# Patient Record
Sex: FEMALE | Race: WHITE | NOT HISPANIC OR LATINO | Employment: OTHER | URBAN - METROPOLITAN AREA
[De-identification: names, ages, dates, MRNs, and addresses within clinical notes are randomized per-mention and may not be internally consistent; named-entity substitution may affect disease eponyms.]

---

## 2017-07-07 ENCOUNTER — TRANSCRIBE ORDERS (OUTPATIENT)
Dept: LAB | Facility: CLINIC | Age: 72
End: 2017-07-07

## 2017-07-07 ENCOUNTER — APPOINTMENT (OUTPATIENT)
Dept: LAB | Facility: CLINIC | Age: 72
End: 2017-07-07
Payer: MEDICARE

## 2017-07-07 ENCOUNTER — GENERIC CONVERSION - ENCOUNTER (OUTPATIENT)
Dept: OTHER | Facility: OTHER | Age: 72
End: 2017-07-07

## 2017-07-07 DIAGNOSIS — E55.9 VITAMIN D DEFICIENCY: ICD-10-CM

## 2017-07-07 LAB — 25(OH)D3 SERPL-MCNC: 34.9 NG/ML (ref 30–100)

## 2017-07-07 PROCEDURE — 82306 VITAMIN D 25 HYDROXY: CPT

## 2017-07-07 PROCEDURE — 36415 COLL VENOUS BLD VENIPUNCTURE: CPT

## 2017-07-13 ENCOUNTER — ALLSCRIPTS OFFICE VISIT (OUTPATIENT)
Dept: OTHER | Facility: OTHER | Age: 72
End: 2017-07-13

## 2017-07-13 DIAGNOSIS — Z78.0 ASYMPTOMATIC MENOPAUSAL STATE: ICD-10-CM

## 2017-12-11 DIAGNOSIS — E55.9 VITAMIN D DEFICIENCY: ICD-10-CM

## 2017-12-11 DIAGNOSIS — D69.6 THROMBOCYTOPENIA (HCC): ICD-10-CM

## 2017-12-11 DIAGNOSIS — R73.01 IMPAIRED FASTING GLUCOSE: ICD-10-CM

## 2017-12-11 DIAGNOSIS — Z79.899 OTHER LONG TERM (CURRENT) DRUG THERAPY: ICD-10-CM

## 2017-12-11 DIAGNOSIS — E07.9 DISORDER OF THYROID: ICD-10-CM

## 2017-12-22 ENCOUNTER — GENERIC CONVERSION - ENCOUNTER (OUTPATIENT)
Dept: OTHER | Facility: OTHER | Age: 72
End: 2017-12-22

## 2017-12-22 ENCOUNTER — APPOINTMENT (OUTPATIENT)
Dept: LAB | Facility: CLINIC | Age: 72
End: 2017-12-22
Payer: MEDICARE

## 2017-12-22 DIAGNOSIS — E07.9 DISORDER OF THYROID: ICD-10-CM

## 2017-12-22 DIAGNOSIS — E55.9 VITAMIN D DEFICIENCY: ICD-10-CM

## 2017-12-22 DIAGNOSIS — D69.6 THROMBOCYTOPENIA (HCC): ICD-10-CM

## 2017-12-22 DIAGNOSIS — Z79.899 OTHER LONG TERM (CURRENT) DRUG THERAPY: ICD-10-CM

## 2017-12-22 DIAGNOSIS — R73.01 IMPAIRED FASTING GLUCOSE: ICD-10-CM

## 2017-12-22 LAB
25(OH)D3 SERPL-MCNC: 26.9 NG/ML (ref 30–100)
ALBUMIN SERPL BCP-MCNC: 3.6 G/DL (ref 3.5–5)
ALP SERPL-CCNC: 68 U/L (ref 46–116)
ALT SERPL W P-5'-P-CCNC: 26 U/L (ref 12–78)
ANION GAP SERPL CALCULATED.3IONS-SCNC: 4 MMOL/L (ref 4–13)
AST SERPL W P-5'-P-CCNC: 26 U/L (ref 5–45)
BASOPHILS # BLD AUTO: 0.02 THOUSANDS/ΜL (ref 0–0.1)
BASOPHILS NFR BLD AUTO: 1 % (ref 0–1)
BILIRUB SERPL-MCNC: 0.54 MG/DL (ref 0.2–1)
BUN SERPL-MCNC: 12 MG/DL (ref 5–25)
CALCIUM SERPL-MCNC: 8.7 MG/DL (ref 8.3–10.1)
CHLORIDE SERPL-SCNC: 107 MMOL/L (ref 100–108)
CHOLEST SERPL-MCNC: 154 MG/DL (ref 50–200)
CO2 SERPL-SCNC: 29 MMOL/L (ref 21–32)
CREAT SERPL-MCNC: 0.65 MG/DL (ref 0.6–1.3)
EOSINOPHIL # BLD AUTO: 0.11 THOUSAND/ΜL (ref 0–0.61)
EOSINOPHIL NFR BLD AUTO: 3 % (ref 0–6)
ERYTHROCYTE [DISTWIDTH] IN BLOOD BY AUTOMATED COUNT: 12.9 % (ref 11.6–15.1)
GFR SERPL CREATININE-BSD FRML MDRD: 88 ML/MIN/1.73SQ M
GLUCOSE P FAST SERPL-MCNC: 99 MG/DL (ref 65–99)
HCT VFR BLD AUTO: 45.9 % (ref 34.8–46.1)
HDLC SERPL-MCNC: 64 MG/DL (ref 40–60)
HGB BLD-MCNC: 15.4 G/DL (ref 11.5–15.4)
LDLC SERPL CALC-MCNC: 65 MG/DL (ref 0–100)
LYMPHOCYTES # BLD AUTO: 1.56 THOUSANDS/ΜL (ref 0.6–4.47)
LYMPHOCYTES NFR BLD AUTO: 35 % (ref 14–44)
MCH RBC QN AUTO: 32 PG (ref 26.8–34.3)
MCHC RBC AUTO-ENTMCNC: 33.6 G/DL (ref 31.4–37.4)
MCV RBC AUTO: 95 FL (ref 82–98)
MONOCYTES # BLD AUTO: 0.43 THOUSAND/ΜL (ref 0.17–1.22)
MONOCYTES NFR BLD AUTO: 10 % (ref 4–12)
NEUTROPHILS # BLD AUTO: 2.31 THOUSANDS/ΜL (ref 1.85–7.62)
NEUTS SEG NFR BLD AUTO: 51 % (ref 43–75)
NRBC BLD AUTO-RTO: 0 /100 WBCS
PLATELET # BLD AUTO: 142 THOUSANDS/UL (ref 149–390)
PMV BLD AUTO: 10.6 FL (ref 8.9–12.7)
POTASSIUM SERPL-SCNC: 4.1 MMOL/L (ref 3.5–5.3)
PROT SERPL-MCNC: 6.6 G/DL (ref 6.4–8.2)
RBC # BLD AUTO: 4.81 MILLION/UL (ref 3.81–5.12)
SODIUM SERPL-SCNC: 140 MMOL/L (ref 136–145)
TRIGL SERPL-MCNC: 123 MG/DL
TSH SERPL DL<=0.05 MIU/L-ACNC: 0.27 UIU/ML (ref 0.36–3.74)
WBC # BLD AUTO: 4.44 THOUSAND/UL (ref 4.31–10.16)

## 2017-12-22 PROCEDURE — 85025 COMPLETE CBC W/AUTO DIFF WBC: CPT

## 2017-12-22 PROCEDURE — 82306 VITAMIN D 25 HYDROXY: CPT

## 2017-12-22 PROCEDURE — 80053 COMPREHEN METABOLIC PANEL: CPT

## 2017-12-22 PROCEDURE — 84443 ASSAY THYROID STIM HORMONE: CPT

## 2017-12-22 PROCEDURE — 36415 COLL VENOUS BLD VENIPUNCTURE: CPT

## 2017-12-22 PROCEDURE — 80061 LIPID PANEL: CPT

## 2017-12-28 ENCOUNTER — ALLSCRIPTS OFFICE VISIT (OUTPATIENT)
Dept: OTHER | Facility: OTHER | Age: 72
End: 2017-12-28

## 2017-12-29 NOTE — PROGRESS NOTES
Assessment   1  Thrombocytopenia (287 5) (D69 6)   2  Impaired fasting glucose (790 21) (R73 01)   3  Vitamin D deficiency (268 9) (E55 9)   4  Current every day smoker (305 1) (F17 200)   5  Elevated blood pressure reading (796 2) (R03 0)    Plan   Anxiety disorder    · ALPRAZolam 0 25 MG Oral Tablet (Xanax); Take 1 tablet twice daily prn anxiety  Dense breasts, Encounter for screening mammogram for malignant neoplasm of breast    · MAMMO SCREENING BILATERAL W 3D & CAD; Status:Hold For - Scheduling; Requested    for:89Tft1257;   Elevated blood pressure reading    · Begin a limited exercise program ; Status:Complete;   Done: 46XUZ4517   · Follow-up visit in 1 month Evaluation and Treatment  Follow-up  Status: Hold For -    Scheduling  Requested for: 57Tix8844  SocHx: Current every day smoker    · You need to quit smoking ; Status:Complete;   Done: 18MUW3295  Thyroid disorder    · (1) FREE T3; Status:Active; Requested for:60Adw0907;    · (1) T4, FREE; Status:Active; Requested for:43Qos1426;    · (1) TSH; Status:Active; Requested for:79Uyf8363;     Discussion/Summary      Vitamin D deficiency - level is down to 26 9; she will increase her vitamin D from 2000 units a day to 3000 a day  - platelets are up from 123 to 142 fasting glucose - fasting sugar is borderline at 99 blood pressure - will recheck in 1 month with drinking less caffeine  Chief Complaint   f/u lab work discuss thyroid level and blood pressure check [facility]      History of Present Illness   her anxiety has been acting up  The patient is being seen for follow-up of vitamin D deficiency  Disease type: vitamin D deficiency  Current treatment includes vitamin D3 (cholecalciferol)  Symptoms:  fatigue  Review of Systems        Constitutional: No fever, no chills, feels well, no tiredness, no recent weight gain or weight loss  Respiratory: No complaints of shortness of breath, no wheezing, no cough, no SOB on exertion, no orthopnea, no PND  Active Problems   1  Allergic rhinitis (477 9) (J30 9)   2  Anxiety disorder (300 00) (F41 9)   3  Chronic obstructive pulmonary disease (496) (J44 9)   4  Dense breasts (793 82) (R92 2)   5  Encounter for screening for malignant neoplasm of colon (V76 51) (Z12 11)   6  Encounter for screening mammogram for malignant neoplasm of breast (V76 12)     (Z12 31)   7  Impaired fasting glucose (790 21) (R73 01)   8  Long term use of drug (V58 69) (Z79 899)   9  Lyme disease (088 81) (A69 20)   10  Nicotine dependence (305 1) (F17 200)   11  Postmenopausal (V49 81) (Z78 0)   12  Swelling of right eyelid (374 82) (H02 843)   13  Thrombocytopenia (287 5) (D69 6)   14  Thyroid disorder (246 9) (E07 9)   15  Vitamin D deficiency (268 9) (E55 9)   16  Well adult on routine health check (V70 0) (Z00 00)    Past Medical History   1  History of Abdominal pain, RLQ (right lower quadrant) (789 03) (R10 31)   2  History of Acute atopic conjunctivitis, unspecified laterality (372 05) (H10 10)   3  History of Acute Bronchitis With Bronchospasm (466 0)   4  History of Blood pressure elevated (401 9) (I10)   5  History of Cellulitis (682 9) (L03 90)   6  History of Contusion Of The Elbow With Intact Skin Surface (923 11)   7  Cough (786 2) (R05)   8  History of acute pharyngitis (V12 69) (Z87 09)   9  History of Impacted cerumen, unspecified laterality (380 4) (H61 20)   10  Influenza vaccine needed (V04 81) (Z23)   11  Influenza vaccine needed (V04 81) (Z23)   12  Initial Medicare annual wellness visit (V70 0) (Z00 00)   13  Medicare annual wellness visit, subsequent (V70 0) (Z00 00)   14  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)   15  History of Open Wound Of The Auricle (872 01)   16  Otitis media, unspecified laterality   17  History of Otitis media, unspecified laterality    Surgical History   1  History of Appendectomy   2  History of Tonsillectomy    Family History   Mother    1   Family history of lung cancer (V16 1) (Z80 1)    Social History    · Current every day smoker (305 1) (F17 200)  The social history was reviewed and updated today  Current Meds    1  ALPRAZolam 0 25 MG Oral Tablet; Take 1 tablet twice daily prn anxiety; Therapy: 43XDC8128 to (Evaluate:81Pqp8688)  Requested for: 66YHY1852; Last     Rx:95Dmy4497 Ordered   2  Calcium 600+D Plus Minerals 600-400 MG-UNIT Oral Tablet; Take 1 tablet daily as     directed Recorded   3  Multivitamin TABS; Take 1 tablet daily Recorded   4  Vitamin D3 2000 UNIT Oral Tablet; Therapy: (Recorded:72Tdq4435) to Recorded    Allergies   1  Erythromycin TABS    Vitals   Vital Signs    Recorded: 31Qgk4042 10:26AM Recorded: 07JSW9040 10:10AM   Temperature  97 2 F   Heart Rate  86   Respiration  18   Systolic 350 187   Diastolic 82 90   Height  5 ft 4 in   Weight  117 lb    BMI Calculated  20 08   BSA Calculated  1 56     Physical Exam        Constitutional      General appearance: No acute distress, well appearing and well nourished  Ears, Nose, Mouth, and Throat      External inspection of ears and nose: Normal        Otoscopic examination: Tympanic membranes translucent with normal light reflex  Canals patent without erythema  Oropharynx: Normal with no erythema, edema, exudate or lesions  Pulmonary      Auscultation of lungs: Clear to auscultation  no rales or crackles were heard bilaterally  no rhonchi  no friction rub  no wheezing  Cardiovascular      Auscultation of heart: Normal rate and rhythm, normal S1 and S2, without murmurs         Musculoskeletal      Gait and station: Normal           Results/Data   (1) VITAMIN D 25-HYDROXY 31Slu6480 07:59AM Saima Green Order Number: [id number]      Test Name Result Flag Reference   VIT D 25-HYDROX 26 9 ng/mL L 30 0-100 0   This assay is a certified procedure of the CDC Vitamin D Standardization Certification Program (VDSCP)           Deficiency <20ng/ml Insufficiency 20-30ng/ml      Sufficient  ng/ml           *Patients undergoing fluorescein dye angiography may retain small amounts of fluorescein in the body for 48-72 hours post procedure  Samples containing fluorescein can produce falsely elevated Vitamin D values  If the patient had this procedure, a specimen should be resubmitted post fluorescein clearance  (1) CBC/PLT/DIFF 18Icd4966 07:59AM Maybelle Gowers Order Number: [id number]      Test Name Result Flag Reference   WBC COUNT 4 44 Thousand/uL  4 31-10 16   RBC COUNT 4 81 Million/uL  3 81-5 12   HEMOGLOBIN 15 4 g/dL  11 5-15 4   HEMATOCRIT 45 9 %  34 8-46  1   MCV 95 fL  82-98   MCH 32 0 pg  26 8-34 3   MCHC 33 6 g/dL  31 4-37 4   RDW 12 9 %  11 6-15 1   MPV 10 6 fL  8 9-12 7   PLATELET COUNT 487 Thousands/uL L 149-390   nRBC AUTOMATED 0 /100 WBCs     NEUTROPHILS RELATIVE PERCENT 51 %  43-75   LYMPHOCYTES RELATIVE PERCENT 35 %  14-44   MONOCYTES RELATIVE PERCENT 10 %  4-12   EOSINOPHILS RELATIVE PERCENT 3 %  0-6   BASOPHILS RELATIVE PERCENT 1 %  0-1   NEUTROPHILS ABSOLUTE COUNT 2 31 Thousands/? ??L  1 85-7 62   LYMPHOCYTES ABSOLUTE COUNT 1 56 Thousands/? ??L  0 60-4 47   MONOCYTES ABSOLUTE COUNT 0 43 Thousand/? ??L  0 17-1 22   EOSINOPHILS ABSOLUTE COUNT 0 11 Thousand/? ??L  0 00-0 61   BASOPHILS ABSOLUTE COUNT 0 02 Thousands/? ??L  0 00-0 10      (1) COMPREHENSIVE METABOLIC PANEL 85TUP1853 56:41PQ Maybelle Gowers Order Number: [id number]      Test Name Result Flag Reference   SODIUM 140 mmol/L  136-145   POTASSIUM 4 1 mmol/L  3 5-5 3   CHLORIDE 107 mmol/L  100-108   CARBON DIOXIDE 29 mmol/L  21-32   ANION GAP (CALC) 4 mmol/L  4-13   BLOOD UREA NITROGEN 12 mg/dL  5-25   CREATININE 0 65 mg/dL  0 60-1 30   Standardized to IDMS reference method   CALCIUM 8 7 mg/dL  8 3-10 1   BILI, TOTAL 0 54 mg/dL  0 20-1 00   ALK PHOSPHATAS 68 U/L     ALT (SGPT) 26 U/L  12-78   Specimen collection should occur prior to Sulfasalazine and/or Sulfapyridine administration due to the potential for falsely depressed results  AST(SGOT) 26 U/L  5-45   Specimen collection should occur prior to Sulfasalazine administration due to the potential for falsely depressed results  ALBUMIN 3 6 g/dL  3 5-5 0   TOTAL PROTEIN 6 6 g/dL  6 4-8 2   eGFR 88 ml/min/1 73sq m     National Kidney Disease Education Program recommendations are as follows:     GFR calculation is accurate only with a steady state creatinine     Chronic Kidney disease less than 60 ml/min/1 73 sq  meters     Kidney failure less than 15 ml/min/1 73 sq  meters  GLUCOSE FASTING 99 mg/dL  65-99   Specimen collection should occur prior to Sulfasalazine administration due to the potential for falsely depressed results  Specimen collection should occur prior to Sulfapyridine administration due to the potential for falsely elevated results  (1) TSH 22Dec2017 07:59AM Bitly Order Number: [id number]      Test Name Result Flag Reference   TSH 0 272 uIU/mL L 0 358-3 740   Patients undergoing fluorescein dye angiography may retain small amounts of fluorescein in the body for 48-72 hours post procedure  Samples containing fluorescein can produce falsely depressed TSH values  If the patient had this procedure,a specimen should be resubmitted post fluorescein clearance                           The recommended reference ranges for TSH during pregnancy are as follows:     First trimester 0 1 to 2 5 uIU/mL     Second trimester  0 2 to 3 0 uIU/mL     Third trimester 0 3 to 3 0 uIU/m      (1) LIPID PANEL FASTING W DIRECT LDL REFLEX 22Dec2017 07:59AM Bitly Order Number: [id number]      Test Name Result Flag Reference   CHOLESTEROL 154 mg/dL     LDL CHOLESTEROL CALCULATED 65 mg/dL  0-100   Triglyceride:           Normal <150 mg/dl      Borderline High 150-199 mg/dl      High 200-499 mg/dl      Very High >499 mg/dl               Cholesterol:          Desirable <200 mg/dl       Borderline High 200-239 mg/dl       High >239 mg/dl               HDL Cholesterol:          High>59 mg/dL       Low <41 mg/dL               HDL Cholesterol:          High>59 mg/dL       Low <41 mg/dL               This screening LDL is a calculated result  It does not have the accuracy of the Direct Measured LDL in the monitoring of patients with hyperlipidemia and/or statin therapy  Direct Measure LDL (UOR664) must be ordered separately in these patients  TRIGLYCERIDES 123 mg/dL  <=150   Specimen collection should occur prior to N-Acetylcysteine or Metamizole administration due to the potential for falsely depressed results  HDL,DIRECT 64 mg/dL H 40-60   Specimen collection should occur prior to Metamizole administration due to the potential for falsley depressed results  Health Management   History of Screening for genitourinary condition   *VB - Urinary Incontinence Screen (Dx Z13 89 Screen for UI); every 1 year; Last 12LHW4949; Next    Due: 24Tjp4560;  Active    Future Appointments      Date/Time Provider Specialty Site   02/06/2018 11:45 AM Ricky Zuniga DO Family Medicine [facility]     Signatures    Electronically signed by : Carmelita Shukla DO; Dec 28 2017 10:37AM EST                       (Author)

## 2018-01-10 NOTE — PROGRESS NOTES
Assessment    1  Initial Medicare annual wellness visit (V70 0) (Z00 00)    Plan   Allergic rhinitis    · Nasacort Allergy 24HR 55 MCG/ACT Nasal Aerosol; INSTILL 1 SQUIRT Every 2  hours  Cough    · * XR CHEST PA & LATERAL; Status:Active; Requested WVI:44POK6554;   Impaired fasting glucose, Thrombocytopenia, Vitamin D deficiency    · (1) CBC/PLT/DIFF; Status:Active; Requested for:75Pre9764;    · (1) COMPREHENSIVE METABOLIC PANEL; Status:Active; Requested for:26Sau2465;    · (1) HEMOGLOBIN A1C; Status:Active; Requested for:15Izd6595;    · (1) VITAMIN D 25-HYDROXY; Status:Active; Requested for:09Btp8762;    · Follow-up visit in 6 months Evaluation and Treatment  Follow-up  Status: Complete -  Scheduling  Done: 14ZHP0432 10:33AM  Screening for genitourinary condition    · *VB-Urinary Incontinence Screen (Dx V81 6 Screen for UI); Status:Complete;   Done:  36VZE9577 12:00AM   · *VB-Urinary Incontinence Screen (Dx V81 6 Screen for UI) ; every 1 year; Last 21Jun2016;  Next 21Jun2017; Status:Active  SocHx: Current every day smoker    · You need to quit smoking ; Status:Complete;   Done: 01JEY4709    Dulera 100-5 MCG/ACT Inhalation Aerosol; INHALE 2 PUFFS AT 12 HOUR INTERVALS (MORNING AND EVENING); Therapy: 21Jun2016 to (Last Rx:21Jun2016); Status: ACTIVE Ordered  Rx By: Roseann Mueller; Dispense: 0 Days ; #:2 X 13 GM Inhaler; Refill: 0;   For: Chronic obstructive pulmonary disease; MARSHALL = N; Dispense Sample     Discussion/Summary    Care plan given  Impression: with preventive exam as well as age and risk appropriate counseling completed  Cardiovascular screening and counseling: the risks and benefits of screening were discussed and due for EKG  Diabetes screening and counseling: screening is current  Colorectal cancer screening and counseling: the risks and benefits of screening were discussed and the patient declines screening     Breast cancer screening and counseling: screening is current and Had mammogram earlier this year, task requesting records done  Cervical cancer screening and counseling: screening not indicated  Osteoporosis screening and counseling: the risks and benefits of screening were discussed and the patient declines screening  Abdominal aortic aneurysm screening and counseling: screening not indicated  Glaucoma screening and counseling: the risks and benefits of screening were discussed and ophthalmologist referral    HIV screening and counseling: screening not indicated  Immunizations: influenza vaccine is up to date this year, the risks and benefits of pneumococcal vaccination were discussed with the patient, Prevnar 13 recommended, hepatitis B vaccination series is not indicated at this time due to the patient's low risk of sherita the disease, the risks and benefits of the Zostavax vaccine were discussed with the patient and Shingles vaccine recommended  Advance Directive Planning: not complete, paperwork and instructions were given to the patient, she was encouraged to follow-up with me to discuss her questions and/or decisions  Patient Discussion: plan discussed with the patient, follow-up visit needed in 6 months  Chief Complaint  Wellness exam      History of Present Illness  The patient is being seen for the initial annual wellness visit  Medicare Screening and Risk Factors   Hospitalizations: no previous hospitalizations  Once per lifetime medicare screening tests: ECG has not been done  Medicare Screening Tests Risk Questions   Abdominal aortic aneurysm risk assessment: none indicated  Osteoporosis risk assessment: , female gender and over 48years of age  HIV risk assessment: none indicated  Drug and Alcohol Use: The patient currently smokes 1 packs per day  She is ready to quit using tobacco  Alcohol concern:   The patient has no concerns about alcohol abuse  She has never used illicit drugs     Diet and Physical Activity: Current diet includes well balanced meals, 2 servings of fruit per day, 2 servings of vegetables per day, 2 servings of meat per day, 3 servings of simple carbohydrates per day, 1 servings of dairy products per day, 2 cups of coffee per day and 1 cans of regular soda per day  Exercise: walking, stretching 10 hours per week  Mood Disorder and Cognitive Impairment Screening: Anxiety screening has history of anxiety  She denies feeling down, depressed, or hopeless over the past two weeks  She denies feeling little interest or pleasure in doing things over the past two weeks  Cognitive impairment screening: denies difficulty learning/retaining new information, denies difficulty handling complex tasks, denies difficulty with reasoning, denies difficulty with spatial ability and orientation, denies difficulty with language and denies difficulty with behavior  Functional Ability/Level of Safety: Hearing is normal bilaterally, normal in the right ear, normal in the left ear and a hearing aid is not used  The patient is currently able to do activities of daily living without limitations, able to do instrumental activities of daily living without limitations, able to participate in social activities without limitations and able to drive without limitations  Activities of daily living details: does not need help using the phone, no transportation help needed, does not need help shopping, no meal preparation help needed, does not need help doing housework, does not need help doing laundry, does not need help managing medications and does not need help managing money  Fall risk factors: The patient fell 0 times in the past 12 months  Home safety risk factors:  no handrails on the stairs, but no unfamiliar surroundings, no loose rugs, no poor household lighting, no uneven floors, no household clutter and grab bars in the bathroom     Advance Directives: Advance directives: no living will, no durable power of  for health care directives and no advance directives  end of life decisions were reviewed with the patient and I agree with the patient's decisions  Co-Managers and Medical Equipment/Suppliers: See Patient Care Team     The patient currently has no urinary incontinence symptoms  Patient Care Team    Care Team Member Role Specialty Office Number   Tom Manning MD Specialist Ophthalmology [phone]   [place], [address] [phone]     Active Problems    1  Allergic rhinitis (477 9) (J30 9)   2  Anxiety disorder (300 00) (F41 9)   3  Chronic obstructive pulmonary disease (496) (J44 9)   4  Cough (786 2) (R05)   5  Encounter for screening for malignant neoplasm of colon (V76 51) (Z12 11)   6  Encounter for screening mammogram for malignant neoplasm of breast (V76 12)   (Z12 31)   7  Impaired fasting glucose (790 21) (R73 01)   8  Long term use of drug (V58 69) (Z79 899)   9  Lyme disease (088 81) (A69 20)   10  Nicotine dependence (305 1) (F17 200)   11  Thrombocytopenia (287 5) (D69 6)   12  Thyroid disorder (246 9) (E07 9)   13  Vitamin D deficiency (268 9) (E55 9)   14  Well adult on routine health check (V70 0) (Z00 00)    Past Medical History    1  History of Abdominal pain, RLQ (right lower quadrant) (789 03) (R10 31)   2  History of Acute atopic conjunctivitis, unspecified laterality (372 05) (H10 10)   3  History of Acute Bronchitis With Bronchospasm (466 0)   4  History of Blood pressure elevated (401 9) (I10)   5  History of Cellulitis (682 9) (L03 90)   6  History of Contusion Of The Elbow With Intact Skin Surface (923 11)   7  History of acute pharyngitis (V12 69) (Z87 09)   8  History of Impacted cerumen, unspecified laterality (380 4) (H61 20)   9  Influenza vaccine needed (V04 81) (Z23)   10  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)   11  History of Open Wound Of The Auricle (872 01)   12  Otitis media, unspecified laterality   13   History of Otitis media, unspecified laterality    Surgical History    1  History of Appendectomy   2  History of Tonsillectomy    Family History  Mother    1  Family history of lung cancer (V16 1) (Z80 1)    Social History    · Current every day smoker (305 1) (F17 200)    Current Meds   1  ALPRAZolam 0 25 MG Oral Tablet; Take 1 tablet twice daily prn anxiety; Therapy: 06BBN3943 to (Evaluate:49Hvq3752)  Requested for: 20ZOO8974; Last   Rx:52Axo1207 Ordered   2  Calcium 600+D Plus Minerals 600-400 MG-UNIT Oral Tablet; Take 1 tablet daily as   directed Recorded   3  Multivitamin TABS; Take 1 tablet daily Recorded   4  ProAir  (90 Base) MCG/ACT Inhalation Aerosol Solution; 2 puffs Q4 hours prn   SOB/wheeze; Therapy: 62HNP6094 to (Last Rx:31Vcc7409)  Requested for: 96QEJ7727 Ordered   5  Vitamin D3 1000 UNIT Oral Tablet; TAKE AS DIRECTED; Therapy: 55VBO1496 to (Last Rx:68Afy8746) Ordered    Allergies    1  Erythromycin TABS    Immunizations  Influenza --- Sayda Crews: 19Sxh2409Trjeck Nan: 60BQY3154; Juma Yaude: 71SUA7841   PNEUMO (POLY) --- Sayda Crews: 08YKF2088   Td/DT --- Series1: 57Gyj2716     Vitals  Signs [Data Includes: Current Encounter]   Recorded: 05WJY1655 57:76MT   Systolic: 225  Diastolic: 85  Recorded: 34AJJ8171 09:50AM   Temperature: 96 9 F  Heart Rate: 100  Respiration: 16  Systolic: 212  Diastolic: 78  Height: 5 ft 4 in  Weight: 111 lb   BMI Calculated: 19 05  BSA Calculated: 1 53    Results/Data  Encounter Results   PHQ-2 Adult Depression Screening 21Jun2016 10:44AM User, SPARQCode     Test Name Result Flag Reference   PHQ-2 Adult Depression Score 0     Over the last two weeks, how often have you been bothered by any of the following problems?   Little interest or pleasure in doing things: Not at all - 0  Feeling down, depressed, or hopeless: Not at all - 0   PHQ-2 Adult Depression Screening Negative       Falls Risk Assessment (Dx V80 09 Screen for Neurologic Disorder) 21Jun2016 10:44AM User, SPARQCode     Test Name Result Flag Reference   Falls Risk      No falls in the past year     *VB-Urinary Incontinence Screen (Dx V81 6 Screen for UI) 21Jun2016 12:00AM Arnav Sofia     Test Name Result Flag Reference   Urinary Incontinence Assessment 21Jun2016         Health Management  Screening for genitourinary condition   *VB-Urinary Incontinence Screen (Dx V81 6 Screen for UI); every 1 year; Last 21Jun2016; Next  Due: 21Jun2017;  Active    Future Appointments    Date/Time Provider Specialty Site   12/21/2016 10:30 AM Arnav Sofia DO Family Medicine [facility]     Signatures   Electronically signed by : Tanya Plasencia DO; Jun 21 2016  4:56PM EST                       (Author)

## 2018-01-10 NOTE — RESULT NOTES
Discussion/Summary   Appointment 7/13/17   Please print and put in my folder  Dr Raine Francois      Verified Results  (1) VITAMIN D 25-HYDROXY 34ZDR4382 10:59AM Brannon Lundberg Order Number: [id number]     Test Name Result Flag Reference   VIT D 25-HYDROX 34 9 ng/mL  30 0-100 0   This assay is a certified procedure of the CDC Vitamin D Standardization Certification Program (VDSCP)     Deficiency <20ng/ml   Insufficiency 20-30ng/ml   Sufficient  ng/ml     *Patients undergoing fluorescein dye angiography may retain small amounts of fluorescein in the body for 48-72 hours post procedure  Samples containing fluorescein can produce falsely elevated Vitamin D values  If the patient had this procedure, a specimen should be resubmitted post fluorescein clearance

## 2018-01-11 NOTE — RESULT NOTES
Message   Appointment 6/21/16   Please print and put in my folder   Dr Olimpia Turner     Verified Results  (1) COMPREHENSIVE METABOLIC PANEL [id number] 07:30[facility]     Test Name Result Flag Reference   GLUCOSE,RANDM 96 mg/dL     If the patient is fasting, the ADA then defines impaired fasting glucose as > 100 mg/dL and diabetes as > or equal to 123 mg/dL  SODIUM 136 mmol/L  136-145   POTASSIUM 4 3 mmol/L  3 5-5 3   CHLORIDE 105 mmol/L  100-108   CARBON DIOXIDE 29 mmol/L  21-32   ANION GAP (CALC) 2 mmol/L L 4-13   BLOOD UREA NITROGEN 11 mg/dL  5-25   CREATININE 0 70 mg/dL  0 60-1 30   Standardized to IDMS reference method   CALCIUM 9 0 mg/dL  8 3-10 1   BILI, TOTAL 0 48 mg/dL  0 20-1 00   ALK PHOSPHATAS 75 U/L     ALT (SGPT) 19 U/L  12-78   AST(SGOT) 19 U/L  5-45   ALBUMIN 3 8 g/dL  3 5-5 0   TOTAL PROTEIN 6 9 g/dL  6 4-8 2   eGFR Non-African American      >60 0 ml/min/1 73sq [facility] Disease Education Program recommendations are as follows:  GFR calculation is accurate only with a steady state creatinine  Chronic Kidney disease less than 60 ml/min/1 73 sq  meters  Kidney failure less than 15 ml/min/1 73 sq  meters  (1) CBC/PLT/DIFF [id number] 08:02AM [facility]     Test Name Result Flag Reference   WBC COUNT 5 49 Thousand/uL  4 31-10 16   RBC COUNT 4 80 Million/uL  3 81-5 12   HEMOGLOBIN 15 4 g/dL  11 5-15 4   HEMATOCRIT 47 1 % H 34 8-46  1   MCV 98 fL  82-98   MCH 32 1 pg  26 8-34 3   MCHC 32 7 g/dL  31 4-37 4   RDW 13 0 %  11 6-15 1   MPV 10 6 fL  8 9-12 7   PLATELET COUNT 031 Thousands/uL L 149-390   nRBC AUTOMATED 0 /100 WBCs     NEUTROPHILS RELATIVE PERCENT 51 %  43-75   LYMPHOCYTES RELATIVE PERCENT 37 %  14-44   MONOCYTES RELATIVE PERCENT 9 %  4-12   EOSINOPHILS RELATIVE PERCENT 2 %  0-6   BASOPHILS RELATIVE PERCENT 1 %  0-1   NEUTROPHILS ABSOLUTE COUNT 2 75 Thousands/?L  1 85-7 62   LYMPHOCYTES ABSOLUTE COUNT 2 04 Thousands/?L  0 60-4 47   MONOCYTES ABSOLUTE COUNT 0 51 Thousand/?L  0 17-1 22   EOSINOPHILS ABSOLUTE COUNT 0 13 Thousand/?L  0 00-0 61   BASOPHILS ABSOLUTE COUNT 0 05 Thousands/?L  0 00-0 10     (1) VITAMIN D 25-HYDROXY [id number] 08:02AM Ora Dill     Test Name Result Flag Reference   VIT D 25-HYDROX 27 3 ng/mL L 30 0-100 0   This assay is a certified procedure of the CDC Vitamin D Standardization Certification Program (VDSCP)    Deficiency <20ng/ml  Insufficiency 20-30ng/ml  Sufficient  ng/ml    *Patients undergoing fluorescein dye angiography may retain small amounts of fluorescein in the body for 48-72 hours post procedure  Samples containing fluorescein can produce falsely elevated Vitamin D values  If the patient had this procedure, a specimen should be resubmitted post fluorescein clearance

## 2018-01-11 NOTE — RESULT NOTES
Message   Appointment 12/21/16   Please print and put in my folder  Dr Tahmina Nuñez      Verified Results  (1) CBC/PLT/DIFF [id number] 08:24AM Alvina Fill Order Number: [id number]     Test Name Result Flag Reference   WBC COUNT 4 47 Thousand/uL  4 31-10 16   RBC COUNT 4 37 Million/uL  3 81-5 12   HEMOGLOBIN 13 7 g/dL  11 5-15 4   HEMATOCRIT 42 4 %  34 8-46  1   MCV 97 fL  82-98   MCH 31 4 pg  26 8-34 3   MCHC 32 3 g/dL  31 4-37 4   RDW 12 3 %  11 6-15 1   MPV 10 4 fL  8 9-12 7   PLATELET COUNT 822 Thousands/uL L 149-390   nRBC AUTOMATED 0 /100 WBCs     NEUTROPHILS RELATIVE PERCENT 63 %  43-75   LYMPHOCYTES RELATIVE PERCENT 24 %  14-44   MONOCYTES RELATIVE PERCENT 10 %  4-12   EOSINOPHILS RELATIVE PERCENT 3 %  0-6   BASOPHILS RELATIVE PERCENT 0 %  0-1   NEUTROPHILS ABSOLUTE COUNT 2 80 Thousands/?L  1 85-7 62   LYMPHOCYTES ABSOLUTE COUNT 1 05 Thousands/?L  0 60-4 47   MONOCYTES ABSOLUTE COUNT 0 46 Thousand/?L  0 17-1 22   EOSINOPHILS ABSOLUTE COUNT 0 13 Thousand/?L  0 00-0 61   BASOPHILS ABSOLUTE COUNT 0 02 Thousands/?L  0 00-0 10     (1) VITAMIN D 25-HYDROXY [id number] 08:24AM Alvina Fill Order Number: [id number]     Test Name Result Flag Reference   VIT D 25-HYDROX 25 6 ng/mL L 30 0-100 0   This assay is a certified procedure of the CDC Vitamin D Standardization Certification Program (VDSCP)     Deficiency <20ng/ml   Insufficiency 20-30ng/ml   Sufficient  ng/ml     *Patients undergoing fluorescein dye angiography may retain small amounts of fluorescein in the body for 48-72 hours post procedure  Samples containing fluorescein can produce falsely elevated Vitamin D values  If the patient had this procedure, a specimen should be resubmitted post fluorescein clearance       (1) COMPREHENSIVE METABOLIC PANEL [id number] 56:56WZ Alvina Fill Order Number: [id number]     Test Name Result Flag Reference   GLUCOSE,RANDM 93 mg/dL     If the patient is fasting, the ADA then defines impaired fasting glucose as > 100 mg/dL and diabetes as > or equal to 123 mg/dL  SODIUM 143 mmol/L  136-145   POTASSIUM 4 0 mmol/L  3 5-5 3   CHLORIDE 106 mmol/L  100-108   CARBON DIOXIDE 31 mmol/L  21-32   ANION GAP (CALC) 6 mmol/L  4-13   BLOOD UREA NITROGEN 11 mg/dL  5-25   CREATININE 0 74 mg/dL  0 60-1 30   Standardized to IDMS reference method   CALCIUM 8 8 mg/dL  8 3-10 1   BILI, TOTAL 0 36 mg/dL  0 20-1 00   ALK PHOSPHATAS 84 U/L     ALT (SGPT) 23 U/L  12-78   AST(SGOT) 21 U/L  5-45   ALBUMIN 3 3 g/dL L 3 5-5 0   TOTAL PROTEIN 6 5 g/dL  6 4-8 2   eGFR Non-African American      >60 0 ml/min/1 73sq [facility] Disease Education Program recommendations are as follows:  GFR calculation is accurate only with a steady state creatinine  Chronic Kidney disease less than 60 ml/min/1 73 sq  meters  Kidney failure less than 15 ml/min/1 73 sq  meters  (1) HEMOGLOBIN A1C [id number] 08:24AM Sheri Tan Order Number: [id number]     Test Name Result Flag Reference   HEMOGLOBIN A1C 5 3 %  4 2-6 3   EST  AVG   GLUCOSE 105 mg/dl

## 2018-01-12 NOTE — RESULT NOTES
Verified Results  * XR CHEST PA & LATERAL 21Jun2016 10:48AM Gregory Chang Order Number: [id number]     Test Name Result Flag Reference   XR CHEST PA & LATERAL (Report)     CHEST - DUAL ENERGY     INDICATION: Chronic cough  COMPARISON: 2/5/2013     VIEWS: PA (including soft tissue/bone algorithms) and lateral projections; 4 images     FINDINGS:        Cardiomediastinal silhouette appears unremarkable  The lungs are clear  No pneumothorax or pleural effusion  The lungs are hyperinflated  Visualized osseous structures appear within normal limits for the patient's age  IMPRESSION:     No active pulmonary disease  Hyperinflation  Workstation performed: [id number]     Signed by:   Felipe Rdz MD   6/22/16       Discussion/Summary   Christine Villarreal,   Your chest x-ray is clear       Dr Mauricio Reyes

## 2018-01-13 NOTE — RESULT NOTES
Verified Results  *VB-Urinary Incontinence Screen (Dx V81 6 Screen for UI) 21Jun2016 12:00AM Murphy Reyes     Test Name Result Flag Reference   Urinary Incontinence Assessment 21Jun2016         Plan  Allergic rhinitis    · Nasacort Allergy 24HR 55 MCG/ACT Nasal Aerosol; INSTILL 1 SQUIRT Every 2  hours  Chronic obstructive pulmonary disease    · Dulera 100-5 MCG/ACT Inhalation Aerosol; INHALE 2 PUFFS AT 12 HOUR  INTERVALS (MORNING AND EVENING)  Cough    · * XR CHEST PA & LATERAL; Status:Active; Requested RK:[id number];   Impaired fasting glucose, Thrombocytopenia, Vitamin D deficiency    · (1) CBC/PLT/DIFF; Status:Active; Requested for:15Lpf8416;    · (1) COMPREHENSIVE METABOLIC PANEL; Status:Active; Requested for:94Cod7416;    · (1) HEMOGLOBIN A1C; Status:Active; Requested for:65Mot8118;    · (1) VITAMIN D 25-HYDROXY; Status:Active; Requested for:99Tou1279;    · Follow-up visit in 6 months Evaluation and Treatment  Follow-up  Status: Complete -  Scheduling  Done: 41XAC0621 10:33AM  Screening for genitourinary condition    · *VB-Urinary Incontinence Screen (Dx V81 6 Screen for UI); Status:Complete;   Done:  39CMZ2676 12:00AM   · *VB-Urinary Incontinence Screen (Dx V81 6 Screen for UI) ; every 1 year;  Last  21Jun2016; Next 21Jun2017; Status:Active  SocHx: Current every day smoker    · You need to quit smoking ; Status:Complete;   Done: 83ACA1312

## 2018-01-14 VITALS
TEMPERATURE: 98.2 F | RESPIRATION RATE: 20 BRPM | HEIGHT: 64 IN | BODY MASS INDEX: 20.49 KG/M2 | SYSTOLIC BLOOD PRESSURE: 160 MMHG | WEIGHT: 120 LBS | HEART RATE: 102 BPM | DIASTOLIC BLOOD PRESSURE: 110 MMHG

## 2018-01-18 NOTE — PROGRESS NOTES
Assessment    1  Chronic obstructive pulmonary disease (496) (J44 9)   2  Impaired fasting glucose (790 21) (R73 01)   3  Current every day smoker (305 1) (F17 200)   4  Anxiety disorder (300 00) (F41 9)   5  Thrombocytopenia (287 5) (D69 6)   6  Allergic rhinitis (477 9) (J30 9)   7  Cough (786 2) (R05)   8  Initial Medicare annual wellness visit (V70 0) (Z00 00)    Plan   Allergic rhinitis    · Nasacort Allergy 24HR 55 MCG/ACT Nasal Aerosol; INSTILL 1 SQUIRT Every 2  hours  Cough    · * XR CHEST PA & LATERAL; Status:Active; Requested QTM:73BHG8554;   Impaired fasting glucose, Thrombocytopenia, Vitamin D deficiency    · (1) CBC/PLT/DIFF; Status:Active; Requested for:41Bfs9658;    · (1) COMPREHENSIVE METABOLIC PANEL; Status:Active; Requested for:66Ize7053;    · (1) HEMOGLOBIN A1C; Status:Active; Requested for:88Ran6845;    · (1) VITAMIN D 25-HYDROXY; Status:Active; Requested for:73Wbs1773;    · Follow-up visit in 6 months Evaluation and Treatment  Follow-up  Status: Complete -  Scheduling  Done: 56RWV4002 10:33AM  Screening for genitourinary condition    · *VB-Urinary Incontinence Screen (Dx V81 6 Screen for UI); Status:Complete;   Done:  02AEJ4671 12:00AM   · *VB-Urinary Incontinence Screen (Dx V81 6 Screen for UI) ; every 1 year; Last 21Jun2016;  Next 21Jun2017; Status:Active  SocHx: Current every day smoker    · You need to quit smoking ; Status:Complete;   Done: 22ORC4356    Dulera 100-5 MCG/ACT Inhalation Aerosol; INHALE 2 PUFFS AT 12 HOUR INTERVALS (MORNING AND EVENING); Therapy: 21Jun2016 to (Last Rx:21Jun2016); Status: ACTIVE Ordered  Rx By: Esther Maher; Dispense: 0 Days ; #:2 X 13 GM Inhaler; Refill: 0;   For: Chronic obstructive pulmonary disease; MARSHALL = N; Dispense Sample     Discussion/Summary    Impaired fasting glucose - improved, fasting sugar is down to 96  COPD - has a cough now, will check chest x-ray  Samples of dulera given  She can't afford any inhalers at this time     thrombocytopenia - stable, platelets are 303  Vitamin D deficiency - Vitamin D level is 27, which is down from 35  She will restart taking her vitamins  Possible side effects of new medications were reviewed with the patient/guardian today  The treatment plan was reviewed with the patient/guardian  The patient/guardian understands and agrees with the treatment plan      Chief Complaint  F/U review labs      History of Present Illness  She has been outside working in the sun every day so she wasn't taking her vitamin D supplement  She did have her mammogram in February of this year  she is getting light headed when she bends forward for a while then gets back up  She is getting coughing spells  She didn't get the inhaler because it was $53    Her eyes water and burn  Review of Systems    Constitutional: not feeling tired  Cardiovascular: No complaints of slow heart rate, no fast heart rate, no chest pain, no palpitations, no leg claudication, no lower extremity edema  Respiratory: cough, but no wheezing  Active Problems    1  Allergic rhinitis (477 9) (J30 9)   2  Anxiety disorder (300 00) (F41 9)   3  Chronic obstructive pulmonary disease (496) (J44 9)   4  Encounter for screening for malignant neoplasm of colon (V76 51) (Z12 11)   5  Encounter for screening mammogram for malignant neoplasm of breast (V76 12)   (Z12 31)   6  Impaired fasting glucose (790 21) (R73 01)   7  Long term use of drug (V58 69) (Z79 899)   8  Lyme disease (088 81) (A69 20)   9  Nicotine dependence (305 1) (F17 200)   10  Thrombocytopenia (287 5) (D69 6)   11  Thyroid disorder (246 9) (E07 9)   12  Vitamin D deficiency (268 9) (E55 9)   13  Well adult on routine health check (V70 0) (Z00 00)    Past Medical History    1  History of Abdominal pain, RLQ (right lower quadrant) (789 03) (R10 31)   2  History of Acute atopic conjunctivitis, unspecified laterality (372 05) (H10 10)   3  History of Acute Bronchitis With Bronchospasm (466 0)   4  History of Blood pressure elevated (401 9) (I10)   5  History of Cellulitis (682 9) (L03 90)   6  History of Contusion Of The Elbow With Intact Skin Surface (923 11)   7  History of acute pharyngitis (V12 69) (Z87 09)   8  History of Impacted cerumen, unspecified laterality (380 4) (H61 20)   9  Influenza vaccine needed (V04 81) (Z23)   10  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)   11  History of Open Wound Of The Auricle (872 01)   12  Otitis media, unspecified laterality   13  History of Otitis media, unspecified laterality    Surgical History    1  History of Appendectomy   2  History of Tonsillectomy    Family History  Mother    1  Family history of lung cancer (V16 1) (Z80 1)    Social History    · Current every day smoker (305 1) (F17 200)  The social history was reviewed and updated today  Current Meds   1  ALPRAZolam 0 25 MG Oral Tablet; Take 1 tablet twice daily prn anxiety; Therapy: 40JWQ9487 to (Evaluate:63Rcy9107)  Requested for: 67PKH8152; Last   Rx:84Urp5960 Ordered   2  Calcium 600+D Plus Minerals 600-400 MG-UNIT Oral Tablet; Take 1 tablet daily as   directed Recorded   3  Multivitamin TABS; Take 1 tablet daily Recorded   4  ProAir  (90 Base) MCG/ACT Inhalation Aerosol Solution; 2 puffs Q4 hours prn   SOB/wheeze; Therapy: 83UPE6929 to (Last Rx:24Qlr3475)  Requested for: 08YHV1502 Ordered   5  Vitamin D3 1000 UNIT Oral Tablet; TAKE AS DIRECTED; Therapy: 61AZI0864 to (Last Rx:00Ihd7603) Ordered    Allergies    1  Erythromycin TABS    Vitals  Vital Signs [Data Includes: Current Encounter]    Recorded: 21Jun2016 10:19AM Recorded: 21Jun2016 09:50AM   Temperature  96 9 F   Heart Rate  100   Respiration  16   Systolic 853 419   Diastolic 85 78   Height  5 ft 4 in   Weight  111 lb    BMI Calculated  19 05   BSA Calculated  1 53     Physical Exam    Constitutional   General appearance: No acute distress, well appearing and well nourished      Ears, Nose, Mouth, and Throat   External inspection of ears and nose: Normal     Otoscopic examination: Tympanic membranes translucent with normal light reflex  Canals patent without erythema  Oropharynx: Normal with no erythema, edema, exudate or lesions  Pulmonary   Auscultation of lungs: Abnormal   Auscultation of the lungs revealed decreased breath sounds diffusely  no rales or crackles were heard bilaterally  no rhonchi  no friction rub  no wheezing  Cardiovascular   Auscultation of heart: Normal rate and rhythm, normal S1 and S2, without murmurs  Musculoskeletal   Gait and station: Normal          Results/Data  Encounter Results   PHQ-2 Adult Depression Screening 21Jun2016 10:44AM User, [employer]     Test Name Result Flag Reference   PHQ-2 Adult Depression Score 0     Over the last two weeks, how often have you been bothered by any of the following problems? Little interest or pleasure in doing things: Not at all - 0  Feeling down, depressed, or hopeless: Not at all - 0   PHQ-2 Adult Depression Screening Negative       Falls Risk Assessment (Dx V80 09 Screen for Neurologic Disorder) 21Jun2016 10:44AM User, [employer]     Test Name Result Flag Reference   Falls Risk      No falls in the past year     *VB-Urinary Incontinence Screen (Dx V81 6 Screen for UI) 21Jun2016 12:00AM Deandre Bauer     Test Name Result Flag Reference   Urinary Incontinence Assessment 21Jun2016         Health Management  Screening for genitourinary condition   *VB-Urinary Incontinence Screen (Dx V81 6 Screen for UI); every 1 year; Last 21Jun2016; Next  Due: 21Jun2017;  Active    Future Appointments    Date/Time Provider Specialty Site   12/21/2016 10:30 AM Deandre Bauer, [facility]     Signatures   Electronically signed by : Nohelia Fierro DO; Jun 21 2016  1:00PM EST                       (Author)

## 2018-01-23 VITALS
HEIGHT: 64 IN | BODY MASS INDEX: 19.97 KG/M2 | SYSTOLIC BLOOD PRESSURE: 150 MMHG | DIASTOLIC BLOOD PRESSURE: 82 MMHG | WEIGHT: 117 LBS | TEMPERATURE: 97.2 F | RESPIRATION RATE: 18 BRPM | HEART RATE: 86 BPM

## 2018-01-23 NOTE — RESULT NOTES
Discussion/Summary   Appointment 12/28/17   Please print and put in my folder  Dr Merlin Ricker      Verified Results  (1) VITAMIN D 25-HYDROXY [id number] 07:59AM Shira Mood Order Number: [id number]     Test Name Result Flag Reference   VIT D 25-HYDROX 26 9 ng/mL L 30 0-100 0   This assay is a certified procedure of the CDC Vitamin D Standardization Certification Program (VDSCP)     Deficiency <20ng/ml   Insufficiency 20-30ng/ml   Sufficient  ng/ml     *Patients undergoing fluorescein dye angiography may retain small amounts of fluorescein in the body for 48-72 hours post procedure  Samples containing fluorescein can produce falsely elevated Vitamin D values  If the patient had this procedure, a specimen should be resubmitted post fluorescein clearance  (1) CBC/PLT/DIFF [id number] 07:59AM Shira Mood Order Number: [id number]     Test Name Result Flag Reference   WBC COUNT 4 44 Thousand/uL  4 31-10 16   RBC COUNT 4 81 Million/uL  3 81-5 12   HEMOGLOBIN 15 4 g/dL  11 5-15 4   HEMATOCRIT 45 9 %  34 8-46  1   MCV 95 fL  82-98   MCH 32 0 pg  26 8-34 3   MCHC 33 6 g/dL  31 4-37 4   RDW 12 9 %  11 6-15 1   MPV 10 6 fL  8 9-12 7   PLATELET COUNT 924 Thousands/uL L 149-390   nRBC AUTOMATED 0 /100 WBCs     NEUTROPHILS RELATIVE PERCENT 51 %  43-75   LYMPHOCYTES RELATIVE PERCENT 35 %  14-44   MONOCYTES RELATIVE PERCENT 10 %  4-12   EOSINOPHILS RELATIVE PERCENT 3 %  0-6   BASOPHILS RELATIVE PERCENT 1 %  0-1   NEUTROPHILS ABSOLUTE COUNT 2 31 Thousands/? ??L  1 85-7 62   LYMPHOCYTES ABSOLUTE COUNT 1 56 Thousands/? ??L  0 60-4 47   MONOCYTES ABSOLUTE COUNT 0 43 Thousand/? ??L  0 17-1 22   EOSINOPHILS ABSOLUTE COUNT 0 11 Thousand/? ??L  0 00-0 61   BASOPHILS ABSOLUTE COUNT 0 02 Thousands/? ??L  0 00-0 10     (1) COMPREHENSIVE METABOLIC PANEL [id number] 00:99OP Shira Mood Order Number: [id number]     Test Name Result Flag Reference   SODIUM 140 mmol/L  136-145   POTASSIUM 4 1 mmol/L 3 5-5 3   CHLORIDE 107 mmol/L  100-108   CARBON DIOXIDE 29 mmol/L  21-32   ANION GAP (CALC) 4 mmol/L  4-13   BLOOD UREA NITROGEN 12 mg/dL  5-25   CREATININE 0 65 mg/dL  0 60-1 30   Standardized to IDMS reference method   CALCIUM 8 7 mg/dL  8 3-10 1   BILI, TOTAL 0 54 mg/dL  0 20-1 00   ALK PHOSPHATAS 68 U/L     ALT (SGPT) 26 U/L  12-78   Specimen collection should occur prior to Sulfasalazine and/or Sulfapyridine administration due to the potential for falsely depressed results  AST(SGOT) 26 U/L  5-45   Specimen collection should occur prior to Sulfasalazine administration due to the potential for falsely depressed results  ALBUMIN 3 6 g/dL  3 5-5 0   TOTAL PROTEIN 6 6 g/dL  6 4-8 2   eGFR 88 ml/min/1 73sq m     National Kidney Disease Education Program recommendations are as follows:  GFR calculation is accurate only with a steady state creatinine  Chronic Kidney disease less than 60 ml/min/1 73 sq  meters  Kidney failure less than 15 ml/min/1 73 sq  meters  GLUCOSE FASTING 99 mg/dL  65-99   Specimen collection should occur prior to Sulfasalazine administration due to the potential for falsely depressed results  Specimen collection should occur prior to Sulfapyridine administration due to the potential for falsely elevated results  (1) TSH [id number] 07:59AM Tita Levin Order Number: [id number]     Test Name Result Flag Reference   TSH 0 272 uIU/mL L 0 358-3 740   Patients undergoing fluorescein dye angiography may retain small amounts of fluorescein in the body for 48-72 hours post procedure  Samples containing fluorescein can produce falsely depressed TSH values  If the patient had this procedure,a specimen should be resubmitted post fluorescein clearance            The recommended reference ranges for TSH during pregnancy are as follows:  First trimester 0 1 to 2 5 uIU/mL  Second trimester  0 2 to 3 0 uIU/mL  Third trimester 0 3 to 3 0 uIU/m     (1) LIPID PANEL FASTING W DIRECT LDL REFLEX [id number] 07:59AM Tita Kruselamont Order Number: [id number]     Test Name Result Flag Reference   CHOLESTEROL 154 mg/dL     LDL CHOLESTEROL CALCULATED 65 mg/dL  0-100   Triglyceride:        Normal <150 mg/dl   Borderline High 150-199 mg/dl   High 200-499 mg/dl   Very High >499 mg/dl      Cholesterol:       Desirable <200 mg/dl    Borderline High 200-239 mg/dl    High >239 mg/dl      HDL Cholesterol:       High>59 mg/dL    Low <41 mg/dL      HDL Cholesterol:       High>59 mg/dL    Low <41 mg/dL      This screening LDL is a calculated result  It does not have the accuracy of the Direct Measured LDL in the monitoring of patients with hyperlipidemia and/or statin therapy  Direct Measure LDL (OVC018) must be ordered separately in these patients  TRIGLYCERIDES 123 mg/dL  <=150   Specimen collection should occur prior to N-Acetylcysteine or Metamizole administration due to the potential for falsely depressed results  HDL,DIRECT 64 mg/dL H 40-60   Specimen collection should occur prior to Metamizole administration due to the potential for falsley depressed results

## 2018-01-23 NOTE — MISCELLANEOUS
Date: 12/28/2017   To whom it may concern: This is to certify that: Jose Escobar has been under my care for the following diagnosis:   Chronic obstructive pulmonary disease  I feel that she is unable to serve on Jury Duty at this time for the above mentioned medical reasons       Sincerely,   Sandra Chao DO       Electronically signed by : Sandra Chao DO; Dec 28 2017 10:21AM EST                       (Author)

## 2018-01-28 PROBLEM — R03.0 ELEVATED BLOOD PRESSURE READING: Status: ACTIVE | Noted: 2017-12-28

## 2018-01-28 RX ORDER — ALPRAZOLAM 0.25 MG/1
1 TABLET ORAL 2 TIMES DAILY PRN
COMMUNITY
Start: 2012-02-01 | End: 2019-03-12 | Stop reason: SDUPTHER

## 2018-01-28 RX ORDER — CHOLECALCIFEROL (VITAMIN D3) 125 MCG
2000 CAPSULE ORAL DAILY
COMMUNITY

## 2018-01-28 RX ORDER — MULTIVIT-MIN/FOLIC ACID/LUTEIN 500-250MCG
1 TABLET,CHEWABLE ORAL DAILY
COMMUNITY
End: 2022-01-04 | Stop reason: ALTCHOICE

## 2018-01-31 ENCOUNTER — APPOINTMENT (OUTPATIENT)
Dept: LAB | Facility: CLINIC | Age: 73
End: 2018-01-31
Payer: MEDICARE

## 2018-01-31 DIAGNOSIS — E07.9 DISORDER OF THYROID: ICD-10-CM

## 2018-01-31 LAB
T3FREE SERPL-MCNC: 3.15 PG/ML (ref 2.3–4.2)
T4 FREE SERPL-MCNC: 1.16 NG/DL (ref 0.76–1.46)
TSH SERPL DL<=0.05 MIU/L-ACNC: 0.18 UIU/ML (ref 0.36–3.74)

## 2018-01-31 PROCEDURE — 36415 COLL VENOUS BLD VENIPUNCTURE: CPT

## 2018-01-31 PROCEDURE — 84443 ASSAY THYROID STIM HORMONE: CPT

## 2018-01-31 PROCEDURE — 84481 FREE ASSAY (FT-3): CPT

## 2018-01-31 PROCEDURE — 84439 ASSAY OF FREE THYROXINE: CPT

## 2018-02-06 ENCOUNTER — OFFICE VISIT (OUTPATIENT)
Dept: FAMILY MEDICINE CLINIC | Facility: CLINIC | Age: 73
End: 2018-02-06
Payer: MEDICARE

## 2018-02-06 VITALS
DIASTOLIC BLOOD PRESSURE: 90 MMHG | RESPIRATION RATE: 16 BRPM | WEIGHT: 117 LBS | BODY MASS INDEX: 19.97 KG/M2 | SYSTOLIC BLOOD PRESSURE: 158 MMHG | HEIGHT: 64 IN | TEMPERATURE: 96.9 F | HEART RATE: 84 BPM

## 2018-02-06 DIAGNOSIS — E07.9 THYROID DISORDER: Primary | ICD-10-CM

## 2018-02-06 DIAGNOSIS — J44.9 CHRONIC OBSTRUCTIVE PULMONARY DISEASE, UNSPECIFIED COPD TYPE (HCC): ICD-10-CM

## 2018-02-06 DIAGNOSIS — I10 ESSENTIAL HYPERTENSION: ICD-10-CM

## 2018-02-06 PROCEDURE — 99214 OFFICE O/P EST MOD 30 MIN: CPT | Performed by: FAMILY MEDICINE

## 2018-02-06 RX ORDER — METOPROLOL SUCCINATE 25 MG/1
25 TABLET, EXTENDED RELEASE ORAL
Qty: 30 TABLET | Refills: 1 | Status: SHIPPED | OUTPATIENT
Start: 2018-02-06 | End: 2018-03-20 | Stop reason: SDUPTHER

## 2018-02-06 NOTE — PATIENT INSTRUCTIONS
Recent Results (from the past 336 hour(s))   TSH, 3rd generation    Collection Time: 01/31/18 10:38 AM   Result Value Ref Range    TSH 3RD GENERATON 0 182 (L) 0 358 - 3 740 uIU/mL   T4, free    Collection Time: 01/31/18 10:38 AM   Result Value Ref Range    Free T4 1 16 0 76 - 1 46 ng/dL   T3, free    Collection Time: 01/31/18 10:38 AM   Result Value Ref Range    T3, Free 3 15 2 30 - 4 20 pg/mL

## 2018-02-06 NOTE — PROGRESS NOTES
Assessment/Plan:    Thyroid disorder  TSH is in over active range and she is feeling jittery  Referred to endocrinology  Chronic obstructive pulmonary disease ([facility], [place] [facility] )  She declined an inhaler, she has failed them in the past    Advised to quit smoking  Essential hypertension  Pressure not controlled,  Medication started  Risks and benefits of medication discussed  Diagnoses and all orders for this visit:    Thyroid disorder  -     Ambulatory referral to Endocrinology; Future    Chronic obstructive pulmonary disease, unspecified COPD type ([facility], [place] [facility] )    Essential hypertension  -     metoprolol succinate (TOPROL-XL) 25 mg 24 hr tablet; Take 1 tablet (25 mg total) by mouth daily at bedtime          Patient Instructions     Recent Results (from the past 336 hour(s))   TSH, 3rd generation    Collection Time: 01/31/18 10:38 AM   Result Value Ref Range    TSH 3RD GENERATON 0 182 (L) 0 358 - 3 740 uIU/mL   T4, free    Collection Time: 01/31/18 10:38 AM   Result Value Ref Range    Free T4 1 16 0 76 - 1 46 ng/dL   T3, free    Collection Time: 01/31/18 10:38 AM   Result Value Ref Range    T3, Free 3 15 2 30 - 4 20 pg/mL           Return in about 1 month (around 3/6/2018) for blood pressure check  Subjective:      Patient ID: Mendel Garrison is a 75 y o  female  Chief Complaint   Patient presents with    Follow-up     review new labs       She has been feeling tired  She is napping every day  She has had head congestion and is coughing up mucus  She has been coughing for a month  Her back has been aching  After she stretches her back pain gets better  Once she coughs in the morning and gets the mucus out she feels better for the day  She reports that she feels anxious just coming here  She states she feels fine once she leaves the office  She is not checking her pressure at home  She also smoked 4-5 cigarettes this morning and had 2 cups of coffee        Thyroid - she has felt jittery and has been having a hard time gaining weight  The following portions of the patient's history were reviewed and updated as appropriate: allergies, current medications, past family history, past medical history, past social history, past surgical history and problem list     Review of Systems   Constitutional: Positive for chills (feels cold)  Respiratory: Positive for cough  Negative for wheezing  Current Outpatient Prescriptions   Medication Sig Dispense Refill    ALPRAZolam (XANAX) 0 25 mg tablet Take 1 tablet by mouth 2 (two) times a day as needed      Calcium Carbonate-Vit D-Min (CALCIUM 600+D PLUS MINERALS) 600-400 MG-UNIT CHEW Chew 1 tablet daily      Cholecalciferol (VITAMIN D3) 2000 units TABS Take by mouth      Multiple Vitamins-Minerals (MULTIVITAMIN ADULT PO) Take 1 tablet by mouth daily      metoprolol succinate (TOPROL-XL) 25 mg 24 hr tablet Take 1 tablet (25 mg total) by mouth daily at bedtime 30 tablet 1     No current facility-administered medications for this visit  Objective:    /90   Pulse 84   Temp (!) 96 9 °F (36 1 °C)   Resp 16   Ht 5' 4" (1 626 m)   Wt 53 1 kg (117 lb)   BMI 20 08 kg/m²        Physical Exam   Constitutional: She appears well-developed and well-nourished  HENT:   Head: Normocephalic and atraumatic  Right Ear: External ear normal    Left Ear: External ear normal    Mouth/Throat: Oropharynx is clear and moist    Cardiovascular: Normal rate, regular rhythm and normal heart sounds  Exam reveals no friction rub  No murmur heard  Pulmonary/Chest: Effort normal  No respiratory distress  She has no wheezes  She has no rales  Decreased breath sounds bilaterally   Musculoskeletal: She exhibits no edema or deformity  Nursing note and vitals reviewed               Miguel Cortés DO

## 2018-02-17 DIAGNOSIS — R92.2 INCONCLUSIVE MAMMOGRAM: ICD-10-CM

## 2018-02-17 DIAGNOSIS — Z12.31 ENCOUNTER FOR SCREENING MAMMOGRAM FOR MALIGNANT NEOPLASM OF BREAST: ICD-10-CM

## 2018-03-20 ENCOUNTER — OFFICE VISIT (OUTPATIENT)
Dept: FAMILY MEDICINE CLINIC | Facility: CLINIC | Age: 73
End: 2018-03-20
Payer: MEDICARE

## 2018-03-20 VITALS
BODY MASS INDEX: 19.81 KG/M2 | HEIGHT: 64 IN | TEMPERATURE: 97.4 F | SYSTOLIC BLOOD PRESSURE: 135 MMHG | RESPIRATION RATE: 16 BRPM | DIASTOLIC BLOOD PRESSURE: 75 MMHG | HEART RATE: 84 BPM | WEIGHT: 116 LBS

## 2018-03-20 DIAGNOSIS — I10 ESSENTIAL HYPERTENSION: Primary | ICD-10-CM

## 2018-03-20 DIAGNOSIS — Z11.59 NEED FOR HEPATITIS C SCREENING TEST: ICD-10-CM

## 2018-03-20 PROCEDURE — 99213 OFFICE O/P EST LOW 20 MIN: CPT | Performed by: FAMILY MEDICINE

## 2018-03-20 RX ORDER — METOPROLOL SUCCINATE 25 MG/1
25 TABLET, EXTENDED RELEASE ORAL
Qty: 30 TABLET | Refills: 3 | Status: SHIPPED | OUTPATIENT
Start: 2018-03-20 | End: 2018-06-21 | Stop reason: SDUPTHER

## 2018-03-20 NOTE — PROGRESS NOTES
Assessment/Plan:    Problem List Items Addressed This Visit     Essential hypertension - Primary     Pressure has improved  Relevant Medications    metoprolol succinate (TOPROL-XL) 25 mg 24 hr tablet    Other Relevant Orders    CBC    Comprehensive metabolic panel    Lipid Panel with Direct LDL reflex      Other Visit Diagnoses     Need for hepatitis C screening test        Relevant Orders    Hepatitis C antibody          There are no Patient Instructions on file for this visit  No Follow-up on file  Subjective:      Patient ID: Mariana Francisco is a 75 y o  female  Chief Complaint   Patient presents with    Follow-up     new med use       The new blood pressure medication is helping her sleep  She is tolerating the metoprolol well  She gets nervous before her doctor's visit  Hypertension   Condition status: not checking her pressure at home  Pertinent negatives include no headaches  The current treatment provides mild improvement  There are no compliance problems  The following portions of the patient's history were reviewed and updated as appropriate:  past social history    Review of Systems   Respiratory: Negative  Cardiovascular: Negative  Neurological: Negative for headaches  Current Outpatient Prescriptions   Medication Sig Dispense Refill    ALPRAZolam (XANAX) 0 25 mg tablet Take 1 tablet by mouth 2 (two) times a day as needed      Calcium Carbonate-Vit D-Min (CALCIUM 600+D PLUS MINERALS) 600-400 MG-UNIT CHEW Chew 1 tablet daily      Cholecalciferol (VITAMIN D3) 2000 units TABS Take by mouth      metoprolol succinate (TOPROL-XL) 25 mg 24 hr tablet Take 1 tablet (25 mg total) by mouth daily at bedtime 30 tablet 3    Multiple Vitamins-Minerals (MULTIVITAMIN ADULT PO) Take 1 tablet by mouth daily       No current facility-administered medications for this visit          Objective:    /75   Pulse 84   Temp (!) 97 4 °F (36 3 °C)   Resp 16   Ht 5' 4" (1 626 m)   Wt 52 6 kg (116 lb)   LMP  (LMP Unknown) Comment: post menapausal  BMI 19 91 kg/m²        Physical Exam   Constitutional: She appears well-developed and well-nourished  HENT:   Head: Normocephalic and atraumatic  Right Ear: External ear normal    Left Ear: External ear normal    Mouth/Throat: Oropharynx is clear and moist    Cardiovascular: Normal rate, regular rhythm and normal heart sounds  Exam reveals no friction rub  No murmur heard  Pulmonary/Chest: Effort normal and breath sounds normal  No respiratory distress  She has no wheezes  She has no rales  Musculoskeletal: She exhibits no edema or deformity  Nursing note and vitals reviewed               Samantha Reddy DO

## 2018-04-25 ENCOUNTER — OFFICE VISIT (OUTPATIENT)
Dept: ENDOCRINOLOGY | Facility: CLINIC | Age: 73
End: 2018-04-25
Payer: MEDICARE

## 2018-04-25 VITALS
SYSTOLIC BLOOD PRESSURE: 130 MMHG | DIASTOLIC BLOOD PRESSURE: 88 MMHG | HEIGHT: 64 IN | WEIGHT: 117 LBS | BODY MASS INDEX: 19.97 KG/M2 | HEART RATE: 80 BPM

## 2018-04-25 DIAGNOSIS — E07.9 THYROID DISORDER: ICD-10-CM

## 2018-04-25 DIAGNOSIS — R79.89 LOW TSH LEVEL: Primary | ICD-10-CM

## 2018-04-25 DIAGNOSIS — E04.1 THYROID NODULE: ICD-10-CM

## 2018-04-25 LAB
T3 SERPL-MCNC: 1.4 NG/ML (ref 0.6–1.8)
T4 FREE SERPL-MCNC: 1.2 NG/DL (ref 0.76–1.46)
TSH SERPL DL<=0.05 MIU/L-ACNC: 0.21 UIU/ML (ref 0.36–3.74)

## 2018-04-25 PROCEDURE — 36415 COLL VENOUS BLD VENIPUNCTURE: CPT | Performed by: INTERNAL MEDICINE

## 2018-04-25 PROCEDURE — 99204 OFFICE O/P NEW MOD 45 MIN: CPT | Performed by: INTERNAL MEDICINE

## 2018-04-25 PROCEDURE — 84445 ASSAY OF TSI GLOBULIN: CPT | Performed by: INTERNAL MEDICINE

## 2018-04-25 PROCEDURE — 86800 THYROGLOBULIN ANTIBODY: CPT | Performed by: INTERNAL MEDICINE

## 2018-04-25 PROCEDURE — 84443 ASSAY THYROID STIM HORMONE: CPT | Performed by: INTERNAL MEDICINE

## 2018-04-25 PROCEDURE — 84480 ASSAY TRIIODOTHYRONINE (T3): CPT | Performed by: INTERNAL MEDICINE

## 2018-04-25 PROCEDURE — 84439 ASSAY OF FREE THYROXINE: CPT | Performed by: INTERNAL MEDICINE

## 2018-04-25 PROCEDURE — 86376 MICROSOMAL ANTIBODY EACH: CPT | Performed by: INTERNAL MEDICINE

## 2018-04-25 NOTE — ASSESSMENT & PLAN NOTE
Will repeat a thyroid ultrasound to monitor for any changes in the size or characteristics of the nodule

## 2018-04-25 NOTE — ASSESSMENT & PLAN NOTE
Will repeat thyroid function test including TSH, free T4  Also check T3 as well as thyroid antibodies    If TSH is suppressed consider a thyroid uptake and scan

## 2018-04-25 NOTE — PROGRESS NOTES
Carmen Johnson 75 y o  female MRN: [id number]    Encounter: [id number]      Assessment/Plan     Problem List Items Addressed This Visit     Thyroid disorder    Relevant Orders    T4, free Clinic Collect    Thyroid Antibodies Panel Clinic Collect    Thyroid stimulating immunoglobulin Clinic Collect    TSH, 3rd generation Clinic Collect    T3 Clinic Collect    Anti-microsomal antibody    Anti-thyroglobulin antibody    Low TSH level - Primary     Will repeat thyroid function test including TSH, free T4  Also check T3 as well as thyroid antibodies  If TSH is suppressed consider a thyroid uptake and scan         Relevant Orders    T4, free Clinic Collect    Thyroid Antibodies Panel Clinic Collect    Thyroid stimulating immunoglobulin Clinic Collect    TSH, 3rd generation Clinic Collect    T3 Clinic Collect    Anti-microsomal antibody    Anti-thyroglobulin antibody    Thyroid nodule     Will repeat a thyroid ultrasound to monitor for any changes in the size or characteristics of the nodule         Relevant Orders    US thyroid        CC:   Low TSH    History of Present Illness     HPI:  65-year-old woman referred here for evaluation of thyroid dysfunction  She was found to have a Low tsh -on lab testing in [place]   Weight stable for years - lost a few lbs   C/o anxiety /jitterniess for a few months - recently improved   No palpitations  Complains of  Sleep disturbances   No hyper defcation     History of thyroid nodule FNAB 5-6 years back - which was negative for malignancy     Cold intolerance - chronic     No hair /skin changes     C/o fatigue     No neck pain , no recent CT scan   Not on steroids /narcotics   No FH of thyroid dysfunction     Review of Systems   Constitutional: Positive for fatigue and unexpected weight change  Eyes: Negative for visual disturbance  Respiratory: Negative for cough and shortness of breath  Cardiovascular: Negative for chest pain, palpitations and leg swelling  Gastrointestinal: Negative for abdominal distention, constipation, diarrhea, nausea and vomiting  Endocrine: Negative for polydipsia and polyuria  Musculoskeletal: Negative for gait problem  Skin: Negative for color change, pallor, rash and wound  Neurological: Positive for tremors  Psychiatric/Behavioral: Positive for sleep disturbance  The patient is nervous/anxious  All other systems reviewed and are negative  Historical Information   Past Medical History:   Diagnosis Date    Elevated blood pressure reading      Past Surgical History:   Procedure Laterality Date    APPENDECTOMY      TONSILLECTOMY       Social History   History   Alcohol Use No     History   Drug Use No     History   Smoking Status    Current Every Day Smoker   Smokeless Tobacco    Never Used     Family History:   Family History   Problem Relation Age of Onset    Lung cancer Mother     Heart disease Maternal Aunt     Heart disease Maternal Uncle     Heart disease Maternal Grandmother        Meds/Allergies   Current Outpatient Prescriptions   Medication Sig Dispense Refill    ALPRAZolam (XANAX) 0 25 mg tablet Take 1 tablet by mouth 2 (two) times a day as needed      Calcium Carbonate-Vit D-Min (CALCIUM 600+D PLUS MINERALS) 600-400 MG-UNIT CHEW Chew 1 tablet daily      Cholecalciferol (VITAMIN D3) 2000 units TABS Take by mouth      metoprolol succinate (TOPROL-XL) 25 mg 24 hr tablet Take 1 tablet (25 mg total) by mouth daily at bedtime 30 tablet 3    Multiple Vitamins-Minerals (MULTIVITAMIN ADULT PO) Take 1 tablet by mouth daily       No current facility-administered medications for this visit  Allergies   Allergen Reactions    Erythromycin GI Intolerance     Reaction Date: [id number]; Objective   Vitals: Blood pressure 130/88, pulse 80, height 5' 4" (1 626 m), weight 53 1 kg (117 lb)  Physical Exam   Constitutional: She is oriented to person, place, and time   She appears well-developed and well-nourished  No distress  HENT:   Head: Normocephalic and atraumatic  Mouth/Throat: No oropharyngeal exudate  Eyes: Conjunctivae and EOM are normal  No scleral icterus  Neck: Normal range of motion  Neck supple  No thyromegaly present  Right-sided thyroid nodule about 1-1 5 cm   Cardiovascular: Normal rate, regular rhythm and normal heart sounds  No murmur heard  Pulmonary/Chest: Effort normal and breath sounds normal  No respiratory distress  She has no wheezes  She has no rales  Abdominal: Soft  Bowel sounds are normal  She exhibits no distension  There is no tenderness  There is no rebound  Musculoskeletal: Normal range of motion  She exhibits no edema or deformity  Lymphadenopathy:     She has no cervical adenopathy  Neurological: She is alert and oriented to person, place, and time  Skin: Skin is warm and dry  No rash noted  No erythema  No pallor  Psychiatric: Her behavior is normal  Judgment and thought content normal    Mood-anxious       The history was obtained from the review of the chart, patient  Lab Results:   Lab Results   Component Value Date/Time    TSH 3RD GENERATON 0 182 (L) 01/31/2018 10:38 AM    TSH 3RD GENERATON 0 272 (L) 12/22/2017 07:59 AM    Free T4 1 16 01/31/2018 10:38 AM         Portions of the record may have been created with voice recognition software  Occasional wrong word or "sound a like" substitutions may have occurred due to the inherent limitations of voice recognition software  Read the chart carefully and recognize, using context, where substitutions have occurred

## 2018-04-25 NOTE — LETTER
April 25, 2018     Luther Prakash, DO  [address] [address]    Patient: Nohemy Nichole   YOB: 1945   Date of Visit: 4/25/2018       Dear Dr Jese Fatima: Thank you for referring Radha Noguera to me for evaluation  Below are my notes for this consultation  If you have questions, please do not hesitate to call me  I look forward to following your patient along with you  Sincerely,        Flakita Cooper MD        CC: No Recipients  Flakita Cooper MD  4/25/2018 12:42 PM  Sign at close encounter   Nohemy Nichole 75 y o  female MRN: [id number]    Encounter: [id number]      Assessment/Plan     Problem List Items Addressed This Visit     Thyroid disorder    Relevant Orders    T4, free Clinic Collect    Thyroid Antibodies Panel Clinic Collect    Thyroid stimulating immunoglobulin Clinic Collect    TSH, 3rd generation Clinic Collect    T3 Clinic Collect    Anti-microsomal antibody    Anti-thyroglobulin antibody    Low TSH level - Primary     Will repeat thyroid function test including TSH, free T4  Also check T3 as well as thyroid antibodies  If TSH is suppressed consider a thyroid uptake and scan         Relevant Orders    T4, free Clinic Collect    Thyroid Antibodies Panel Clinic Collect    Thyroid stimulating immunoglobulin Clinic Collect    TSH, 3rd generation Clinic Collect    T3 Clinic Collect    Anti-microsomal antibody    Anti-thyroglobulin antibody    Thyroid nodule     Will repeat a thyroid ultrasound to monitor for any changes in the size or characteristics of the nodule         Relevant Orders    US thyroid        CC:   Low TSH    History of Present Illness     HPI:  43-year-old woman referred here for evaluation of thyroid dysfunction  She was found to have a Low tsh -on lab testing in [place]   Weight stable for years - lost a few lbs   C/o anxiety /jitterniess for a few months - recently improved     No palpitations  Complains of  Sleep disturbances   No hyper defcation History of thyroid nodule FNAB 5-6 years back - which was negative for malignancy     Cold intolerance - chronic     No hair /skin changes     C/o fatigue     No neck pain , no recent CT scan   Not on steroids /narcotics   No FH of thyroid dysfunction     Review of Systems   Constitutional: Positive for fatigue and unexpected weight change  Eyes: Negative for visual disturbance  Respiratory: Negative for cough and shortness of breath  Cardiovascular: Negative for chest pain, palpitations and leg swelling  Gastrointestinal: Negative for abdominal distention, constipation, diarrhea, nausea and vomiting  Endocrine: Negative for polydipsia and polyuria  Musculoskeletal: Negative for gait problem  Skin: Negative for color change, pallor, rash and wound  Neurological: Positive for tremors  Psychiatric/Behavioral: Positive for sleep disturbance  The patient is nervous/anxious  All other systems reviewed and are negative        Historical Information   Past Medical History:   Diagnosis Date    Elevated blood pressure reading      Past Surgical History:   Procedure Laterality Date    APPENDECTOMY      TONSILLECTOMY       Social History   History   Alcohol Use No     History   Drug Use No     History   Smoking Status    Current Every Day Smoker   Smokeless Tobacco    Never Used     Family History:   Family History   Problem Relation Age of Onset    Lung cancer Mother     Heart disease Maternal Aunt     Heart disease Maternal Uncle     Heart disease Maternal Grandmother        Meds/Allergies   Current Outpatient Prescriptions   Medication Sig Dispense Refill    ALPRAZolam (XANAX) 0 25 mg tablet Take 1 tablet by mouth 2 (two) times a day as needed      Calcium Carbonate-Vit D-Min (CALCIUM 600+D PLUS MINERALS) 600-400 MG-UNIT CHEW Chew 1 tablet daily      Cholecalciferol (VITAMIN D3) 2000 units TABS Take by mouth      metoprolol succinate (TOPROL-XL) 25 mg 24 hr tablet Take 1 tablet (25 mg total) by mouth daily at bedtime 30 tablet 3    Multiple Vitamins-Minerals (MULTIVITAMIN ADULT PO) Take 1 tablet by mouth daily       No current facility-administered medications for this visit  Allergies   Allergen Reactions    Erythromycin GI Intolerance     Reaction Date: [id number]; Objective   Vitals: Blood pressure 130/88, pulse 80, height 5' 4" (1 626 m), weight 53 1 kg (117 lb)  Physical Exam   Constitutional: She is oriented to person, place, and time  She appears well-developed and well-nourished  No distress  HENT:   Head: Normocephalic and atraumatic  Mouth/Throat: No oropharyngeal exudate  Eyes: Conjunctivae and EOM are normal  No scleral icterus  Neck: Normal range of motion  Neck supple  No thyromegaly present  Right-sided thyroid nodule about 1-1 5 cm   Cardiovascular: Normal rate, regular rhythm and normal heart sounds  No murmur heard  Pulmonary/Chest: Effort normal and breath sounds normal  No respiratory distress  She has no wheezes  She has no rales  Abdominal: Soft  Bowel sounds are normal  She exhibits no distension  There is no tenderness  There is no rebound  Musculoskeletal: Normal range of motion  She exhibits no edema or deformity  Lymphadenopathy:     She has no cervical adenopathy  Neurological: She is alert and oriented to person, place, and time  Skin: Skin is warm and dry  No rash noted  No erythema  No pallor  Psychiatric: Her behavior is normal  Judgment and thought content normal    Mood-anxious       The history was obtained from the review of the chart, patient  Lab Results:   Lab Results   Component Value Date/Time    TSH 3RD GENERATON 0 182 (L) 01/31/2018 10:38 AM    TSH 3RD GENERATON 0 272 (L) 12/22/2017 07:59 AM    Free T4 1 16 01/31/2018 10:38 AM         Portions of the record may have been created with voice recognition software   Occasional wrong word or "sound a like" substitutions may have occurred due to the inherent limitations of voice recognition software  Read the chart carefully and recognize, using context, where substitutions have occurred

## 2018-04-26 LAB
THYROGLOB AB SERPL-ACNC: <1 IU/ML (ref 0–0.9)
THYROPEROXIDASE AB SERPL-ACNC: 9 IU/ML (ref 0–34)

## 2018-04-27 LAB — TSI SER-ACNC: <0.1 IU/L (ref 0–0.55)

## 2018-05-01 ENCOUNTER — HOSPITAL ENCOUNTER (OUTPATIENT)
Dept: RADIOLOGY | Facility: HOSPITAL | Age: 73
Discharge: HOME | End: 2018-05-01
Payer: MEDICARE

## 2018-05-01 DIAGNOSIS — E04.1 THYROID NODULE: ICD-10-CM

## 2018-05-01 PROCEDURE — 76536 US EXAM OF HEAD AND NECK: CPT

## 2018-05-07 NOTE — PROGRESS NOTES
Please call the patient regarding her abnormal result   Small thyroid nodules - do not meet criteria for biopsy - will monitor and repeat thyroid ultrasound in 6-12 months

## 2018-05-08 ENCOUNTER — TELEPHONE (OUTPATIENT)
Dept: ENDOCRINOLOGY | Facility: CLINIC | Age: 73
End: 2018-05-08

## 2018-05-08 DIAGNOSIS — E04.1 THYROID NODULE: Primary | ICD-10-CM

## 2018-05-08 NOTE — TELEPHONE ENCOUNTER
Spoke with pt and she is aware that she has small nodules that don't meet criteria for biopsy and that will do repeat thyroid ultrasound is six to twelve months

## 2018-06-06 ENCOUNTER — OFFICE VISIT (OUTPATIENT)
Dept: ENDOCRINOLOGY | Facility: CLINIC | Age: 73
End: 2018-06-06
Payer: MEDICARE

## 2018-06-06 VITALS
SYSTOLIC BLOOD PRESSURE: 140 MMHG | HEART RATE: 94 BPM | BODY MASS INDEX: 19.97 KG/M2 | DIASTOLIC BLOOD PRESSURE: 95 MMHG | HEIGHT: 64 IN | WEIGHT: 117 LBS

## 2018-06-06 DIAGNOSIS — E04.2 MULTIPLE THYROID NODULES: Primary | ICD-10-CM

## 2018-06-06 DIAGNOSIS — R94.6 ABNORMAL THYROID FUNCTION TEST: ICD-10-CM

## 2018-06-06 DIAGNOSIS — F41.9 ANXIETY DISORDER, UNSPECIFIED TYPE: ICD-10-CM

## 2018-06-06 DIAGNOSIS — I10 ESSENTIAL HYPERTENSION: ICD-10-CM

## 2018-06-06 PROCEDURE — 99214 OFFICE O/P EST MOD 30 MIN: CPT | Performed by: INTERNAL MEDICINE

## 2018-06-06 NOTE — PROGRESS NOTES
Pk Trejo 75 y o  female MRN: [id number]    Encounter: [id number]      Assessment/Plan     Problem List Items Addressed This Visit     Anxiety disorder     Advised to discuss with PCP         Essential hypertension     BP high - patient very nervous about visit /long drive - she has upcoming appointment with pcp          Multiple thyroid nodules - Primary     Thyroid ultrasound reviewed- nodules do not meet criteria for FNAB- will monitor          Relevant Orders    T4, free Lab Collect    TSH, 3rd generation Lab Collect    Abnormal thyroid function test     tsh mildly low but stable, symptoms not likely related to thyroid dysfunction          Relevant Orders    T4, free Lab Collect    TSH, 3rd generation Lab Collect        CC:   Thyroid Dysfunction    History of Present Illness     HPI:  74-year-old woman with history of thyroid nodule and low TSH here for follow-up  She continues to complain of anxiety and jitteriness  Weight fairly stable since her last visit, complains of chronic cold intolerance and fatigue  Denies any difficulty swallowing, breathing are pressure sensation on neck    Review of Systems   Constitutional: Positive for fatigue  Negative for unexpected weight change  Eyes: Negative for visual disturbance  Respiratory: Negative for cough and shortness of breath  Cardiovascular: Negative for chest pain, palpitations and leg swelling  Gastrointestinal: Negative for constipation, diarrhea, nausea and vomiting  Endocrine: Negative for polydipsia and polyuria  Skin: Negative for color change, pallor, rash and wound  Psychiatric/Behavioral: Negative for sleep disturbance  The patient is nervous/anxious  All other systems reviewed and are negative        Historical Information   Past Medical History:   Diagnosis Date    Elevated blood pressure reading      Past Surgical History:   Procedure Laterality Date    APPENDECTOMY      TONSILLECTOMY       Social History   History Alcohol Use No     History   Drug Use No     History   Smoking Status    Current Every Day Smoker   Smokeless Tobacco    Never Used     Family History:   Family History   Problem Relation Age of Onset    Lung cancer Mother     Heart disease Maternal Aunt     Heart disease Maternal Uncle     Heart disease Maternal Grandmother        Meds/Allergies   Current Outpatient Prescriptions   Medication Sig Dispense Refill    ALPRAZolam (XANAX) 0 25 mg tablet Take 1 tablet by mouth 2 (two) times a day as needed      Calcium Carbonate-Vit D-Min (CALCIUM 600+D PLUS MINERALS) 600-400 MG-UNIT CHEW Chew 1 tablet daily      Cholecalciferol (VITAMIN D3) 2000 units TABS Take by mouth      metoprolol succinate (TOPROL-XL) 25 mg 24 hr tablet Take 1 tablet (25 mg total) by mouth daily at bedtime 30 tablet 3    Multiple Vitamins-Minerals (MULTIVITAMIN ADULT PO) Take 1 tablet by mouth daily       No current facility-administered medications for this visit  Allergies   Allergen Reactions    Erythromycin GI Intolerance     Reaction Date: [id number]; Objective   Vitals: Blood pressure 140/95, pulse 94, height 5' 4" (1 626 m), weight 53 1 kg (117 lb)  Physical Exam   Constitutional: She is oriented to person, place, and time  She appears well-developed and well-nourished  No distress  HENT:   Head: Normocephalic  Mouth/Throat: No oropharyngeal exudate  Eyes: Conjunctivae and EOM are normal  No scleral icterus  Neck: Normal range of motion  Neck supple  No thyromegaly present  Cardiovascular: Normal rate, regular rhythm and normal heart sounds  No murmur heard  Pulmonary/Chest: Effort normal and breath sounds normal  No respiratory distress  She has no wheezes  She has no rales  Abdominal: Soft  Bowel sounds are normal  She exhibits no distension  There is no tenderness  There is no rebound  Musculoskeletal: Normal range of motion  She exhibits no edema or deformity     Lymphadenopathy:     She has no cervical adenopathy  Neurological: She is alert and oriented to person, place, and time  Skin: Skin is warm and dry  No rash noted  No erythema  No pallor  Psychiatric: Judgment and thought content normal    Mode-anxious       The history was obtained from the review of the chart, patient  Lab Results:   Lab Results   Component Value Date/Time    TSH 3RD GENERATON 0 208 (L) 04/25/2018 11:16 AM    TSH 3RD GENERATON 0 182 (L) 01/31/2018 10:38 AM    TSH 3RD GENERATON 0 272 (L) 12/22/2017 07:59 AM    Free T4 1 20 04/25/2018 11:16 AM    Free T4 1 16 01/31/2018 10:38 AM       Imaging Studies:   Results for orders placed during the hospital encounter of 05/01/18   US thyroid    Impression No nodule meets current ACR criteria for requiring biopsy but followup ultrasound is recommended in 1 year  Reference: ACR Thyroid Imaging, Reporting and Data System (TI-RADS): White Paper of the [employer]  J AM Tammi Radiol 5110;12:509-173  (additional recommendations based on American Thyroid Association 2015 guidelines )      Workstation performed: [id number]         I have personally reviewed pertinent reports  Portions of the record may have been created with voice recognition software  Occasional wrong word or "sound a like" substitutions may have occurred due to the inherent limitations of voice recognition software  Read the chart carefully and recognize, using context, where substitutions have occurred

## 2018-06-14 ENCOUNTER — APPOINTMENT (OUTPATIENT)
Dept: LAB | Facility: CLINIC | Age: 73
End: 2018-06-14
Payer: MEDICARE

## 2018-06-14 DIAGNOSIS — Z11.59 NEED FOR HEPATITIS C SCREENING TEST: ICD-10-CM

## 2018-06-14 DIAGNOSIS — I10 ESSENTIAL HYPERTENSION: ICD-10-CM

## 2018-06-14 LAB
ALBUMIN SERPL BCP-MCNC: 3.8 G/DL (ref 3.5–5)
ALP SERPL-CCNC: 71 U/L (ref 46–116)
ALT SERPL W P-5'-P-CCNC: 18 U/L (ref 12–78)
ANION GAP SERPL CALCULATED.3IONS-SCNC: 4 MMOL/L (ref 4–13)
AST SERPL W P-5'-P-CCNC: 20 U/L (ref 5–45)
BILIRUB SERPL-MCNC: 0.52 MG/DL (ref 0.2–1)
BUN SERPL-MCNC: 9 MG/DL (ref 5–25)
CALCIUM SERPL-MCNC: 8.8 MG/DL (ref 8.3–10.1)
CHLORIDE SERPL-SCNC: 106 MMOL/L (ref 100–108)
CHOLEST SERPL-MCNC: 146 MG/DL (ref 50–200)
CO2 SERPL-SCNC: 30 MMOL/L (ref 21–32)
CREAT SERPL-MCNC: 0.71 MG/DL (ref 0.6–1.3)
ERYTHROCYTE [DISTWIDTH] IN BLOOD BY AUTOMATED COUNT: 12.3 % (ref 11.6–15.1)
GFR SERPL CREATININE-BSD FRML MDRD: 84 ML/MIN/1.73SQ M
GLUCOSE P FAST SERPL-MCNC: 95 MG/DL (ref 65–99)
HCT VFR BLD AUTO: 46.6 % (ref 34.8–46.1)
HCV AB SER QL: NORMAL
HDLC SERPL-MCNC: 64 MG/DL (ref 40–60)
HGB BLD-MCNC: 15.1 G/DL (ref 11.5–15.4)
LDLC SERPL CALC-MCNC: 58 MG/DL (ref 0–100)
MCH RBC QN AUTO: 32.3 PG (ref 26.8–34.3)
MCHC RBC AUTO-ENTMCNC: 32.4 G/DL (ref 31.4–37.4)
MCV RBC AUTO: 100 FL (ref 82–98)
PLATELET # BLD AUTO: 132 THOUSANDS/UL (ref 149–390)
PMV BLD AUTO: 10.4 FL (ref 8.9–12.7)
POTASSIUM SERPL-SCNC: 4.2 MMOL/L (ref 3.5–5.3)
PROT SERPL-MCNC: 6.5 G/DL (ref 6.4–8.2)
RBC # BLD AUTO: 4.68 MILLION/UL (ref 3.81–5.12)
SODIUM SERPL-SCNC: 140 MMOL/L (ref 136–145)
TRIGL SERPL-MCNC: 120 MG/DL
WBC # BLD AUTO: 4.34 THOUSAND/UL (ref 4.31–10.16)

## 2018-06-14 PROCEDURE — 80061 LIPID PANEL: CPT

## 2018-06-14 PROCEDURE — 36415 COLL VENOUS BLD VENIPUNCTURE: CPT

## 2018-06-14 PROCEDURE — 85027 COMPLETE CBC AUTOMATED: CPT

## 2018-06-14 PROCEDURE — 80053 COMPREHEN METABOLIC PANEL: CPT

## 2018-06-14 PROCEDURE — 86803 HEPATITIS C AB TEST: CPT

## 2018-06-21 ENCOUNTER — OFFICE VISIT (OUTPATIENT)
Dept: FAMILY MEDICINE CLINIC | Facility: CLINIC | Age: 73
End: 2018-06-21
Payer: MEDICARE

## 2018-06-21 VITALS
SYSTOLIC BLOOD PRESSURE: 155 MMHG | BODY MASS INDEX: 19.7 KG/M2 | TEMPERATURE: 97.1 F | WEIGHT: 115.4 LBS | RESPIRATION RATE: 16 BRPM | DIASTOLIC BLOOD PRESSURE: 80 MMHG | HEIGHT: 64 IN | HEART RATE: 76 BPM

## 2018-06-21 DIAGNOSIS — R73.01 IMPAIRED FASTING GLUCOSE: ICD-10-CM

## 2018-06-21 DIAGNOSIS — F41.1 GENERALIZED ANXIETY DISORDER: ICD-10-CM

## 2018-06-21 DIAGNOSIS — I10 ESSENTIAL HYPERTENSION: Primary | ICD-10-CM

## 2018-06-21 DIAGNOSIS — D69.6 THROMBOCYTOPENIA (HCC): ICD-10-CM

## 2018-06-21 PROCEDURE — 99214 OFFICE O/P EST MOD 30 MIN: CPT | Performed by: FAMILY MEDICINE

## 2018-06-21 RX ORDER — METOPROLOL SUCCINATE 50 MG/1
50 TABLET, EXTENDED RELEASE ORAL
Qty: 30 TABLET | Refills: 5 | Status: SHIPPED | OUTPATIENT
Start: 2018-06-21 | End: 2018-12-21 | Stop reason: SDUPTHER

## 2018-06-21 NOTE — PATIENT INSTRUCTIONS
Recent Results (from the past 672 hour(s))   CBC    Collection Time: 06/14/18  8:26 AM   Result Value Ref Range    WBC 4 34 4 31 - 10 16 Thousand/uL    RBC 4 68 3 81 - 5 12 Million/uL    Hemoglobin 15 1 11 5 - 15 4 g/dL    Hematocrit 46 6 (H) 34 8 - 46 1 %     (H) 82 - 98 fL    MCH 32 3 26 8 - 34 3 pg    MCHC 32 4 31 4 - 37 4 g/dL    RDW 12 3 11 6 - 15 1 %    Platelets 488 (L) 458 - 390 Thousands/uL    MPV 10 4 8 9 - 12 7 fL   Comprehensive metabolic panel    Collection Time: 06/14/18  8:26 AM   Result Value Ref Range    Sodium 140 136 - 145 mmol/L    Potassium 4 2 3 5 - 5 3 mmol/L    Chloride 106 100 - 108 mmol/L    CO2 30 21 - 32 mmol/L    Anion Gap 4 4 - 13 mmol/L    BUN 9 5 - 25 mg/dL    Creatinine 0 71 0 60 - 1 30 mg/dL    Glucose, Fasting 95 65 - 99 mg/dL    Calcium 8 8 8 3 - 10 1 mg/dL    AST 20 5 - 45 U/L    ALT 18 12 - 78 U/L    Alkaline Phosphatase 71 46 - 116 U/L    Total Protein 6 5 6 4 - 8 2 g/dL    Albumin 3 8 3 5 - 5 0 g/dL    Total Bilirubin 0 52 0 20 - 1 00 mg/dL    eGFR 84 ml/min/1 73sq m   Lipid Panel with Direct LDL reflex    Collection Time: 06/14/18  8:26 AM   Result Value Ref Range    Cholesterol 146 50 - 200 mg/dL    Triglycerides 120 <=150 mg/dL    HDL, Direct 64 (H) 40 - 60 mg/dL    LDL Calculated 58 0 - 100 mg/dL   Hepatitis C antibody    Collection Time: 06/14/18  8:26 AM   Result Value Ref Range    Hepatitis C Ab Non-reactive Non-reactive       Please start checking your pressure at home

## 2018-06-21 NOTE — PROGRESS NOTES
Assessment/Plan:    Problem List Items Addressed This Visit     Generalized anxiety disorder     Not controlled  We discussed daily medication options, but she does not feel comfortable with that at this time  She is going to take her xanax as needed  Exercise advised            Essential hypertension - Primary     Not controlled  Metoprolol dose increased from 25 to 50 mg a day         Relevant Medications    metoprolol succinate (TOPROL-XL) 50 mg 24 hr tablet    Impaired fasting glucose     Stable with diet  Sugar is normal at 95         Thrombocytopenia (HCC)     Platelets are stable               Patient Instructions     Recent Results (from the past 672 hour(s))   CBC    Collection Time: 06/14/18  8:26 AM   Result Value Ref Range    WBC 4 34 4 31 - 10 16 Thousand/uL    RBC 4 68 3 81 - 5 12 Million/uL    Hemoglobin 15 1 11 5 - 15 4 g/dL    Hematocrit 46 6 (H) 34 8 - 46 1 %     (H) 82 - 98 fL    MCH 32 3 26 8 - 34 3 pg    MCHC 32 4 31 4 - 37 4 g/dL    RDW 12 3 11 6 - 15 1 %    Platelets 312 (L) 565 - 390 Thousands/uL    MPV 10 4 8 9 - 12 7 fL   Comprehensive metabolic panel    Collection Time: 06/14/18  8:26 AM   Result Value Ref Range    Sodium 140 136 - 145 mmol/L    Potassium 4 2 3 5 - 5 3 mmol/L    Chloride 106 100 - 108 mmol/L    CO2 30 21 - 32 mmol/L    Anion Gap 4 4 - 13 mmol/L    BUN 9 5 - 25 mg/dL    Creatinine 0 71 0 60 - 1 30 mg/dL    Glucose, Fasting 95 65 - 99 mg/dL    Calcium 8 8 8 3 - 10 1 mg/dL    AST 20 5 - 45 U/L    ALT 18 12 - 78 U/L    Alkaline Phosphatase 71 46 - 116 U/L    Total Protein 6 5 6 4 - 8 2 g/dL    Albumin 3 8 3 5 - 5 0 g/dL    Total Bilirubin 0 52 0 20 - 1 00 mg/dL    eGFR 84 ml/min/1 73sq m   Lipid Panel with Direct LDL reflex    Collection Time: 06/14/18  8:26 AM   Result Value Ref Range    Cholesterol 146 50 - 200 mg/dL    Triglycerides 120 <=150 mg/dL    HDL, Direct 64 (H) 40 - 60 mg/dL    LDL Calculated 58 0 - 100 mg/dL   Hepatitis C antibody    Collection Time: 06/14/18  8:26 AM   Result Value Ref Range    Hepatitis C Ab Non-reactive Non-reactive       Please start checking your pressure at home  Return for Annual Wellness Visit end of July  Subjective:      Patient ID: Maria T Mercado is a 75 y o  female  Chief Complaint   Patient presents with    Follow-up      6 month follow up  lab work doneon 6/14/18  af/rma        She has been running around a lot helping her daughter and grandchildren  She gets anxious  She does get an occasional anxiety attacks  Hypertension   This is a chronic problem  The current episode started more than 1 year ago  The problem is controlled  Pertinent negatives include no headaches  Past treatments include beta blockers  The current treatment provides moderate improvement  There are no compliance problems  The following portions of the patient's history were reviewed and updated as appropriate:  past social history    Review of Systems   Respiratory: Negative  Cardiovascular: Negative  Neurological: Negative for headaches  Hematological: Does not bruise/bleed easily  Current Outpatient Prescriptions   Medication Sig Dispense Refill    ALPRAZolam (XANAX) 0 25 mg tablet Take 1 tablet by mouth 2 (two) times a day as needed      Calcium Carbonate-Vit D-Min (CALCIUM 600+D PLUS MINERALS) 600-400 MG-UNIT CHEW Chew 1 tablet daily      Cholecalciferol (VITAMIN D3) 2000 units TABS Take by mouth      metoprolol succinate (TOPROL-XL) 50 mg 24 hr tablet Take 1 tablet (50 mg total) by mouth daily at bedtime 30 tablet 5    Multiple Vitamins-Minerals (MULTIVITAMIN ADULT PO) Take 1 tablet by mouth daily       No current facility-administered medications for this visit          Objective:    /80   Pulse 76   Temp (!) 97 1 °F (36 2 °C)   Resp 16   Ht 5' 4" (1 626 m)   Wt 52 3 kg (115 lb 6 4 oz)   LMP  (LMP Unknown) Comment: post menapausal  BMI 19 81 kg/m²        Physical Exam   Constitutional: She appears well-developed and well-nourished  HENT:   Head: Normocephalic and atraumatic  Right Ear: External ear normal    Left Ear: External ear normal    Mouth/Throat: Oropharynx is clear and moist    Cardiovascular: Normal rate, regular rhythm and normal heart sounds  Exam reveals no friction rub  No murmur heard  Pulmonary/Chest: Effort normal and breath sounds normal  No respiratory distress  She has no wheezes  She has no rales  Musculoskeletal: She exhibits no edema or deformity  Nursing note and vitals reviewed               Katerin Bunn DO

## 2018-06-21 NOTE — ASSESSMENT & PLAN NOTE
Not controlled  We discussed daily medication options, but she does not feel comfortable with that at this time  She is going to take her xanax as needed  Exercise advised

## 2018-07-31 ENCOUNTER — OFFICE VISIT (OUTPATIENT)
Dept: FAMILY MEDICINE CLINIC | Facility: CLINIC | Age: 73
End: 2018-07-31
Payer: MEDICARE

## 2018-07-31 VITALS
TEMPERATURE: 97.6 F | WEIGHT: 113 LBS | HEART RATE: 78 BPM | RESPIRATION RATE: 18 BRPM | SYSTOLIC BLOOD PRESSURE: 130 MMHG | HEIGHT: 64 IN | BODY MASS INDEX: 19.29 KG/M2 | DIASTOLIC BLOOD PRESSURE: 90 MMHG

## 2018-07-31 DIAGNOSIS — I10 ESSENTIAL HYPERTENSION: Primary | ICD-10-CM

## 2018-07-31 DIAGNOSIS — R79.89 LOW TSH LEVEL: ICD-10-CM

## 2018-07-31 DIAGNOSIS — J44.9 CHRONIC OBSTRUCTIVE PULMONARY DISEASE, UNSPECIFIED COPD TYPE (HCC): ICD-10-CM

## 2018-07-31 PROCEDURE — 99214 OFFICE O/P EST MOD 30 MIN: CPT | Performed by: FAMILY MEDICINE

## 2018-07-31 RX ORDER — AMOXICILLIN 875 MG/1
875 TABLET, COATED ORAL 2 TIMES DAILY
Qty: 14 TABLET | Refills: 0 | Status: SHIPPED | OUTPATIENT
Start: 2018-07-31 | End: 2018-08-07

## 2018-07-31 RX ORDER — PREDNISONE 20 MG/1
40 TABLET ORAL DAILY
Qty: 10 TABLET | Refills: 0 | Status: SHIPPED | OUTPATIENT
Start: 2018-07-31 | End: 2018-08-05

## 2018-07-31 NOTE — PROGRESS NOTES
Assessment/Plan:    Problem List Items Addressed This Visit     Chronic obstructive pulmonary disease ([address] )     Worsening recent symptoms   Treatment started         Relevant Medications    amoxicillin (AMOXIL) 875 mg tablet    predniSONE 20 mg tablet    Essential hypertension - Primary     Improved with metoprolol         Relevant Orders    CBC    Comprehensive metabolic panel    Lipid Panel with Direct LDL reflex    Low TSH level     I am concerned about her weight loss  Recommended that she follow up with her endocrinologist                 There are no Patient Instructions on file for this visit  Return for Annual Wellness Visit in December  Subjective:      Patient ID: Enriqueta Christie is a 75 y o  female  Chief Complaint   Patient presents with    Follow-up     blood pressure check  [id number]       She has been coughing and feeling tight for over the past 1-2 weeks  She has been following with endocrinology for her thyroid  She is concerned about her continued weight loss  Hypertension   This is a chronic problem  The current episode started more than 1 year ago  The problem has been gradually improving since onset  The problem is controlled  Pertinent negatives include no chest pain or headaches  Risk factors for coronary artery disease include stress and smoking/tobacco exposure  Past treatments include beta blockers  The current treatment provides moderate improvement  There are no compliance problems  The following portions of the patient's history were reviewed and updated as appropriate:  past social history    Review of Systems   Respiratory: Positive for cough  Cardiovascular: Negative for chest pain and leg swelling  Neurological: Negative for headaches           Current Outpatient Prescriptions   Medication Sig Dispense Refill    ALPRAZolam (XANAX) 0 25 mg tablet Take 1 tablet by mouth 2 (two) times a day as needed      Calcium Carbonate-Vit D-Min (CALCIUM 600+D PLUS MINERALS) 600-400 MG-UNIT CHEW Chew 1 tablet daily      Cholecalciferol (VITAMIN D3) 2000 units TABS Take by mouth      metoprolol succinate (TOPROL-XL) 50 mg 24 hr tablet Take 1 tablet (50 mg total) by mouth daily at bedtime 30 tablet 5    Multiple Vitamins-Minerals (MULTIVITAMIN ADULT PO) Take 1 tablet by mouth daily      amoxicillin (AMOXIL) 875 mg tablet Take 1 tablet (875 mg total) by mouth 2 (two) times a day for 7 days 14 tablet 0    predniSONE 20 mg tablet Take 2 tablets (40 mg total) by mouth daily for 5 days 10 tablet 0     No current facility-administered medications for this visit  Objective:    /90   Pulse 78   Temp 97 6 °F (36 4 °C)   Resp 18   Ht 5' 4" (1 626 m)   Wt 51 3 kg (113 lb)   LMP  (LMP Unknown) Comment: post menapausal  BMI 19 40 kg/m²        Physical Exam   Constitutional: She appears well-developed and well-nourished  HENT:   Head: Normocephalic and atraumatic  Right Ear: External ear normal    Left Ear: External ear normal    Mouth/Throat: Oropharynx is clear and moist    Cardiovascular: Normal rate, regular rhythm and normal heart sounds  Exam reveals no friction rub  No murmur heard  Pulmonary/Chest: Effort normal  No respiratory distress  She has no wheezes  She has no rales  Breath sounds decreased bilaterally   Musculoskeletal: She exhibits no edema or deformity  Nursing note and vitals reviewed               Adelso Cordon DO

## 2018-09-07 ENCOUNTER — LAB (OUTPATIENT)
Dept: LAB | Facility: CLINIC | Age: 73
End: 2018-09-07
Payer: MEDICARE

## 2018-09-07 DIAGNOSIS — E04.2 MULTIPLE THYROID NODULES: ICD-10-CM

## 2018-09-07 DIAGNOSIS — R94.6 ABNORMAL THYROID FUNCTION TEST: ICD-10-CM

## 2018-09-07 LAB
T4 FREE SERPL-MCNC: 1.23 NG/DL (ref 0.76–1.46)
TSH SERPL DL<=0.05 MIU/L-ACNC: 0.19 UIU/ML (ref 0.36–3.74)

## 2018-09-07 PROCEDURE — 84443 ASSAY THYROID STIM HORMONE: CPT

## 2018-09-07 PROCEDURE — 84439 ASSAY OF FREE THYROXINE: CPT

## 2018-09-07 PROCEDURE — 36415 COLL VENOUS BLD VENIPUNCTURE: CPT

## 2018-09-09 NOTE — PROGRESS NOTES
Please call the patient regarding labs - tsh continues to be  slightly low but stable - can  Consider low dose methimazole next visit

## 2018-09-10 ENCOUNTER — TELEPHONE (OUTPATIENT)
Dept: ENDOCRINOLOGY | Facility: CLINIC | Age: 73
End: 2018-09-10

## 2018-09-10 NOTE — TELEPHONE ENCOUNTER
----- Message from Marycarmen Hayes MD sent at 9/9/2018  3:17 PM EDT -----  Please call the patient regarding labs - tsh continues to be  slightly low but stable - can  Consider low dose methimazole next visit

## 2018-12-03 ENCOUNTER — OFFICE VISIT (OUTPATIENT)
Dept: ENDOCRINOLOGY | Facility: CLINIC | Age: 73
End: 2018-12-03
Payer: MEDICARE

## 2018-12-03 VITALS
BODY MASS INDEX: 19.97 KG/M2 | DIASTOLIC BLOOD PRESSURE: 84 MMHG | HEIGHT: 64 IN | SYSTOLIC BLOOD PRESSURE: 140 MMHG | WEIGHT: 117 LBS

## 2018-12-03 DIAGNOSIS — I10 ESSENTIAL HYPERTENSION: ICD-10-CM

## 2018-12-03 DIAGNOSIS — E55.9 VITAMIN D DEFICIENCY: ICD-10-CM

## 2018-12-03 DIAGNOSIS — R79.89 LOW TSH LEVEL: ICD-10-CM

## 2018-12-03 DIAGNOSIS — E07.9 THYROID DISORDER: ICD-10-CM

## 2018-12-03 DIAGNOSIS — E04.2 MULTIPLE THYROID NODULES: Primary | ICD-10-CM

## 2018-12-03 PROCEDURE — 99214 OFFICE O/P EST MOD 30 MIN: CPT | Performed by: PHYSICIAN ASSISTANT

## 2018-12-03 NOTE — PROGRESS NOTES
Patient Progress Note    CC: follow-up of thyroid nodules, vitamin D deficiency    Referring Provider  Lisa Mo, Do  [address] [id number]     History of Present Illness:     Patient is a 75year-old female here for follow-up of low TSH, thyroid nodules, and vitamin D deficiency  She is not currently on any thyroid medication  She has had low but relatively stable TSH since December 2017  Free T4 has been in normal range  Thyroglobulin antibody, thyroid microsomal antibody, and thyroid stimulating immunoglobulin are all negative  Her most recent thyroid function tests were done in September 2018 at which time TSH was 0 186 and Free T4 1 23  She denies heat intolerance, palpitations, tremors, diarrhea  Her weight does fluctuate  Thyroid ultrasound was done May 2018  FINDINGS:  Thyroid parenchyma is diffusely heterogeneous in echotexture with focal nodule(s) as described below      Right lobe:  4 7 x 1 5 x 1 5 cm  Left lobe:  5 2 x 2 1 x 1 8 cm  Isthmus:  0 4 cm      Nodule #1  Right midpole measuring 1 3 x 0 6 x 1 0 cm  COMPOSITION:  0 points, spongiform  ECHOGENICITY:  Not applicable when spongiform composition  SHAPE:  0 points, wider-than-tall  MARGIN: Not applicable when spongiform composition  ECHOGENIC FOCI:  0 points, none or large comet-tail artifacts  TI-RADS Classification: TR 1 (0 points), Benign  No FNA  Multiple similar-appearing nodule is seen scattered throughout the right lobe and isthmus      Nodule #2  LEFT upper pole nodule measuring 1 4 x 0 7 x 1 2 cm  COMPOSITION:  1 point, mixed cystic and solid  ECHOGENICITY:  1 point, hyperechoic or isoechoic  SHAPE:  0 points, wider-than-tall  MARGIN: 0 points, smooth  ECHOGENIC FOCI:  0 points, none or large comet-tail artifacts  TI-RADS Classification: TR 2 (2 points), Not suspious  No FNA        Nodule #3  LEFT lower pole nodule measuring 0 9 x 0 7 x 0 8 cm    COMPOSITION:  2 points, solid or almost completely solid   ECHOGENICITY:  2 points, hypoechoic  SHAPE:  0 points, wider-than-tall  MARGIN: 0 points, smooth  ECHOGENIC FOCI:  0 points, none or large comet-tail artifacts  TI-RADS Classification: TR 4 (4-6 points), Moderately suspicious  FNA if > 1 5 cm  Follow if > 1cm        There are additional nodules of lesser size and/or TI-RADS score  These do not necessitate additional evaluation based on ACR criteria        IMPRESSION:     No nodule meets current ACR criteria for requiring biopsy but followup ultrasound is recommended in 1 year       Vitamin D deficiency: she is taking vitamin D3 2000 IU daily    Patient Active Problem List   Diagnosis    Allergic rhinitis    Generalized anxiety disorder    Chronic obstructive pulmonary disease (HCC)    Dense breasts    Essential hypertension    Impaired fasting glucose    Thyroid disorder    Vitamin D deficiency    Thrombocytopenia (HCC)    Low TSH level    Thyroid nodule    Multiple thyroid nodules    Abnormal thyroid function test     Past Medical History:   Diagnosis Date    Elevated blood pressure reading       Past Surgical History:   Procedure Laterality Date    APPENDECTOMY      TONSILLECTOMY        Family History   Problem Relation Age of Onset    Lung cancer Mother     Heart disease Maternal Aunt     Heart disease Maternal Uncle     Heart disease Maternal Grandmother      Social History   Substance Use Topics    Smoking status: Current Every Day Smoker    Smokeless tobacco: Never Used    Alcohol use No     Allergies   Allergen Reactions    Erythromycin GI Intolerance     Reaction Date: [id number];      Current Outpatient Prescriptions   Medication Sig Dispense Refill    ALPRAZolam (XANAX) 0 25 mg tablet Take 1 tablet by mouth 2 (two) times a day as needed      Calcium Carbonate-Vit D-Min (CALCIUM 600+D PLUS MINERALS) 600-400 MG-UNIT CHEW Chew 1 tablet daily      Cholecalciferol (VITAMIN D3) 2000 units TABS Take by mouth  metoprolol succinate (TOPROL-XL) 50 mg 24 hr tablet Take 1 tablet (50 mg total) by mouth daily at bedtime 30 tablet 5    Multiple Vitamins-Minerals (MULTIVITAMIN ADULT PO) Take 1 tablet by mouth daily       No current facility-administered medications for this visit  Review of Systems   Constitutional: Positive for activity change and fatigue  Negative for appetite change and unexpected weight change  HENT: Negative for trouble swallowing and voice change  Eyes: Negative for visual disturbance  Respiratory: Negative for shortness of breath  Cardiovascular: Negative for chest pain and palpitations  Gastrointestinal: Positive for constipation (intermittent)  Negative for diarrhea  Endocrine: Positive for cold intolerance  Negative for heat intolerance  Musculoskeletal: Positive for arthralgias (knees)  Skin: Negative  Neurological: Negative for tremors  Psychiatric/Behavioral: The patient is nervous/anxious  Physical Exam:  Body mass index is 20 08 kg/m²  /84   Ht 5' 4" (1 626 m)   Wt 53 1 kg (117 lb)   LMP  (LMP Unknown) Comment: post menapausal  BMI 20 08 kg/m²    Wt Readings from Last 3 Encounters:   12/03/18 53 1 kg (117 lb)   07/31/18 51 3 kg (113 lb)   06/21/18 52 3 kg (115 lb 6 4 oz)       Physical Exam   Constitutional: She appears well-developed and well-nourished  HENT:   Head: Normocephalic  Eyes: Pupils are equal, round, and reactive to light  EOM are normal  No scleral icterus  Neck: Neck supple  No thyromegaly present  Cardiovascular: Normal rate and regular rhythm  No murmur heard  Pulses:       Radial pulses are 2+ on the right side, and 2+ on the left side  Pulmonary/Chest: Effort normal and breath sounds normal  No respiratory distress  She has no wheezes  Neurological: She is alert  She has normal reflexes  Skin: Skin is warm and dry  Psychiatric: She has a normal mood and affect     Nursing note and vitals reviewed  Patient's shoes and socks were not removed  Labs:      Ref  Range 12/22/2017 07:59 1/31/2018 10:38 4/25/2018 11:16 9/7/2018 10:40   TSH 3RD GENERATON Latest Ref Range: 0 358 - 3 740 uIU/mL 0 272 (L) 0 182 (L) 0 208 (L) 0 186 (L)   Free T4 Latest Ref Range: 0 76 - 1 46 ng/dL  1 16 1 20 1 23   T3, Free Latest Ref Range: 2 30 - 4 20 pg/mL  3 15     T3, Total Latest Ref Range: 0 60 - 1 80 ng/mL   1 40    THYROGLOBULIN AB Latest Ref Range: 0 0 - 0 9 IU/mL   <1 0       Ref  Range 4/25/2018 11:16   THYROID STIMULATING IMMUNOGLOBULIN Latest Ref Range: 0 00 - 0 55 IU/L <0 10      Ref  Range 7/7/2017 10:59 12/22/2017 07:59   Vit D, 25-Hydroxy Latest Ref Range: 30 0 - 100 0 ng/mL 34 9 26 9 (L)         Plan:    Diagnoses and all orders for this visit:    Multiple thyroid nodules  Last ultrasound done May 2018  No nodules met criteria for biopsy at that time  Patient reports she has had a previous biopsy done which was benign  Repeat ultrasound in 6 months     -     US thyroid; Future    Low TSH level  Continue to monitor every 3 months  TSH remains low but free T4 in normal range  TSI and TPO antibodies negative previously  Check TSH and Free T4 now  Discussed depending on results, may need to consider methimazole  -     T4, free; Future  -     TSH, 3rd generation; Future  -     T4, free; Future  -     TSH, 3rd generation; Future    Vitamin D deficiency  Continue vitamin D3 2000 IU daily  Check vitamin D level  -     Vitamin D 25 hydroxy; Future      Discussed with the patient and all questions fully answered  She will call me if any problems arise      Counseled patient on diagnostic results, prognosis, risk and benefit of treatment options, instruction for management, importance of treatment compliance, risk  factor reduction and impressions      Davida Gutierrez PA-C

## 2018-12-14 ENCOUNTER — APPOINTMENT (OUTPATIENT)
Dept: LAB | Facility: CLINIC | Age: 73
End: 2018-12-14
Payer: MEDICARE

## 2018-12-14 DIAGNOSIS — I10 ESSENTIAL HYPERTENSION: ICD-10-CM

## 2018-12-14 LAB
ALBUMIN SERPL BCP-MCNC: 3.6 G/DL (ref 3.5–5)
ALP SERPL-CCNC: 78 U/L (ref 46–116)
ALT SERPL W P-5'-P-CCNC: 17 U/L (ref 12–78)
ANION GAP SERPL CALCULATED.3IONS-SCNC: 3 MMOL/L (ref 4–13)
AST SERPL W P-5'-P-CCNC: 15 U/L (ref 5–45)
BILIRUB SERPL-MCNC: 0.46 MG/DL (ref 0.2–1)
BUN SERPL-MCNC: 11 MG/DL (ref 5–25)
CALCIUM SERPL-MCNC: 9.9 MG/DL (ref 8.3–10.1)
CHLORIDE SERPL-SCNC: 104 MMOL/L (ref 100–108)
CHOLEST SERPL-MCNC: 168 MG/DL (ref 50–200)
CO2 SERPL-SCNC: 30 MMOL/L (ref 21–32)
CREAT SERPL-MCNC: 0.69 MG/DL (ref 0.6–1.3)
ERYTHROCYTE [DISTWIDTH] IN BLOOD BY AUTOMATED COUNT: 12.2 % (ref 11.6–15.1)
GFR SERPL CREATININE-BSD FRML MDRD: 86 ML/MIN/1.73SQ M
GLUCOSE P FAST SERPL-MCNC: 100 MG/DL (ref 65–99)
HCT VFR BLD AUTO: 48.2 % (ref 34.8–46.1)
HDLC SERPL-MCNC: 71 MG/DL (ref 40–60)
HGB BLD-MCNC: 15.6 G/DL (ref 11.5–15.4)
LDLC SERPL CALC-MCNC: 77 MG/DL (ref 0–100)
MCH RBC QN AUTO: 32.1 PG (ref 26.8–34.3)
MCHC RBC AUTO-ENTMCNC: 32.4 G/DL (ref 31.4–37.4)
MCV RBC AUTO: 99 FL (ref 82–98)
PLATELET # BLD AUTO: 144 THOUSANDS/UL (ref 149–390)
PMV BLD AUTO: 10.5 FL (ref 8.9–12.7)
POTASSIUM SERPL-SCNC: 4.3 MMOL/L (ref 3.5–5.3)
PROT SERPL-MCNC: 7 G/DL (ref 6.4–8.2)
RBC # BLD AUTO: 4.86 MILLION/UL (ref 3.81–5.12)
SODIUM SERPL-SCNC: 137 MMOL/L (ref 136–145)
TRIGL SERPL-MCNC: 98 MG/DL
WBC # BLD AUTO: 5.24 THOUSAND/UL (ref 4.31–10.16)

## 2018-12-14 PROCEDURE — 80061 LIPID PANEL: CPT

## 2018-12-14 PROCEDURE — 36415 COLL VENOUS BLD VENIPUNCTURE: CPT

## 2018-12-14 PROCEDURE — 80053 COMPREHEN METABOLIC PANEL: CPT

## 2018-12-14 PROCEDURE — 85027 COMPLETE CBC AUTOMATED: CPT

## 2018-12-21 ENCOUNTER — OFFICE VISIT (OUTPATIENT)
Dept: FAMILY MEDICINE CLINIC | Facility: CLINIC | Age: 73
End: 2018-12-21
Payer: MEDICARE

## 2018-12-21 VITALS
WEIGHT: 113.6 LBS | SYSTOLIC BLOOD PRESSURE: 170 MMHG | TEMPERATURE: 96.4 F | BODY MASS INDEX: 19.39 KG/M2 | DIASTOLIC BLOOD PRESSURE: 92 MMHG | HEIGHT: 64 IN | RESPIRATION RATE: 18 BRPM | HEART RATE: 88 BPM

## 2018-12-21 DIAGNOSIS — Z00.00 MEDICARE ANNUAL WELLNESS VISIT, SUBSEQUENT: Primary | ICD-10-CM

## 2018-12-21 DIAGNOSIS — Z12.31 SCREENING MAMMOGRAM, ENCOUNTER FOR: ICD-10-CM

## 2018-12-21 DIAGNOSIS — Z23 NEED FOR VACCINATION: ICD-10-CM

## 2018-12-21 DIAGNOSIS — Z78.0 POSTMENOPAUSAL: ICD-10-CM

## 2018-12-21 DIAGNOSIS — R92.2 DENSE BREASTS: ICD-10-CM

## 2018-12-21 DIAGNOSIS — Z12.11 COLON CANCER SCREENING: ICD-10-CM

## 2018-12-21 DIAGNOSIS — I10 ESSENTIAL HYPERTENSION: ICD-10-CM

## 2018-12-21 PROCEDURE — G0008 ADMIN INFLUENZA VIRUS VAC: HCPCS

## 2018-12-21 PROCEDURE — G0439 PPPS, SUBSEQ VISIT: HCPCS | Performed by: FAMILY MEDICINE

## 2018-12-21 PROCEDURE — 90662 IIV NO PRSV INCREASED AG IM: CPT

## 2018-12-21 RX ORDER — METOPROLOL SUCCINATE 50 MG/1
75 TABLET, EXTENDED RELEASE ORAL
Qty: 45 TABLET | Refills: 5 | Status: SHIPPED | OUTPATIENT
Start: 2018-12-21 | End: 2018-12-21 | Stop reason: SDUPTHER

## 2018-12-21 RX ORDER — METOPROLOL SUCCINATE 50 MG/1
75 TABLET, EXTENDED RELEASE ORAL
Qty: 45 TABLET | Refills: 5 | Status: SHIPPED | OUTPATIENT
Start: 2018-12-21 | End: 2019-02-04 | Stop reason: SDUPTHER

## 2018-12-21 NOTE — PROGRESS NOTES
Assessment and Plan:    Problem List Items Addressed This Visit     Dense breasts    Relevant Orders    Mammo screening bilateral w 3d & cad    Essential hypertension     Not controlled  Dose of metoprolol increased from 50 to 75 mg   Will recheck in 1 month         Relevant Medications    metoprolol succinate (TOPROL-XL) 50 mg 24 hr tablet    Other Relevant Orders    CBC    Comprehensive metabolic panel    Lipid Panel with Direct LDL reflex      Other Visit Diagnoses     Medicare annual wellness visit, subsequent    -  Primary    Screening mammogram, encounter for        Relevant Orders    Mammo screening bilateral w 3d & cad    Postmenopausal        Relevant Orders    DXA bone density spine hip and pelvis    Colon cancer screening        She is worried about the risk of a colonoscopy, but will consider ColoGuard  she is going to check with insurance for coverage  Need for vaccination        Relevant Orders    influenza vaccine, 6340-5448, high-dose, PF 0 5 mL, for patients 65 yr+ (FLUZONE HIGH-DOSE) (Completed)        Health Maintenance Due   Topic Date Due    Medicare Annual Wellness Visit (AWV)  1945    MAMMOGRAM  1945    DXA SCAN  1945    CRC Screening: Colonoscopy  1945    DTaP,Tdap,and Td Vaccines (1 - Tdap) 04/19/2000    Pneumococcal PPSV23/PCV13 65+ Years / Low and Medium Risk (2 of 2 - PCV13) 07/18/2014    INFLUENZA VACCINE  07/01/2018         HPI:  Mariana Francisco is a 75 y o  female here for her Subsequent Wellness Visit  She has been taking care of her ill   He just had a partial amputation  She has been spending most of her time taking care of him  She quit smoking for a month and a half  She went back after her  restarted smoking       Patient Active Problem List   Diagnosis    Allergic rhinitis    Generalized anxiety disorder    Chronic obstructive pulmonary disease (HCC)    Dense breasts    Essential hypertension    Impaired fasting glucose    Thyroid disorder    Vitamin D deficiency    Thrombocytopenia (HCC)    Low TSH level    Thyroid nodule    Multiple thyroid nodules    Abnormal thyroid function test     Past Medical History:   Diagnosis Date    Elevated blood pressure reading      Past Surgical History:   Procedure Laterality Date    APPENDECTOMY      TONSILLECTOMY       Family History   Problem Relation Age of Onset    Lung cancer Mother     Heart disease Maternal Aunt     Heart disease Maternal Uncle     Heart disease Maternal Grandmother      History   Smoking Status    Current Every Day Smoker   Smokeless Tobacco    Never Used     History   Alcohol Use No      History   Drug Use No       Current Outpatient Prescriptions   Medication Sig Dispense Refill    ALPRAZolam (XANAX) 0 25 mg tablet Take 1 tablet by mouth 2 (two) times a day as needed      Calcium Carbonate-Vit D-Min (CALCIUM 600+D PLUS MINERALS) 600-400 MG-UNIT CHEW Chew 1 tablet daily      Cholecalciferol (VITAMIN D3) 2000 units TABS Take by mouth      metoprolol succinate (TOPROL-XL) 50 mg 24 hr tablet Take 1 5 tablets (75 mg total) by mouth daily at bedtime 45 tablet 5    Multiple Vitamins-Minerals (MULTIVITAMIN ADULT PO) Take 1 tablet by mouth daily       No current facility-administered medications for this visit        Allergies   Allergen Reactions    Erythromycin GI Intolerance     Reaction Date: [id number];      Immunization History   Administered Date(s) Administered    Influenza Split High Dose Preservative Free IM 12/18/2015, 12/21/2016    Influenza TIV (IM) 10/12/2012, 01/13/2014    Influenza, high dose seasonal 0 5 mL 12/21/2018    Pneumococcal Polysaccharide PPV23 07/18/2013    Td (adult), adsorbed 04/18/2000       Patient Care Team:  Rabia Aldridge DO as PCP - Susana Sapp MD as PCP - Endocrinology (Endocrinology)  DO Moises De Dios MD Luwana Madden, PA-C as Physician Assistant (Endocrinology)    Medicare Screening Tests and Risk Assessments:  Petra Castelan is here for her Subsequent Wellness visit  Health Risk Assessment:  Patient rates overall health as good  Patient feels that their physical health rating is Same  Eyesight was rated as Same  Hearing was rated as Same  Patient feels that their emotional and mental health rating is Same  Pain experienced by patient in the last 7 days has been Some  Patient's pain rating has been 5/10  Patient states that she has experienced no weight loss or gain in last 6 months  Emotional/Mental Health:  Patient has been feeling nervous/anxious  PHQ-9 Depression Screening:    Frequency of the following problems over the past two weeks:      1  Little interest or pleasure in doing things: 0 - not at all      2  Feeling down, depressed, or hopeless: 0 - not at all  PHQ-2 Score: 0          Broken Bones/Falls: Fall Risk Assessment:    In the past year, patient has experienced: No history of falling in past year          Bladder/Bowel:  Patient has not leaked urine accidently in the last six months  Patient reports no loss of bowel control  Immunizations:  Patient has not had a flu vaccination within the last year  Patient has received a pneumonia shot  Patient has not received a shingles shot  Patient has received tetanus/diphtheria shot  Home Safety:  Patient does not have trouble with stairs inside or outside of their home  Patient currently reports that there are no safety hazards present in home, working smoke alarms, working carbon monoxide detectors  Preventative Screenings:   Breast cancer screening performed, no colon cancer screen completed, cholesterol screen completed, glaucoma eye exam completed,     Nutrition:  Current diet: Regular with servings of the following:    Medications:  Patient is not currently taking any over-the-counter supplements  Patient is able to manage medications  Lifestyle Choices:  Patient reports current tobacco use  Patient reports no alcohol use  Patient drives a vehicle  Patient wears seat belt  Activities of Daily Living:  Can get out of bed by his or her self, able to dress self, able to make own meals, able to do own shopping, able to bathe self, can do own laundry/housekeeping, can manage own money, pay bills and track expenses    Previous Hospitalizations:  No hospitalization or ED visit in past 12 months        Advanced Directives:  Patient has not decided on power of   Patient has not completed advanced directive  Preventative Screening/Counseling:      Cardiovascular:      General: Risks and Benefits Discussed      Comments: Due for EKG        Diabetes:      General: Screening Current          Colorectal Cancer:      General: Risks and Benefits Discussed and Patient Declines          Breast Cancer:      General: Risks and Benefits Discussed          Cervical Cancer:      General: Screening Not Indicated          Osteoporosis:      General: Risks and Benefits Discussed      Due for studies: DXA Axial          AAA:      General: Screening Not Indicated          Glaucoma:      General: Screening Current          HIV:      General: Screening Not Indicated          Hepatitis C:      General: Screening Not Indicated        Advanced Directives:   Patient has no living will for healthcare, Information on ACP and/or AD provided  End of life assessment reviewed with patient  Provider agrees with end of life decisions   Additional Comments: Has 5 Wishes at home, will think about it    Immunizations:      Influenza: Risks & Benefits Discussed and Influenza Due Today      Pneumococcal: Risks & Benefits Discussed      Shingrix: Risks & Benefits Discussed          Physical Exam   Constitutional: She is oriented to person, place, and time  She appears well-developed and well-nourished  HENT:   Head: Normocephalic and atraumatic     Right Ear: External ear normal    Left Ear: External ear normal    Nose: Nose normal    Mouth/Throat: Oropharynx is clear and moist    Cardiovascular: Normal rate, regular rhythm and normal heart sounds  Exam reveals no friction rub  No murmur heard  Pulmonary/Chest: No respiratory distress  She has no wheezes  She has no rales  Decreased breath sounds   Musculoskeletal: She exhibits no edema  Neurological: She is oriented to person, place, and time  No cranial nerve deficit  Nursing note and vitals reviewed

## 2018-12-21 NOTE — PATIENT INSTRUCTIONS
Recent Results (from the past 672 hour(s))   CBC    Collection Time: 12/14/18  8:10 AM   Result Value Ref Range    WBC 5 24 4 31 - 10 16 Thousand/uL    RBC 4 86 3 81 - 5 12 Million/uL    Hemoglobin 15 6 (H) 11 5 - 15 4 g/dL    Hematocrit 48 2 (H) 34 8 - 46 1 %    MCV 99 (H) 82 - 98 fL    MCH 32 1 26 8 - 34 3 pg    MCHC 32 4 31 4 - 37 4 g/dL    RDW 12 2 11 6 - 15 1 %    Platelets 658 (L) 140 - 390 Thousands/uL    MPV 10 5 8 9 - 12 7 fL   Comprehensive metabolic panel    Collection Time: 12/14/18  8:10 AM   Result Value Ref Range    Sodium 137 136 - 145 mmol/L    Potassium 4 3 3 5 - 5 3 mmol/L    Chloride 104 100 - 108 mmol/L    CO2 30 21 - 32 mmol/L    ANION GAP 3 (L) 4 - 13 mmol/L    BUN 11 5 - 25 mg/dL    Creatinine 0 69 0 60 - 1 30 mg/dL    Glucose, Fasting 100 (H) 65 - 99 mg/dL    Calcium 9 9 8 3 - 10 1 mg/dL    AST 15 5 - 45 U/L    ALT 17 12 - 78 U/L    Alkaline Phosphatase 78 46 - 116 U/L    Total Protein 7 0 6 4 - 8 2 g/dL    Albumin 3 6 3 5 - 5 0 g/dL    Total Bilirubin 0 46 0 20 - 1 00 mg/dL    eGFR 86 ml/min/1 73sq m   Lipid Panel with Direct LDL reflex    Collection Time: 12/14/18  8:10 AM   Result Value Ref Range    Cholesterol 168 50 - 200 mg/dL    Triglycerides 98 <=150 mg/dL    HDL, Direct 71 (H) 40 - 60 mg/dL    LDL Calculated 77 0 - 100 mg/dL     Pneumonia Vaccine:   You had a pneumovax 23 and need a Prevnar 13

## 2019-02-04 ENCOUNTER — OFFICE VISIT (OUTPATIENT)
Dept: FAMILY MEDICINE CLINIC | Facility: CLINIC | Age: 74
End: 2019-02-04
Payer: MEDICARE

## 2019-02-04 VITALS
TEMPERATURE: 98.2 F | HEIGHT: 64 IN | HEART RATE: 92 BPM | RESPIRATION RATE: 16 BRPM | SYSTOLIC BLOOD PRESSURE: 158 MMHG | WEIGHT: 110 LBS | BODY MASS INDEX: 18.78 KG/M2 | DIASTOLIC BLOOD PRESSURE: 76 MMHG

## 2019-02-04 DIAGNOSIS — R79.89 LOW TSH LEVEL: ICD-10-CM

## 2019-02-04 DIAGNOSIS — I10 ESSENTIAL HYPERTENSION: Primary | ICD-10-CM

## 2019-02-04 DIAGNOSIS — Z72.0 NICOTINE ABUSE: ICD-10-CM

## 2019-02-04 PROCEDURE — 99213 OFFICE O/P EST LOW 20 MIN: CPT | Performed by: FAMILY MEDICINE

## 2019-02-04 RX ORDER — METOPROLOL SUCCINATE 100 MG/1
100 TABLET, EXTENDED RELEASE ORAL
Qty: 30 TABLET | Refills: 5 | Status: SHIPPED | OUTPATIENT
Start: 2019-02-04 | End: 2019-06-19 | Stop reason: SDUPTHER

## 2019-02-04 NOTE — PROGRESS NOTES
Assessment/Plan:    Problem List Items Addressed This Visit     Essential hypertension - Primary     Pressure not controlled  metoprolol dose increased from 75 to 100 mg at bedtime         Relevant Medications    metoprolol succinate (TOPROL-XL) 100 mg 24 hr tablet    Low TSH level     TSH is low, but T4 is normal  Has follow up with endocrinology scheduled         Nicotine abuse     Not ready to quit  Discussed that smoking is a risk for heart disease and cancer               There are no Patient Instructions on file for this visit  Return in about 1 month (around 3/4/2019) for Next scheduled follow up  Subjective:      Patient ID: Mark Cooper is a 75 y o  female  Chief Complaint   Patient presents with    Follow-up     1 month f/u       She had a lot of coffee this morning before her visit  She is also getting clear nasal congestion for weeks  Hypertension   This is a chronic problem  The current episode started more than 1 year ago  The problem is unchanged  The problem is uncontrolled  Pertinent negatives include no headaches, peripheral edema or shortness of breath  Past treatments include beta blockers  The current treatment provides mild improvement  There are no compliance problems  The following portions of the patient's history were reviewed and updated as appropriate:  past social history    Review of Systems   Respiratory: Negative for shortness of breath  Neurological: Negative for headaches  Psychiatric/Behavioral: The patient is nervous/anxious            Current Outpatient Prescriptions   Medication Sig Dispense Refill    ALPRAZolam (XANAX) 0 25 mg tablet Take 1 tablet by mouth 2 (two) times a day as needed      Calcium Carbonate-Vit D-Min (CALCIUM 600+D PLUS MINERALS) 600-400 MG-UNIT CHEW Chew 1 tablet daily      Cholecalciferol (VITAMIN D3) 2000 units TABS Take by mouth      metoprolol succinate (TOPROL-XL) 100 mg 24 hr tablet Take 1 tablet (100 mg total) by mouth daily at bedtime 30 tablet 5    Multiple Vitamins-Minerals (MULTIVITAMIN ADULT PO) Take 1 tablet by mouth daily       No current facility-administered medications for this visit  Objective:    /76   Pulse 92   Temp 98 2 °F (36 8 °C)   Resp 16   Ht 5' 4" (1 626 m)   Wt 49 9 kg (110 lb)   LMP  (LMP Unknown) Comment: post menapausal  BMI 18 88 kg/m²        Physical Exam   Constitutional: She appears well-developed and well-nourished  HENT:   Head: Normocephalic and atraumatic  Right Ear: External ear normal    Left Ear: External ear normal    Mouth/Throat: Oropharynx is clear and moist    Cardiovascular: Normal rate, regular rhythm and normal heart sounds  Exam reveals no friction rub  No murmur heard  Pulmonary/Chest: Effort normal and breath sounds normal  No respiratory distress  She has no wheezes  She has no rales  Musculoskeletal: She exhibits no edema or deformity  Nursing note and vitals reviewed               Madhuri Mcdonald DO

## 2019-03-02 DIAGNOSIS — Z12.31 SCREENING MAMMOGRAM, ENCOUNTER FOR: ICD-10-CM

## 2019-03-02 DIAGNOSIS — R92.2 DENSE BREASTS: ICD-10-CM

## 2019-03-12 ENCOUNTER — LAB (OUTPATIENT)
Dept: LAB | Facility: CLINIC | Age: 74
End: 2019-03-12
Payer: MEDICARE

## 2019-03-12 ENCOUNTER — OFFICE VISIT (OUTPATIENT)
Dept: FAMILY MEDICINE CLINIC | Facility: CLINIC | Age: 74
End: 2019-03-12
Payer: MEDICARE

## 2019-03-12 VITALS
HEIGHT: 64 IN | SYSTOLIC BLOOD PRESSURE: 140 MMHG | DIASTOLIC BLOOD PRESSURE: 86 MMHG | WEIGHT: 113 LBS | RESPIRATION RATE: 18 BRPM | HEART RATE: 76 BPM | BODY MASS INDEX: 19.29 KG/M2 | TEMPERATURE: 97 F

## 2019-03-12 DIAGNOSIS — J44.9 CHRONIC OBSTRUCTIVE PULMONARY DISEASE, UNSPECIFIED COPD TYPE (HCC): ICD-10-CM

## 2019-03-12 DIAGNOSIS — R79.89 LOW TSH LEVEL: ICD-10-CM

## 2019-03-12 DIAGNOSIS — E55.9 VITAMIN D DEFICIENCY: ICD-10-CM

## 2019-03-12 DIAGNOSIS — D69.6 THROMBOCYTOPENIA (HCC): ICD-10-CM

## 2019-03-12 DIAGNOSIS — E07.9 THYROID DISORDER: ICD-10-CM

## 2019-03-12 DIAGNOSIS — I10 ESSENTIAL HYPERTENSION: Primary | ICD-10-CM

## 2019-03-12 DIAGNOSIS — F41.1 GENERALIZED ANXIETY DISORDER: ICD-10-CM

## 2019-03-12 LAB
25(OH)D3 SERPL-MCNC: 41.3 NG/ML (ref 30–100)
T4 FREE SERPL-MCNC: 1.06 NG/DL (ref 0.76–1.46)
TSH SERPL DL<=0.05 MIU/L-ACNC: 0.43 UIU/ML (ref 0.36–3.74)

## 2019-03-12 PROCEDURE — 84443 ASSAY THYROID STIM HORMONE: CPT

## 2019-03-12 PROCEDURE — 82306 VITAMIN D 25 HYDROXY: CPT

## 2019-03-12 PROCEDURE — 36415 COLL VENOUS BLD VENIPUNCTURE: CPT

## 2019-03-12 PROCEDURE — 99214 OFFICE O/P EST MOD 30 MIN: CPT | Performed by: FAMILY MEDICINE

## 2019-03-12 PROCEDURE — 84439 ASSAY OF FREE THYROXINE: CPT

## 2019-03-12 RX ORDER — ALPRAZOLAM 0.25 MG/1
0.25 TABLET ORAL DAILY PRN
Qty: 20 TABLET | Refills: 0 | Status: SHIPPED | OUTPATIENT
Start: 2019-03-12 | End: 2020-02-17 | Stop reason: SDUPTHER

## 2019-03-12 NOTE — PROGRESS NOTES
Assessment/Plan:    Problem List Items Addressed This Visit     Chronic obstructive pulmonary disease ([address] )     Breathing has been good, but is coughing in the morning         Essential hypertension - Primary     Blood pressure is down with higher metoprolol          Generalized anxiety disorder     stable         Relevant Medications    ALPRAZolam (XANAX) 0 25 mg tablet    Thrombocytopenia (HCC)     Will check platelets in June                   There are no Patient Instructions on file for this visit  Return for Next scheduled follow up Mid to the End of June  Subjective:      Patient ID: Sumit Avila is a 75 y o  female  Chief Complaint   Patient presents with    Follow-up     blood pressure check  [id number]       She is still feeling tired  She is not ready to quit smoking  Hypertension   This is a chronic problem  The current episode started more than 1 year ago  The problem is controlled  Pertinent negatives include no peripheral edema  Past treatments include beta blockers  The current treatment provides moderate improvement  Compliance problems include exercise  The following portions of the patient's history were reviewed and updated as appropriate:  past social history    Review of Systems   Constitutional: Positive for fatigue  Respiratory: Positive for cough  Cardiovascular: Negative for leg swelling           Current Outpatient Medications   Medication Sig Dispense Refill    ALPRAZolam (XANAX) 0 25 mg tablet Take 1 tablet (0 25 mg total) by mouth daily as needed for anxiety 20 tablet 0    Calcium Carbonate-Vit D-Min (CALCIUM 600+D PLUS MINERALS) 600-400 MG-UNIT CHEW Chew 1 tablet daily      Cholecalciferol (VITAMIN D3) 2000 units TABS Take by mouth      metoprolol succinate (TOPROL-XL) 100 mg 24 hr tablet Take 1 tablet (100 mg total) by mouth daily at bedtime 30 tablet 5    Multiple Vitamins-Minerals (MULTIVITAMIN ADULT PO) Take 1 tablet by mouth daily       No current facility-administered medications for this visit  Objective:    /86   Pulse 76   Temp (!) 97 °F (36 1 °C)   Resp 18   Ht 5' 4" (1 626 m)   Wt 51 3 kg (113 lb)   LMP  (LMP Unknown) Comment: post menapausal  BMI 19 40 kg/m²        Physical Exam   Constitutional: She appears well-developed and well-nourished  HENT:   Head: Normocephalic and atraumatic  Right Ear: External ear normal    Left Ear: External ear normal    Mouth/Throat: Oropharynx is clear and moist    Cardiovascular: Normal rate, regular rhythm and normal heart sounds  Exam reveals no friction rub  No murmur heard  Pulmonary/Chest: Effort normal and breath sounds normal  No respiratory distress  She has no wheezes  She has no rales  Musculoskeletal: She exhibits no edema or deformity  Nursing note and vitals reviewed               Wendy Hope DO

## 2019-03-13 ENCOUNTER — TELEPHONE (OUTPATIENT)
Dept: ENDOCRINOLOGY | Facility: CLINIC | Age: 74
End: 2019-03-13

## 2019-03-13 NOTE — RESULT ENCOUNTER NOTE
Thyroid blood work in normal range  Will continue to monitor  Vitamin D in normal range  Continue current supplementation

## 2019-03-13 NOTE — TELEPHONE ENCOUNTER
----- Message from Julieann Bumpers, PA-C sent at 3/13/2019 11:34 AM EDT -----  Thyroid blood work in normal range  Will continue to monitor  Vitamin D in normal range  Continue current supplementation

## 2019-06-05 ENCOUNTER — HOSPITAL ENCOUNTER (OUTPATIENT)
Dept: RADIOLOGY | Facility: HOSPITAL | Age: 74
Discharge: HOME | End: 2019-06-05
Payer: MEDICARE

## 2019-06-05 DIAGNOSIS — E04.2 MULTIPLE THYROID NODULES: ICD-10-CM

## 2019-06-05 PROCEDURE — 76536 US EXAM OF HEAD AND NECK: CPT

## 2019-06-14 ENCOUNTER — LAB (OUTPATIENT)
Dept: LAB | Facility: CLINIC | Age: 74
End: 2019-06-14
Payer: MEDICARE

## 2019-06-14 DIAGNOSIS — I10 ESSENTIAL HYPERTENSION: ICD-10-CM

## 2019-06-14 DIAGNOSIS — R79.89 LOW TSH LEVEL: ICD-10-CM

## 2019-06-14 DIAGNOSIS — E55.9 VITAMIN D DEFICIENCY: ICD-10-CM

## 2019-06-14 DIAGNOSIS — E07.9 THYROID DISORDER: ICD-10-CM

## 2019-06-14 LAB
25(OH)D3 SERPL-MCNC: 27 NG/ML (ref 30–100)
ALBUMIN SERPL BCP-MCNC: 3.7 G/DL (ref 3.5–5)
ALP SERPL-CCNC: 62 U/L (ref 46–116)
ALT SERPL W P-5'-P-CCNC: 17 U/L (ref 12–78)
ANION GAP SERPL CALCULATED.3IONS-SCNC: 4 MMOL/L (ref 4–13)
AST SERPL W P-5'-P-CCNC: 13 U/L (ref 5–45)
BILIRUB SERPL-MCNC: 0.46 MG/DL (ref 0.2–1)
BUN SERPL-MCNC: 10 MG/DL (ref 5–25)
CALCIUM SERPL-MCNC: 8.9 MG/DL (ref 8.3–10.1)
CHLORIDE SERPL-SCNC: 104 MMOL/L (ref 100–108)
CHOLEST SERPL-MCNC: 154 MG/DL (ref 50–200)
CO2 SERPL-SCNC: 29 MMOL/L (ref 21–32)
CREAT SERPL-MCNC: 0.72 MG/DL (ref 0.6–1.3)
ERYTHROCYTE [DISTWIDTH] IN BLOOD BY AUTOMATED COUNT: 12.1 % (ref 11.6–15.1)
GFR SERPL CREATININE-BSD FRML MDRD: 82 ML/MIN/1.73SQ M
GLUCOSE P FAST SERPL-MCNC: 108 MG/DL (ref 65–99)
HCT VFR BLD AUTO: 48 % (ref 34.8–46.1)
HDLC SERPL-MCNC: 62 MG/DL (ref 40–60)
HGB BLD-MCNC: 15.3 G/DL (ref 11.5–15.4)
LDLC SERPL CALC-MCNC: 68 MG/DL (ref 0–100)
MCH RBC QN AUTO: 31.9 PG (ref 26.8–34.3)
MCHC RBC AUTO-ENTMCNC: 31.9 G/DL (ref 31.4–37.4)
MCV RBC AUTO: 100 FL (ref 82–98)
PLATELET # BLD AUTO: 145 THOUSANDS/UL (ref 149–390)
PMV BLD AUTO: 10.8 FL (ref 8.9–12.7)
POTASSIUM SERPL-SCNC: 3.9 MMOL/L (ref 3.5–5.3)
PROT SERPL-MCNC: 6.7 G/DL (ref 6.4–8.2)
RBC # BLD AUTO: 4.8 MILLION/UL (ref 3.81–5.12)
SODIUM SERPL-SCNC: 137 MMOL/L (ref 136–145)
T4 FREE SERPL-MCNC: 1.26 NG/DL (ref 0.76–1.46)
TRIGL SERPL-MCNC: 121 MG/DL
TSH SERPL DL<=0.05 MIU/L-ACNC: 0.29 UIU/ML (ref 0.36–3.74)
WBC # BLD AUTO: 5.39 THOUSAND/UL (ref 4.31–10.16)

## 2019-06-14 PROCEDURE — 80053 COMPREHEN METABOLIC PANEL: CPT

## 2019-06-14 PROCEDURE — 82306 VITAMIN D 25 HYDROXY: CPT

## 2019-06-14 PROCEDURE — 84439 ASSAY OF FREE THYROXINE: CPT

## 2019-06-14 PROCEDURE — 80061 LIPID PANEL: CPT

## 2019-06-14 PROCEDURE — 85027 COMPLETE CBC AUTOMATED: CPT

## 2019-06-14 PROCEDURE — 36415 COLL VENOUS BLD VENIPUNCTURE: CPT

## 2019-06-14 PROCEDURE — 84443 ASSAY THYROID STIM HORMONE: CPT

## 2019-06-19 ENCOUNTER — OFFICE VISIT (OUTPATIENT)
Dept: FAMILY MEDICINE CLINIC | Facility: CLINIC | Age: 74
End: 2019-06-19
Payer: MEDICARE

## 2019-06-19 VITALS
TEMPERATURE: 97.4 F | HEART RATE: 82 BPM | WEIGHT: 111 LBS | DIASTOLIC BLOOD PRESSURE: 72 MMHG | BODY MASS INDEX: 18.95 KG/M2 | SYSTOLIC BLOOD PRESSURE: 132 MMHG | RESPIRATION RATE: 18 BRPM | HEIGHT: 64 IN

## 2019-06-19 DIAGNOSIS — R94.6 ABNORMAL THYROID FUNCTION TEST: ICD-10-CM

## 2019-06-19 DIAGNOSIS — R73.01 IMPAIRED FASTING GLUCOSE: ICD-10-CM

## 2019-06-19 DIAGNOSIS — E55.9 VITAMIN D DEFICIENCY: ICD-10-CM

## 2019-06-19 DIAGNOSIS — I10 ESSENTIAL HYPERTENSION: Primary | ICD-10-CM

## 2019-06-19 DIAGNOSIS — J44.9 CHRONIC OBSTRUCTIVE PULMONARY DISEASE, UNSPECIFIED COPD TYPE (HCC): ICD-10-CM

## 2019-06-19 PROCEDURE — 99214 OFFICE O/P EST MOD 30 MIN: CPT | Performed by: FAMILY MEDICINE

## 2019-06-19 RX ORDER — METOPROLOL SUCCINATE 100 MG/1
100 TABLET, EXTENDED RELEASE ORAL
Qty: 30 TABLET | Refills: 5 | Status: SHIPPED | OUTPATIENT
Start: 2019-06-19 | End: 2019-08-12 | Stop reason: SDUPTHER

## 2019-06-21 ENCOUNTER — TELEPHONE (OUTPATIENT)
Dept: FAMILY MEDICINE CLINIC | Facility: CLINIC | Age: 74
End: 2019-06-21

## 2019-06-21 DIAGNOSIS — J44.9 CHRONIC OBSTRUCTIVE PULMONARY DISEASE, UNSPECIFIED COPD TYPE (HCC): ICD-10-CM

## 2019-06-24 ENCOUNTER — TELEPHONE (OUTPATIENT)
Dept: FAMILY MEDICINE CLINIC | Facility: CLINIC | Age: 74
End: 2019-06-24

## 2019-06-26 ENCOUNTER — OFFICE VISIT (OUTPATIENT)
Dept: ENDOCRINOLOGY | Facility: CLINIC | Age: 74
End: 2019-06-26
Payer: MEDICARE

## 2019-06-26 VITALS
HEART RATE: 100 BPM | HEIGHT: 64 IN | DIASTOLIC BLOOD PRESSURE: 86 MMHG | BODY MASS INDEX: 19 KG/M2 | WEIGHT: 111.3 LBS | SYSTOLIC BLOOD PRESSURE: 140 MMHG

## 2019-06-26 DIAGNOSIS — R79.89 LOW TSH LEVEL: ICD-10-CM

## 2019-06-26 DIAGNOSIS — I10 ESSENTIAL HYPERTENSION: ICD-10-CM

## 2019-06-26 DIAGNOSIS — E04.2 MULTIPLE THYROID NODULES: ICD-10-CM

## 2019-06-26 DIAGNOSIS — R53.83 FATIGUE, UNSPECIFIED TYPE: ICD-10-CM

## 2019-06-26 DIAGNOSIS — R63.4 WEIGHT LOSS: ICD-10-CM

## 2019-06-26 DIAGNOSIS — E05.90 SUBCLINICAL HYPERTHYROIDISM: Primary | ICD-10-CM

## 2019-06-26 DIAGNOSIS — Z72.0 NICOTINE ABUSE: ICD-10-CM

## 2019-06-26 DIAGNOSIS — E55.9 VITAMIN D DEFICIENCY: ICD-10-CM

## 2019-06-26 PROCEDURE — 99215 OFFICE O/P EST HI 40 MIN: CPT | Performed by: INTERNAL MEDICINE

## 2019-06-26 RX ORDER — METHIMAZOLE 5 MG/1
2.5 TABLET ORAL DAILY
Qty: 45 TABLET | Refills: 3 | Status: SHIPPED | OUTPATIENT
Start: 2019-06-26 | End: 2020-06-24 | Stop reason: SDUPTHER

## 2019-06-28 ENCOUNTER — TELEPHONE (OUTPATIENT)
Dept: FAMILY MEDICINE CLINIC | Facility: CLINIC | Age: 74
End: 2019-06-28

## 2019-06-28 ENCOUNTER — APPOINTMENT (OUTPATIENT)
Dept: LAB | Facility: CLINIC | Age: 74
End: 2019-06-28
Payer: MEDICARE

## 2019-06-28 DIAGNOSIS — R79.89 LOW TSH LEVEL: ICD-10-CM

## 2019-06-28 DIAGNOSIS — R53.83 FATIGUE, UNSPECIFIED TYPE: ICD-10-CM

## 2019-06-28 LAB
CORTIS AM PEAK SERPL-MCNC: 21.5 UG/DL (ref 4.2–22.4)
T3FREE SERPL-MCNC: 3.06 PG/ML (ref 2.3–4.2)

## 2019-06-28 PROCEDURE — 84481 FREE ASSAY (FT-3): CPT

## 2019-06-28 PROCEDURE — 84445 ASSAY OF TSI GLOBULIN: CPT

## 2019-06-28 PROCEDURE — 36415 COLL VENOUS BLD VENIPUNCTURE: CPT

## 2019-06-28 PROCEDURE — 82533 TOTAL CORTISOL: CPT

## 2019-07-01 LAB — TSI SER-ACNC: <0.1 IU/L (ref 0–0.55)

## 2019-07-03 ENCOUNTER — TELEPHONE (OUTPATIENT)
Dept: ENDOCRINOLOGY | Facility: CLINIC | Age: 74
End: 2019-07-03

## 2019-07-03 NOTE — TELEPHONE ENCOUNTER
Called patient and provided lab results  She stated she has started the methimazole, she has taken 4 doses so far  She wanted to know if she starts to experience and side effects Dr Carolina Sanders told her about what she do, I advised to call the office asked to speak to me and provide the side effects so I can let Dr Carolina Sanders know

## 2019-07-03 NOTE — TELEPHONE ENCOUNTER
Please inform the patient that cortisol and free T3 level came back within normal limits  Thyroid antibody was not detected/negative  She should start methimazole as we discussed during her visit, repeat blood work in 6-8 weeks

## 2019-07-29 ENCOUNTER — APPOINTMENT (OUTPATIENT)
Dept: LAB | Facility: CLINIC | Age: 74
End: 2019-07-29
Payer: MEDICARE

## 2019-07-29 DIAGNOSIS — R79.89 LOW TSH LEVEL: ICD-10-CM

## 2019-07-29 LAB
T3FREE SERPL-MCNC: 2.87 PG/ML (ref 2.3–4.2)
T4 FREE SERPL-MCNC: 1.21 NG/DL (ref 0.76–1.46)
TSH SERPL DL<=0.05 MIU/L-ACNC: 1.59 UIU/ML (ref 0.36–3.74)

## 2019-07-29 PROCEDURE — 36415 COLL VENOUS BLD VENIPUNCTURE: CPT

## 2019-07-29 PROCEDURE — 84481 FREE ASSAY (FT-3): CPT

## 2019-07-29 PROCEDURE — 84443 ASSAY THYROID STIM HORMONE: CPT

## 2019-07-29 PROCEDURE — 84439 ASSAY OF FREE THYROXINE: CPT

## 2019-07-30 ENCOUNTER — TELEPHONE (OUTPATIENT)
Dept: ENDOCRINOLOGY | Facility: CLINIC | Age: 74
End: 2019-07-30

## 2019-07-30 DIAGNOSIS — R94.6 ABNORMAL THYROID FUNCTION TEST: Primary | ICD-10-CM

## 2019-07-30 NOTE — TELEPHONE ENCOUNTER
----- Message from Maty Morse MD sent at 7/29/2019  3:46 PM EDT -----  Thyroid function test within normal limits    Continue methimazole 2 5 mg orally once a day, repeat TSH, free T3, free T4 in 6-8 weeks

## 2019-07-30 NOTE — TELEPHONE ENCOUNTER
Spoke with patients  and relayed the test results  He is very concerned that the patient is losing so much weight

## 2019-07-30 NOTE — TELEPHONE ENCOUNTER
If the weight loss was secondary to  hyperthyroidism, that should start improving since the thyroid levels are now within normal limits    If there is continued weight loss, should also follow up with her primary care physician

## 2019-07-30 NOTE — TELEPHONE ENCOUNTER
----- Message from Dasha Denis MD sent at 7/29/2019  3:46 PM EDT -----  Thyroid function test within normal limits    Continue methimazole 2 5 mg orally once a day, repeat TSH, free T3, free T4 in 6-8 weeks

## 2019-08-12 DIAGNOSIS — I10 ESSENTIAL HYPERTENSION: ICD-10-CM

## 2019-08-12 RX ORDER — METOPROLOL SUCCINATE 100 MG/1
100 TABLET, EXTENDED RELEASE ORAL
Qty: 30 TABLET | Refills: 5 | Status: SHIPPED | OUTPATIENT
Start: 2019-08-12 | End: 2020-02-07 | Stop reason: SDUPTHER

## 2019-08-26 ENCOUNTER — OFFICE VISIT (OUTPATIENT)
Dept: ENDOCRINOLOGY | Facility: CLINIC | Age: 74
End: 2019-08-26
Payer: MEDICARE

## 2019-08-26 VITALS
WEIGHT: 107 LBS | HEART RATE: 64 BPM | DIASTOLIC BLOOD PRESSURE: 80 MMHG | HEIGHT: 64 IN | SYSTOLIC BLOOD PRESSURE: 156 MMHG | BODY MASS INDEX: 18.27 KG/M2

## 2019-08-26 DIAGNOSIS — E05.90 SUBCLINICAL HYPERTHYROIDISM: Primary | ICD-10-CM

## 2019-08-26 DIAGNOSIS — E04.2 MULTIPLE THYROID NODULES: ICD-10-CM

## 2019-08-26 DIAGNOSIS — R63.4 WEIGHT LOSS: ICD-10-CM

## 2019-08-26 DIAGNOSIS — E55.9 VITAMIN D DEFICIENCY: ICD-10-CM

## 2019-08-26 PROCEDURE — 99215 OFFICE O/P EST HI 40 MIN: CPT | Performed by: INTERNAL MEDICINE

## 2019-08-26 NOTE — PATIENT INSTRUCTIONS
Please continue methimazole 2 5 mg orally once a day  Repeat thyroid function tests as previously ordered   Please follow-up with your primary care physician regarding  anxiety and considering  Appetite stimulating medication     Follow-up in 3 month

## 2019-08-26 NOTE — PROGRESS NOTES
Shyanne Pillai 75 y o  female MRN: [id number]    Encounter: [id number]      Assessment/Plan     Assessment: This is a 75y o -year-old female with history of subclinical hyperthyroidism, thyroid nodule, vitamin-D deficiency, weight loss, stress/anxiety    Plan:  1  Subclinical hyperthyroidism  On methimazole 2 5 mg orally once a day since 06/26/2019  Labs 07/2019 within normal limits  - advised to continue methimazole 2 5 mg orally once a day  -repeat thyroid function test due in 2-4 weeks    2  Thyroid nodule  Plan for repeat ultrasound in 6 months to assess interval change    3  Vitamin-D deficiency  Continue vitamin D3 supplementation  Repeat vitamin-D level in 3 months  DEXA scan as ordered    4  Weight loss-management of subclinical hyperthyroidism as above  Stress/anxiety may also be contributing to the above  Discussed relaxation techniques, advised to follow-up with primary care physician, if severe should consider standing dose of medications for anxiety  Also follow up with primary care physician regarding consideration of appetite stimulants  CC:  Subclinical hyperthyroidism     History of Present Illness     HPI:  Shyanne Pillai is a 27-year-old female who is here for a follow-up of subclinical hyperthyroidism  Also has a past medical history of vitamin-D deficiency, thyroid nodules    Subclinical hyperthyroidism  Last seen in clinic 06/2019 at which time she was started on methimazole 2 5 mg orally once a day    History of thyroid nodules, FNA 67 years ago which was benign  USG 06/2019-right mid gland nodule measuring 0 8 X 0 7 X 0 6 cm which was not previously seen, moderately suspicious-follow since > 1 cm  Other nodules stable as compared to before    Planned for repeat ultrasound in 6 months    Since complains of ongoing weight loss however does admit that she is very stressed 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  Tries to eat 3 times a day, os if she can be started on a appetite stimulant  Is extremely anxious and stressed  Energy levels are fair  Denies diarrhea, once in a while has constipation   Denies shakes, tremors, palpitations  No difficulty in sleeping  No heat intolerance    Vitamin-D deficiency-taking vitamin D3 2000 International Units orally once a day  DEXA scan ordered-not done as yet    All other systems were reviewed and are negative  Review of Systems    Historical Information   Past Medical History:   Diagnosis Date    Elevated blood pressure reading      Past Surgical History:   Procedure Laterality Date    APPENDECTOMY      TONSILLECTOMY       Social History   Social History     Substance and Sexual Activity   Alcohol Use No     Social History     Substance and Sexual Activity   Drug Use No     Social History     Tobacco Use   Smoking Status Current Every Day Smoker   Smokeless Tobacco Never Used     Family History:   Family History   Problem Relation Age of Onset    Lung cancer Mother     Heart disease Maternal Aunt     Heart disease Maternal Uncle     Heart disease Maternal Grandmother        Meds/Allergies   Current Outpatient Medications   Medication Sig Dispense Refill    ALPRAZolam (XANAX) 0 25 mg tablet Take 1 tablet (0 25 mg total) by mouth daily as needed for anxiety 20 tablet 0    Calcium Carbonate-Vit D-Min (CALCIUM 600+D PLUS MINERALS) 600-400 MG-UNIT CHEW Chew 1 tablet daily      Cholecalciferol (VITAMIN D3) 2000 units TABS Take 2,000 Units by mouth daily       methimazole (TAPAZOLE) 5 mg tablet Take 0 5 tablets (2 5 mg total) by mouth daily 45 tablet 3    metoprolol succinate (TOPROL-XL) 100 mg 24 hr tablet Take 1 tablet (100 mg total) by mouth daily at bedtime 30 tablet 5    Multiple Vitamins-Minerals (MULTIVITAMIN ADULT PO) Take 1 tablet by mouth daily      fluticasone-salmeterol (ADVAIR DISKUS, WIXELA INHUB) 250-50 mcg/dose inhaler Inhale 1 puff 2 (two) times a day Rinse mouth after use   (Patient not taking: Reported on 8/26/2019) 1 Inhaler 5     No current facility-administered medications for this visit  Allergies   Allergen Reactions    Erythromycin GI Intolerance     Reaction Date: [id number]; Objective   Vitals: Blood pressure 156/80, pulse 64, height 5' 4" (1 626 m), weight 48 5 kg (107 lb)  Physical Exam   Constitutional: She is oriented to person, place, and time  She appears well-developed and well-nourished  HENT:   Head: Normocephalic and atraumatic  Eyes: Pupils are equal, round, and reactive to light  Conjunctivae and EOM are normal    Neck: Normal range of motion  Neck supple  No thyromegaly present  Cardiovascular: Normal rate, regular rhythm and normal heart sounds  No murmur heard  Pulmonary/Chest: Effort normal and breath sounds normal  She has no wheezes  Abdominal: Soft  She exhibits no distension  There is no tenderness  Musculoskeletal: Normal range of motion  She exhibits no edema  Neurological: She is alert and oriented to person, place, and time  She displays normal reflexes  Mild tremors present on the outstretched arms   Skin: Skin is warm and dry  Psychiatric: She has a normal mood and affect  Her behavior is normal    Vitals reviewed  The history was obtained from the review of the chart, patient  Lab Results:   Lab Results   Component Value Date/Time    TSH 3RD GENERATON 1 590 07/29/2019 08:52 AM    TSH 3RD GENERATON 0 293 (L) 06/14/2019 09:35 AM    TSH 3RD GENERATON 0 434 03/12/2019 11:24 AM    Free T4 1 21 07/29/2019 08:52 AM    Free T4 1 26 06/14/2019 09:35 AM    Free T4 1 06 03/12/2019 11:24 AM       Imaging Studies:   Results for orders placed during the hospital encounter of 06/05/19   US thyroid    Impression No nodule meets current ACR criteria for requiring biopsy but followup ultrasound is recommended in 1 year  Reference: ACR Thyroid Imaging, Reporting and Data System (TI-RADS): White Paper of the [facility]   J AM Tammi Radiol 3079;30:185-482  (additional recommendations based on American Thyroid Association 2015 guidelines )      Workstation performed: [id number]         I have personally reviewed pertinent reports  Portions of the record may have been created with voice recognition software  Occasional wrong word or "sound a like" substitutions may have occurred due to the inherent limitations of voice recognition software  Read the chart carefully and recognize, using context, where substitutions have occurred

## 2019-09-10 ENCOUNTER — APPOINTMENT (OUTPATIENT)
Dept: LAB | Facility: CLINIC | Age: 74
End: 2019-09-10
Payer: MEDICARE

## 2019-09-10 ENCOUNTER — OFFICE VISIT (OUTPATIENT)
Dept: FAMILY MEDICINE CLINIC | Facility: CLINIC | Age: 74
End: 2019-09-10
Payer: MEDICARE

## 2019-09-10 VITALS
OXYGEN SATURATION: 95 % | HEIGHT: 64 IN | TEMPERATURE: 99 F | BODY MASS INDEX: 18.27 KG/M2 | DIASTOLIC BLOOD PRESSURE: 70 MMHG | SYSTOLIC BLOOD PRESSURE: 164 MMHG | WEIGHT: 107 LBS | RESPIRATION RATE: 16 BRPM | HEART RATE: 95 BPM

## 2019-09-10 DIAGNOSIS — I10 ESSENTIAL HYPERTENSION: ICD-10-CM

## 2019-09-10 DIAGNOSIS — J20.9 ACUTE BRONCHITIS, UNSPECIFIED ORGANISM: ICD-10-CM

## 2019-09-10 DIAGNOSIS — J44.9 CHRONIC OBSTRUCTIVE PULMONARY DISEASE, UNSPECIFIED COPD TYPE (HCC): ICD-10-CM

## 2019-09-10 DIAGNOSIS — L03.114 CELLULITIS OF LEFT HAND: Primary | ICD-10-CM

## 2019-09-10 DIAGNOSIS — R94.6 ABNORMAL THYROID FUNCTION TEST: ICD-10-CM

## 2019-09-10 LAB
T3 SERPL-MCNC: 1.1 NG/ML (ref 0.6–1.8)
T4 FREE SERPL-MCNC: 1.1 NG/DL (ref 0.76–1.46)
TSH SERPL DL<=0.05 MIU/L-ACNC: 2.43 UIU/ML (ref 0.36–3.74)

## 2019-09-10 PROCEDURE — 84480 ASSAY TRIIODOTHYRONINE (T3): CPT

## 2019-09-10 PROCEDURE — 99214 OFFICE O/P EST MOD 30 MIN: CPT | Performed by: FAMILY MEDICINE

## 2019-09-10 PROCEDURE — 84439 ASSAY OF FREE THYROXINE: CPT

## 2019-09-10 PROCEDURE — 36415 COLL VENOUS BLD VENIPUNCTURE: CPT

## 2019-09-10 PROCEDURE — 84443 ASSAY THYROID STIM HORMONE: CPT

## 2019-09-10 RX ORDER — CEFDINIR 300 MG/1
600 CAPSULE ORAL DAILY
Qty: 20 CAPSULE | Refills: 0 | Status: SHIPPED | OUTPATIENT
Start: 2019-09-10 | End: 2019-09-20

## 2019-09-10 RX ORDER — CEPHALEXIN 500 MG/1
500 CAPSULE ORAL 3 TIMES DAILY
Qty: 30 CAPSULE | Refills: 0 | Status: SHIPPED | OUTPATIENT
Start: 2019-09-10 | End: 2019-09-10

## 2019-09-11 NOTE — PROGRESS NOTES
Assessment/Plan:    No problem-specific Assessment & Plan notes found for this encounter  Warm soak bid suggested  F/u bp, not controlled, she states it's white-coat, I advised monitoring and f/u    F/u Friday afternoon but can call to cancel fri AM if no better    Bronchitis new, cefdinir for uri and cellulitis coverage  Copd/tob use unchanged, risks aware     Diagnoses and all orders for this visit:    Cellulitis of left hand  -     Discontinue: cephalexin (KEFLEX) 500 mg capsule; Take 1 capsule (500 mg total) by mouth 3 (three) times a day for 10 days  -     cefdinir (OMNICEF) 300 mg capsule; Take 2 capsules (600 mg total) by mouth daily for 10 days    Essential hypertension    Chronic obstructive pulmonary disease, unspecified COPD type (HCC)    Acute bronchitis, unspecified organism  -     cefdinir (OMNICEF) 300 mg capsule; Take 2 capsules (600 mg total) by mouth daily for 10 days                  Return in about 3 days (around 9/13/2019) for Recheck  Subjective:      Patient ID: Leonarda Costello is a 75 y o  female  Chief Complaint   Patient presents with    Bug bite     swollen knuckles-wmcma       HPI   terminal lung/liver CA  Yard work with gloves  Ant pile found  Thorn? Yesterday  Last night was ok but this am noted ache and redness MCP joint #2  Not stiff  No fever  No sob  No wheezing  Has copd  Smokes  Not itchy  Able to move finger  No trauma recalled  Felt something stick her during yardwork    Been coughing  Having clear nasal dc  Yellow mucus lately, phlegm  The following portions of the patient's history were reviewed and updated as appropriate: allergies, current medications, past family history, past medical history, past social history, past surgical history and problem list     Review of Systems   Cardiovascular: Negative for chest pain  Neurological: Negative for dizziness           Current Outpatient Medications   Medication Sig Dispense Refill    ALPRAZolam (XANAX) 0 25 mg tablet Take 1 tablet (0 25 mg total) by mouth daily as needed for anxiety 20 tablet 0    Calcium Carbonate-Vit D-Min (CALCIUM 600+D PLUS MINERALS) 600-400 MG-UNIT CHEW Chew 1 tablet daily      Cholecalciferol (VITAMIN D3) 2000 units TABS Take 2,000 Units by mouth daily       methimazole (TAPAZOLE) 5 mg tablet Take 0 5 tablets (2 5 mg total) by mouth daily 45 tablet 3    metoprolol succinate (TOPROL-XL) 100 mg 24 hr tablet Take 1 tablet (100 mg total) by mouth daily at bedtime 30 tablet 5    Multiple Vitamins-Minerals (MULTIVITAMIN ADULT PO) Take 1 tablet by mouth daily      cefdinir (OMNICEF) 300 mg capsule Take 2 capsules (600 mg total) by mouth daily for 10 days 20 capsule 0     No current facility-administered medications for this visit  Objective:    /70   Pulse 95   Temp 99 °F (37 2 °C)   Resp 16   Ht 5' 4" (1 626 m)   Wt 48 5 kg (107 lb)   LMP  (LMP Unknown) Comment: post menapausal  SpO2 95%   BMI 18 37 kg/m²        Physical Exam   Constitutional: She appears well-developed  No distress  HENT:   Head: Normocephalic  Mouth/Throat: No oropharyngeal exudate  Eyes: Conjunctivae are normal  No scleral icterus  Neck: Neck supple  Cardiovascular: Normal rate, regular rhythm, normal heart sounds and intact distal pulses  No murmur heard  Pulmonary/Chest: Effort normal  No respiratory distress  She has no wheezes  She has no rales  Abdominal: Soft  Bowel sounds are normal  She exhibits no distension  Musculoskeletal: Normal range of motion  She exhibits no edema or deformity  Neurological: She is alert  She exhibits normal muscle tone  Skin: Skin is warm and dry  Rash noted  There is erythema  No pallor  See picture, erythematous area dorsum left hand over MCP joint#2, no definite FB, some bleeding from small opening   Psychiatric: Her behavior is normal  Thought content normal    Nursing note and vitals reviewed               Tom Knight DO

## 2019-09-12 ENCOUNTER — TELEPHONE (OUTPATIENT)
Dept: ENDOCRINOLOGY | Facility: CLINIC | Age: 74
End: 2019-09-12

## 2019-09-12 DIAGNOSIS — E04.2 MULTIPLE THYROID NODULES: ICD-10-CM

## 2019-09-12 DIAGNOSIS — E05.90 SUBCLINICAL HYPERTHYROIDISM: Primary | ICD-10-CM

## 2019-09-12 DIAGNOSIS — R94.6 ABNORMAL THYROID FUNCTION TEST: ICD-10-CM

## 2019-09-12 NOTE — TELEPHONE ENCOUNTER
----- Message from Santiago Kraft MD sent at 9/12/2019  8:37 AM EDT -----  Please call and inform patient of results  Thyroid function test within normal limits  Continue methimazole 2 5 mg orally once a day    Repeat TSH, free T3, free T4 in 6 weeks

## 2019-10-24 ENCOUNTER — APPOINTMENT (OUTPATIENT)
Dept: LAB | Facility: CLINIC | Age: 74
End: 2019-10-24
Payer: MEDICARE

## 2019-10-24 DIAGNOSIS — E05.90 SUBCLINICAL HYPERTHYROIDISM: ICD-10-CM

## 2019-10-24 DIAGNOSIS — E04.2 MULTIPLE THYROID NODULES: ICD-10-CM

## 2019-10-24 DIAGNOSIS — R94.6 ABNORMAL THYROID FUNCTION TEST: ICD-10-CM

## 2019-10-24 LAB
T3 SERPL-MCNC: 1.1 NG/ML (ref 0.6–1.8)
T4 FREE SERPL-MCNC: 1.04 NG/DL (ref 0.76–1.46)
TSH SERPL DL<=0.05 MIU/L-ACNC: 1.87 UIU/ML (ref 0.36–3.74)

## 2019-10-24 PROCEDURE — 36415 COLL VENOUS BLD VENIPUNCTURE: CPT

## 2019-10-24 PROCEDURE — 84439 ASSAY OF FREE THYROXINE: CPT

## 2019-10-24 PROCEDURE — 84480 ASSAY TRIIODOTHYRONINE (T3): CPT

## 2019-10-24 PROCEDURE — 84443 ASSAY THYROID STIM HORMONE: CPT

## 2020-01-21 ENCOUNTER — APPOINTMENT (OUTPATIENT)
Dept: LAB | Facility: CLINIC | Age: 75
End: 2020-01-21
Payer: MEDICARE

## 2020-01-21 DIAGNOSIS — I10 ESSENTIAL HYPERTENSION: ICD-10-CM

## 2020-01-21 DIAGNOSIS — R73.01 IMPAIRED FASTING GLUCOSE: ICD-10-CM

## 2020-01-21 LAB
ALBUMIN SERPL BCP-MCNC: 3.9 G/DL (ref 3.5–5)
ALP SERPL-CCNC: 68 U/L (ref 46–116)
ALT SERPL W P-5'-P-CCNC: 19 U/L (ref 12–78)
ANION GAP SERPL CALCULATED.3IONS-SCNC: 1 MMOL/L (ref 4–13)
AST SERPL W P-5'-P-CCNC: 14 U/L (ref 5–45)
BILIRUB SERPL-MCNC: 0.62 MG/DL (ref 0.2–1)
BUN SERPL-MCNC: 11 MG/DL (ref 5–25)
CALCIUM SERPL-MCNC: 9.4 MG/DL (ref 8.3–10.1)
CHLORIDE SERPL-SCNC: 107 MMOL/L (ref 100–108)
CHOLEST SERPL-MCNC: 167 MG/DL (ref 50–200)
CO2 SERPL-SCNC: 33 MMOL/L (ref 21–32)
CREAT SERPL-MCNC: 0.7 MG/DL (ref 0.6–1.3)
ERYTHROCYTE [DISTWIDTH] IN BLOOD BY AUTOMATED COUNT: 12.5 % (ref 11.6–15.1)
EST. AVERAGE GLUCOSE BLD GHB EST-MCNC: 108 MG/DL
GFR SERPL CREATININE-BSD FRML MDRD: 85 ML/MIN/1.73SQ M
GLUCOSE P FAST SERPL-MCNC: 107 MG/DL (ref 65–99)
HBA1C MFR BLD: 5.4 % (ref 4.2–6.3)
HCT VFR BLD AUTO: 47.2 % (ref 34.8–46.1)
HDLC SERPL-MCNC: 68 MG/DL
HGB BLD-MCNC: 15.1 G/DL (ref 11.5–15.4)
LDLC SERPL CALC-MCNC: 77 MG/DL (ref 0–100)
MCH RBC QN AUTO: 31.9 PG (ref 26.8–34.3)
MCHC RBC AUTO-ENTMCNC: 32 G/DL (ref 31.4–37.4)
MCV RBC AUTO: 100 FL (ref 82–98)
PLATELET # BLD AUTO: 140 THOUSANDS/UL (ref 149–390)
PMV BLD AUTO: 10.4 FL (ref 8.9–12.7)
POTASSIUM SERPL-SCNC: 4.3 MMOL/L (ref 3.5–5.3)
PROT SERPL-MCNC: 6.6 G/DL (ref 6.4–8.2)
RBC # BLD AUTO: 4.73 MILLION/UL (ref 3.81–5.12)
SODIUM SERPL-SCNC: 141 MMOL/L (ref 136–145)
TRIGL SERPL-MCNC: 110 MG/DL
WBC # BLD AUTO: 5.89 THOUSAND/UL (ref 4.31–10.16)

## 2020-01-21 PROCEDURE — 80053 COMPREHEN METABOLIC PANEL: CPT

## 2020-01-21 PROCEDURE — 83036 HEMOGLOBIN GLYCOSYLATED A1C: CPT

## 2020-01-21 PROCEDURE — 80061 LIPID PANEL: CPT

## 2020-01-21 PROCEDURE — 36415 COLL VENOUS BLD VENIPUNCTURE: CPT

## 2020-01-21 PROCEDURE — 85027 COMPLETE CBC AUTOMATED: CPT

## 2020-02-07 DIAGNOSIS — I10 ESSENTIAL HYPERTENSION: ICD-10-CM

## 2020-02-07 RX ORDER — METOPROLOL SUCCINATE 100 MG/1
100 TABLET, EXTENDED RELEASE ORAL
Qty: 30 TABLET | Refills: 5 | Status: SHIPPED | OUTPATIENT
Start: 2020-02-07 | End: 2020-08-10 | Stop reason: SDUPTHER

## 2020-02-17 ENCOUNTER — OFFICE VISIT (OUTPATIENT)
Dept: FAMILY MEDICINE CLINIC | Facility: CLINIC | Age: 75
End: 2020-02-17
Payer: MEDICARE

## 2020-02-17 VITALS
BODY MASS INDEX: 17.75 KG/M2 | WEIGHT: 104 LBS | SYSTOLIC BLOOD PRESSURE: 138 MMHG | RESPIRATION RATE: 18 BRPM | HEIGHT: 64 IN | OXYGEN SATURATION: 98 % | DIASTOLIC BLOOD PRESSURE: 82 MMHG | TEMPERATURE: 98.2 F | HEART RATE: 98 BPM

## 2020-02-17 DIAGNOSIS — I10 ESSENTIAL HYPERTENSION: ICD-10-CM

## 2020-02-17 DIAGNOSIS — R92.2 DENSE BREASTS: ICD-10-CM

## 2020-02-17 DIAGNOSIS — Z00.00 MEDICARE ANNUAL WELLNESS VISIT, SUBSEQUENT: Primary | ICD-10-CM

## 2020-02-17 DIAGNOSIS — Z12.31 SCREENING MAMMOGRAM, ENCOUNTER FOR: ICD-10-CM

## 2020-02-17 DIAGNOSIS — J44.9 CHRONIC OBSTRUCTIVE PULMONARY DISEASE, UNSPECIFIED COPD TYPE (HCC): ICD-10-CM

## 2020-02-17 DIAGNOSIS — F41.1 GENERALIZED ANXIETY DISORDER: ICD-10-CM

## 2020-02-17 PROBLEM — L03.114 CELLULITIS OF LEFT HAND: Status: RESOLVED | Noted: 2019-09-10 | Resolved: 2020-02-17

## 2020-02-17 PROCEDURE — 4040F PNEUMOC VAC/ADMIN/RCVD: CPT | Performed by: FAMILY MEDICINE

## 2020-02-17 PROCEDURE — 4004F PT TOBACCO SCREEN RCVD TLK: CPT | Performed by: FAMILY MEDICINE

## 2020-02-17 PROCEDURE — 1123F ACP DISCUSS/DSCN MKR DOCD: CPT | Performed by: FAMILY MEDICINE

## 2020-02-17 PROCEDURE — 3079F DIAST BP 80-89 MM HG: CPT | Performed by: FAMILY MEDICINE

## 2020-02-17 PROCEDURE — 1125F AMNT PAIN NOTED PAIN PRSNT: CPT | Performed by: FAMILY MEDICINE

## 2020-02-17 PROCEDURE — 3075F SYST BP GE 130 - 139MM HG: CPT | Performed by: FAMILY MEDICINE

## 2020-02-17 PROCEDURE — 3008F BODY MASS INDEX DOCD: CPT | Performed by: FAMILY MEDICINE

## 2020-02-17 PROCEDURE — 1160F RVW MEDS BY RX/DR IN RCRD: CPT | Performed by: FAMILY MEDICINE

## 2020-02-17 PROCEDURE — 1170F FXNL STATUS ASSESSED: CPT | Performed by: FAMILY MEDICINE

## 2020-02-17 PROCEDURE — G0439 PPPS, SUBSEQ VISIT: HCPCS | Performed by: FAMILY MEDICINE

## 2020-02-17 RX ORDER — ESCITALOPRAM OXALATE 10 MG/1
10 TABLET ORAL DAILY
Qty: 30 TABLET | Refills: 5 | Status: SHIPPED | OUTPATIENT
Start: 2020-02-17 | End: 2020-08-15 | Stop reason: SDUPTHER

## 2020-02-17 RX ORDER — ALPRAZOLAM 0.25 MG/1
0.25 TABLET ORAL DAILY PRN
Qty: 30 TABLET | Refills: 3 | Status: SHIPPED | OUTPATIENT
Start: 2020-02-17 | End: 2021-11-18 | Stop reason: SDUPTHER

## 2020-02-17 NOTE — PROGRESS NOTES
Assessment and Plan:     Problem List Items Addressed This Visit     Chronic obstructive pulmonary disease ([address] )     Not controlled  [facility] started         Relevant Medications    mometasone-formoterol (DULERA) 200-5 MCG/ACT inhaler    Dense breasts    Relevant Orders    Mammo screening bilateral w 3d & cad    Essential hypertension     Stable         Generalized anxiety disorder     Not controlled  Risks and benefits of medication discussed  Relevant Medications    escitalopram (LEXAPRO) 10 mg tablet    ALPRAZolam (XANAX) 0 25 mg tablet      Other Visit Diagnoses     Medicare annual wellness visit, subsequent    -  Primary    Screening mammogram, encounter for        Relevant Orders    Mammo screening bilateral w 3d & cad        BMI Counseling: Body mass index is 17 85 kg/m²  The BMI is below normal  Patient advised to gain weight  [place] monitoring program report reviewed and is appropriate  [place] and [place] included in search criteria  Preventive health issues were discussed with patient, and age appropriate screening tests were ordered as noted in patient's After Visit Summary  Personalized health advice and appropriate referrals for health education or preventive services given if needed, as noted in patient's After Visit Summary  History of Present Illness:     Patient presents for Medicare Annual Wellness visit    She has been having a hard time  Her  passed recently  She is now in [facility] and her  was not paying for her or his life insurance       Patient Care Team:  Jh Trejo DO as PCP - Yesi Baker MD as PCP - Endocrinology (Endocrinology)  DO Rylee Morgan MD Illa Hai, PA-C as Physician Assistant (Endocrinology)     Problem List:     Patient Active Problem List   Diagnosis    Allergic rhinitis    Generalized anxiety disorder    Chronic obstructive pulmonary disease ([facility] [address] )    Dense breasts    Essential hypertension    Impaired fasting glucose    Thyroid disorder    Vitamin D deficiency    Thrombocytopenia (HCC)    Low TSH level    Thyroid nodule    Multiple thyroid nodules    Abnormal thyroid function test    Nicotine abuse    Subclinical hyperthyroidism    Weight loss    Fatigue      Past Medical and Surgical History:     Past Medical History:   Diagnosis Date    Elevated blood pressure reading      Past Surgical History:   Procedure Laterality Date    APPENDECTOMY      TONSILLECTOMY        Family History:     Family History   Problem Relation Age of Onset    Lung cancer Mother     Heart disease Maternal Aunt     Heart disease Maternal Uncle     Heart disease Maternal Grandmother       Social History:        Social History     Socioeconomic History    Marital status: /Civil Union     Spouse name: None    Number of children: None    Years of education: None    Highest education level: None   Occupational History    None   Social Needs    Financial resource strain: None    Food insecurity:     Worry: None     Inability: None    Transportation needs:     Medical: None     Non-medical: None   Tobacco Use    Smoking status: Current Every Day Smoker    Smokeless tobacco: Never Used   Substance and Sexual Activity    Alcohol use: No    Drug use: No    Sexual activity: None   Lifestyle    Physical activity:     Days per week: None     Minutes per session: None    Stress: None   Relationships    Social connections:     Talks on phone: None     Gets together: None     Attends [employer] service: None     Active member of club or organization: None     Attends meetings of clubs or organizations: None     Relationship status: None    Intimate partner violence:     Fear of current or ex partner: None     Emotionally abused: None     Physically abused: None     Forced sexual activity: None   Other Topics Concern    None   Social History Narrative    None      Medications and Allergies:     Current Outpatient Medications   Medication Sig Dispense Refill    ALPRAZolam (XANAX) 0 25 mg tablet Take 1 tablet (0 25 mg total) by mouth daily as needed for anxiety 30 tablet 3    Calcium Carbonate-Vit D-Min (CALCIUM 600+D PLUS MINERALS) 600-400 MG-UNIT CHEW Chew 1 tablet daily      Cholecalciferol (VITAMIN D3) 2000 units TABS Take 2,000 Units by mouth daily       methimazole (TAPAZOLE) 5 mg tablet Take 0 5 tablets (2 5 mg total) by mouth daily 45 tablet 3    metoprolol succinate (TOPROL-XL) 100 mg 24 hr tablet Take 1 tablet (100 mg total) by mouth daily at bedtime 30 tablet 5    Multiple Vitamins-Minerals (MULTIVITAMIN ADULT PO) Take 1 tablet by mouth daily      escitalopram (LEXAPRO) 10 mg tablet Take 1 tablet (10 mg total) by mouth daily 30 tablet 5    mometasone-formoterol (DULERA) 200-5 MCG/ACT inhaler Inhale 2 puffs 2 (two) times a day Rinse mouth after use  1 Inhaler 3     No current facility-administered medications for this visit  Allergies   Allergen Reactions    Erythromycin GI Intolerance     Reaction Date: [id number];       Immunizations:     Immunization History   Administered Date(s) Administered    Influenza Split High Dose Preservative Free IM 12/18/2015, 12/21/2016    Influenza TIV (IM) 10/12/2012, 01/13/2014    Influenza, high dose seasonal 0 5 mL 12/21/2018    Pneumococcal Polysaccharide PPV23 07/18/2013    Td (adult), adsorbed 04/18/2000      Health Maintenance:         Topic Date Due    DXA SCAN  1945    Hepatitis C Screening  Completed         Topic Date Due    Pneumococcal Vaccine: 65+ Years (2 of 2 - PCV13) 07/18/2014    Influenza Vaccine  07/01/2019      Medicare Health Risk Assessment:     /82   Pulse 98   Temp 98 2 °F (36 8 °C)   Resp 18   Ht 5' 4" (1 626 m)   Wt 47 2 kg (104 lb)   LMP  (LMP Unknown) Comment: post menapausal  SpO2 98%   BMI 17 85 kg/m²      Martina Whittington is here for her Subsequent Wellness visit       Health Risk Assessment:   Patient rates overall health as fair  Patient feels that their physical health rating is same  Eyesight was rated as same  Hearing was rated as same  Patient feels that their emotional and mental health rating is slightly worse  Pain experienced in the last 7 days has been some  Patient's pain rating has been 4/10  Patient states that she has experienced no weight loss or gain in last 6 months  Depression Screening:   PHQ-2 Score: 2      Fall Risk Screening: In the past year, patient has experienced: no history of falling in past year      Urinary Incontinence Screening:   Patient has not leaked urine accidently in the last six months  Home Safety:  Patient does not have trouble with stairs inside or outside of their home  Patient has working smoke alarms and has working carbon monoxide detector  Home safety hazards include: none  Nutrition:   Current diet is Regular  Medications:   Patient is currently taking over-the-counter supplements  OTC medications include: see medication list  Patient is able to manage medications  Activities of Daily Living (ADLs)/Instrumental Activities of Daily Living (IADLs):   Walk and transfer into and out of bed and chair?: Yes  Dress and groom yourself?: Yes    Bathe or shower yourself?: Yes    Feed yourself?  Yes  Do your laundry/housekeeping?: Yes  Manage your money, pay your bills and track your expenses?: Yes  Make your own meals?: Yes    Do your own shopping?: Yes    Previous Hospitalizations:   Any hospitalizations or ED visits within the last 12 months?: No      Advance Care Planning:   Living will: No    Durable POA for healthcare: No    Advanced directive: No    Advanced directive counseling given: Yes    End of Life Decisions reviewed with patient: Yes    Provider agrees with end of life decisions: Yes      Cognitive Screening:   Provider or family/friend/caregiver concerned regarding cognition?: No    PREVENTIVE SCREENINGS Cardiovascular Screening:    General: Screening Current      Diabetes Screening:     General: Screening Current      Colorectal Cancer Screening:     General: Screening Not Indicated      Breast Cancer Screening:     General: Screening Current      Cervical Cancer Screening:    General: Screening Not Indicated      Osteoporosis Screening:    General: Risks and Benefits Discussed and Patient Declines      Abdominal Aortic Aneurysm (AAA) Screening:        General: Screening Not Indicated      Lung Cancer Screening:     General: Screening Not Indicated      Hepatitis C Screening:    General: Screening Current    Physical Exam   Constitutional: She is oriented to person, place, and time  She appears well-developed and well-nourished  HENT:   Head: Normocephalic and atraumatic  Right Ear: External ear normal    Left Ear: External ear normal    Nose: Nose normal    Mouth/Throat: Oropharynx is clear and moist    Cardiovascular: Normal rate, regular rhythm and normal heart sounds  Exam reveals no friction rub  No murmur heard  Pulmonary/Chest: Breath sounds normal  No respiratory distress  She has no wheezes  She has no rales  Musculoskeletal: She exhibits no edema  Neurological: She is oriented to person, place, and time  No cranial nerve deficit  Nursing note and vitals reviewed         Anayeli Earl DO

## 2020-02-17 NOTE — PATIENT INSTRUCTIONS
Medicare Preventive Visit Patient Instructions  Thank you for completing your Welcome to Medicare Visit or Medicare Annual Wellness Visit today  Your next wellness visit will be due in one year (2/17/2021)  The screening/preventive services that you may require over the next 5-10 years are detailed below  Some tests may not apply to you based off risk factors and/or age  Screening tests ordered at today's visit but not completed yet may show as past due  Also, please note that scanned in results may not display below  Preventive Screenings:  Service Recommendations Previous Testing/Comments   Colorectal Cancer Screening  * Colonoscopy    * Fecal Occult Blood Test (FOBT)/Fecal Immunochemical Test (FIT)  * Fecal DNA/Cologuard Test  * Flexible Sigmoidoscopy Age: 54-65 years old   Colonoscopy: every 10 years (may be performed more frequently if at higher risk)  OR  FOBT/FIT: every 1 year  OR  Cologuard: every 3 years  OR  Sigmoidoscopy: every 5 years  Screening may be recommended earlier than age 48 if at higher risk for colorectal cancer  Also, an individualized decision between you and your healthcare provider will decide whether screening between the ages of 74-80 would be appropriate  Colonoscopy: Not on file  FOBT/FIT: Not on file  Cologuard: Not on file  Sigmoidoscopy: Not on file         Breast Cancer Screening Age: 36 years old  Frequency: every 1-2 years  Not required if history of left and right mastectomy Mammogram: 02/28/2019    Screening Current   Cervical Cancer Screening Between the ages of 21-29, pap smear recommended once every 3 years  Between the ages of 33-67, can perform pap smear with HPV co-testing every 5 years     Recommendations may differ for women with a history of total hysterectomy, cervical cancer, or abnormal pap smears in past  Pap Smear: Not on file    Screening Not Indicated   Hepatitis C Screening Once for adults born between 1945 and 1965  More frequently in patients at high risk for Hepatitis C Hep C Antibody: 06/14/2018    Screening Current   Diabetes Screening 1-2 times per year if you're at risk for diabetes or have pre-diabetes Fasting glucose: 107 mg/dL   A1C: 5 4 %    Screening Current   Cholesterol Screening Once every 5 years if you don't have a lipid disorder  May order more often based on risk factors  Lipid panel: 01/21/2020    Screening Current     Other Preventive Screenings Covered by Medicare:  1  Abdominal Aortic Aneurysm (AAA) Screening: covered once if your at risk  You're considered to be at risk if you have a family history of AAA  2  Lung Cancer Screening: covers low dose CT scan once per year if you meet all of the following conditions: (1) Age 50-69; (2) No signs or symptoms of lung cancer; (3) Current smoker or have quit smoking within the last 15 years; (4) You have a tobacco smoking history of at least 30 pack years (packs per day multiplied by number of years you smoked); (5) You get a written order from a healthcare provider  3  Glaucoma Screening: covered annually if you're considered high risk: (1) You have diabetes OR (2) Family history of glaucoma OR (3)  aged 48 and older OR (3)  American aged 72 and older  3  Osteoporosis Screening: covered every 2 years if you meet one of the following conditions: (1) You're estrogen deficient and at risk for osteoporosis based off medical history and other findings; (2) Have a vertebral abnormality; (3) On glucocorticoid therapy for more than 3 months; (4) Have primary hyperparathyroidism; (5) On osteoporosis medications and need to assess response to drug therapy  · Last bone density test (DXA Scan): Not on file  5  HIV Screening: covered annually if you're between the age of 12-76  Also covered annually if you are younger than 13 and older than 72 with risk factors for HIV infection  For pregnant patients, it is covered up to 3 times per pregnancy      Immunizations:  Immunization Recommendations   Influenza Vaccine Annual influenza vaccination during flu season is recommended for all persons aged >= 6 months who do not have contraindications   Pneumococcal Vaccine (Prevnar and Pneumovax)  * Prevnar = PCV13  * Pneumovax = PPSV23   Adults 25-60 years old: 1-3 doses may be recommended based on certain risk factors  Adults 72 years old: Prevnar (PCV13) vaccine recommended followed by Pneumovax (PPSV23) vaccine  If already received PPSV23 since turning 65, then PCV13 recommended at least one year after PPSV23 dose  Hepatitis B Vaccine 3 dose series if at intermediate or high risk (ex: diabetes, end stage renal disease, liver disease)   Tetanus (Td) Vaccine - COST NOT COVERED BY MEDICARE PART B Following completion of primary series, a booster dose should be given every 10 years to maintain immunity against tetanus  Td may also be given as tetanus wound prophylaxis  Tdap Vaccine - COST NOT COVERED BY MEDICARE PART B Recommended at least once for all adults  For pregnant patients, recommended with each pregnancy  Shingles Vaccine (Shingrix) - COST NOT COVERED BY MEDICARE PART B  2 shot series recommended in those aged 48 and above     Health Maintenance Due:      Topic Date Due    DXA SCAN  1945    Hepatitis C Screening  Completed     Immunizations Due:      Topic Date Due    Pneumococcal Vaccine: 65+ Years (2 of 2 - PCV13) 07/18/2014    Influenza Vaccine  07/01/2019     Advance Directives   What are advance directives? Advance directives are legal documents that state your wishes and plans for medical care  These plans are made ahead of time in case you lose your ability to make decisions for yourself  Advance directives can apply to any medical decision, such as the treatments you want, and if you want to donate organs  What are the types of advance directives? There are many types of advance directives, and each state has rules about how to use them   You may choose a combination of any of the following:  · Living will: This is a written record of the treatment you want  You can also choose which treatments you do not want, which to limit, and which to stop at a certain time  This includes surgery, medicine, IV fluid, and tube feedings  · Durable power of  for healthcare [facility]): This is a written record that states who you want to make healthcare choices for you when you are unable to make them for yourself  This person, called a proxy, is usually a family member or a friend  You may choose more than 1 proxy  · Do not resuscitate (DNR) order:  A DNR order is used in case your heart stops beating or you stop breathing  It is a request not to have certain forms of treatment, such as CPR  A DNR order may be included in other types of advance directives  · Medical directive: This covers the care that you want if you are in a coma, near death, or unable to make decisions for yourself  You can list the treatments you want for each condition  Treatment may include pain medicine, surgery, blood transfusions, dialysis, IV or tube feedings, and a ventilator (breathing machine)  · Values history: This document has questions about your views, beliefs, and how you feel and think about life  This information can help others choose the care that you would choose  Why are advance directives important? An advance directive helps you control your care  Although spoken wishes may be used, it is better to have your wishes written down  Spoken wishes can be misunderstood, or not followed  Treatments may be given even if you do not want them  An advance directive may make it easier for your family to make difficult choices about your care  Cigarette Smoking and Your Health   Risks to your health if you smoke:  Nicotine and other chemicals found in tobacco damage every cell in your body   Even if you are a light smoker, you have an increased risk for cancer, heart disease, and lung disease  If you are pregnant or have diabetes, smoking increases your risk for complications  Benefits to your health if you stop smoking:   · You decrease respiratory symptoms such as coughing, wheezing, and shortness of breath  · You reduce your risk for cancers of the lung, mouth, throat, kidney, bladder, pancreas, stomach, and cervix  If you already have cancer, you increase the benefits of chemotherapy  You also reduce your risk for cancer returning or a second cancer from developing  · You reduce your risk for heart disease, blood clots, heart attack, and stroke  · You reduce your risk for lung infections, and diseases such as pneumonia, asthma, chronic bronchitis, and emphysema  · Your circulation improves  More oxygen can be delivered to your body  If you have diabetes, you lower your risk for complications, such as kidney, artery, and eye diseases  You also lower your risk for nerve damage  Nerve damage can lead to amputations, poor vision, and blindness  · You improve your body's ability to heal and to fight infections  For more information and support to stop smoking:   · [employer]  Phone: 0- 188 - [phone]  Web Address: [employer]  Underweight  Underweight is defined as having a body mass index (BMI) of less than 18 5 kg/m2   Anorexia  is a loss of appetite, decreased food intake, or both  Your appetite naturally decreases as you get older  You also get full faster than you used to  This occurs because your body needs less energy  Other body changes can also lead to a decreased appetite  Even though some appetite loss is normal, you still need to get enough calories and nutrients to keep you healthy  You can start to lose too much weight if you do not eat as much food as your body needs  Unwanted weight loss can cause health problems, or worsen health problems you already have  You can also become dehydrated if you do not drink enough liquid    How to eat healthy and get enough nutrients:   · Choose healthy foods  Eat a variety of fruits, vegetables, whole grains, low-fat dairy foods, lean meats, and other protein foods  Limit foods high in fat, sugar, and salt  Limit or avoid alcohol as directed  Work with a dietitian to help you plan your meals if you need to follow a special diet  A dietitian can also teach you how to modify foods if you have trouble chewing or swallowing  · Snack on healthy foods between meals  if you only eat a small amount during meals  Snacks provide extra healthy nutrients and calories between meals  Examples include fruit, cheese, and whole grain crackers  · Drink liquids as directed  to avoid dehydration  Drink liquids between meals if they cause you to get full too quickly during meals  Ask how much liquid to drink each day and which liquids are best for you  · Use herbs, spices, and flavor enhancers to add flavor to foods  Avoid using herbs and spice blends that also contain sodium  Ask your healthcare provider or dietitian about flavor enhancers  Flavor enhancers with ham, natural chilel, and roast beef flavors can also be sprinkled on food to add flavor  · Share meals with others as often as you can  Eating with others may help you to eat better during meal time  Ask family members, neighbors, or friends to join you for lunch  There are also senior centers where you can meet people, and share meals with them  · Ask family and friends for help  with shopping or preparing foods  Ask for a ride to the grocery store, if needed  © Copyright [employer] 2018 Information is for End User's use only and may not be sold, redistributed or otherwise used for commercial purposes   All illustrations and images included in CareNotes® are the copyrighted property of A [employer] A M [employer]  or [address] [place], [place] [address]    Recent Results (from the past 672 hour(s))   CBC    Collection Time: 01/21/20 10:12 AM   Result Value Ref Range    WBC 5 89 4 31 - 10 16 Thousand/uL RBC 4 73 3 81 - 5 12 Million/uL    Hemoglobin 15 1 11 5 - 15 4 g/dL    Hematocrit 47 2 (H) 34 8 - 46 1 %     (H) 82 - 98 fL    MCH 31 9 26 8 - 34 3 pg    MCHC 32 0 31 4 - 37 4 g/dL    RDW 12 5 11 6 - 15 1 %    Platelets 881 (L) 927 - 390 Thousands/uL    MPV 10 4 8 9 - 12 7 fL   Comprehensive metabolic panel    Collection Time: 01/21/20 10:12 AM   Result Value Ref Range    Sodium 141 136 - 145 mmol/L    Potassium 4 3 3 5 - 5 3 mmol/L    Chloride 107 100 - 108 mmol/L    CO2 33 (H) 21 - 32 mmol/L    ANION GAP 1 (L) 4 - 13 mmol/L    BUN 11 5 - 25 mg/dL    Creatinine 0 70 0 60 - 1 30 mg/dL    Glucose, Fasting 107 (H) 65 - 99 mg/dL    Calcium 9 4 8 3 - 10 1 mg/dL    AST 14 5 - 45 U/L    ALT 19 12 - 78 U/L    Alkaline Phosphatase 68 46 - 116 U/L    Total Protein 6 6 6 4 - 8 2 g/dL    Albumin 3 9 3 5 - 5 0 g/dL    Total Bilirubin 0 62 0 20 - 1 00 mg/dL    eGFR 85 ml/min/1 73sq m   Hemoglobin A1C    Collection Time: 01/21/20 10:12 AM   Result Value Ref Range    Hemoglobin A1C 5 4 4 2 - 6 3 %     mg/dl   Lipid Panel with Direct LDL reflex    Collection Time: 01/21/20 10:12 AM   Result Value Ref Range    Cholesterol 167 50 - 200 mg/dL    Triglycerides 110 <=150 mg/dL    HDL, Direct 68 >=40 mg/dL    LDL Calculated 77 0 - 100 mg/dL

## 2020-03-30 ENCOUNTER — TELEMEDICINE (OUTPATIENT)
Dept: FAMILY MEDICINE CLINIC | Facility: CLINIC | Age: 75
End: 2020-03-30
Payer: MEDICARE

## 2020-03-30 VITALS — HEIGHT: 64 IN | WEIGHT: 104 LBS | BODY MASS INDEX: 17.75 KG/M2

## 2020-03-30 DIAGNOSIS — J44.9 CHRONIC OBSTRUCTIVE PULMONARY DISEASE, UNSPECIFIED COPD TYPE (HCC): ICD-10-CM

## 2020-03-30 DIAGNOSIS — F41.1 GENERALIZED ANXIETY DISORDER: Primary | ICD-10-CM

## 2020-03-30 PROCEDURE — G2012 BRIEF CHECK IN BY MD/QHP: HCPCS | Performed by: FAMILY MEDICINE

## 2020-03-30 NOTE — PROGRESS NOTES
Virtual Brief Visit    Problem List Items Addressed This Visit     Chronic obstructive pulmonary disease ([facility] [address] )     Symptoms have improved with Dulera  Encouraged patient to continue taking it twice daily         Generalized anxiety disorder - Primary     Improving with lexapro  Continue 10 mg a day    She has not been needing the xanax often  Return in about 6 months (around 9/30/2020) for Next scheduled follow up  Reason for visit is follow up new lexapro use  Done at virtual visit due to [facility] virus outbreak  Encounter provider Jesu Barker DO    Provider located at [address]  [address] [facility]  [address]  [place] [address]      Recent Visits  No visits were found meeting these conditions  Showing recent visits within past 7 days and meeting all other requirements     Today's Visits  Date Type Provider Dept   03/30/20 37[address] [facility], [address] [facility] today's visits and meeting all other requirements     Future Appointments  No visits were found meeting these conditions  Showing future appointments within next 150 days and meeting all other requirements        After connecting through telephone, the patient was identified by name and date of birth  Marina Maciel was informed that this is a telemedicine visit and that the visit is being conducted through telephone  My office door was closed  No one else was in the room  She acknowledged consent and understanding of privacy and security of the video platform  The patient has agreed to participate and understands they can discontinue the visit at any time  Patient is aware this is a billable service  Subjective  Marina Maciel is a 67 y o  female     She is doing well on her inhaler  It is helping with her breathing  She is feeling better after she clears up in the morning  Anxiety - started on lexapro 6 weeks ago  It is helping her cope   She is also now dealing with the social distancing due to corona virus out break  She is not needing the xanax often  Past Medical History:   Diagnosis Date    Elevated blood pressure reading        Past Surgical History:   Procedure Laterality Date    APPENDECTOMY      TONSILLECTOMY         Current Outpatient Medications   Medication Sig Dispense Refill    ALPRAZolam (XANAX) 0 25 mg tablet Take 1 tablet (0 25 mg total) by mouth daily as needed for anxiety 30 tablet 3    Calcium Carbonate-Vit D-Min (CALCIUM 600+D PLUS MINERALS) 600-400 MG-UNIT CHEW Chew 1 tablet daily      Cholecalciferol (VITAMIN D3) 2000 units TABS Take 2,000 Units by mouth daily       escitalopram (LEXAPRO) 10 mg tablet Take 1 tablet (10 mg total) by mouth daily 30 tablet 5    methimazole (TAPAZOLE) 5 mg tablet Take 0 5 tablets (2 5 mg total) by mouth daily 45 tablet 3    metoprolol succinate (TOPROL-XL) 100 mg 24 hr tablet Take 1 tablet (100 mg total) by mouth daily at bedtime 30 tablet 5    mometasone-formoterol (DULERA) 200-5 MCG/ACT inhaler Inhale 2 puffs 2 (two) times a day Rinse mouth after use  1 Inhaler 3    Multiple Vitamins-Minerals (MULTIVITAMIN ADULT PO) Take 1 tablet by mouth daily       No current facility-administered medications for this visit  Allergies   Allergen Reactions    Erythromycin GI Intolerance     Reaction Date: [id number]; Review of Systems   Respiratory: Positive for shortness of breath (in the morning, until [facility] kicks in)  Cardiovascular: Negative for chest pain  I spent 10 minutes with the patient during this visit

## 2020-03-30 NOTE — ASSESSMENT & PLAN NOTE
Symptoms have improved with [facility]  Encouraged patient to continue taking it twice daily

## 2020-06-15 ENCOUNTER — TELEMEDICINE (OUTPATIENT)
Dept: ENDOCRINOLOGY | Facility: CLINIC | Age: 75
End: 2020-06-15
Payer: MEDICARE

## 2020-06-15 DIAGNOSIS — E05.90 SUBCLINICAL HYPERTHYROIDISM: Primary | ICD-10-CM

## 2020-06-15 DIAGNOSIS — E55.9 VITAMIN D DEFICIENCY: ICD-10-CM

## 2020-06-15 DIAGNOSIS — J44.9 CHRONIC OBSTRUCTIVE PULMONARY DISEASE, UNSPECIFIED COPD TYPE (HCC): ICD-10-CM

## 2020-06-15 DIAGNOSIS — R53.83 FATIGUE, UNSPECIFIED TYPE: ICD-10-CM

## 2020-06-15 DIAGNOSIS — E04.2 MULTIPLE THYROID NODULES: ICD-10-CM

## 2020-06-15 PROCEDURE — 99214 OFFICE O/P EST MOD 30 MIN: CPT | Performed by: INTERNAL MEDICINE

## 2020-06-19 ENCOUNTER — APPOINTMENT (OUTPATIENT)
Dept: LAB | Facility: CLINIC | Age: 75
End: 2020-06-19
Payer: MEDICARE

## 2020-06-19 DIAGNOSIS — E55.9 VITAMIN D DEFICIENCY: ICD-10-CM

## 2020-06-19 DIAGNOSIS — E05.90 SUBCLINICAL HYPERTHYROIDISM: ICD-10-CM

## 2020-06-19 LAB
25(OH)D3 SERPL-MCNC: 17.8 NG/ML (ref 30–100)
ALBUMIN SERPL BCP-MCNC: 3.7 G/DL (ref 3.5–5)
ALP SERPL-CCNC: 83 U/L (ref 46–116)
ALT SERPL W P-5'-P-CCNC: 18 U/L (ref 12–78)
ANION GAP SERPL CALCULATED.3IONS-SCNC: 4 MMOL/L (ref 4–13)
AST SERPL W P-5'-P-CCNC: 16 U/L (ref 5–45)
BASOPHILS # BLD AUTO: 0.07 THOUSANDS/ΜL (ref 0–0.1)
BASOPHILS NFR BLD AUTO: 1 % (ref 0–1)
BILIRUB SERPL-MCNC: 0.55 MG/DL (ref 0.2–1)
BUN SERPL-MCNC: 9 MG/DL (ref 5–25)
CALCIUM SERPL-MCNC: 9.7 MG/DL (ref 8.3–10.1)
CHLORIDE SERPL-SCNC: 106 MMOL/L (ref 100–108)
CO2 SERPL-SCNC: 29 MMOL/L (ref 21–32)
CREAT SERPL-MCNC: 0.62 MG/DL (ref 0.6–1.3)
EOSINOPHIL # BLD AUTO: 0.11 THOUSAND/ΜL (ref 0–0.61)
EOSINOPHIL NFR BLD AUTO: 2 % (ref 0–6)
ERYTHROCYTE [DISTWIDTH] IN BLOOD BY AUTOMATED COUNT: 13 % (ref 11.6–15.1)
GFR SERPL CREATININE-BSD FRML MDRD: 88 ML/MIN/1.73SQ M
GLUCOSE SERPL-MCNC: 88 MG/DL (ref 65–140)
HCT VFR BLD AUTO: 46.5 % (ref 34.8–46.1)
HGB BLD-MCNC: 14.5 G/DL (ref 11.5–15.4)
IMM GRANULOCYTES # BLD AUTO: 0.01 THOUSAND/UL (ref 0–0.2)
IMM GRANULOCYTES NFR BLD AUTO: 0 % (ref 0–2)
LYMPHOCYTES # BLD AUTO: 1.65 THOUSANDS/ΜL (ref 0.6–4.47)
LYMPHOCYTES NFR BLD AUTO: 30 % (ref 14–44)
MCH RBC QN AUTO: 31.7 PG (ref 26.8–34.3)
MCHC RBC AUTO-ENTMCNC: 31.2 G/DL (ref 31.4–37.4)
MCV RBC AUTO: 102 FL (ref 82–98)
MONOCYTES # BLD AUTO: 0.6 THOUSAND/ΜL (ref 0.17–1.22)
MONOCYTES NFR BLD AUTO: 11 % (ref 4–12)
NEUTROPHILS # BLD AUTO: 3.01 THOUSANDS/ΜL (ref 1.85–7.62)
NEUTS SEG NFR BLD AUTO: 56 % (ref 43–75)
NRBC BLD AUTO-RTO: 0 /100 WBCS
PLATELET # BLD AUTO: 139 THOUSANDS/UL (ref 149–390)
PMV BLD AUTO: 10.3 FL (ref 8.9–12.7)
POTASSIUM SERPL-SCNC: 4.1 MMOL/L (ref 3.5–5.3)
PROT SERPL-MCNC: 6.7 G/DL (ref 6.4–8.2)
RBC # BLD AUTO: 4.58 MILLION/UL (ref 3.81–5.12)
SODIUM SERPL-SCNC: 139 MMOL/L (ref 136–145)
T4 FREE SERPL-MCNC: 1.16 NG/DL (ref 0.76–1.46)
TSH SERPL DL<=0.05 MIU/L-ACNC: 2.11 UIU/ML (ref 0.36–3.74)
WBC # BLD AUTO: 5.45 THOUSAND/UL (ref 4.31–10.16)

## 2020-06-19 PROCEDURE — 84443 ASSAY THYROID STIM HORMONE: CPT

## 2020-06-19 PROCEDURE — 84439 ASSAY OF FREE THYROXINE: CPT

## 2020-06-19 PROCEDURE — 80053 COMPREHEN METABOLIC PANEL: CPT

## 2020-06-19 PROCEDURE — 36415 COLL VENOUS BLD VENIPUNCTURE: CPT

## 2020-06-19 PROCEDURE — 82306 VITAMIN D 25 HYDROXY: CPT

## 2020-06-19 PROCEDURE — 85025 COMPLETE CBC W/AUTO DIFF WBC: CPT

## 2020-06-23 ENCOUNTER — TELEPHONE (OUTPATIENT)
Dept: OTHER | Facility: HOSPITAL | Age: 75
End: 2020-06-23

## 2020-06-23 DIAGNOSIS — E05.90 SUBCLINICAL HYPERTHYROIDISM: Primary | ICD-10-CM

## 2020-06-23 DIAGNOSIS — E55.9 HYPOVITAMINOSIS D: ICD-10-CM

## 2020-06-23 RX ORDER — ERGOCALCIFEROL 1.25 MG/1
50000 CAPSULE ORAL WEEKLY
Qty: 8 CAPSULE | Refills: 0 | Status: SHIPPED | OUTPATIENT
Start: 2020-06-23 | End: 2021-07-02 | Stop reason: ALTCHOICE

## 2020-06-24 ENCOUNTER — TELEPHONE (OUTPATIENT)
Dept: ENDOCRINOLOGY | Facility: CLINIC | Age: 75
End: 2020-06-24

## 2020-06-24 DIAGNOSIS — E55.9 VITAMIN D DEFICIENCY: ICD-10-CM

## 2020-06-24 DIAGNOSIS — E04.2 MULTIPLE THYROID NODULES: Primary | ICD-10-CM

## 2020-06-24 DIAGNOSIS — E05.90 SUBCLINICAL HYPERTHYROIDISM: ICD-10-CM

## 2020-06-24 RX ORDER — METHIMAZOLE 5 MG/1
2.5 TABLET ORAL DAILY
Qty: 45 TABLET | Refills: 3 | Status: SHIPPED | OUTPATIENT
Start: 2020-06-24 | End: 2021-06-09 | Stop reason: SDUPTHER

## 2020-08-10 DIAGNOSIS — I10 ESSENTIAL HYPERTENSION: ICD-10-CM

## 2020-08-10 RX ORDER — METOPROLOL SUCCINATE 100 MG/1
100 TABLET, EXTENDED RELEASE ORAL
Qty: 30 TABLET | Refills: 5 | Status: SHIPPED | OUTPATIENT
Start: 2020-08-10 | End: 2021-02-05 | Stop reason: SDUPTHER

## 2020-08-15 DIAGNOSIS — F41.1 GENERALIZED ANXIETY DISORDER: ICD-10-CM

## 2020-08-17 DIAGNOSIS — F41.1 GENERALIZED ANXIETY DISORDER: ICD-10-CM

## 2020-08-17 RX ORDER — ESCITALOPRAM OXALATE 10 MG/1
10 TABLET ORAL DAILY
Qty: 30 TABLET | Refills: 5 | Status: SHIPPED | OUTPATIENT
Start: 2020-08-17 | End: 2021-02-11 | Stop reason: SDUPTHER

## 2020-08-17 RX ORDER — ESCITALOPRAM OXALATE 10 MG/1
10 TABLET ORAL DAILY
Qty: 30 TABLET | Refills: 5 | OUTPATIENT
Start: 2020-08-17

## 2020-09-25 ENCOUNTER — APPOINTMENT (OUTPATIENT)
Dept: LAB | Facility: CLINIC | Age: 75
End: 2020-09-25
Payer: MEDICARE

## 2020-09-25 ENCOUNTER — TELEPHONE (OUTPATIENT)
Dept: ENDOCRINOLOGY | Facility: CLINIC | Age: 75
End: 2020-09-25

## 2020-09-25 DIAGNOSIS — E55.9 VITAMIN D DEFICIENCY: ICD-10-CM

## 2020-09-25 DIAGNOSIS — E05.90 SUBCLINICAL HYPERTHYROIDISM: ICD-10-CM

## 2020-09-25 DIAGNOSIS — E04.2 MULTIPLE THYROID NODULES: ICD-10-CM

## 2020-09-25 LAB
25(OH)D3 SERPL-MCNC: 33.5 NG/ML (ref 30–100)
T4 FREE SERPL-MCNC: 1.17 NG/DL (ref 0.76–1.46)
TSH SERPL DL<=0.05 MIU/L-ACNC: 2.22 UIU/ML (ref 0.36–3.74)

## 2020-09-25 PROCEDURE — 84439 ASSAY OF FREE THYROXINE: CPT

## 2020-09-25 PROCEDURE — 82306 VITAMIN D 25 HYDROXY: CPT

## 2020-09-25 PROCEDURE — 36415 COLL VENOUS BLD VENIPUNCTURE: CPT

## 2020-09-25 PROCEDURE — 84443 ASSAY THYROID STIM HORMONE: CPT

## 2020-09-25 NOTE — TELEPHONE ENCOUNTER
Covering for Dr Juan Pablo Rolon    Please let pt know her thyroid labs and vitamin D level are normal

## 2020-10-14 DIAGNOSIS — D69.6 THROMBOCYTOPENIA (HCC): ICD-10-CM

## 2020-10-14 DIAGNOSIS — E55.9 VITAMIN D DEFICIENCY: ICD-10-CM

## 2020-10-14 DIAGNOSIS — R63.4 WEIGHT LOSS: ICD-10-CM

## 2020-10-14 DIAGNOSIS — I10 ESSENTIAL HYPERTENSION: Primary | ICD-10-CM

## 2020-10-14 DIAGNOSIS — J44.9 CHRONIC OBSTRUCTIVE PULMONARY DISEASE, UNSPECIFIED COPD TYPE (HCC): ICD-10-CM

## 2021-02-05 DIAGNOSIS — I10 ESSENTIAL HYPERTENSION: ICD-10-CM

## 2021-02-05 RX ORDER — METOPROLOL SUCCINATE 100 MG/1
100 TABLET, EXTENDED RELEASE ORAL
Qty: 30 TABLET | Refills: 5 | Status: SHIPPED | OUTPATIENT
Start: 2021-02-05 | End: 2021-08-02 | Stop reason: SDUPTHER

## 2021-02-11 DIAGNOSIS — J44.9 CHRONIC OBSTRUCTIVE PULMONARY DISEASE, UNSPECIFIED COPD TYPE (HCC): ICD-10-CM

## 2021-02-11 DIAGNOSIS — F41.1 GENERALIZED ANXIETY DISORDER: ICD-10-CM

## 2021-02-11 RX ORDER — ESCITALOPRAM OXALATE 10 MG/1
10 TABLET ORAL DAILY
Qty: 30 TABLET | Refills: 0 | Status: SHIPPED | OUTPATIENT
Start: 2021-02-11 | End: 2021-03-15 | Stop reason: SDUPTHER

## 2021-02-26 ENCOUNTER — LAB (OUTPATIENT)
Dept: LAB | Facility: CLINIC | Age: 76
End: 2021-02-26
Payer: MEDICARE

## 2021-02-26 DIAGNOSIS — D69.6 THROMBOCYTOPENIA (HCC): ICD-10-CM

## 2021-02-26 DIAGNOSIS — I10 ESSENTIAL HYPERTENSION: ICD-10-CM

## 2021-02-26 LAB
ALBUMIN SERPL BCP-MCNC: 3.5 G/DL (ref 3.5–5)
ALP SERPL-CCNC: 76 U/L (ref 46–116)
ALT SERPL W P-5'-P-CCNC: 17 U/L (ref 12–78)
ANION GAP SERPL CALCULATED.3IONS-SCNC: 3 MMOL/L (ref 4–13)
AST SERPL W P-5'-P-CCNC: 21 U/L (ref 5–45)
BILIRUB SERPL-MCNC: 0.59 MG/DL (ref 0.2–1)
BUN SERPL-MCNC: 8 MG/DL (ref 5–25)
CALCIUM SERPL-MCNC: 9 MG/DL (ref 8.3–10.1)
CHLORIDE SERPL-SCNC: 107 MMOL/L (ref 100–108)
CHOLEST SERPL-MCNC: 154 MG/DL (ref 50–200)
CO2 SERPL-SCNC: 30 MMOL/L (ref 21–32)
CREAT SERPL-MCNC: 0.62 MG/DL (ref 0.6–1.3)
ERYTHROCYTE [DISTWIDTH] IN BLOOD BY AUTOMATED COUNT: 13.1 % (ref 11.6–15.1)
GFR SERPL CREATININE-BSD FRML MDRD: 87 ML/MIN/1.73SQ M
GLUCOSE P FAST SERPL-MCNC: 108 MG/DL (ref 65–99)
HCT VFR BLD AUTO: 45.4 % (ref 34.8–46.1)
HDLC SERPL-MCNC: 69 MG/DL
HGB BLD-MCNC: 14.5 G/DL (ref 11.5–15.4)
LDLC SERPL CALC-MCNC: 65 MG/DL (ref 0–100)
MCH RBC QN AUTO: 32.1 PG (ref 26.8–34.3)
MCHC RBC AUTO-ENTMCNC: 31.9 G/DL (ref 31.4–37.4)
MCV RBC AUTO: 100 FL (ref 82–98)
PLATELET # BLD AUTO: 141 THOUSANDS/UL (ref 149–390)
PMV BLD AUTO: 10.1 FL (ref 8.9–12.7)
POTASSIUM SERPL-SCNC: 4.1 MMOL/L (ref 3.5–5.3)
PROT SERPL-MCNC: 6.4 G/DL (ref 6.4–8.2)
RBC # BLD AUTO: 4.52 MILLION/UL (ref 3.81–5.12)
SODIUM SERPL-SCNC: 140 MMOL/L (ref 136–145)
TRIGL SERPL-MCNC: 101 MG/DL
WBC # BLD AUTO: 5.52 THOUSAND/UL (ref 4.31–10.16)

## 2021-02-26 PROCEDURE — 80053 COMPREHEN METABOLIC PANEL: CPT

## 2021-02-26 PROCEDURE — 80061 LIPID PANEL: CPT

## 2021-02-26 PROCEDURE — 85027 COMPLETE CBC AUTOMATED: CPT

## 2021-02-26 PROCEDURE — 36415 COLL VENOUS BLD VENIPUNCTURE: CPT

## 2021-03-02 ENCOUNTER — TELEMEDICINE (OUTPATIENT)
Dept: FAMILY MEDICINE CLINIC | Facility: CLINIC | Age: 76
End: 2021-03-02
Payer: MEDICARE

## 2021-03-02 ENCOUNTER — TELEPHONE (OUTPATIENT)
Dept: FAMILY MEDICINE CLINIC | Facility: CLINIC | Age: 76
End: 2021-03-02

## 2021-03-02 DIAGNOSIS — B34.9 VIRAL INFECTION, UNSPECIFIED: Primary | ICD-10-CM

## 2021-03-02 PROCEDURE — 99441 PR PHYS/QHP TELEPHONE EVALUATION 5-10 MIN: CPT | Performed by: FAMILY MEDICINE

## 2021-03-02 NOTE — PROGRESS NOTES
COVID-19 Virtual Visit     Assessment/Plan:    Problem List Items Addressed This Visit     None      Visit Diagnoses     Viral infection, unspecified    -  Primary    Relevant Orders    Novel Coronavirus (Covid-19),PCR [facility] - Collected at Mobile Vans or [facility]         Disposition:     I referred patient to one of our centralized sites for a COVID-19 swab  I have spent 8 minutes directly with the patient  Greater than 50% of this time was spent in counseling/coordination of care regarding: instructions for management  Encounter provider Lucio Boogie DO    Provider located at  [address]  [place] [address]  [address] [facility] [address]  [place] [address]    Recent Visits  No visits were found meeting these conditions  Showing recent visits within past 7 days and meeting all other requirements     Today's Visits  Date Type Provider Dept   03/02/21 3700 [facility], [address] [place] [address]   03/02/21 Telephone [facility] today's visits and meeting all other requirements     Future Appointments  No visits were found meeting these conditions  Showing future appointments within next 150 days and meeting all other requirements        Patient agrees to participate in a virtual check in via telephone or video visit instead of presenting to the office to address urgent/immediate medical needs  Patient is aware this is a billable service  After connecting through Telephone, the patient was identified by name and date of birth  Francy Barahona was informed that this was a telemedicine visit and that the exam was being conducted confidentially over secure lines  My office door was closed  No one else was in the room  Francy Barahona acknowledged consent and understanding of privacy and security of the telemedicine visit   I informed the patient that I have reviewed her record in Epic and presented the opportunity for her to ask any questions regarding the visit today  The patient agreed to participate  It was my intent to perform this visit via video technology but the patient was not able to do a video connection so the visit was completed via audio telephone only  Subjective:   Suellen Franco is a 67 y o  female who is concerned about COVID-19  Patient's symptoms include rhinorrhea  Patient denies fever and shortness of breath  Cough: chronic  Date of exposure: 2/24/2021    Exposure:   Contact with a person who is under investigation (PUI) for or who is positive for COVID-19 within the last 14 days?: Yes    Her son has COVID and he is not doing well  She is coughing up a lot of mucus  She is not sure when her symptoms started  She has a chronic cough  She doesn't feel sick  He lives with her, but they are staying separate       No results found for: Kerwin Colon, BERTA, [address]  Past Medical History:   Diagnosis Date    Elevated blood pressure reading      Past Surgical History:   Procedure Laterality Date    APPENDECTOMY      TONSILLECTOMY       Current Outpatient Medications   Medication Sig Dispense Refill    ALPRAZolam (XANAX) 0 25 mg tablet Take 1 tablet (0 25 mg total) by mouth daily as needed for anxiety 30 tablet 3    Calcium Carbonate-Vit D-Min (CALCIUM 600+D PLUS MINERALS) 600-400 MG-UNIT CHEW Chew 1 tablet daily      Cholecalciferol (VITAMIN D3) 2000 units TABS Take 2,000 Units by mouth daily       ergocalciferol (VITAMIN D2) 50,000 units Take 1 capsule (50,000 Units total) by mouth once a week for 8 doses 8 capsule 0    escitalopram (LEXAPRO) 10 mg tablet Take 1 tablet (10 mg total) by mouth daily 30 tablet 0    methimazole (TAPAZOLE) 5 mg tablet Take 0 5 tablets (2 5 mg total) by mouth daily 45 tablet 3    metoprolol succinate (TOPROL-XL) 100 mg 24 hr tablet Take 1 tablet (100 mg total) by mouth daily at bedtime 30 tablet 5    mometasone-formoterol (DULERA) 200-5 MCG/ACT inhaler Inhale 2 puffs 2 (two) times a day Rinse mouth after use  1 Inhaler 0    Multiple Vitamins-Minerals (MULTIVITAMIN ADULT PO) Take 1 tablet by mouth daily       No current facility-administered medications for this visit  Allergies   Allergen Reactions    Erythromycin GI Intolerance     Reaction Date: [id number]; Review of Systems   Constitutional: Negative for fever  HENT: Positive for rhinorrhea  Respiratory: Negative for shortness of breath  Cough: chronic  VIRTUAL VISIT [id number] acknowledges that she has consented to an online visit or consultation  She understands that the online visit is based solely on information provided by her, and that, in the absence of a face-to-face physical evaluation by the physician, the diagnosis she receives is both limited and provisional in terms of accuracy and completeness  This is not intended to replace a full medical face-to-face evaluation by the physician  Kaye Botello understands and accepts these terms

## 2021-03-02 NOTE — TELEPHONE ENCOUNTER
Spoke to patient , she said her son does live with her in the same household  She says she currently has some congestion and runny nose  Some cough  No other symptoms  Patient scheduled for virtual appt    Hiral Chung MA

## 2021-03-02 NOTE — TELEPHONE ENCOUNTER
Mark Sheets son is positive for covid  Should Mark Sheets be tested? She stated she is staying away from him but does have a runny nose  She stated it is allergies  She is not sick    Can we order a test for Mark Sheets or should she have a virtual?  She seems very anxious

## 2021-03-03 DIAGNOSIS — B34.9 VIRAL INFECTION, UNSPECIFIED: ICD-10-CM

## 2021-03-03 PROCEDURE — U0005 INFEC AGEN DETEC AMPLI PROBE: HCPCS | Performed by: FAMILY MEDICINE

## 2021-03-03 PROCEDURE — U0003 INFECTIOUS AGENT DETECTION BY NUCLEIC ACID (DNA OR RNA); SEVERE ACUTE RESPIRATORY SYNDROME CORONAVIRUS 2 (SARS-COV-2) (CORONAVIRUS DISEASE [COVID-19]), AMPLIFIED PROBE TECHNIQUE, MAKING USE OF HIGH THROUGHPUT TECHNOLOGIES AS DESCRIBED BY CMS-2020-01-R: HCPCS | Performed by: FAMILY MEDICINE

## 2021-03-04 LAB — SARS-COV-2 RNA RESP QL NAA+PROBE: POSITIVE

## 2021-03-05 ENCOUNTER — TELEPHONE (OUTPATIENT)
Dept: FAMILY MEDICINE CLINIC | Facility: CLINIC | Age: 76
End: 2021-03-05

## 2021-03-05 DIAGNOSIS — U07.1 COVID-19: Primary | ICD-10-CM

## 2021-03-05 NOTE — TELEPHONE ENCOUNTER
3/5/2021 8:03 AM Called patient regarding her positive COVID results  Left message on her voicemail to call the office     Marielos Paige DO

## 2021-03-05 NOTE — TELEPHONE ENCOUNTER
3/5/2021 9:39 AM called patient regarding her COVID results  She is only feeling congested, runny nose and cough  She is very tired  She declined monoclonal antibody infusion  Her son had it and it didn't help him  Also the start date of her infection is very unclear  Quarantine recommended until 3/10/21    ER instructions reviewed      [facility], DO

## 2021-03-12 ENCOUNTER — TELEPHONE (OUTPATIENT)
Dept: FAMILY MEDICINE CLINIC | Facility: CLINIC | Age: 76
End: 2021-03-12

## 2021-03-12 DIAGNOSIS — J44.9 CHRONIC OBSTRUCTIVE PULMONARY DISEASE, UNSPECIFIED COPD TYPE (HCC): ICD-10-CM

## 2021-03-12 DIAGNOSIS — F41.1 GENERALIZED ANXIETY DISORDER: ICD-10-CM

## 2021-03-12 RX ORDER — ESCITALOPRAM OXALATE 10 MG/1
10 TABLET ORAL DAILY
Qty: 30 TABLET | Refills: 0 | Status: CANCELLED | OUTPATIENT
Start: 2021-03-12

## 2021-03-13 DIAGNOSIS — F41.1 GENERALIZED ANXIETY DISORDER: ICD-10-CM

## 2021-03-13 DIAGNOSIS — J44.9 CHRONIC OBSTRUCTIVE PULMONARY DISEASE, UNSPECIFIED COPD TYPE (HCC): ICD-10-CM

## 2021-03-15 DIAGNOSIS — J44.9 CHRONIC OBSTRUCTIVE PULMONARY DISEASE, UNSPECIFIED COPD TYPE (HCC): ICD-10-CM

## 2021-03-15 DIAGNOSIS — F41.1 GENERALIZED ANXIETY DISORDER: ICD-10-CM

## 2021-03-15 RX ORDER — ESCITALOPRAM OXALATE 10 MG/1
TABLET ORAL
Qty: 30 TABLET | Refills: 3 | Status: SHIPPED | OUTPATIENT
Start: 2021-03-15 | End: 2021-07-12

## 2021-03-15 RX ORDER — ESCITALOPRAM OXALATE 10 MG/1
10 TABLET ORAL DAILY
Qty: 30 TABLET | Refills: 0 | Status: SHIPPED | OUTPATIENT
Start: 2021-03-15 | End: 2021-03-15 | Stop reason: SDUPTHER

## 2021-03-15 RX ORDER — MOMETASONE FUROATE AND FORMOTEROL FUMARATE DIHYDRATE 200; 5 UG/1; UG/1
AEROSOL RESPIRATORY (INHALATION)
Qty: 13 G | Refills: 3 | Status: SHIPPED | OUTPATIENT
Start: 2021-03-15 | End: 2021-07-19 | Stop reason: SDUPTHER

## 2021-06-04 NOTE — ASSESSMENT & PLAN NOTE
Umesh Jimenez is a 63 y.o. male evaluated via telephone on 2021. Consent:  He and/or health care decision maker is aware that that he may receive a bill for this telephone service, depending on his insurance coverage, and has provided verbal consent to proceed: Yes      I affirm this is a Patient Initiated Episode with an Established Patient who has not had a related appointment within my department in the past 7 days or scheduled within the next 24 hours. Total Time:     Note: not billable if this call serves to triage the patient into an appointment for the relevant concern      Edwar Felix MD     2021    TELEHEALTH EVALUATION -- Audio (During [facility] [place] [address] emergency)    Chief Complaint   Patient presents with    Hypertension    Coronary Artery Disease    COPD       HPI     Umesh Jimenez (:  1961) has requested an audio evaluation for the following concern(s):    Patient is here for f/u HTN, PAD. Is compliant with meds and has no side effects from them. Avoids added salt. Tries to eat healthy. Exercises occasionally. Has no chest pain, shortness of breath, palpitations or edema. No claudication. CVA and TBI  No new deficits. No facial droop. No speech impairment. No unilateral weakness. COPD. On LABA/ICS and prn albuterol - less than 3 times a week. No significant cough, mild baseline shortness of breath, occasional wheezing, mild, occasional chest tightness. ETOH neuropathy - chronic pain for which he desires improved control. Has asked several times in the past for pain meds even though he has PMR specialist. Very frustrated that his pain mgt has been limited. Prior to Visit Medications    Medication Sig Taking?  Authorizing Provider   valsartan-hydroCHLOROthiazide (DIOVAN-HCT) 80-12.5 MG per tablet take 1 tablet by mouth once daily  Ladan Bazan MD   HYDROcodone-acetaminophen (NORCO) 7.5-325 MG per tablet Take 1 tablet by mouth every 8 stable daily  Miguel Beal MD   aspirin EC 81 MG EC tablet Take 1 tablet by mouth daily  Miguel Beal MD   nitroGLYCERIN (NITROSTAT) 0.4 MG SL tablet up to max of 3 total doses. If no relief after 1 dose, call 911. Miguel Beal MD   REFRESH TEARS 0.5 % SOLN ophthalmic solution instill 1 drop into both eyes four times a day  Historical Provider, MD       PHYSICAL EXAMINATION:          Patient appears to be alert and oriented to person, place, time, situation and is in no acute distress. Respiratory effort appears normal. Mood appears stable and speech and thought are grossly normal.    Lab Results   Component Value Date    TSH 2.170 08/08/2018     Lipid Profile: No components found for: CHLPL  Lab Results   Component Value Date    TRIG 139 01/19/2019    TRIG 347 (H) 08/08/2018    TRIG 132 04/18/2017     Lab Results   Component Value Date    HDL 29 (L) 02/26/2020    HDL 39 (L) 01/19/2019    HDL 34 (L) 08/08/2018     Lab Results   Component Value Date    LDLCALC 12 02/26/2020    [address] 01/19/2019    LDLCALC 92 08/08/2018     No results found for: LABVLDL  Hemoglobin A1C:   Lab Results   Component Value Date    LABA1C 6.1 (H) 04/02/2021       ASSESSMENT/PLAN:  Inder Brown was seen today for hypertension, coronary artery disease and copd. 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 Therefore, stable and well-controlled. Essential hypertension  Comments:  Stable and well-controlled    PAD (peripheral artery disease) ([facility])  Comments:  No claudication. Stable and well-controlled    Simple chronic bronchitis ([address]  Comments:  Stable and well-controlled    Traumatic brain injury with loss of consciousness, sequela ([address]  Comments:  Stable and well-controlled. No progression. Alcoholic polyneuropathy (HCC)  Comments:  Stable, fair to poor control.   Encouraged patient have a conversation with  Sosa. No follow-ups on file. Heriberto Marion is a 63 y.o. male being evaluated by a Virtual Visit (telephonic visit) encounter to address concerns as mentioned above. A caregiver was present when appropriate. Due to this being a TeleHealth encounter (During CDANJ-85 public health emergency), evaluation of the following organ systems was limited: Vitals/Constitutional/EENT/Resp/CV/GI//MS/Neuro/Skin/Heme-Lymph-Imm. Pursuant to the emergency declaration under the [address] [place] [address], [address] [place] [address] authority and the [employer] Act, this Virtual Visit was conducted with patient's (and/or legal guardian's) consent, to reduce the patient's risk of exposure to COVID-19 and provide necessary medical care. The patient (and/or legal guardian) has also been advised to contact this office for worsening conditions or problems, and seek emergency medical treatment and/or call 911 if deemed necessary. Services were provided through a telephonic synchronous discussion virtually to substitute for in-person clinic visit. Patient and provider were located at their individual homes. --Sharona Talamantes MD on 6/19/2021 at 5:15 PM    An electronic signature was used to authenticate this note.

## 2021-06-09 DIAGNOSIS — E05.90 SUBCLINICAL HYPERTHYROIDISM: ICD-10-CM

## 2021-06-09 RX ORDER — METHIMAZOLE 5 MG/1
2.5 TABLET ORAL DAILY
Qty: 45 TABLET | Refills: 3 | Status: SHIPPED | OUTPATIENT
Start: 2021-06-09 | End: 2022-06-13

## 2021-06-15 ENCOUNTER — OFFICE VISIT (OUTPATIENT)
Dept: ENDOCRINOLOGY | Facility: CLINIC | Age: 76
End: 2021-06-15
Payer: MEDICARE

## 2021-06-15 VITALS
SYSTOLIC BLOOD PRESSURE: 144 MMHG | BODY MASS INDEX: 19.21 KG/M2 | TEMPERATURE: 97.7 F | HEART RATE: 98 BPM | DIASTOLIC BLOOD PRESSURE: 80 MMHG | HEIGHT: 65 IN | WEIGHT: 115.3 LBS

## 2021-06-15 DIAGNOSIS — E55.9 VITAMIN D DEFICIENCY: ICD-10-CM

## 2021-06-15 DIAGNOSIS — E05.90 SUBCLINICAL HYPERTHYROIDISM: Primary | ICD-10-CM

## 2021-06-15 DIAGNOSIS — R42 LIGHTHEADEDNESS: ICD-10-CM

## 2021-06-15 DIAGNOSIS — R53.83 FATIGUE, UNSPECIFIED TYPE: ICD-10-CM

## 2021-06-15 DIAGNOSIS — E04.2 MULTIPLE THYROID NODULES: ICD-10-CM

## 2021-06-15 PROCEDURE — 99214 OFFICE O/P EST MOD 30 MIN: CPT | Performed by: INTERNAL MEDICINE

## 2021-06-15 NOTE — PROGRESS NOTES
Eddie Mitchell 67 y o  female MRN: [id number]    Encounter: [id number]      Assessment/Plan     1   subclinical hyperthyroidism   2  Thyroid nodules   No recent labs  - continue methimazole 2 5 mg orally daily  - check TSH, free T3, free T4   - ultrasound thyroid as previously ordered to assess interval change in thyroid nodules  Patient remindedTo have it done    3  history of vitamin-D deficiency  - continue multivitamin   - check vitamin D level     4  Fatigue  5  Lightheadedness   - maintain euthyroidism as above, normal vitamin-D levels   - check 8:00 a m  cortisol, BMP, cbc      follow-up in 3 months    CC: hyperthyroidism     History of Present Illness     HPI:  Eddie Mitchell is a 67 y o  female who is here for a follow-up of  Subclinical hyperthyroidism  Also has a past medical history of vitamin-D deficiency, thyroid nodules     last seen in 06/15/2020       history of subclinical hyperthyroidism, on methimazole since 06/2019   Currently on methimazole 2 5 mg orally daily     C/o feeling tired; Feels it is a s/e of lexapro   C/o multiple joint pain   C/o flatulence   C/o right knee swelling and pain X3 weeks - does not know if she bumped her knee somewhere  Better with walking and has fredrick icing it  Has constipation - not new   no heat / cold intolerance   No hair/ nail complaints   Occasional lightheadedness X few weeks usually around he time she takes her inhaler for allergies/ sinuses   No nausea/ vomiting    History of thyroid nodules, FNA 67 years ago which was benign  USG 06/2019-right mid gland nodule measuring 0 8 X 0 7 X 0 6 cm which was not previously seen, moderately suspicious-follow since > 1 cm  Other nodules stable as compared to before    has not had repeat ultrasound as was ordered    Taking MVI with vitamin D3  No other vitamins     All other systems were reviewed and are negative         Review of Systems    Historical Information   Past Medical History:   Diagnosis Date    Elevated blood pressure reading      Past Surgical History:   Procedure Laterality Date    APPENDECTOMY      TONSILLECTOMY       Social History   Social History     Substance and Sexual Activity   Alcohol Use No     Social History     Substance and Sexual Activity   Drug Use No     Social History     Tobacco Use   Smoking Status Current Every Day Smoker   Smokeless Tobacco Never Used     Family History:   Family History   Problem Relation Age of Onset    Lung cancer Mother     Heart disease Maternal Aunt     Heart disease Maternal Uncle     Heart disease Maternal Grandmother        Meds/Allergies   Current Outpatient Medications   Medication Sig Dispense Refill    ALPRAZolam (XANAX) 0 25 mg tablet Take 1 tablet (0 25 mg total) by mouth daily as needed for anxiety 30 tablet 3    Calcium Carbonate-Vit D-Min (CALCIUM 600+D PLUS MINERALS) 600-400 MG-UNIT CHEW Chew 1 tablet daily      Cholecalciferol (VITAMIN D3) 2000 units TABS Take 2,000 Units by mouth daily       Dulera 200-5 MCG/ACT inhaler inhale 2 puffs by mouth and INTO THE LUNGS twice a day  RINSE MOUTH AFTER USE 13 g 3    ergocalciferol (VITAMIN D2) 50,000 units Take 1 capsule (50,000 Units total) by mouth once a week for 8 doses 8 capsule 0    escitalopram (LEXAPRO) 10 mg tablet take 1 tablet by mouth once daily 30 tablet 3    methimazole (TAPAZOLE) 5 mg tablet Take 0 5 tablets (2 5 mg total) by mouth daily 45 tablet 3    metoprolol succinate (TOPROL-XL) 100 mg 24 hr tablet Take 1 tablet (100 mg total) by mouth daily at bedtime 30 tablet 5    Multiple Vitamins-Minerals (MULTIVITAMIN ADULT PO) Take 1 tablet by mouth daily       No current facility-administered medications for this visit  Allergies   Allergen Reactions    Erythromycin GI Intolerance     Reaction Date: [id number]; Objective   Vitals: Blood pressure 144/80, pulse 98, temperature 97 7 °F (36 5 °C), height 5' 4 5" (1 638 m), weight 52 3 kg (115 lb 4 8 oz)      Physical Exam  Constitutional:       Appearance: She is well-developed  HENT:      Head: Normocephalic and atraumatic  Eyes:      Conjunctiva/sclera: Conjunctivae normal       Pupils: Pupils are equal, round, and reactive to light  Neck:      Thyroid: No thyromegaly  Comments: No thyromegaly, nontender, no nodules appreciated on palpation    Cardiovascular:      Rate and Rhythm: Normal rate and regular rhythm  Heart sounds: Normal heart sounds  No murmur heard  Pulmonary:      Effort: Pulmonary effort is normal       Breath sounds: Normal breath sounds  No wheezing  Abdominal:      General: There is no distension  Palpations: Abdomen is soft  Tenderness: There is no abdominal tenderness  Musculoskeletal:         General: Normal range of motion  Cervical back: Normal range of motion and neck supple  Skin:     General: Skin is warm and dry  Neurological:      Mental Status: She is alert and oriented to person, place, and time  Deep Tendon Reflexes: Reflexes normal       Comments: Fine tremors on the outstretched arms   Psychiatric:         Behavior: Behavior normal          The history was obtained from the review of the chart, patient      Lab Results:   Lab Results   Component Value Date/Time    TSH 3RD GENERATON 2 220 09/25/2020 10:16 AM    TSH 3RD GENERATON 2 110 06/19/2020 10:53 AM    Free T4 1 17 09/25/2020 10:16 AM    Free T4 1 16 06/19/2020 10:53 AM     Lab Results   Component Value Date    WBC 5 52 02/26/2021    HGB 14 5 02/26/2021    HCT 45 4 02/26/2021     (H) 02/26/2021     (L) 02/26/2021     Lab Results   Component Value Date    CREATININE 0 62 02/26/2021    BUN 8 02/26/2021     12/14/2015    K 4 1 02/26/2021     02/26/2021    CO2 30 02/26/2021     Lab Results   Component Value Date    HGBA1C 5 4 01/21/2020         Imaging Studies:   Results for orders placed during the hospital encounter of 06/05/19    US thyroid    Impression  No nodule meets current ACR criteria for requiring biopsy but followup ultrasound is recommended in 1 year  Reference: ACR Thyroid Imaging, Reporting and Data System (TI-RADS): White Paper of the [facility]  J AM Tammi Radiol 4689;56:176-088  (additional recommendations based on American Thyroid Association 2015 guidelines )      Workstation performed: [id number]      I have personally reviewed pertinent reports  Portions of the record may have been created with voice recognition software  Occasional wrong word or "sound a like" substitutions may have occurred due to the inherent limitations of voice recognition software  Read the chart carefully and recognize, using context, where substitutions have occurred

## 2021-06-15 NOTE — PATIENT INSTRUCTIONS
Please get labs done as ordered at 8 am in the morning  You are beeing referred for an ultrasound of the thyroid  Follow-up in 3 months

## 2021-06-28 ENCOUNTER — APPOINTMENT (OUTPATIENT)
Dept: LAB | Facility: CLINIC | Age: 76
End: 2021-06-28
Payer: MEDICARE

## 2021-06-28 DIAGNOSIS — E05.90 SUBCLINICAL HYPERTHYROIDISM: ICD-10-CM

## 2021-06-28 DIAGNOSIS — R42 LIGHTHEADEDNESS: ICD-10-CM

## 2021-06-28 DIAGNOSIS — R53.83 FATIGUE, UNSPECIFIED TYPE: ICD-10-CM

## 2021-06-28 DIAGNOSIS — E55.9 VITAMIN D DEFICIENCY: ICD-10-CM

## 2021-06-28 DIAGNOSIS — E04.2 MULTIPLE THYROID NODULES: ICD-10-CM

## 2021-06-28 LAB
25(OH)D3 SERPL-MCNC: 25.8 NG/ML (ref 30–100)
ANION GAP SERPL CALCULATED.3IONS-SCNC: 4 MMOL/L (ref 4–13)
BASOPHILS # BLD AUTO: 0.07 THOUSANDS/ΜL (ref 0–0.1)
BASOPHILS NFR BLD AUTO: 1 % (ref 0–1)
BUN SERPL-MCNC: 8 MG/DL (ref 5–25)
CALCIUM SERPL-MCNC: 9.3 MG/DL (ref 8.3–10.1)
CHLORIDE SERPL-SCNC: 104 MMOL/L (ref 100–108)
CO2 SERPL-SCNC: 30 MMOL/L (ref 21–32)
CORTIS AM PEAK SERPL-MCNC: 20.5 UG/DL (ref 4.2–22.4)
CREAT SERPL-MCNC: 0.59 MG/DL (ref 0.6–1.3)
EOSINOPHIL # BLD AUTO: 0.09 THOUSAND/ΜL (ref 0–0.61)
EOSINOPHIL NFR BLD AUTO: 2 % (ref 0–6)
ERYTHROCYTE [DISTWIDTH] IN BLOOD BY AUTOMATED COUNT: 12.6 % (ref 11.6–15.1)
GFR SERPL CREATININE-BSD FRML MDRD: 89 ML/MIN/1.73SQ M
GLUCOSE P FAST SERPL-MCNC: 103 MG/DL (ref 65–99)
HCT VFR BLD AUTO: 46.1 % (ref 34.8–46.1)
HGB BLD-MCNC: 14.5 G/DL (ref 11.5–15.4)
IMM GRANULOCYTES # BLD AUTO: 0.01 THOUSAND/UL (ref 0–0.2)
IMM GRANULOCYTES NFR BLD AUTO: 0 % (ref 0–2)
LYMPHOCYTES # BLD AUTO: 1.62 THOUSANDS/ΜL (ref 0.6–4.47)
LYMPHOCYTES NFR BLD AUTO: 30 % (ref 14–44)
MCH RBC QN AUTO: 31.8 PG (ref 26.8–34.3)
MCHC RBC AUTO-ENTMCNC: 31.5 G/DL (ref 31.4–37.4)
MCV RBC AUTO: 101 FL (ref 82–98)
MONOCYTES # BLD AUTO: 0.48 THOUSAND/ΜL (ref 0.17–1.22)
MONOCYTES NFR BLD AUTO: 9 % (ref 4–12)
NEUTROPHILS # BLD AUTO: 3.16 THOUSANDS/ΜL (ref 1.85–7.62)
NEUTS SEG NFR BLD AUTO: 58 % (ref 43–75)
NRBC BLD AUTO-RTO: 0 /100 WBCS
PLATELET # BLD AUTO: 167 THOUSANDS/UL (ref 149–390)
PMV BLD AUTO: 10 FL (ref 8.9–12.7)
POTASSIUM SERPL-SCNC: 4.1 MMOL/L (ref 3.5–5.3)
RBC # BLD AUTO: 4.56 MILLION/UL (ref 3.81–5.12)
SODIUM SERPL-SCNC: 138 MMOL/L (ref 136–145)
T3FREE SERPL-MCNC: 2.44 PG/ML (ref 2.3–4.2)
T4 FREE SERPL-MCNC: 0.95 NG/DL (ref 0.76–1.46)
TSH SERPL DL<=0.05 MIU/L-ACNC: 2.03 UIU/ML (ref 0.36–3.74)
WBC # BLD AUTO: 5.43 THOUSAND/UL (ref 4.31–10.16)

## 2021-06-28 PROCEDURE — 80048 BASIC METABOLIC PNL TOTAL CA: CPT

## 2021-06-28 PROCEDURE — 84439 ASSAY OF FREE THYROXINE: CPT

## 2021-06-28 PROCEDURE — 36415 COLL VENOUS BLD VENIPUNCTURE: CPT

## 2021-06-28 PROCEDURE — 85025 COMPLETE CBC W/AUTO DIFF WBC: CPT

## 2021-06-28 PROCEDURE — 82533 TOTAL CORTISOL: CPT

## 2021-06-28 PROCEDURE — 82306 VITAMIN D 25 HYDROXY: CPT

## 2021-06-28 PROCEDURE — 84443 ASSAY THYROID STIM HORMONE: CPT

## 2021-06-28 PROCEDURE — 84481 FREE ASSAY (FT-3): CPT

## 2021-06-29 ENCOUNTER — TELEPHONE (OUTPATIENT)
Dept: ENDOCRINOLOGY | Facility: CLINIC | Age: 76
End: 2021-06-29

## 2021-06-29 DIAGNOSIS — E55.9 VITAMIN D DEFICIENCY: ICD-10-CM

## 2021-06-29 DIAGNOSIS — R73.01 IMPAIRED FASTING GLUCOSE: ICD-10-CM

## 2021-06-29 DIAGNOSIS — E05.90 SUBCLINICAL HYPERTHYROIDISM: Primary | ICD-10-CM

## 2021-06-29 DIAGNOSIS — E04.2 MULTIPLE THYROID NODULES: ICD-10-CM

## 2021-06-29 NOTE — TELEPHONE ENCOUNTER
----- Message from Teo Ovalles MD sent at 6/28/2021 10:35 PM EDT -----  Please call the patient and inform of results  Vitamin-D level low, recommend starting vitamin D3 2000 international units orally daily  Thyroid function test within normal limits, recommend increasing methimazole to 2 5 mg on alternate days, repeat TSH, free T3, free T4 in 6 weeks, earlier if patient develops hyperthyroid symptoms      Also check vitamin B12, A1c

## 2021-06-29 NOTE — TELEPHONE ENCOUNTER
Patient informed of lab results  Repeat labs in 6 weeks  She verbally understood    She will schedule US thyroid

## 2021-07-02 ENCOUNTER — OFFICE VISIT (OUTPATIENT)
Dept: FAMILY MEDICINE CLINIC | Facility: CLINIC | Age: 76
End: 2021-07-02
Payer: MEDICARE

## 2021-07-02 VITALS
WEIGHT: 115 LBS | TEMPERATURE: 97.7 F | DIASTOLIC BLOOD PRESSURE: 88 MMHG | HEART RATE: 60 BPM | HEIGHT: 63 IN | SYSTOLIC BLOOD PRESSURE: 144 MMHG | BODY MASS INDEX: 20.38 KG/M2 | RESPIRATION RATE: 20 BRPM

## 2021-07-02 DIAGNOSIS — Z00.00 MEDICARE ANNUAL WELLNESS VISIT, SUBSEQUENT: Primary | ICD-10-CM

## 2021-07-02 DIAGNOSIS — G89.29 CHRONIC PAIN OF RIGHT KNEE: ICD-10-CM

## 2021-07-02 DIAGNOSIS — D69.6 THROMBOCYTOPENIA (HCC): ICD-10-CM

## 2021-07-02 DIAGNOSIS — M25.561 CHRONIC PAIN OF RIGHT KNEE: ICD-10-CM

## 2021-07-02 DIAGNOSIS — Z78.0 POSTMENOPAUSAL: ICD-10-CM

## 2021-07-02 DIAGNOSIS — M12.9 ARTHRITIS, MULTIPLE JOINT INVOLVEMENT: ICD-10-CM

## 2021-07-02 DIAGNOSIS — I10 ESSENTIAL HYPERTENSION: ICD-10-CM

## 2021-07-02 DIAGNOSIS — U09.9 POST-ACUTE SEQUELAE OF COVID-19 (PASC): ICD-10-CM

## 2021-07-02 DIAGNOSIS — J44.9 CHRONIC OBSTRUCTIVE PULMONARY DISEASE, UNSPECIFIED COPD TYPE (HCC): ICD-10-CM

## 2021-07-02 PROCEDURE — 1123F ACP DISCUSS/DSCN MKR DOCD: CPT | Performed by: FAMILY MEDICINE

## 2021-07-02 PROCEDURE — G0439 PPPS, SUBSEQ VISIT: HCPCS | Performed by: FAMILY MEDICINE

## 2021-07-02 NOTE — PROGRESS NOTES
Assessment and Plan:     Problem List Items Addressed This Visit     Chronic obstructive pulmonary disease ([address] )     Stable          Essential hypertension    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[facility])     Having long term fatigue  Declined PT         Thrombocytopenia ([address] )     Stable            Other Visit Diagnoses     Medicare annual wellness visit, subsequent    -  Primary    Arthritis, multiple joint involvement        Relevant Orders    Rheumatoid Arthritis Factor    C-reactive protein    Lyme Total Antibody Profile with reflex to WB    Chronic pain of right knee        she is going to call Dr Victor Hugo Martínez for evaluation    Postmenopausal        Relevant Orders    DXA bone density spine hip and pelvis        COVID 19 Vaccine recommended, risks and benefits of vaccine discussed  Preventive health issues were discussed with patient, and age appropriate screening tests were ordered as noted in patient's After Visit Summary  Personalized health advice and appropriate referrals for health education or preventive services given if needed, as noted in patient's After Visit Summary       History of Present Illness:     Patient presents for Medicare Annual Wellness visit    Patient Care Team:  Jason Lopez DO as PCP - Esha Gary MD as PCP - Endocrinology (Endocrinology)  DO Emil Schmidt MD Clearnce Mouse, PA-C as Physician Assistant (Endocrinology)     Problem List:     Patient Active Problem List   Diagnosis    Allergic rhinitis    Generalized anxiety disorder    Chronic obstructive pulmonary disease ([address]    Dense breasts    Essential hypertension    Impaired fasting glucose    Thyroid disorder    Vitamin D deficiency    Thrombocytopenia (HCC)    Low TSH level    Thyroid nodule    Multiple thyroid nodules    Abnormal thyroid function test    Nicotine abuse    Subclinical hyperthyroidism    Weight loss    Fatigue    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[facility]    Lightheadedness      Past Medical and Surgical History:     Past Medical History:   Diagnosis Date    Elevated blood pressure reading      Past Surgical History:   Procedure Laterality Date    APPENDECTOMY      TONSILLECTOMY        Family History:     Family History   Problem Relation Age of Onset    Lung cancer Mother     Heart disease Maternal Aunt     Heart disease Maternal Uncle     Heart disease Maternal Grandmother       Social History:     Social History     Socioeconomic History    Marital status: /Civil Union     Spouse name: None    Number of children: None    Years of education: None    Highest education level: None   Occupational History    None   Tobacco Use    Smoking status: Current Every Day Smoker     Packs/day: 1 50     Types: Cigarettes    Smokeless tobacco: Never Used   Vaping Use    Vaping Use: Never used   Substance and Sexual Activity    Alcohol use: Never    Drug use: Never    Sexual activity: None   Other Topics Concern    None   Social History Narrative    None     Social Determinants of Health     Financial Resource Strain:     Difficulty of Paying Living Expenses:    Food Insecurity:     Worried About Running Out of Food in the Last Year:     Ran Out of Food in the Last Year:    Transportation Needs:     Lack of Transportation (Medical):      Lack of Transportation (Non-Medical):    Physical Activity:     Days of Exercise per Week:     Minutes of Exercise per Session:    Stress:     Feeling of Stress :    Social Connections:     Frequency of Communication with Friends and Family:     Frequency of Social Gatherings with Friends and Family:     Attends Yarsani Services:     Active Member of Clubs or Organizations:     Attends Club or Organization Meetings:     Marital Status:    Intimate Partner Violence:     Fear of Current or Ex-Partner:     Emotionally Abused:     Physically Abused:     Sexually Abused:       Medications and Allergies: Current Outpatient Medications   Medication Sig Dispense Refill    ALPRAZolam (XANAX) 0 25 mg tablet Take 1 tablet (0 25 mg total) by mouth daily as needed for anxiety 30 tablet 3    Cholecalciferol (VITAMIN D3) 2000 units TABS Take 2,000 Units by mouth daily       Dulera 200-5 MCG/ACT inhaler inhale 2 puffs by mouth and INTO THE LUNGS twice a day  RINSE MOUTH AFTER USE 13 g 3    escitalopram (LEXAPRO) 10 mg tablet take 1 tablet by mouth once daily 30 tablet 3    methimazole (TAPAZOLE) 5 mg tablet Take 0 5 tablets (2 5 mg total) by mouth daily 45 tablet 3    metoprolol succinate (TOPROL-XL) 100 mg 24 hr tablet Take 1 tablet (100 mg total) by mouth daily at bedtime 30 tablet 5    Multiple Vitamins-Minerals (MULTIVITAMIN ADULT PO) Take 1 tablet by mouth daily      Calcium Carbonate-Vit D-Min (CALCIUM 600+D PLUS MINERALS) 600-400 MG-UNIT CHEW Chew 1 tablet daily (Patient not taking: Reported on 7/2/2021)       No current facility-administered medications for this visit       Allergies   Allergen Reactions    Erythromycin GI Intolerance     Reaction Date: [id number];       Immunizations:     Immunization History   Administered Date(s) Administered    Influenza Split High Dose Preservative Free IM 12/18/2015, 12/21/2016    Influenza, high dose seasonal 0 7 mL 12/21/2018    Influenza, seasonal, injectable 10/12/2012, 01/13/2014    Pneumococcal Polysaccharide PPV23 07/18/2013    Td (adult), adsorbed 04/18/2000      Health Maintenance:         Topic Date Due    DXA SCAN  Never done    Hepatitis C Screening  Completed         Topic Date Due    COVID-19 Vaccine (1) Never done    DTaP,Tdap,and Td Vaccines (1 - Tdap) 02[birth year]04/1965    Influenza Vaccine (1) 09/01/2021      Medicare Health Risk Assessment:     /88   Pulse 60   Temp 97 7 °F (36 5 °C)   Resp 20   Ht 5' 3" (1 6 m)   Wt 52 2 kg (115 lb)   LMP  (LMP Unknown) Comment: post menapausal  BMI 20 37 kg/m²      Kathie is here for her Subsequent Wellness visit  Health Risk Assessment:   Patient rates overall health as fair  Patient feels that their physical health rating is slightly worse  Patient is satisfied with their life  Eyesight was rated as same  Hearing was rated as same  Patient feels that their emotional and mental health rating is slightly better  Patients states they are never, rarely angry  Patient states they are always unusually tired/fatigued  Pain experienced in the last 7 days has been a lot  Patient's pain rating has been 8/10  Patient states that she has experienced no weight loss or gain in last 6 months  Depression Screening:   PHQ-2 Score: 1      Fall Risk Screening: In the past year, patient has experienced: history of falling in past year    Number of falls: 1  Injured during fall?: Yes    Feels unsteady when standing or walking?: Yes    Worried about falling?: Yes      Urinary Incontinence Screening:   Patient has leaked urine accidently in the last six months  Home Safety:  Patient has trouble with stairs inside or outside of their home  Patient has working smoke alarms and has working carbon monoxide detector  Home safety hazards include: loose rugs on the floor  Nutrition:   Current diet is Regular  Medications:   Patient is currently taking over-the-counter supplements  OTC medications include: see medication list  Patient is able to manage medications  Activities of Daily Living (ADLs)/Instrumental Activities of Daily Living (IADLs):   Walk and transfer into and out of bed and chair?: Yes  Dress and groom yourself?: Yes    Bathe or shower yourself?: Yes    Feed yourself?  Yes  Do your laundry/housekeeping?: Yes  Manage your money, pay your bills and track your expenses?: Yes  Make your own meals?: Yes    Do your own shopping?: Yes    Previous Hospitalizations:   Any hospitalizations or ED visits within the last 12 months?: No      Advance Care Planning:   Living will: Yes    Durable POA for healthcare: Yes    Advanced directive: Yes    Advanced directive counseling given: Yes    End of Life Decisions reviewed with patient: Yes    Provider agrees with end of life decisions: Yes      Cognitive Screening:   Provider or family/friend/caregiver concerned regarding cognition?: No    PREVENTIVE SCREENINGS      Cardiovascular Screening:    General: Screening Current      Diabetes Screening:     General: Screening Current      Colorectal Cancer Screening:     General: Screening Not Indicated      Breast Cancer Screening:     General: Screening Not Indicated      Cervical Cancer Screening:    General: Screening Not Indicated      Osteoporosis Screening:    General: Risks and Benefits Discussed    Due for: DXA Axial      Abdominal Aortic Aneurysm (AAA) Screening:        General: Screening Not Indicated      Lung Cancer Screening:     General: Screening Not Indicated      Hepatitis C Screening:    General: Screening Current    Screening, Brief Intervention, and Referral to Treatment (SBIRT)    Screening  Typical number of drinks in a day: 0  Typical number of drinks in a week: 0  Interpretation: Low risk drinking behavior  AUDIT-C Screenin) How often did you have a drink containing alcohol in the past year? never  2) How many drinks did you have on a typical day when you were drinking in the past year? 0  3) How often did you have 6 or more drinks on one occasion in the past year? never    AUDIT-C Score: 0  Interpretation: Score 0-2 (female): Negative screen for alcohol misuse    Single Item Drug Screening:  How often have you used an illegal drug (including marijuana) or a prescription medication for non-medical reasons in the past year? never    Single Item Drug Screen Score: 0  Interpretation: Negative screen for possible drug use disorder    Brief Intervention  Alcohol & drug use screenings were reviewed  No concerns regarding substance use disorder identified       Physical Exam  Vitals and nursing note reviewed  Constitutional:       Appearance: She is well-developed  HENT:      Head: Normocephalic and atraumatic  Right Ear: External ear normal       Left Ear: External ear normal       Nose: Nose normal    Cardiovascular:      Rate and Rhythm: Normal rate and regular rhythm  Heart sounds: Normal heart sounds  No murmur heard  No friction rub  Pulmonary:      Effort: No respiratory distress  Breath sounds: Normal breath sounds  No wheezing or rales  Musculoskeletal:      Right lower leg: No edema  Left lower leg: No edema  Neurological:      Mental Status: She is oriented to person, place, and time  Cranial Nerves: No cranial nerve deficit          Marck Pain, DO

## 2021-07-02 NOTE — PATIENT INSTRUCTIONS
Medicare Preventive Visit Patient Instructions  Thank you for completing your Welcome to Medicare Visit or Medicare Annual Wellness Visit today  Your next wellness visit will be due in one year (7/3/2022)  The screening/preventive services that you may require over the next 5-10 years are detailed below  Some tests may not apply to you based off risk factors and/or age  Screening tests ordered at today's visit but not completed yet may show as past due  Also, please note that scanned in results may not display below  Preventive Screenings:  Service Recommendations Previous Testing/Comments   Colorectal Cancer Screening  * Colonoscopy    * Fecal Occult Blood Test (FOBT)/Fecal Immunochemical Test (FIT)  * Fecal DNA/Cologuard Test  * Flexible Sigmoidoscopy Age: 54-65 years old   Colonoscopy: every 10 years (may be performed more frequently if at higher risk)  OR  FOBT/FIT: every 1 year  OR  Cologuard: every 3 years  OR  Sigmoidoscopy: every 5 years  Screening may be recommended earlier than age 48 if at higher risk for colorectal cancer  Also, an individualized decision between you and your healthcare provider will decide whether screening between the ages of 74-80 would be appropriate  Colonoscopy: Not on file  FOBT/FIT: Not on file  Cologuard: Not on file  Sigmoidoscopy: Not on file          Breast Cancer Screening Age: 36 years old  Frequency: every 1-2 years  Not required if history of left and right mastectomy Mammogram: 02/28/2019        Cervical Cancer Screening Between the ages of 21-29, pap smear recommended once every 3 years  Between the ages of 33-67, can perform pap smear with HPV co-testing every 5 years     Recommendations may differ for women with a history of total hysterectomy, cervical cancer, or abnormal pap smears in past  Pap Smear: Not on file    Screening Not Indicated   Hepatitis C Screening Once for adults born between [facility]  More frequently in patients at high risk for Hepatitis C Hep C Antibody: 06/14/2018    Screening Current   Diabetes Screening 1-2 times per year if you're at risk for diabetes or have pre-diabetes Fasting glucose: 103 mg/dL   A1C: 5 4 %    Screening Current   Cholesterol Screening Once every 5 years if you don't have a lipid disorder  May order more often based on risk factors  Lipid panel: 02/26/2021    Screening Current     Other Preventive Screenings Covered by Medicare:  1  Abdominal Aortic Aneurysm (AAA) Screening: covered once if your at risk  You're considered to be at risk if you have a family history of AAA  2  Lung Cancer Screening: covers low dose CT scan once per year if you meet all of the following conditions: (1) Age 50-69; (2) No signs or symptoms of lung cancer; (3) Current smoker or have quit smoking within the last 15 years; (4) You have a tobacco smoking history of at least 30 pack years (packs per day multiplied by number of years you smoked); (5) You get a written order from a healthcare provider  3  Glaucoma Screening: covered annually if you're considered high risk: (1) You have diabetes OR (2) Family history of glaucoma OR (3)  aged 48 and older OR (3)  American aged 72 and older  3  Osteoporosis Screening: covered every 2 years if you meet one of the following conditions: (1) You're estrogen deficient and at risk for osteoporosis based off medical history and other findings; (2) Have a vertebral abnormality; (3) On glucocorticoid therapy for more than 3 months; (4) Have primary hyperparathyroidism; (5) On osteoporosis medications and need to assess response to drug therapy  · Last bone density test (DXA Scan): Not on file  5  HIV Screening: covered annually if you're between the age of 12-76  Also covered annually if you are younger than 13 and older than 72 with risk factors for HIV infection  For pregnant patients, it is covered up to 3 times per pregnancy      Immunizations:  Immunization Recommendations Influenza Vaccine Annual influenza vaccination during flu season is recommended for all persons aged >= 6 months who do not have contraindications   Pneumococcal Vaccine (Prevnar and Pneumovax)  * Prevnar = PCV13  * Pneumovax = PPSV23   Adults 25-60 years old: 1-3 doses may be recommended based on certain risk factors  Adults 72 years old: Prevnar (PCV13) vaccine recommended followed by Pneumovax (PPSV23) vaccine  If already received PPSV23 since turning 65, then PCV13 recommended at least one year after PPSV23 dose  Hepatitis B Vaccine 3 dose series if at intermediate or high risk (ex: diabetes, end stage renal disease, liver disease)   Tetanus (Td) Vaccine - COST NOT COVERED BY MEDICARE PART B Following completion of primary series, a booster dose should be given every 10 years to maintain immunity against tetanus  Td may also be given as tetanus wound prophylaxis  Tdap Vaccine - COST NOT COVERED BY MEDICARE PART B Recommended at least once for all adults  For pregnant patients, recommended with each pregnancy  Shingles Vaccine (Shingrix) - COST NOT COVERED BY MEDICARE PART B  2 shot series recommended in those aged 48 and above     Health Maintenance Due:      Topic Date Due    DXA SCAN  Never done    Hepatitis C Screening  Completed     Immunizations Due:      Topic Date Due    COVID-19 Vaccine (1) Never done    DTaP,Tdap,and Td Vaccines (1 - Tdap) 02/04/1966    Influenza Vaccine (1) 09/01/2021     Advance Directives   What are advance directives? Advance directives are legal documents that state your wishes and plans for medical care  These plans are made ahead of time in case you lose your ability to make decisions for yourself  Advance directives can apply to any medical decision, such as the treatments you want, and if you want to donate organs  What are the types of advance directives? There are many types of advance directives, and each state has rules about how to use them   You may choose a combination of any of the following:  · Living will: This is a written record of the treatment you want  You can also choose which treatments you do not want, which to limit, and which to stop at a certain time  This includes surgery, medicine, IV fluid, and tube feedings  · Durable power of  for healthcare [facility]): This is a written record that states who you want to make healthcare choices for you when you are unable to make them for yourself  This person, called a proxy, is usually a family member or a friend  You may choose more than 1 proxy  · Do not resuscitate (DNR) order:  A DNR order is used in case your heart stops beating or you stop breathing  It is a request not to have certain forms of treatment, such as CPR  A DNR order may be included in other types of advance directives  · Medical directive: This covers the care that you want if you are in a coma, near death, or unable to make decisions for yourself  You can list the treatments you want for each condition  Treatment may include pain medicine, surgery, blood transfusions, dialysis, IV or tube feedings, and a ventilator (breathing machine)  · Values history: This document has questions about your views, beliefs, and how you feel and think about life  This information can help others choose the care that you would choose  Why are advance directives important? An advance directive helps you control your care  Although spoken wishes may be used, it is better to have your wishes written down  Spoken wishes can be misunderstood, or not followed  Treatments may be given even if you do not want them  An advance directive may make it easier for your family to make difficult choices about your care  Fall Prevention    Fall prevention  includes ways to make your home and other areas safer  It also includes ways you can move more carefully to prevent a fall   Health conditions that cause changes in your blood pressure, vision, or muscle strength and coordination may increase your risk for falls  Medicines may also increase your risk for falls if they make you dizzy, weak, or sleepy  Fall prevention tips:   · Stand or sit up slowly  · Use assistive devices as directed  · Wear shoes that fit well and have soles that   · Wear a personal alarm  · Stay active  · Manage your medical conditions  Home Safety Tips:  · Add items to prevent falls in the bathroom  · Keep paths clear  · Install bright lights in your home  · Keep items you use often on shelves within reach  · Paint or place reflective tape on the edges of your stairs  Urinary Incontinence   Urinary incontinence (UI)  is when you lose control of your bladder  UI develops because your bladder cannot store or empty urine properly  The 3 most common types of UI are stress incontinence, urge incontinence, or both  Medicines:   · May be given to help strengthen your bladder control  Report any side effects of medication to your healthcare provider  Do pelvic muscle exercises often:  Your pelvic muscles help you stop urinating  Squeeze these muscles tight for 5 seconds, then relax for 5 seconds  Gradually work up to squeezing for 10 seconds  Do 3 sets of 15 repetitions a day, or as directed  This will help strengthen your pelvic muscles and improve bladder control  Train your bladder:  Go to the bathroom at set times, such as every 2 hours, even if you do not feel the urge to go  You can also try to hold your urine when you feel the urge to go  For example, hold your urine for 5 minutes when you feel the urge to go  As that becomes easier, hold your urine for 10 minutes  Self-care:   · Keep a UI record  Write down how often you leak urine and how much you leak  Make a note of what you were doing when you leaked urine  · Drink liquids as directed  You may need to limit the amount of liquid you drink to help control your urine leakage   Do not drink any liquid right before you go to bed  Limit or do not have drinks that contain caffeine or alcohol  · Prevent constipation  Eat a variety of high-fiber foods  Good examples are high-fiber cereals, beans, vegetables, and whole-grain breads  Walking is the best way to trigger your intestines to have a bowel movement  · Exercise regularly and maintain a healthy weight  Weight loss and exercise will decrease pressure on your bladder and help you control your leakage  · Use a catheter as directed  to help empty your bladder  A catheter is a tiny, plastic tube that is put into your bladder to drain your urine  · Go to behavior therapy as directed  Behavior therapy may be used to help you learn to control your urge to urinate  Cigarette Smoking and Your Health   Risks to your health if you smoke:  Nicotine and other chemicals found in tobacco damage every cell in your body  Even if you are a light smoker, you have an increased risk for cancer, heart disease, and lung disease  If you are pregnant or have diabetes, smoking increases your risk for complications  Benefits to your health if you stop smoking:   · You decrease respiratory symptoms such as coughing, wheezing, and shortness of breath  · You reduce your risk for cancers of the lung, mouth, throat, kidney, bladder, pancreas, stomach, and cervix  If you already have cancer, you increase the benefits of chemotherapy  You also reduce your risk for cancer returning or a second cancer from developing  · You reduce your risk for heart disease, blood clots, heart attack, and stroke  · You reduce your risk for lung infections, and diseases such as pneumonia, asthma, chronic bronchitis, and emphysema  · Your circulation improves  More oxygen can be delivered to your body  If you have diabetes, you lower your risk for complications, such as kidney, artery, and eye diseases  You also lower your risk for nerve damage   Nerve damage can lead to amputations, poor vision, and blindness  · You improve your body's ability to heal and to fight infections  For more information and support to stop smoking:   · Smokefree  gov  Phone: 1- 389 - 243-3358  Web Address: www [employer]   [address] Information is for End User's use only and may not be sold, redistributed or otherwise used for commercial purposes   All illustrations and images included in CareNotes® are the copyrighted property of A D A M , Inc  or [address] [place] [address]

## 2021-07-08 ENCOUNTER — APPOINTMENT (OUTPATIENT)
Dept: LAB | Facility: CLINIC | Age: 76
End: 2021-07-08
Payer: MEDICARE

## 2021-07-08 DIAGNOSIS — M12.9 ARTHRITIS, MULTIPLE JOINT INVOLVEMENT: ICD-10-CM

## 2021-07-08 LAB — CRP SERPL QL: <3 MG/L

## 2021-07-08 PROCEDURE — 86140 C-REACTIVE PROTEIN: CPT

## 2021-07-08 PROCEDURE — 36415 COLL VENOUS BLD VENIPUNCTURE: CPT

## 2021-07-08 PROCEDURE — 86430 RHEUMATOID FACTOR TEST QUAL: CPT

## 2021-07-08 PROCEDURE — 86618 LYME DISEASE ANTIBODY: CPT

## 2021-07-08 PROCEDURE — 86617 LYME DISEASE ANTIBODY: CPT

## 2021-07-09 LAB
B BURGDOR IGG+IGM SER-ACNC: 1834
RHEUMATOID FACT SER QL LA: NEGATIVE

## 2021-07-10 LAB

## 2021-07-12 DIAGNOSIS — F41.1 GENERALIZED ANXIETY DISORDER: ICD-10-CM

## 2021-07-12 RX ORDER — ESCITALOPRAM OXALATE 10 MG/1
TABLET ORAL
Qty: 30 TABLET | Refills: 3 | Status: SHIPPED | OUTPATIENT
Start: 2021-07-12 | End: 2021-11-09 | Stop reason: SDUPTHER

## 2021-07-13 ENCOUNTER — HOSPITAL ENCOUNTER (OUTPATIENT)
Dept: RADIOLOGY | Facility: HOSPITAL | Age: 76
Discharge: HOME | End: 2021-07-13
Payer: MEDICARE

## 2021-07-13 ENCOUNTER — TELEPHONE (OUTPATIENT)
Dept: ENDOCRINOLOGY | Facility: CLINIC | Age: 76
End: 2021-07-13

## 2021-07-13 DIAGNOSIS — E04.2 MULTIPLE THYROID NODULES: ICD-10-CM

## 2021-07-13 PROCEDURE — 76536 US EXAM OF HEAD AND NECK: CPT

## 2021-07-13 NOTE — TELEPHONE ENCOUNTER
Consuelo o/p called us  This pt needs a new script in her chart or faxed there asap  Pt is there now for the thyroid u/s  Apparently a  wrote the order but used moy's name as dr Tiffanie Ramsey u send I to them asap?

## 2021-07-19 DIAGNOSIS — J44.9 CHRONIC OBSTRUCTIVE PULMONARY DISEASE, UNSPECIFIED COPD TYPE (HCC): ICD-10-CM

## 2021-07-19 RX ORDER — MOMETASONE FUROATE AND FORMOTEROL FUMARATE DIHYDRATE 200; 5 UG/1; UG/1
2 AEROSOL RESPIRATORY (INHALATION) 2 TIMES DAILY
Qty: 13 G | Refills: 1 | Status: SHIPPED | OUTPATIENT
Start: 2021-07-19 | End: 2021-09-20 | Stop reason: SDUPTHER

## 2021-07-19 NOTE — TELEPHONE ENCOUNTER
Requested medication(s) are due for refill today: Yes  Patient has already received a courtesy refill: No  Other reason request has been forwarded to provider:   Failed Protocol    Question on rx signature

## 2021-07-21 ENCOUNTER — TELEPHONE (OUTPATIENT)
Dept: ENDOCRINOLOGY | Facility: CLINIC | Age: 76
End: 2021-07-21

## 2021-07-21 NOTE — TELEPHONE ENCOUNTER
----- Message from Marcello Dakin, MD sent at 7/20/2021  4:14 PM EDT -----  Stable thyroid ultrasound Recommend repeat ultrasound in 1 year to assess interval change

## 2021-08-02 DIAGNOSIS — I10 ESSENTIAL HYPERTENSION: ICD-10-CM

## 2021-08-02 RX ORDER — METOPROLOL SUCCINATE 100 MG/1
100 TABLET, EXTENDED RELEASE ORAL
Qty: 30 TABLET | Refills: 5 | Status: SHIPPED | OUTPATIENT
Start: 2021-08-02 | End: 2021-11-22 | Stop reason: SDUPTHER

## 2021-08-03 ENCOUNTER — TELEPHONE (OUTPATIENT)
Dept: FAMILY MEDICINE CLINIC | Facility: CLINIC | Age: 76
End: 2021-08-03

## 2021-08-03 ENCOUNTER — HOSPITAL ENCOUNTER (OUTPATIENT)
Dept: RADIOLOGY | Facility: HOSPITAL | Age: 76
Discharge: HOME | End: 2021-08-03
Payer: MEDICARE

## 2021-08-03 DIAGNOSIS — M81.0 AGE-RELATED OSTEOPOROSIS WITHOUT CURRENT PATHOLOGICAL FRACTURE: Primary | ICD-10-CM

## 2021-08-03 DIAGNOSIS — Z78.0 POSTMENOPAUSAL: ICD-10-CM

## 2021-08-03 PROCEDURE — 77080 DXA BONE DENSITY AXIAL: CPT

## 2021-08-03 RX ORDER — ALENDRONATE SODIUM 70 MG/1
70 TABLET ORAL
Qty: 12 TABLET | Refills: 3 | Status: SHIPPED | OUTPATIENT
Start: 2021-08-03 | End: 2021-11-22 | Stop reason: SDUPTHER

## 2021-08-03 NOTE — TELEPHONE ENCOUNTER
8/3/2021 1:01 PM [address] regarding her DEXA results  She has osteoporosis     Left message on her voicemail to call the office     Luis Alberto Sparks DO

## 2021-08-03 NOTE — TELEPHONE ENCOUNTER
I spoke with pt, she is aware of her DEXA scan results  Pt would like a call back on how to proceed given these results   Sharad Mera LPN

## 2021-08-03 NOTE — TELEPHONE ENCOUNTER
8/3/2021 1:56 PM Returned call to Michelle Mckay  We discussed her results and treatment options    Alendronate started   Risks and benefits of medication discussed  Let patient know that she will need to stop the medication if she has major dental work done    Crista Littlejohn, DO

## 2021-09-20 DIAGNOSIS — J44.9 CHRONIC OBSTRUCTIVE PULMONARY DISEASE, UNSPECIFIED COPD TYPE (HCC): ICD-10-CM

## 2021-09-20 RX ORDER — MOMETASONE FUROATE AND FORMOTEROL FUMARATE DIHYDRATE 200; 5 UG/1; UG/1
2 AEROSOL RESPIRATORY (INHALATION) 2 TIMES DAILY
Qty: 13 G | Refills: 1 | Status: SHIPPED | OUTPATIENT
Start: 2021-09-20 | End: 2021-09-22 | Stop reason: SDUPTHER

## 2021-09-22 ENCOUNTER — TELEPHONE (OUTPATIENT)
Dept: FAMILY MEDICINE CLINIC | Facility: CLINIC | Age: 76
End: 2021-09-22

## 2021-09-22 DIAGNOSIS — J44.9 CHRONIC OBSTRUCTIVE PULMONARY DISEASE, UNSPECIFIED COPD TYPE (HCC): ICD-10-CM

## 2021-09-22 RX ORDER — MOMETASONE FUROATE AND FORMOTEROL FUMARATE DIHYDRATE 200; 5 UG/1; UG/1
2 AEROSOL RESPIRATORY (INHALATION) 2 TIMES DAILY
Qty: 13 G | Refills: 1 | Status: SHIPPED | OUTPATIENT
Start: 2021-09-22 | End: 2021-10-22 | Stop reason: SDUPTHER

## 2021-09-22 NOTE — TELEPHONE ENCOUNTER
DR KRAUS     Please resend patients dulera  [facility] never received it  Red [facility] for delivery

## 2021-10-21 DIAGNOSIS — J44.9 CHRONIC OBSTRUCTIVE PULMONARY DISEASE, UNSPECIFIED COPD TYPE (HCC): ICD-10-CM

## 2021-10-22 RX ORDER — MOMETASONE FUROATE AND FORMOTEROL FUMARATE DIHYDRATE 200; 5 UG/1; UG/1
2 AEROSOL RESPIRATORY (INHALATION) 2 TIMES DAILY
Qty: 13 G | Refills: 1 | Status: SHIPPED | OUTPATIENT
Start: 2021-10-22 | End: 2021-11-18 | Stop reason: SDUPTHER

## 2021-10-22 RX ORDER — MOMETASONE FUROATE AND FORMOTEROL FUMARATE DIHYDRATE 200; 5 UG/1; UG/1
AEROSOL RESPIRATORY (INHALATION)
Qty: 13 G | Refills: 1 | OUTPATIENT
Start: 2021-10-22

## 2021-11-09 DIAGNOSIS — F41.1 GENERALIZED ANXIETY DISORDER: ICD-10-CM

## 2021-11-09 RX ORDER — ESCITALOPRAM OXALATE 10 MG/1
10 TABLET ORAL DAILY
Qty: 30 TABLET | Refills: 3 | Status: SHIPPED | OUTPATIENT
Start: 2021-11-09 | End: 2021-11-18 | Stop reason: SINTOL

## 2021-11-15 ENCOUNTER — HOSPITAL ENCOUNTER (EMERGENCY)
Facility: HOSPITAL | Age: 76
Discharge: HOME | End: 2021-11-15
Attending: EMERGENCY MEDICINE | Admitting: EMERGENCY MEDICINE
Payer: MEDICARE

## 2021-11-15 ENCOUNTER — APPOINTMENT (EMERGENCY)
Dept: RADIOLOGY | Facility: HOSPITAL | Age: 76
End: 2021-11-15
Payer: MEDICARE

## 2021-11-15 VITALS
HEIGHT: 64 IN | TEMPERATURE: 98.5 F | WEIGHT: 115.08 LBS | OXYGEN SATURATION: 96 % | HEART RATE: 87 BPM | SYSTOLIC BLOOD PRESSURE: 109 MMHG | DIASTOLIC BLOOD PRESSURE: 59 MMHG | BODY MASS INDEX: 19.65 KG/M2 | RESPIRATION RATE: 18 BRPM

## 2021-11-15 DIAGNOSIS — R10.9 ABDOMINAL PAIN: Primary | ICD-10-CM

## 2021-11-15 DIAGNOSIS — N39.0 UTI (URINARY TRACT INFECTION): ICD-10-CM

## 2021-11-15 LAB
ALBUMIN SERPL BCP-MCNC: 3.4 G/DL (ref 3.5–5)
ALP SERPL-CCNC: 81 U/L (ref 46–116)
ALT SERPL W P-5'-P-CCNC: 11 U/L (ref 12–78)
ANION GAP SERPL CALCULATED.3IONS-SCNC: 10 MMOL/L (ref 4–13)
AST SERPL W P-5'-P-CCNC: 14 U/L (ref 5–45)
BACTERIA UR QL AUTO: ABNORMAL /HPF
BASOPHILS # BLD AUTO: 0.04 THOUSANDS/ΜL (ref 0–0.1)
BASOPHILS NFR BLD AUTO: 0 % (ref 0–1)
BILIRUB SERPL-MCNC: 0.49 MG/DL (ref 0.2–1)
BILIRUB UR QL STRIP: NEGATIVE
BUN SERPL-MCNC: 18 MG/DL (ref 5–25)
CALCIUM ALBUM COR SERPL-MCNC: 9.5 MG/DL (ref 8.3–10.1)
CALCIUM SERPL-MCNC: 9 MG/DL (ref 8.3–10.1)
CHLORIDE SERPL-SCNC: 94 MMOL/L (ref 100–108)
CLARITY UR: CLEAR
CO2 SERPL-SCNC: 26 MMOL/L (ref 21–32)
COLOR UR: YELLOW
CREAT SERPL-MCNC: 0.69 MG/DL (ref 0.6–1.3)
EOSINOPHIL # BLD AUTO: 0.06 THOUSAND/ΜL (ref 0–0.61)
EOSINOPHIL NFR BLD AUTO: 1 % (ref 0–6)
ERYTHROCYTE [DISTWIDTH] IN BLOOD BY AUTOMATED COUNT: 12.7 % (ref 11.6–15.1)
GFR SERPL CREATININE-BSD FRML MDRD: 84 ML/MIN/1.73SQ M
GLUCOSE SERPL-MCNC: 116 MG/DL (ref 65–140)
GLUCOSE UR STRIP-MCNC: NEGATIVE MG/DL
HCT VFR BLD AUTO: 44.5 % (ref 34.8–46.1)
HGB BLD-MCNC: 14.7 G/DL (ref 11.5–15.4)
HGB UR QL STRIP.AUTO: ABNORMAL
IMM GRANULOCYTES # BLD AUTO: 0.05 THOUSAND/UL (ref 0–0.2)
IMM GRANULOCYTES NFR BLD AUTO: 0 % (ref 0–2)
KETONES UR STRIP-MCNC: ABNORMAL MG/DL
LEUKOCYTE ESTERASE UR QL STRIP: ABNORMAL
LIPASE SERPL-CCNC: 78 U/L (ref 73–393)
LYMPHOCYTES # BLD AUTO: 1.64 THOUSANDS/ΜL (ref 0.6–4.47)
LYMPHOCYTES NFR BLD AUTO: 14 % (ref 14–44)
MCH RBC QN AUTO: 31.9 PG (ref 26.8–34.3)
MCHC RBC AUTO-ENTMCNC: 33 G/DL (ref 31.4–37.4)
MCV RBC AUTO: 97 FL (ref 82–98)
MONOCYTES # BLD AUTO: 1.21 THOUSAND/ΜL (ref 0.17–1.22)
MONOCYTES NFR BLD AUTO: 11 % (ref 4–12)
NEUTROPHILS # BLD AUTO: 8.4 THOUSANDS/ΜL (ref 1.85–7.62)
NEUTS SEG NFR BLD AUTO: 74 % (ref 43–75)
NITRITE UR QL STRIP: NEGATIVE
NON-SQ EPI CELLS URNS QL MICRO: ABNORMAL /HPF
NRBC BLD AUTO-RTO: 0 /100 WBCS
PH UR STRIP.AUTO: 6 [PH]
PLATELET # BLD AUTO: 218 THOUSANDS/UL (ref 149–390)
PMV BLD AUTO: 9.5 FL (ref 8.9–12.7)
POTASSIUM SERPL-SCNC: 3.6 MMOL/L (ref 3.5–5.3)
PROT SERPL-MCNC: 7.2 G/DL (ref 6.4–8.2)
PROT UR STRIP-MCNC: ABNORMAL MG/DL
RBC # BLD AUTO: 4.61 MILLION/UL (ref 3.81–5.12)
RBC #/AREA URNS AUTO: ABNORMAL /HPF
SODIUM SERPL-SCNC: 130 MMOL/L (ref 136–145)
SP GR UR STRIP.AUTO: 1.01 (ref 1–1.03)
UROBILINOGEN UR QL STRIP.AUTO: 0.2 E.U./DL
WBC # BLD AUTO: 11.4 THOUSAND/UL (ref 4.31–10.16)
WBC #/AREA URNS AUTO: ABNORMAL /HPF

## 2021-11-15 PROCEDURE — 36415 COLL VENOUS BLD VENIPUNCTURE: CPT | Performed by: EMERGENCY MEDICINE

## 2021-11-15 PROCEDURE — 81001 URINALYSIS AUTO W/SCOPE: CPT | Performed by: EMERGENCY MEDICINE

## 2021-11-15 PROCEDURE — 96375 TX/PRO/DX INJ NEW DRUG ADDON: CPT

## 2021-11-15 PROCEDURE — 83690 ASSAY OF LIPASE: CPT | Performed by: EMERGENCY MEDICINE

## 2021-11-15 PROCEDURE — 99285 EMERGENCY DEPT VISIT HI MDM: CPT | Performed by: EMERGENCY MEDICINE

## 2021-11-15 PROCEDURE — 85025 COMPLETE CBC W/AUTO DIFF WBC: CPT | Performed by: EMERGENCY MEDICINE

## 2021-11-15 PROCEDURE — 96361 HYDRATE IV INFUSION ADD-ON: CPT

## 2021-11-15 PROCEDURE — 74177 CT ABD & PELVIS W/CONTRAST: CPT

## 2021-11-15 PROCEDURE — 99285 EMERGENCY DEPT VISIT HI MDM: CPT

## 2021-11-15 PROCEDURE — 80053 COMPREHEN METABOLIC PANEL: CPT | Performed by: EMERGENCY MEDICINE

## 2021-11-15 PROCEDURE — 96365 THER/PROPH/DIAG IV INF INIT: CPT

## 2021-11-15 RX ORDER — ONDANSETRON 2 MG/ML
4 INJECTION INTRAMUSCULAR; INTRAVENOUS ONCE
Status: COMPLETED | OUTPATIENT
Start: 2021-11-15 | End: 2021-11-15

## 2021-11-15 RX ORDER — CEPHALEXIN 500 MG/1
500 CAPSULE ORAL 2 TIMES DAILY
Qty: 6 CAPSULE | Refills: 0 | Status: SHIPPED | OUTPATIENT
Start: 2021-11-15 | End: 2021-11-18

## 2021-11-15 RX ORDER — HYDROMORPHONE HCL/PF 1 MG/ML
0.5 SYRINGE (ML) INJECTION ONCE
Status: COMPLETED | OUTPATIENT
Start: 2021-11-15 | End: 2021-11-15

## 2021-11-15 RX ORDER — FAMOTIDINE 20 MG/1
20 TABLET, FILM COATED ORAL DAILY
Qty: 10 TABLET | Refills: 0 | Status: SHIPPED | OUTPATIENT
Start: 2021-11-15 | End: 2021-11-30 | Stop reason: ALTCHOICE

## 2021-11-15 RX ORDER — SACCHAROMYCES BOULARDII 250 MG
250 CAPSULE ORAL 2 TIMES DAILY
Qty: 20 CAPSULE | Refills: 0 | Status: SHIPPED | OUTPATIENT
Start: 2021-11-15 | End: 2021-11-25

## 2021-11-15 RX ORDER — CEFTRIAXONE 1 G/50ML
1000 INJECTION, SOLUTION INTRAVENOUS ONCE
Status: COMPLETED | OUTPATIENT
Start: 2021-11-15 | End: 2021-11-15

## 2021-11-15 RX ORDER — MORPHINE SULFATE 4 MG/ML
4 INJECTION, SOLUTION INTRAMUSCULAR; INTRAVENOUS ONCE
Status: COMPLETED | OUTPATIENT
Start: 2021-11-15 | End: 2021-11-15

## 2021-11-15 RX ADMIN — ONDANSETRON 4 MG: 2 INJECTION INTRAMUSCULAR; INTRAVENOUS at 16:44

## 2021-11-15 RX ADMIN — CEFTRIAXONE 1000 MG: 1 INJECTION, SOLUTION INTRAVENOUS at 21:21

## 2021-11-15 RX ADMIN — IOHEXOL 100 ML: 350 INJECTION, SOLUTION INTRAVENOUS at 19:22

## 2021-11-15 RX ADMIN — SODIUM CHLORIDE 1000 ML: 0.9 INJECTION, SOLUTION INTRAVENOUS at 16:38

## 2021-11-15 RX ADMIN — MORPHINE SULFATE 4 MG: 4 INJECTION INTRAVENOUS at 16:42

## 2021-11-15 RX ADMIN — HYDROMORPHONE HYDROCHLORIDE 0.5 MG: 1 INJECTION, SOLUTION INTRAMUSCULAR; INTRAVENOUS; SUBCUTANEOUS at 17:36

## 2021-11-15 RX ADMIN — FAMOTIDINE 20 MG: 10 INJECTION, SOLUTION INTRAVENOUS at 21:21

## 2021-11-17 ENCOUNTER — VBI (OUTPATIENT)
Dept: FAMILY MEDICINE CLINIC | Facility: CLINIC | Age: 76
End: 2021-11-17

## 2021-11-17 ENCOUNTER — TELEPHONE (OUTPATIENT)
Dept: FAMILY MEDICINE CLINIC | Facility: CLINIC | Age: 76
End: 2021-11-17

## 2021-11-17 DIAGNOSIS — E87.1 HYPONATREMIA: Primary | ICD-10-CM

## 2021-11-17 DIAGNOSIS — F41.1 GENERALIZED ANXIETY DISORDER: ICD-10-CM

## 2021-11-17 DIAGNOSIS — J44.9 CHRONIC OBSTRUCTIVE PULMONARY DISEASE, UNSPECIFIED COPD TYPE (HCC): ICD-10-CM

## 2021-11-18 RX ORDER — ALPRAZOLAM 0.25 MG/1
0.25 TABLET ORAL DAILY PRN
Qty: 30 TABLET | Refills: 3 | Status: SHIPPED | OUTPATIENT
Start: 2021-11-18

## 2021-11-18 RX ORDER — MOMETASONE FUROATE AND FORMOTEROL FUMARATE DIHYDRATE 200; 5 UG/1; UG/1
2 AEROSOL RESPIRATORY (INHALATION) 2 TIMES DAILY
Qty: 13 G | Refills: 1 | Status: SHIPPED | OUTPATIENT
Start: 2021-11-18 | End: 2022-01-19 | Stop reason: SDUPTHER

## 2021-11-22 ENCOUNTER — TELEPHONE (OUTPATIENT)
Dept: FAMILY MEDICINE CLINIC | Facility: CLINIC | Age: 76
End: 2021-11-22

## 2021-11-22 DIAGNOSIS — F41.1 GENERALIZED ANXIETY DISORDER: ICD-10-CM

## 2021-11-22 DIAGNOSIS — M81.0 AGE-RELATED OSTEOPOROSIS WITHOUT CURRENT PATHOLOGICAL FRACTURE: ICD-10-CM

## 2021-11-22 DIAGNOSIS — I10 ESSENTIAL HYPERTENSION: ICD-10-CM

## 2021-11-22 DIAGNOSIS — J44.9 CHRONIC OBSTRUCTIVE PULMONARY DISEASE, UNSPECIFIED COPD TYPE (HCC): Primary | ICD-10-CM

## 2021-11-22 PROBLEM — M17.11 PRIMARY OSTEOARTHRITIS OF RIGHT KNEE: Status: ACTIVE | Noted: 2021-11-12

## 2021-11-22 RX ORDER — ALENDRONATE SODIUM 70 MG/1
70 TABLET ORAL
Qty: 12 TABLET | Refills: 3 | Status: SHIPPED | OUTPATIENT
Start: 2021-11-22

## 2021-11-22 RX ORDER — METOPROLOL SUCCINATE 100 MG/1
100 TABLET, EXTENDED RELEASE ORAL
Qty: 30 TABLET | Refills: 5 | Status: SHIPPED | OUTPATIENT
Start: 2021-11-22 | End: 2022-06-02

## 2021-11-24 ENCOUNTER — RA CDI HCC (OUTPATIENT)
Dept: OTHER | Facility: HOSPITAL | Age: 76
End: 2021-11-24

## 2021-11-30 ENCOUNTER — OFFICE VISIT (OUTPATIENT)
Dept: FAMILY MEDICINE CLINIC | Facility: CLINIC | Age: 76
End: 2021-11-30
Payer: MEDICARE

## 2021-11-30 VITALS
OXYGEN SATURATION: 98 % | HEART RATE: 78 BPM | HEIGHT: 64 IN | RESPIRATION RATE: 18 BRPM | WEIGHT: 114 LBS | SYSTOLIC BLOOD PRESSURE: 130 MMHG | BODY MASS INDEX: 19.46 KG/M2 | TEMPERATURE: 98 F | DIASTOLIC BLOOD PRESSURE: 76 MMHG

## 2021-11-30 DIAGNOSIS — E87.1 HYPONATREMIA: ICD-10-CM

## 2021-11-30 DIAGNOSIS — I10 ESSENTIAL HYPERTENSION: ICD-10-CM

## 2021-11-30 DIAGNOSIS — J44.9 CHRONIC OBSTRUCTIVE PULMONARY DISEASE, UNSPECIFIED COPD TYPE (HCC): Primary | ICD-10-CM

## 2021-11-30 DIAGNOSIS — U09.9 POST-ACUTE SEQUELAE OF COVID-19 (PASC): ICD-10-CM

## 2021-11-30 PROCEDURE — 99214 OFFICE O/P EST MOD 30 MIN: CPT | Performed by: FAMILY MEDICINE

## 2021-11-30 RX ORDER — PREDNISONE 20 MG/1
40 TABLET ORAL DAILY
Qty: 10 TABLET | Refills: 0 | Status: SHIPPED | OUTPATIENT
Start: 2021-11-30 | End: 2021-12-05

## 2021-12-02 ENCOUNTER — LAB (OUTPATIENT)
Dept: LAB | Facility: CLINIC | Age: 76
End: 2021-12-02
Payer: MEDICARE

## 2021-12-02 DIAGNOSIS — R73.01 IMPAIRED FASTING GLUCOSE: ICD-10-CM

## 2021-12-02 DIAGNOSIS — E04.2 MULTIPLE THYROID NODULES: ICD-10-CM

## 2021-12-02 DIAGNOSIS — E55.9 VITAMIN D DEFICIENCY: ICD-10-CM

## 2021-12-02 DIAGNOSIS — E87.1 HYPONATREMIA: ICD-10-CM

## 2021-12-02 DIAGNOSIS — E05.90 SUBCLINICAL HYPERTHYROIDISM: ICD-10-CM

## 2021-12-02 LAB
ANION GAP SERPL CALCULATED.3IONS-SCNC: 4 MMOL/L (ref 4–13)
BUN SERPL-MCNC: 12 MG/DL (ref 5–25)
CALCIUM SERPL-MCNC: 9.4 MG/DL (ref 8.3–10.1)
CHLORIDE SERPL-SCNC: 105 MMOL/L (ref 100–108)
CO2 SERPL-SCNC: 30 MMOL/L (ref 21–32)
CREAT SERPL-MCNC: 0.56 MG/DL (ref 0.6–1.3)
GFR SERPL CREATININE-BSD FRML MDRD: 90 ML/MIN/1.73SQ M
GLUCOSE SERPL-MCNC: 85 MG/DL (ref 65–140)
POTASSIUM SERPL-SCNC: 3.7 MMOL/L (ref 3.5–5.3)
SODIUM SERPL-SCNC: 139 MMOL/L (ref 136–145)

## 2021-12-02 PROCEDURE — 36415 COLL VENOUS BLD VENIPUNCTURE: CPT

## 2021-12-02 PROCEDURE — 80048 BASIC METABOLIC PNL TOTAL CA: CPT

## 2021-12-03 ENCOUNTER — TELEPHONE (OUTPATIENT)
Dept: FAMILY MEDICINE CLINIC | Facility: CLINIC | Age: 76
End: 2021-12-03

## 2021-12-29 ENCOUNTER — RA CDI HCC (OUTPATIENT)
Dept: OTHER | Facility: HOSPITAL | Age: 76
End: 2021-12-29

## 2021-12-29 NOTE — PROGRESS NOTES
[address]  coding opportunities          Number of diagnosis code(s) already on the problem list added to FYI fla               Number of suggestions used: 1         Patients insurance company: Medicare     Visit status: Patient arrived for their scheduled appointment        Please review/recertify the BPA diagnoses for [facility] coding opportunities          Number of diagnosis code(s) already on the problem list added to FYI fla                     Patients insurance company: [employer]

## 2022-01-03 ENCOUNTER — TELEPHONE (OUTPATIENT)
Dept: ENDOCRINOLOGY | Facility: CLINIC | Age: 77
End: 2022-01-03

## 2022-01-03 DIAGNOSIS — E05.90 SUBCLINICAL HYPERTHYROIDISM: ICD-10-CM

## 2022-01-03 RX ORDER — METHIMAZOLE 5 MG/1
5 TABLET ORAL EVERY OTHER DAY
Qty: 45 TABLET | Refills: 3 | Status: CANCELLED
Start: 2022-01-03

## 2022-01-03 NOTE — TELEPHONE ENCOUNTER
Pt called received methimazole  rx from pharmacy and bottle is wrong  Advised pt we have not sent refills since 6/9/21   Offered to send new rx with updated directions she stated she has many pills does not need right now

## 2022-01-04 ENCOUNTER — OFFICE VISIT (OUTPATIENT)
Dept: FAMILY MEDICINE CLINIC | Facility: CLINIC | Age: 77
End: 2022-01-04
Payer: MEDICARE

## 2022-01-04 VITALS
BODY MASS INDEX: 19.39 KG/M2 | DIASTOLIC BLOOD PRESSURE: 72 MMHG | WEIGHT: 113.6 LBS | RESPIRATION RATE: 18 BRPM | HEIGHT: 64 IN | HEART RATE: 72 BPM | SYSTOLIC BLOOD PRESSURE: 138 MMHG | TEMPERATURE: 98.6 F

## 2022-01-04 DIAGNOSIS — U09.9 POST-ACUTE SEQUELAE OF COVID-19 (PASC): ICD-10-CM

## 2022-01-04 DIAGNOSIS — E44.1 MILD PROTEIN-CALORIE MALNUTRITION (HCC): Primary | ICD-10-CM

## 2022-01-04 DIAGNOSIS — J44.9 CHRONIC OBSTRUCTIVE PULMONARY DISEASE, UNSPECIFIED COPD TYPE (HCC): ICD-10-CM

## 2022-01-04 DIAGNOSIS — I10 ESSENTIAL HYPERTENSION: ICD-10-CM

## 2022-01-04 DIAGNOSIS — D69.6 THROMBOCYTOPENIA (HCC): ICD-10-CM

## 2022-01-04 DIAGNOSIS — Z23 NEED FOR VACCINATION: ICD-10-CM

## 2022-01-04 PROCEDURE — 99214 OFFICE O/P EST MOD 30 MIN: CPT | Performed by: FAMILY MEDICINE

## 2022-01-04 PROCEDURE — 90662 IIV NO PRSV INCREASED AG IM: CPT

## 2022-01-04 PROCEDURE — G0008 ADMIN INFLUENZA VIRUS VAC: HCPCS

## 2022-01-04 NOTE — ASSESSMENT & PLAN NOTE
Malnutrition Findings: bitemporal wasting           BMI Findings: Body mass index is 19 5 kg/m²         Increased protein intake advised

## 2022-01-04 NOTE — PROGRESS NOTES
Assessment/Plan:    1  Mild protein-calorie malnutrition ([facility] )  Assessment & Plan:  Malnutrition Findings: bitemporal wasting           BMI Findings: Body mass index is 19 5 kg/m²  Increased protein intake advised      2  Chronic obstructive pulmonary disease, unspecified COPD type ([facility] )  Assessment & Plan:  Stable  Smoking cessation discussed       3  Thrombocytopenia (HCC)  Assessment & Plan:  Improving  Last platelets were normal     Orders:  -     CBC; Future; Expected date: 06/18/2022    4  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[facility])  Assessment & Plan:  Still feeling tired       5  Need for vaccination  -     influenza vaccine, high-dose, PF 0 7 mL (FLUZONE HIGH-DOSE)    6  Essential hypertension  Assessment & Plan:  stable    Orders:  -     Comprehensive metabolic panel; Future; Expected date: 06/18/2022  -     Lipid Panel with Direct LDL reflex; Future; Expected date: 06/18/2022      Tobacco Cessation Counseling: Tobacco cessation counseling was provided  The patient is sincerely urged to quit consumption of tobacco  She is not ready to quit tobacco       COVID 19 Vaccine recommended, risks and benefits of vaccine discussed  There are no Patient Instructions on file for this visit  Return in about 6 months (around 7/4/2022) for Annual Wellness Visit  Subjective:      Patient ID: Ozzy Flaherty is a 67 y o  female  Chief Complaint   Patient presents with    Follow-up     6 month f/u nm lpn       She is still tired since having COVID  She has chronic shortness of breath  COPD-patient tried quitting smoking over the holidays  She was able to go 3 days and then went back to it  She continues to have a chronic cough  Malnutrition-patient reports she is trying to eat as much as possible  She has been eating egg salad in tuna fish        The following portions of the patient's history were reviewed and updated as appropriate: allergies, current medications, past family history, past medical history, past social history, past surgical history and problem list     Review of Systems      Current Outpatient Medications   Medication Sig Dispense Refill    alendronate (Fosamax) 70 mg tablet Take 1 tablet (70 mg total) by mouth every 7 days 12 tablet 3    ALPRAZolam (XANAX) 0 25 mg tablet Take 1 tablet (0 25 mg total) by mouth daily as needed for anxiety 30 tablet 3    Cholecalciferol (VITAMIN D3) 2000 units TABS Take 2,000 Units by mouth daily       esomeprazole (NexIUM) 20 mg capsule Take 20 mg by mouth every morning before breakfast      methimazole (TAPAZOLE) 5 mg tablet Take 0 5 tablets (2 5 mg total) by mouth daily 45 tablet 3    metoprolol succinate (TOPROL-XL) 100 mg 24 hr tablet Take 1 tablet (100 mg total) by mouth daily at bedtime 30 tablet 5    mometasone-formoterol (Dulera) 200-5 MCG/ACT inhaler Inhale 2 puffs 2 (two) times a day Rinse mouth after use  13 g 1    Multiple Vitamins-Minerals (MULTIVITAMIN ADULT PO) Take 1 tablet by mouth daily       No current facility-administered medications for this visit  Objective:    /72   Pulse 72   Temp 98 6 °F (37 °C)   Resp 18   Ht 5' 4" (1 626 m)   Wt 51 5 kg (113 lb 9 6 oz)   LMP  (LMP Unknown) Comment: post menapausal  BMI 19 50 kg/m²        Physical Exam  Vitals and nursing note reviewed  Constitutional:       Appearance: She is well-developed  HENT:      Head: Normocephalic and atraumatic  Right Ear: Tympanic membrane and external ear normal       Left Ear: Tympanic membrane and external ear normal    Cardiovascular:      Rate and Rhythm: Normal rate and regular rhythm  Heart sounds: Normal heart sounds  No murmur heard  No friction rub  Pulmonary:      Effort: Pulmonary effort is normal  No respiratory distress  Breath sounds: Normal breath sounds  No wheezing or rales  Musculoskeletal:      Right lower leg: No edema  Left lower leg: No edema                  Shirley Lock, DO

## 2022-01-19 DIAGNOSIS — J44.9 CHRONIC OBSTRUCTIVE PULMONARY DISEASE, UNSPECIFIED COPD TYPE (HCC): ICD-10-CM

## 2022-01-19 RX ORDER — MOMETASONE FUROATE AND FORMOTEROL FUMARATE DIHYDRATE 200; 5 UG/1; UG/1
2 AEROSOL RESPIRATORY (INHALATION) 2 TIMES DAILY
Qty: 13 G | Refills: 1 | Status: SHIPPED | OUTPATIENT
Start: 2022-01-19 | End: 2022-03-24

## 2022-02-28 ENCOUNTER — APPOINTMENT (OUTPATIENT)
Dept: LAB | Facility: CLINIC | Age: 77
End: 2022-02-28
Payer: MEDICARE

## 2022-02-28 LAB
EST. AVERAGE GLUCOSE BLD GHB EST-MCNC: 105 MG/DL
HBA1C MFR BLD: 5.3 %
T4 FREE SERPL-MCNC: 1.39 NG/DL (ref 0.76–1.46)
TSH SERPL DL<=0.05 MIU/L-ACNC: 0.76 UIU/ML (ref 0.36–3.74)

## 2022-02-28 PROCEDURE — 82306 VITAMIN D 25 HYDROXY: CPT

## 2022-02-28 PROCEDURE — 84480 ASSAY TRIIODOTHYRONINE (T3): CPT

## 2022-02-28 PROCEDURE — 36415 COLL VENOUS BLD VENIPUNCTURE: CPT

## 2022-02-28 PROCEDURE — 83036 HEMOGLOBIN GLYCOSYLATED A1C: CPT

## 2022-02-28 PROCEDURE — 84443 ASSAY THYROID STIM HORMONE: CPT

## 2022-02-28 PROCEDURE — 84439 ASSAY OF FREE THYROXINE: CPT

## 2022-03-01 LAB
25(OH)D3 SERPL-MCNC: 34.2 NG/ML (ref 30–100)
T3 SERPL-MCNC: 1.1 NG/ML (ref 0.6–1.8)

## 2022-03-24 DIAGNOSIS — J44.9 CHRONIC OBSTRUCTIVE PULMONARY DISEASE, UNSPECIFIED COPD TYPE (HCC): ICD-10-CM

## 2022-03-24 RX ORDER — MOMETASONE FUROATE AND FORMOTEROL FUMARATE DIHYDRATE 200; 5 UG/1; UG/1
AEROSOL RESPIRATORY (INHALATION)
Qty: 13 G | Refills: 1 | Status: SHIPPED | OUTPATIENT
Start: 2022-03-24 | End: 2022-05-23

## 2022-03-31 ENCOUNTER — OFFICE VISIT (OUTPATIENT)
Dept: ENDOCRINOLOGY | Facility: CLINIC | Age: 77
End: 2022-03-31
Payer: MEDICARE

## 2022-03-31 VITALS
BODY MASS INDEX: 17.42 KG/M2 | WEIGHT: 102 LBS | DIASTOLIC BLOOD PRESSURE: 72 MMHG | HEIGHT: 64 IN | HEART RATE: 81 BPM | SYSTOLIC BLOOD PRESSURE: 140 MMHG

## 2022-03-31 DIAGNOSIS — E04.2 MULTIPLE THYROID NODULES: ICD-10-CM

## 2022-03-31 DIAGNOSIS — L29.9 ITCHING: ICD-10-CM

## 2022-03-31 DIAGNOSIS — E05.90 SUBCLINICAL HYPERTHYROIDISM: Primary | ICD-10-CM

## 2022-03-31 PROCEDURE — 99214 OFFICE O/P EST MOD 30 MIN: CPT | Performed by: INTERNAL MEDICINE

## 2022-03-31 NOTE — PROGRESS NOTES
Sumit Avila 65 y o  female MRN: [id number]    Encounter: [id number]      Assessment/Plan     1  Subclinical hyperthyroidism   2  Multiple thyroid nodules   Thyroid function tests were within normal limits in 02/2022 however on exam, noted to have tremors and has symptoms that could be associated with hyperthyroidism though they could be explained by other etiologies/anxiety as well-continue current dose of methimazole  -repeat TSH, free T4, T3   - repeat thyroid ultrasound around 07/2022    3  Itching-check CMP   4  Anxiety       CC:  Subclinical hyperthyroidism    History of Present Illness     HPI:  Sumit Avila is a 65 y o  female who is here for a follow-up of subclinical hyperthyroidism, thyroid nodule    Last seen in 6/2021    Subclinical hypothyroidism, on methimazole since 06/2019  Taking methimazole 5 mg every other day     Has been stressed since her  passed away   skips breakfast   lost 12 lbs over the last few months   C/o feeling tired  C/o dry skin   Gets shaky and nervous when anxious   taking miralaax for constipation   No shakes/ tremors/ palpitations  Always been cold     Denies any increased swelling in the neck   No obstructive symptoms  Last ultrasound 07/2021-stable     All other systems were reviewed and are negative         Review of Systems    Historical Information   Past Medical History:   Diagnosis Date    Elevated blood pressure reading      Past Surgical History:   Procedure Laterality Date    APPENDECTOMY      TONSILLECTOMY       Social History   Social History     Substance and Sexual Activity   Alcohol Use Never     Social History     Substance and Sexual Activity   Drug Use Never     Social History     Tobacco Use   Smoking Status Current Every Day Smoker    Packs/day: 1 50    Types: Cigarettes   Smokeless Tobacco Never Used     Family History:   Family History   Problem Relation Age of Onset    Lung cancer Mother     Heart disease Maternal Aunt     Heart disease Maternal Uncle     Heart disease Maternal Grandmother        Meds/Allergies   Current Outpatient Medications   Medication Sig Dispense Refill    alendronate (Fosamax) 70 mg tablet Take 1 tablet (70 mg total) by mouth every 7 days 12 tablet 3    ALPRAZolam (XANAX) 0 25 mg tablet Take 1 tablet (0 25 mg total) by mouth daily as needed for anxiety 30 tablet 3    Cholecalciferol (VITAMIN D3) 2000 units TABS Take 2,000 Units by mouth daily       Dulera 200-5 MCG/ACT inhaler INHALE TWO PUFFS BY MOUTH TWICE A DAY - RINSE MOUTH AFTER USE 13 g 1    esomeprazole (NexIUM) 20 mg capsule Take 20 mg by mouth every morning before breakfast      methimazole (TAPAZOLE) 5 mg tablet Take 0 5 tablets (2 5 mg total) by mouth daily 45 tablet 3    metoprolol succinate (TOPROL-XL) 100 mg 24 hr tablet Take 1 tablet (100 mg total) by mouth daily at bedtime 30 tablet 5    Multiple Vitamins-Minerals (MULTIVITAMIN ADULT PO) Take 1 tablet by mouth daily (Patient not taking: Reported on 3/31/2022 )       No current facility-administered medications for this visit  Allergies   Allergen Reactions    Erythromycin GI Intolerance     Reaction Date: [id number];     Lexapro [Escitalopram] Other (See Comments)     hyponatremia       Objective   Vitals: Blood pressure 140/72, pulse 81, height 5' 4" (1 626 m), weight 46 3 kg (102 lb)  Physical Exam  Constitutional:       Appearance: She is well-developed  HENT:      Head: Normocephalic and atraumatic  Eyes:      Conjunctiva/sclera: Conjunctivae normal       Pupils: Pupils are equal, round, and reactive to light  Neck:      Thyroid: No thyromegaly  Cardiovascular:      Rate and Rhythm: Normal rate and regular rhythm  Heart sounds: Normal heart sounds  No murmur heard  Pulmonary:      Effort: Pulmonary effort is normal       Breath sounds: Normal breath sounds  No wheezing  Abdominal:      General: There is no distension  Palpations: Abdomen is soft  Tenderness: There is no abdominal tenderness  Musculoskeletal:         General: Normal range of motion  Cervical back: Normal range of motion and neck supple  Skin:     General: Skin is warm and dry  Neurological:      Mental Status: She is alert and oriented to person, place, and time  Deep Tendon Reflexes: Reflexes normal       Comments: Tremors on the outstretched arms +   Psychiatric:         Behavior: Behavior normal          The history was obtained from the review of the chart, patient  Lab Results:   Lab Results   Component Value Date/Time    TSH 3RD GENERATON 0 764 02/28/2022 10:55 AM    TSH 3RD GENERATON 2 030 06/28/2021 08:11 AM    Free T4 1 39 02/28/2022 10:55 AM    Free T4 0 95 06/28/2021 08:11 AM     Lab Results   Component Value Date    WBC 11 40 (H) 11/15/2021    HGB 14 7 11/15/2021    HCT 44 5 11/15/2021    MCV 97 11/15/2021     11/15/2021     Lab Results   Component Value Date    CREATININE 0 56 (L) 12/02/2021    BUN 12 12/02/2021     12/14/2015    K 3 7 12/02/2021     12/02/2021    CO2 30 12/02/2021     Lab Results   Component Value Date    HGBA1C 5 3 02/28/2022         Imaging Studies:   Results for orders placed during the hospital encounter of 07/13/21    US thyroid    Impression  Bilateral nodules also unchanged from prior study  Some nodules seen on the left are not seen in the current study  Follow-up in one year recommended  Reference: ACR Thyroid Imaging, Reporting and Data System (TI-RADS): White Paper of the [employer]  J AM Tammi Radiol 5714;00:230-560  (additional recommendations based on American Thyroid Association 2015 guidelines )      Workstation performed: [id number]      I have personally reviewed pertinent reports  Portions of the record may have been created with voice recognition software   Occasional wrong word or "sound a like" substitutions may have occurred due to the inherent limitations of voice recognition software  Read the chart carefully and recognize, using context, where substitutions have occurred

## 2022-03-31 NOTE — PATIENT INSTRUCTIONS
Continue methimazole 5 mg orally on alternate days for now   Please have labs done as ordered   Follow-up in 3-4 months

## 2022-04-08 ENCOUNTER — APPOINTMENT (OUTPATIENT)
Dept: LAB | Facility: CLINIC | Age: 77
End: 2022-04-08
Payer: MEDICARE

## 2022-04-08 DIAGNOSIS — L29.9 ITCHING: ICD-10-CM

## 2022-04-08 DIAGNOSIS — E04.2 MULTIPLE THYROID NODULES: ICD-10-CM

## 2022-04-08 DIAGNOSIS — E05.90 SUBCLINICAL HYPERTHYROIDISM: ICD-10-CM

## 2022-04-08 LAB
ALBUMIN SERPL BCP-MCNC: 3.7 G/DL (ref 3.5–5)
ALP SERPL-CCNC: 66 U/L (ref 46–116)
ALT SERPL W P-5'-P-CCNC: 14 U/L (ref 12–78)
ANION GAP SERPL CALCULATED.3IONS-SCNC: 2 MMOL/L (ref 4–13)
AST SERPL W P-5'-P-CCNC: 13 U/L (ref 5–45)
BILIRUB SERPL-MCNC: 0.41 MG/DL (ref 0.2–1)
BUN SERPL-MCNC: 9 MG/DL (ref 5–25)
CALCIUM SERPL-MCNC: 9.6 MG/DL (ref 8.3–10.1)
CHLORIDE SERPL-SCNC: 107 MMOL/L (ref 100–108)
CO2 SERPL-SCNC: 30 MMOL/L (ref 21–32)
CREAT SERPL-MCNC: 0.65 MG/DL (ref 0.6–1.3)
GFR SERPL CREATININE-BSD FRML MDRD: 85 ML/MIN/1.73SQ M
GLUCOSE P FAST SERPL-MCNC: 105 MG/DL (ref 65–99)
POTASSIUM SERPL-SCNC: 4.6 MMOL/L (ref 3.5–5.3)
PROT SERPL-MCNC: 6.6 G/DL (ref 6.4–8.2)
SODIUM SERPL-SCNC: 139 MMOL/L (ref 136–145)
T3 SERPL-MCNC: 1.1 NG/ML (ref 0.6–1.8)
T4 FREE SERPL-MCNC: 1.21 NG/DL (ref 0.76–1.46)
TSH SERPL DL<=0.05 MIU/L-ACNC: 1.07 UIU/ML (ref 0.45–4.5)

## 2022-04-08 PROCEDURE — 80053 COMPREHEN METABOLIC PANEL: CPT

## 2022-04-08 PROCEDURE — 84443 ASSAY THYROID STIM HORMONE: CPT

## 2022-04-08 PROCEDURE — 84480 ASSAY TRIIODOTHYRONINE (T3): CPT

## 2022-04-08 PROCEDURE — 36415 COLL VENOUS BLD VENIPUNCTURE: CPT

## 2022-04-08 PROCEDURE — 84439 ASSAY OF FREE THYROXINE: CPT

## 2022-05-23 DIAGNOSIS — J44.9 CHRONIC OBSTRUCTIVE PULMONARY DISEASE, UNSPECIFIED COPD TYPE (HCC): ICD-10-CM

## 2022-05-23 RX ORDER — MOMETASONE FUROATE AND FORMOTEROL FUMARATE DIHYDRATE 200; 5 UG/1; UG/1
AEROSOL RESPIRATORY (INHALATION)
Qty: 13 G | Refills: 5 | Status: SHIPPED | OUTPATIENT
Start: 2022-05-23

## 2022-06-02 DIAGNOSIS — I10 ESSENTIAL HYPERTENSION: ICD-10-CM

## 2022-06-02 RX ORDER — METOPROLOL SUCCINATE 100 MG/1
TABLET, EXTENDED RELEASE ORAL
Qty: 30 TABLET | Refills: 5 | Status: SHIPPED | OUTPATIENT
Start: 2022-06-02

## 2022-06-13 DIAGNOSIS — E05.90 SUBCLINICAL HYPERTHYROIDISM: ICD-10-CM

## 2022-06-13 RX ORDER — METHIMAZOLE 5 MG/1
TABLET ORAL
Qty: 45 TABLET | Refills: 1 | Status: SHIPPED | OUTPATIENT
Start: 2022-06-13

## 2022-06-29 ENCOUNTER — RA CDI HCC (OUTPATIENT)
Dept: OTHER | Facility: HOSPITAL | Age: 77
End: 2022-06-29

## 2022-06-29 NOTE — PROGRESS NOTES
[address]  coding opportunities       Chart reviewed, no opportunity found: CHART REVIEWED, NO OPPORTUNITY FOUND        Patients Insurance     Medicare Insurance: Medicare

## 2022-08-18 ENCOUNTER — APPOINTMENT (OUTPATIENT)
Dept: LAB | Facility: CLINIC | Age: 77
End: 2022-08-18
Payer: MEDICARE

## 2022-08-18 DIAGNOSIS — D69.6 THROMBOCYTOPENIA (HCC): ICD-10-CM

## 2022-08-18 DIAGNOSIS — I10 ESSENTIAL HYPERTENSION: ICD-10-CM

## 2022-08-18 LAB
ALBUMIN SERPL BCP-MCNC: 3.4 G/DL (ref 3.5–5)
ALP SERPL-CCNC: 70 U/L (ref 46–116)
ALT SERPL W P-5'-P-CCNC: 14 U/L (ref 12–78)
ANION GAP SERPL CALCULATED.3IONS-SCNC: 2 MMOL/L (ref 4–13)
AST SERPL W P-5'-P-CCNC: 15 U/L (ref 5–45)
BILIRUB SERPL-MCNC: 0.46 MG/DL (ref 0.2–1)
BUN SERPL-MCNC: 7 MG/DL (ref 5–25)
CALCIUM ALBUM COR SERPL-MCNC: 9.6 MG/DL (ref 8.3–10.1)
CALCIUM SERPL-MCNC: 9.1 MG/DL (ref 8.3–10.1)
CHLORIDE SERPL-SCNC: 107 MMOL/L (ref 96–108)
CHOLEST SERPL-MCNC: 155 MG/DL
CO2 SERPL-SCNC: 30 MMOL/L (ref 21–32)
CREAT SERPL-MCNC: 0.59 MG/DL (ref 0.6–1.3)
ERYTHROCYTE [DISTWIDTH] IN BLOOD BY AUTOMATED COUNT: 12.7 % (ref 11.6–15.1)
GFR SERPL CREATININE-BSD FRML MDRD: 88 ML/MIN/1.73SQ M
GLUCOSE P FAST SERPL-MCNC: 94 MG/DL (ref 65–99)
HCT VFR BLD AUTO: 45.7 % (ref 34.8–46.1)
HDLC SERPL-MCNC: 66 MG/DL
HGB BLD-MCNC: 14.6 G/DL (ref 11.5–15.4)
LDLC SERPL CALC-MCNC: 72 MG/DL (ref 0–100)
MCH RBC QN AUTO: 31.7 PG (ref 26.8–34.3)
MCHC RBC AUTO-ENTMCNC: 31.9 G/DL (ref 31.4–37.4)
MCV RBC AUTO: 99 FL (ref 82–98)
PLATELET # BLD AUTO: 141 THOUSANDS/UL (ref 149–390)
PMV BLD AUTO: 10.4 FL (ref 8.9–12.7)
POTASSIUM SERPL-SCNC: 4 MMOL/L (ref 3.5–5.3)
PROT SERPL-MCNC: 6.5 G/DL (ref 6.4–8.4)
RBC # BLD AUTO: 4.6 MILLION/UL (ref 3.81–5.12)
SODIUM SERPL-SCNC: 139 MMOL/L (ref 135–147)
TRIGL SERPL-MCNC: 87 MG/DL
WBC # BLD AUTO: 5.14 THOUSAND/UL (ref 4.31–10.16)

## 2022-08-18 PROCEDURE — 85027 COMPLETE CBC AUTOMATED: CPT

## 2022-08-18 PROCEDURE — 80061 LIPID PANEL: CPT

## 2022-08-18 PROCEDURE — 36415 COLL VENOUS BLD VENIPUNCTURE: CPT

## 2022-08-18 PROCEDURE — 80053 COMPREHEN METABOLIC PANEL: CPT

## 2022-08-25 ENCOUNTER — HOSPITAL ENCOUNTER (OUTPATIENT)
Dept: RADIOLOGY | Facility: HOSPITAL | Age: 77
Discharge: HOME | End: 2022-08-25
Attending: INTERNAL MEDICINE
Payer: MEDICARE

## 2022-08-25 DIAGNOSIS — E04.2 MULTIPLE THYROID NODULES: ICD-10-CM

## 2022-08-25 DIAGNOSIS — E05.90 SUBCLINICAL HYPERTHYROIDISM: ICD-10-CM

## 2022-08-25 PROCEDURE — 76536 US EXAM OF HEAD AND NECK: CPT

## 2022-08-29 ENCOUNTER — OFFICE VISIT (OUTPATIENT)
Dept: FAMILY MEDICINE CLINIC | Facility: CLINIC | Age: 77
End: 2022-08-29
Payer: MEDICARE

## 2022-08-29 VITALS
HEART RATE: 65 BPM | OXYGEN SATURATION: 100 % | DIASTOLIC BLOOD PRESSURE: 86 MMHG | BODY MASS INDEX: 18.61 KG/M2 | TEMPERATURE: 97.5 F | SYSTOLIC BLOOD PRESSURE: 152 MMHG | HEIGHT: 64 IN | RESPIRATION RATE: 16 BRPM | WEIGHT: 109 LBS

## 2022-08-29 DIAGNOSIS — J44.9 CHRONIC OBSTRUCTIVE PULMONARY DISEASE, UNSPECIFIED COPD TYPE (HCC): ICD-10-CM

## 2022-08-29 DIAGNOSIS — I10 ESSENTIAL HYPERTENSION: ICD-10-CM

## 2022-08-29 DIAGNOSIS — Z23 NEED FOR VACCINATION: ICD-10-CM

## 2022-08-29 DIAGNOSIS — Z00.00 MEDICARE ANNUAL WELLNESS VISIT, SUBSEQUENT: Primary | ICD-10-CM

## 2022-08-29 DIAGNOSIS — Z59.9 FINANCIAL DIFFICULTIES: ICD-10-CM

## 2022-08-29 PROCEDURE — G0439 PPPS, SUBSEQ VISIT: HCPCS | Performed by: FAMILY MEDICINE

## 2022-08-29 PROCEDURE — G0009 ADMIN PNEUMOCOCCAL VACCINE: HCPCS

## 2022-08-29 PROCEDURE — 90677 PCV20 VACCINE IM: CPT

## 2022-08-29 SDOH — ECONOMIC STABILITY - INCOME SECURITY: PROBLEM RELATED TO HOUSING AND ECONOMIC CIRCUMSTANCES, UNSPECIFIED: Z59.9

## 2022-08-29 NOTE — ASSESSMENT & PLAN NOTE
Blood pressure is elevated today but patient is very anxious    She is having significant financial difficulty and may lose her home

## 2022-08-29 NOTE — PROGRESS NOTES
Assessment and Plan:     Problem List Items Addressed This Visit     Chronic obstructive pulmonary disease ([address] )     Unchanged   Smoking cessation advised         Essential hypertension     Blood pressure is elevated today but patient is very anxious  She is having significant financial difficulty and may lose her home         Relevant Orders    CBC    Comprehensive metabolic panel    Lipid Panel with Direct LDL reflex      Other Visit Diagnoses     Medicare annual wellness visit, subsequent    -  Primary    Financial difficulties        Relevant Orders    Ambulatory referral to social work care management program    Need for vaccination        Relevant Orders    Pneumococcal Conjugate Vaccine 20-valent (Pcv20) (Completed)      Return in about 6 months (around 2/28/2023) for Next scheduled follow up  Agreed to meet with   Depression Screening and Follow-up Plan: Patient was screened for depression during today's encounter  They screened negative with a PHQ-2 score of 2  Falls Plan of Care: balance, strength, and gait training instructions were provided  Urinary Incontinence Plan of Care: counseling topics discussed: practice Kegel (pelvic floor strengthening) exercises  Preventive health issues were discussed with patient, and age appropriate screening tests were ordered as noted in patient's After Visit Summary  Personalized health advice and appropriate referrals for health education or preventive services given if needed, as noted in patient's After Visit Summary  History of Present Illness:     Patient presents for a Medicare Wellness Visit    She still can't taste right since COVID  She quit smoking for 3 days and then went back to smoking        Patient Care Team:  Viviana Disla DO as PCP - Cash Camejo MD as PCP - Endocrinology (Endocrinology)  DO Curtis Lancaster MD Rainell Blind, PA-C as Physician Assistant (Endocrinology)     Review of Systems:     Review of Systems   Respiratory: Positive for cough           Problem List:     Patient Active Problem List   Diagnosis    Allergic rhinitis    Generalized anxiety disorder    Chronic obstructive pulmonary disease (HCC)    Dense breasts    Essential hypertension    Impaired fasting glucose    Thyroid disorder    Vitamin D deficiency    Thrombocytopenia (HCC)    Low TSH level    Thyroid nodule    Multiple thyroid nodules    Abnormal thyroid function test    Nicotine abuse    Subclinical hyperthyroidism    Weight loss    Fatigue    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[facility])    Lightheadedness    Age-related osteoporosis without current pathological fracture    Primary osteoarthritis of right knee    Mild protein-calorie malnutrition (HCC)    Itching      Past Medical and Surgical History:     Past Medical History:   Diagnosis Date    Elevated blood pressure reading      Past Surgical History:   Procedure Laterality Date    APPENDECTOMY      TONSILLECTOMY        Family History:     Family History   Problem Relation Age of Onset    Lung cancer Mother     Heart disease Maternal Aunt     Heart disease Maternal Uncle     Heart disease Maternal Grandmother       Social History:     Social History     Socioeconomic History    Marital status: /Civil Union     Spouse name: None    Number of children: None    Years of education: None    Highest education level: None   Occupational History    None   Tobacco Use    Smoking status: Current Every Day Smoker     Packs/day: 1 50     Types: Cigarettes    Smokeless tobacco: Never Used   Vaping Use    Vaping Use: Never used   Substance and Sexual Activity    Alcohol use: Never    Drug use: Never    Sexual activity: None   Other Topics Concern    None   Social History Narrative    None     Social Determinants of Health     Financial Resource Strain: Medium Risk    Difficulty of Paying Living Expenses: Somewhat hard   Food Insecurity: Not on file   Transportation Needs: No Transportation Needs    Lack of Transportation (Medical): No    Lack of Transportation (Non-Medical): No   Physical Activity: Not on file   Stress: Not on file   Social Connections: Not on file   Intimate Partner Violence: Not on file   Housing Stability: Not on file      Medications and Allergies:     Current Outpatient Medications   Medication Sig Dispense Refill    alendronate (Fosamax) 70 mg tablet Take 1 tablet (70 mg total) by mouth every 7 days 12 tablet 3    ALPRAZolam (XANAX) 0 25 mg tablet Take 1 tablet (0 25 mg total) by mouth daily as needed for anxiety 30 tablet 3    Cholecalciferol (VITAMIN D3) 2000 units TABS Take 2,000 Units by mouth daily       Dulera 200-5 MCG/ACT inhaler INHALE TWO PUFFS BY MOUTH TWICE A DAY - RINSE MOUTH AFTER USE 13 g 5    esomeprazole (NexIUM) 20 mg capsule Take 20 mg by mouth every morning before breakfast      methimazole (TAPAZOLE) 5 mg tablet TAKE 1/2 TABLET BY MOUTH DAILY 45 tablet 1    metoprolol succinate (TOPROL-XL) 100 mg 24 hr tablet TAKE ONE TABLET BY MOUTH EVERY DAY AT BEDTIME 30 tablet 5    Multiple Vitamins-Minerals (MULTIVITAMIN ADULT PO) Take 1 tablet by mouth daily       No current facility-administered medications for this visit       Allergies   Allergen Reactions    Erythromycin GI Intolerance     Reaction Date: [id number];     Lexapro [Escitalopram] Other (See Comments)     hyponatremia      Immunizations:     Immunization History   Administered Date(s) Administered    Influenza Split High Dose Preservative Free IM 12/18/2015, 12/21/2016    Influenza, high dose seasonal 0 7 mL 12/21/2018, 01/04/2022    Influenza, seasonal, injectable 10/12/2012, 01/13/2014    Pneumococcal Conjugate Vaccine 20-valent (Pcv20), Polysace 08/29/2022    Pneumococcal Polysaccharide PPV23 07/18/2013    Td (adult), adsorbed 04/18/2000      Health Maintenance:         Topic Date Due    DXA SCAN  08/03/2023    Hepatitis C Screening  Completed         Topic Date Due    COVID-19 Vaccine (1) Never done    Influenza Vaccine (1) 09/01/2022      Medicare Screening Tests and Risk Assessments:     Monique Spence is here for her Subsequent Wellness visit  Last Medicare Wellness visit information reviewed, patient interviewed and updates made to the record today  Health Risk Assessment:   Patient rates overall health as fair  Patient feels that their physical health rating is same  Patient is satisfied with their life  Eyesight was rated as same  Hearing was rated as same  Patient feels that their emotional and mental health rating is same  Patients states they are sometimes angry  Patient states they are always unusually tired/fatigued  Pain experienced in the last 7 days has been some  Patient's pain rating has been 5/10  Patient states that she has experienced no weight loss or gain in last 6 months  Depression Screening:   PHQ-2 Score: 2      Fall Risk Screening: In the past year, patient has experienced: history of falling in past year    Number of falls: 1  Injured during fall?: Yes    Feels unsteady when standing or walking?: Yes    Worried about falling?: Yes      Urinary Incontinence Screening:   Patient has leaked urine accidently in the last six months  Home Safety:  Patient does not have trouble with stairs inside or outside of their home  Patient has working smoke alarms and has working carbon monoxide detector  Home safety hazards include: none  Nutrition:   Current diet is Regular  Medications:   Patient is currently taking over-the-counter supplements  OTC medications include: see medication list  Patient is able to manage medications  Activities of Daily Living (ADLs)/Instrumental Activities of Daily Living (IADLs):   Walk and transfer into and out of bed and chair?: Yes  Dress and groom yourself?: Yes    Bathe or shower yourself?: Yes    Feed yourself?  Yes  Do your laundry/housekeeping?: Yes  Manage your money, pay your bills and track your expenses?: Yes  Make your own meals?: Yes    Do your own shopping?: Yes    Previous Hospitalizations:   Any hospitalizations or ED visits within the last 12 months?: Yes    How many hospitalizations have you had in the last year?: 1-2    Advance Care Planning:   Living will: Yes    Advanced directive: Yes    Advanced directive counseling given: Yes    End of Life Decisions reviewed with patient: Yes    Provider agrees with end of life decisions: Yes      Cognitive Screening:   Provider or family/friend/caregiver concerned regarding cognition?: No    PREVENTIVE SCREENINGS      Cardiovascular Screening:    General: Screening Current      Diabetes Screening:     General: Screening Current      Colorectal Cancer Screening:     General: Screening Not Indicated      Breast Cancer Screening:     General: Screening Not Indicated      Cervical Cancer Screening:    General: Screening Not Indicated      Osteoporosis Screening:    General: Screening Not Indicated and History Osteoporosis      Abdominal Aortic Aneurysm (AAA) Screening:        General: Screening Not Indicated      Lung Cancer Screening:     General: Screening Not Indicated      Hepatitis C Screening:    General: Screening Current    Screening, Brief Intervention, and Referral to Treatment (SBIRT)    Screening  Typical number of drinks in a day: 0  Typical number of drinks in a week: 0  Interpretation: Low risk drinking behavior      AUDIT-C Screenin) How often did you have a drink containing alcohol in the past year? never  2) How many drinks did you have on a typical day when you were drinking in the past year? 0  3) How often did you have 6 or more drinks on one occasion in the past year? never    AUDIT-C Score: 0  Interpretation: Score 0-2 (female): Negative screen for alcohol misuse    Single Item Drug Screening:  How often have you used an illegal drug (including marijuana) or a prescription medication for non-medical reasons in the past year? never    Single Item Drug Screen Score: 0  Interpretation: Negative screen for possible drug use disorder    Brief Intervention  Alcohol & drug use screenings were reviewed  No concerns regarding substance use disorder identified  No exam data present     Physical Exam:     /86   Pulse 65   Temp 97 5 °F (36 4 °C)   Resp 16   Ht 5' 4" (1 626 m)   Wt 49 4 kg (109 lb)   LMP  (LMP Unknown) Comment: post menapausal  SpO2 100%   BMI 18 71 kg/m²     Physical Exam  Vitals and nursing note reviewed  Constitutional:       Appearance: She is well-developed  HENT:      Head: Normocephalic and atraumatic  Right Ear: External ear normal       Left Ear: External ear normal       Nose: Nose normal    Cardiovascular:      Rate and Rhythm: Normal rate and regular rhythm  Heart sounds: Normal heart sounds  No murmur heard  No friction rub  Pulmonary:      Effort: No respiratory distress  Breath sounds: Normal breath sounds  No wheezing or rales  Musculoskeletal:      Right lower leg: No edema  Left lower leg: No edema  Neurological:      Mental Status: She is oriented to person, place, and time  Cranial Nerves: No cranial nerve deficit            Linda Broaden, DO

## 2022-09-19 ENCOUNTER — PATIENT OUTREACH (OUTPATIENT)
Dept: FAMILY MEDICINE CLINIC | Facility: CLINIC | Age: 77
End: 2022-09-19

## 2022-09-19 NOTE — PROGRESS NOTES
SW received referral for SW outreach  SW called pt  Reached voicemail, left message requesting call back

## 2022-09-26 ENCOUNTER — PATIENT OUTREACH (OUTPATIENT)
Dept: FAMILY MEDICINE CLINIC | Facility: CLINIC | Age: 77
End: 2022-09-26

## 2022-09-26 NOTE — LETTER
[id number] [facility] [id number]    Re: social needs   9/26/2022       Dear [facility],    We tried to reach you by phone on 9/19 and 9/26 and was unfortunately unable to reach you   Should you have any concerns with, or need assistance with any social needs including financial resources, transportation resources, housing or food resources, please feel free to reach me at   [phone]    Sincerely,     Pao Linares, TUYETW

## 2022-09-26 NOTE — PROGRESS NOTES
2nd outreach    SW called pt  SW reached voicemail  Left message requesting call back  SW mailed Unable to reach letter

## 2022-09-30 ENCOUNTER — APPOINTMENT (OUTPATIENT)
Dept: LAB | Facility: CLINIC | Age: 77
End: 2022-09-30
Payer: MEDICARE

## 2022-09-30 ENCOUNTER — OFFICE VISIT (OUTPATIENT)
Dept: ENDOCRINOLOGY | Facility: CLINIC | Age: 77
End: 2022-09-30
Payer: MEDICARE

## 2022-09-30 VITALS
WEIGHT: 108 LBS | BODY MASS INDEX: 18.44 KG/M2 | DIASTOLIC BLOOD PRESSURE: 82 MMHG | HEIGHT: 64 IN | SYSTOLIC BLOOD PRESSURE: 140 MMHG | HEART RATE: 88 BPM

## 2022-09-30 DIAGNOSIS — E04.2 MULTIPLE THYROID NODULES: ICD-10-CM

## 2022-09-30 DIAGNOSIS — D69.6 DECREASED PLATELET COUNT (HCC): ICD-10-CM

## 2022-09-30 DIAGNOSIS — D75.89 INCREASED MCV: ICD-10-CM

## 2022-09-30 DIAGNOSIS — Z13.21 ENCOUNTER FOR VITAMIN DEFICIENCY SCREENING: ICD-10-CM

## 2022-09-30 DIAGNOSIS — R53.83 FATIGUE, UNSPECIFIED TYPE: ICD-10-CM

## 2022-09-30 DIAGNOSIS — E55.9 VITAMIN D DEFICIENCY: ICD-10-CM

## 2022-09-30 DIAGNOSIS — E05.90 SUBCLINICAL HYPERTHYROIDISM: ICD-10-CM

## 2022-09-30 DIAGNOSIS — E05.90 SUBCLINICAL HYPERTHYROIDISM: Primary | ICD-10-CM

## 2022-09-30 LAB
25(OH)D3 SERPL-MCNC: 21.9 NG/ML (ref 30–100)
T3 SERPL-MCNC: 1.3 NG/ML (ref 0.6–1.8)
T4 FREE SERPL-MCNC: 1.24 NG/DL (ref 0.76–1.46)
TSH SERPL DL<=0.05 MIU/L-ACNC: 0.73 UIU/ML (ref 0.45–4.5)
VIT B12 SERPL-MCNC: 281 PG/ML (ref 100–900)

## 2022-09-30 PROCEDURE — 84443 ASSAY THYROID STIM HORMONE: CPT

## 2022-09-30 PROCEDURE — 82306 VITAMIN D 25 HYDROXY: CPT

## 2022-09-30 PROCEDURE — 99214 OFFICE O/P EST MOD 30 MIN: CPT | Performed by: INTERNAL MEDICINE

## 2022-09-30 PROCEDURE — 84439 ASSAY OF FREE THYROXINE: CPT

## 2022-09-30 PROCEDURE — 84480 ASSAY TRIIODOTHYRONINE (T3): CPT

## 2022-09-30 PROCEDURE — 36415 COLL VENOUS BLD VENIPUNCTURE: CPT

## 2022-09-30 PROCEDURE — 82607 VITAMIN B-12: CPT

## 2022-09-30 NOTE — PATIENT INSTRUCTIONS
Continue methimazole 2 5 mg orally daily or 5 mg on alternate days     Please have labs done as ordered   Follow up with primary care physician/ gastroenterology regarding bloating     Follow up in 3-4 months

## 2022-09-30 NOTE — PROGRESS NOTES
Juanita Lopez 65 y o  female MRN: [id number]    Encounter: [id number]      Assessment/Plan     1  Subclinical Hyperthyroidism   2  Multiple thyroid nodules  3  Fatigue  4  Cold intolerance  5  History of vitamin-D deficiency  6  Mildly elevated MCV, low platelet count  Continue methimazole 2 5 mg orally daily or 5 mg on alternate days   Check TSH, free T4, T3  Check vitamin-D, vitamin B12    7  Bloating  Takes Nexium as needed   -follow-up with primary care/consult gastroenterology    CC:  Subclinical hyperthyroidism    History of Present Illness     HPI:  Juanita Lopez is a 65 y o  female who is here for a follow-up of subclinical hyperthyroidism, thyroid nodules    Last seen in 03/2022  History of subclinical hyperthyroidism, on methimazole since 06/2019   Currently on methimazole 5 mg every other day  - patient states that she would like to try taking 2 5 mg orally daily    Last thyroid ultrasound 08/25/2022-stable thyroid nodules  No obstrucive symptoms     C/o feeling very tired and sleepy for a few months  Feels more cold, not using heat as yet at home   some constipation  - not new    Is taking probiotic which is helps  c/o bloating X long time along with some discomfort on the right side of the abdomen which gets better if she presses on it and is able to burp   Does not sleep well at night, wakes up to use the restroom and has difficulty falling back asleep   No shakes/ tremors/ palpitations     Few months ago, had an episode of vertigo but has not occutred again   Has not been taking Vitamin D3 supplements     All other systems were reviewed and are negative         Review of Systems    Historical Information   Past Medical History:   Diagnosis Date    Elevated blood pressure reading      Past Surgical History:   Procedure Laterality Date    APPENDECTOMY      TONSILLECTOMY       Social History   Social History     Substance and Sexual Activity   Alcohol Use Never     Social History     Substance and Sexual Activity   Drug Use Never     Social History     Tobacco Use   Smoking Status Current Every Day Smoker    Packs/day: 1 50    Types: Cigarettes   Smokeless Tobacco Never Used     Family History:   Family History   Problem Relation Age of Onset    Lung cancer Mother     Heart disease Maternal Aunt     Heart disease Maternal Uncle     Heart disease Maternal Grandmother        Meds/Allergies   Current Outpatient Medications   Medication Sig Dispense Refill    alendronate (Fosamax) 70 mg tablet Take 1 tablet (70 mg total) by mouth every 7 days 12 tablet 3    ALPRAZolam (XANAX) 0 25 mg tablet Take 1 tablet (0 25 mg total) by mouth daily as needed for anxiety 30 tablet 3    Cholecalciferol (VITAMIN D3) 2000 units TABS Take 2,000 Units by mouth daily       Dulera 200-5 MCG/ACT inhaler INHALE TWO PUFFS BY MOUTH TWICE A DAY - RINSE MOUTH AFTER USE 13 g 5    esomeprazole (NexIUM) 20 mg capsule Take 20 mg by mouth every morning before breakfast      methimazole (TAPAZOLE) 5 mg tablet TAKE 1/2 TABLET BY MOUTH DAILY 45 tablet 1    metoprolol succinate (TOPROL-XL) 100 mg 24 hr tablet TAKE ONE TABLET BY MOUTH EVERY DAY AT BEDTIME 30 tablet 5    Multiple Vitamins-Minerals (MULTIVITAMIN ADULT PO) Take 1 tablet by mouth daily       No current facility-administered medications for this visit  Allergies   Allergen Reactions    Erythromycin GI Intolerance     Reaction Date: [id number];     Lexapro [Escitalopram] Other (See Comments)     hyponatremia       Objective   Vitals: Blood pressure 140/82, pulse 88, height 5' 4" (1 626 m), weight 49 kg (108 lb)  Physical Exam  Constitutional:       Appearance: She is well-developed  HENT:      Head: Normocephalic and atraumatic  Eyes:      Conjunctiva/sclera: Conjunctivae normal       Pupils: Pupils are equal, round, and reactive to light  Neck:      Thyroid: No thyromegaly        Comments: No thyromegaly   Nontender, no nodules appreciated on palpation Cardiovascular:      Rate and Rhythm: Normal rate and regular rhythm  Heart sounds: Normal heart sounds  No murmur heard  Pulmonary:      Effort: Pulmonary effort is normal       Breath sounds: Normal breath sounds  No wheezing  Abdominal:      General: There is no distension  Palpations: Abdomen is soft  Tenderness: There is no abdominal tenderness  Musculoskeletal:         General: Normal range of motion  Cervical back: Normal range of motion and neck supple  Skin:     General: Skin is warm and dry  Neurological:      Mental Status: She is alert and oriented to person, place, and time  Deep Tendon Reflexes: Reflexes normal       Comments: No tremors on the outstretched arms      Psychiatric:         Behavior: Behavior normal          The history was obtained from the review of the chart, patient  Lab Results:   Lab Results   Component Value Date/Time    TSH 3RD GENERATON 1 070 04/08/2022 08:03 AM    TSH 3RD GENERATON 0 764 02/28/2022 10:55 AM    Free T4 1 21 04/08/2022 08:03 AM    Free T4 1 39 02/28/2022 10:55 AM     Lab Results   Component Value Date    WBC 5 14 08/18/2022    HGB 14 6 08/18/2022    HCT 45 7 08/18/2022    MCV 99 (H) 08/18/2022     (L) 08/18/2022     Lab Results   Component Value Date    CREATININE 0 59 (L) 08/18/2022    BUN 7 08/18/2022     12/14/2015    K 4 0 08/18/2022     08/18/2022    CO2 30 08/18/2022     Lab Results   Component Value Date    HGBA1C 5 3 02/28/2022         Imaging Studies:   Results for orders placed during the hospital encounter of 08/25/22    US thyroid    Impression  Stable thyroid nodules as above  No nodule meets current ACR criteria for requiring biopsy or followup ultrasounds  Reference: ACR Thyroid Imaging, Reporting and Data System (TI-RADS): White Paper of the [employer]   J AM Tammi Radiol 6165;16:[id number]  (additional recommendations based on American Thyroid Association 2015 guidelines )      Workstation performed: [id number]      I have personally reviewed pertinent reports  Portions of the record may have been created with voice recognition software  Occasional wrong word or "sound a like" substitutions may have occurred due to the inherent limitations of voice recognition software  Read the chart carefully and recognize, using context, where substitutions have occurred

## 2022-10-04 DIAGNOSIS — E55.9 VITAMIN D DEFICIENCY: Primary | ICD-10-CM

## 2022-10-04 DIAGNOSIS — E05.90 SUBCLINICAL HYPERTHYROIDISM: ICD-10-CM

## 2022-10-07 ENCOUNTER — PATIENT OUTREACH (OUTPATIENT)
Dept: FAMILY MEDICINE CLINIC | Facility: CLINIC | Age: 77
End: 2022-10-07

## 2022-10-07 NOTE — PROGRESS NOTES
SW received vm from this pt stating she received letter from this SW and was unable to answer her phone due to problems with her answering machine, but does need help with some things  SW called and spoke with Douglas Gonsalez explained her  passed away and left her nothing, she cannot pay the mortgage  She had the realtor there this morning  Santino Marie wants $2000/month  She cannot afford this  Her son is involved in helping  He has visited places as well  He was given the number for [employer] but they're not answering  She said she has a  for the house but even when she gets the money she won't have anything left  Pt explained the realtor is trying to help her but is not having any luck  SW asked pt what she can afford  $1250/month, and gets $1815 in SSI  Her son lives with her and she is looking for a 2 bedroom house  SW explained that SW can speak with the  and see if she has any ideas, however, the realtor may be her best bet  SW called and spoke with Caity Campos, outreach coordintor  Terell Case stated that to her knowledge there is not any apt in that price range, and the realtor has much more access to housing that is available, than Jojo ZAPATA called pt back and suggested she continue to work with the realtor  Pt understood  Pt has no other financial needs, including food, transportation, or healthcare needs

## 2022-11-17 DIAGNOSIS — F41.1 GENERALIZED ANXIETY DISORDER: ICD-10-CM

## 2022-11-17 RX ORDER — ALPRAZOLAM 0.25 MG/1
0.25 TABLET ORAL DAILY PRN
Qty: 30 TABLET | Refills: 3 | Status: SHIPPED | OUTPATIENT
Start: 2022-11-17

## 2022-11-17 NOTE — TELEPHONE ENCOUNTER
-- DO NOT REPLY / DO NOT REPLY ALL --  -- Message is from the [facility]--    COVID-19 Universal Screening: N/A - Not about scheduling    General Patient Message      Reason for Call: Patient is calling to inform the wrong medication was sent to the pharmacy the medication should have been the Humalog brand maximum dosage of 75/25 in order to be covered. Please fax prescription to [phone]. Please assist    Caller Information       Type Contact Phone    03/22/2021 08:56 AM CDT Phone (Incoming) Eleuterio Mi (Self) [phone] (M)          Alternative phone number: none     Turnaround time given to caller:   \"This message will be sent to [state Provider's name]. The clinical team will fulfill your request as soon as they review your message.\"     The [place] Prescription Monitoring report was reviewed and is appropriate

## 2022-11-18 DIAGNOSIS — J44.9 CHRONIC OBSTRUCTIVE PULMONARY DISEASE, UNSPECIFIED COPD TYPE (HCC): ICD-10-CM

## 2022-11-18 RX ORDER — MOMETASONE FUROATE AND FORMOTEROL FUMARATE DIHYDRATE 200; 5 UG/1; UG/1
AEROSOL RESPIRATORY (INHALATION)
Qty: 13 G | Refills: 5 | Status: SHIPPED | OUTPATIENT
Start: 2022-11-18 | End: 2022-11-21

## 2022-11-19 DIAGNOSIS — J44.9 CHRONIC OBSTRUCTIVE PULMONARY DISEASE, UNSPECIFIED COPD TYPE (HCC): ICD-10-CM

## 2022-11-21 RX ORDER — MOMETASONE FUROATE AND FORMOTEROL FUMARATE DIHYDRATE 200; 5 UG/1; UG/1
AEROSOL RESPIRATORY (INHALATION)
Qty: 13 G | Refills: 5 | Status: SHIPPED | OUTPATIENT
Start: 2022-11-21

## 2022-11-23 DIAGNOSIS — I10 ESSENTIAL HYPERTENSION: ICD-10-CM

## 2022-11-23 RX ORDER — METOPROLOL SUCCINATE 100 MG/1
TABLET, EXTENDED RELEASE ORAL
Qty: 30 TABLET | Refills: 5 | Status: SHIPPED | OUTPATIENT
Start: 2022-11-23

## 2022-12-01 DIAGNOSIS — E05.90 SUBCLINICAL HYPERTHYROIDISM: ICD-10-CM

## 2022-12-01 RX ORDER — METHIMAZOLE 5 MG/1
TABLET ORAL
Qty: 45 TABLET | Refills: 1 | Status: SHIPPED | OUTPATIENT
Start: 2022-12-01 | End: 2022-12-06

## 2022-12-06 DIAGNOSIS — E05.90 SUBCLINICAL HYPERTHYROIDISM: ICD-10-CM

## 2022-12-06 RX ORDER — METHIMAZOLE 5 MG/1
TABLET ORAL
Qty: 45 TABLET | Refills: 1 | Status: SHIPPED | OUTPATIENT
Start: 2022-12-06

## 2023-02-13 LAB
ALBUMIN SERPL-MCNC: 4.3 G/DL (ref 3.6–5.1)
ALBUMIN/GLOB SERPL: 1.8 (CALC) (ref 1–2.5)
ALP SERPL-CCNC: 72 U/L (ref 37–153)
ALT SERPL-CCNC: 7 U/L (ref 6–29)
AST SERPL-CCNC: 15 U/L (ref 10–35)
BASOPHILS # BLD AUTO: 49 CELLS/UL (ref 0–200)
BASOPHILS NFR BLD AUTO: 0.9 %
BILIRUB SERPL-MCNC: 0.6 MG/DL (ref 0.2–1.2)
BUN SERPL-MCNC: 9 MG/DL (ref 7–25)
BUN/CREAT SERPL: NORMAL (CALC) (ref 6–22)
CALCIUM SERPL-MCNC: 9.5 MG/DL (ref 8.6–10.4)
CHLORIDE SERPL-SCNC: 101 MMOL/L (ref 98–110)
CHOLEST SERPL-MCNC: 212 MG/DL
CHOLEST/HDLC SERPL: 2.7 (CALC)
CO2 SERPL-SCNC: 32 MMOL/L (ref 20–32)
CREAT SERPL-MCNC: 0.65 MG/DL (ref 0.6–1)
EOSINOPHIL # BLD AUTO: 130 CELLS/UL (ref 15–500)
EOSINOPHIL NFR BLD AUTO: 2.4 %
ERYTHROCYTE [DISTWIDTH] IN BLOOD BY AUTOMATED COUNT: 11.9 % (ref 11–15)
GFR/BSA.PRED SERPLBLD CYS-BASED-ARV: 90 ML/MIN/1.73M2
GLOBULIN SER CALC-MCNC: 2.4 G/DL (CALC) (ref 1.9–3.7)
GLUCOSE SERPL-MCNC: 95 MG/DL (ref 65–99)
HCT VFR BLD AUTO: 45.1 % (ref 35–45)
HDLC SERPL-MCNC: 78 MG/DL
HGB BLD-MCNC: 15 G/DL (ref 11.7–15.5)
LDLC SERPL CALC-MCNC: 111 MG/DL (CALC)
LYMPHOCYTES # BLD AUTO: 1447 CELLS/UL (ref 850–3900)
LYMPHOCYTES NFR BLD AUTO: 26.8 %
MCH RBC QN AUTO: 31.3 PG (ref 27–33)
MCHC RBC AUTO-ENTMCNC: 33.3 G/DL (ref 32–36)
MCV RBC AUTO: 94 FL (ref 80–100)
MONOCYTES # BLD AUTO: 491 CELLS/UL (ref 200–950)
MONOCYTES NFR BLD AUTO: 9.1 %
NEUTROPHILS # BLD AUTO: 3283 CELLS/UL (ref 1500–7800)
NEUTROPHILS NFR BLD AUTO: 60.8 %
NONHDLC SERPL-MCNC: 134 MG/DL (CALC)
PLATELET # BLD AUTO: 143 THOUSAND/UL (ref 140–400)
PMV BLD REES-ECKER: 10.5 FL (ref 7.5–12.5)
POTASSIUM SERPL-SCNC: 4.6 MMOL/L (ref 3.5–5.3)
PROT SERPL-MCNC: 6.7 G/DL (ref 6.1–8.1)
RBC # BLD AUTO: 4.8 MILLION/UL (ref 3.8–5.1)
SODIUM SERPL-SCNC: 140 MMOL/L (ref 135–146)
TRIGL SERPL-MCNC: 121 MG/DL
WBC # BLD AUTO: 5.4 THOUSAND/UL (ref 3.8–10.8)

## 2023-03-14 ENCOUNTER — RA CDI HCC (OUTPATIENT)
Dept: OTHER | Facility: HOSPITAL | Age: 78
End: 2023-03-14

## 2023-03-20 ENCOUNTER — OFFICE VISIT (OUTPATIENT)
Dept: FAMILY MEDICINE CLINIC | Facility: CLINIC | Age: 78
End: 2023-03-20

## 2023-03-20 VITALS
BODY MASS INDEX: 18.64 KG/M2 | TEMPERATURE: 98.3 F | HEIGHT: 64 IN | RESPIRATION RATE: 18 BRPM | HEART RATE: 86 BPM | WEIGHT: 109.2 LBS | SYSTOLIC BLOOD PRESSURE: 144 MMHG | DIASTOLIC BLOOD PRESSURE: 86 MMHG

## 2023-03-20 DIAGNOSIS — J44.9 CHRONIC OBSTRUCTIVE PULMONARY DISEASE, UNSPECIFIED COPD TYPE (HCC): Primary | ICD-10-CM

## 2023-03-20 DIAGNOSIS — E44.1 MILD PROTEIN-CALORIE MALNUTRITION (HCC): ICD-10-CM

## 2023-03-20 DIAGNOSIS — I10 ESSENTIAL HYPERTENSION: ICD-10-CM

## 2023-03-20 DIAGNOSIS — R26.89 POOR BALANCE: ICD-10-CM

## 2023-03-20 DIAGNOSIS — D69.6 DECREASED PLATELET COUNT (HCC): ICD-10-CM

## 2023-03-20 RX ORDER — AMOXICILLIN 875 MG/1
875 TABLET, COATED ORAL 2 TIMES DAILY
Qty: 14 TABLET | Refills: 0 | Status: SHIPPED | OUTPATIENT
Start: 2023-03-20 | End: 2023-03-27

## 2023-03-20 RX ORDER — PREDNISONE 20 MG/1
40 TABLET ORAL DAILY
Qty: 10 TABLET | Refills: 0 | Status: SHIPPED | OUTPATIENT
Start: 2023-03-20 | End: 2023-03-25

## 2023-03-20 NOTE — ASSESSMENT & PLAN NOTE
Stable   May be having side effects from metoprolol  Once she finishes her current treatment for her COPD exacerbation we will have her try taking the metoprolol in the morning instead of at night

## 2023-03-20 NOTE — PROGRESS NOTES
Name: Israel Barnett      : [id number]      MRN: [id number]  Encounter Provider: Amira Dash DO  Encounter Date: 3/20/2023   Encounter department: [facility]     1  Chronic obstructive pulmonary disease, unspecified COPD type ([facility] )  Assessment & Plan:  Not controlled  Encouraged to quit smoking    Orders:  -     predniSONE 20 mg tablet; Take 2 tablets (40 mg total) by mouth daily for 5 days Take with food  -     amoxicillin (AMOXIL) 875 mg tablet; Take 1 tablet (875 mg total) by mouth 2 (two) times a day for 7 days  -     XR chest pa & lateral; Future; Expected date: 2023    2  Mild protein-calorie malnutrition ([place] [address] )    3  Decreased platelet count (HCC)  Assessment & Plan:  Stable       4  Essential hypertension  Assessment & Plan:  Stable   May be having side effects from metoprolol  Once she finishes her current treatment for her COPD exacerbation we will have her try taking the metoprolol in the morning instead of at night      5  Poor balance  Comments: Worsening  Orders:  -     Ambulatory referral to Physical Therapy; Future        Return in about 6 months (around 2023) for Annual Wellness Visit  Subjective      She has not been sleeping well and has to nap during day  She hasn't felt well for 3-4 months  She is also having a hard time with her balance  She feels like she is going to fall backwards  She is coughing and has head congestion       Review of Systems    Current Outpatient Medications on File Prior to Visit   Medication Sig   • alendronate (Fosamax) 70 mg tablet Take 1 tablet (70 mg total) by mouth every 7 days   • ALPRAZolam (XANAX) 0 25 mg tablet Take 1 tablet (0 25 mg total) by mouth daily as needed for anxiety   • Cholecalciferol (VITAMIN D3) 2000 units TABS Take 2,000 Units by mouth daily    • Dulera 200-5 MCG/ACT inhaler INHALE TWO PUFFS BY MOUTH TWICE A DAY - RINSE MOUTH AFTER USE   • esomeprazole (NexIUM) 20 mg capsule Take 20 mg by mouth every morning before breakfast   • methimazole (TAPAZOLE) 5 mg tablet TAKE 1/2 TABLET BY MOUTH DAILY   • metoprolol succinate (TOPROL-XL) 100 mg 24 hr tablet TAKE ONE TABLET BY MOUTH EVERY DAY AT BEDTIME   • Multiple Vitamins-Minerals (MULTIVITAMIN ADULT PO) Take 1 tablet by mouth daily       Objective     /86   Pulse 86   Temp 98 3 °F (36 8 °C)   Resp 18   Ht 5' 4" (1 626 m)   Wt 49 5 kg (109 lb 3 2 oz)   LMP  (LMP Unknown) Comment: post menapausal  BMI 18 74 kg/m²     Physical Exam  Vitals and nursing note reviewed  Constitutional:       Appearance: She is well-developed  HENT:      Head: Normocephalic and atraumatic  Right Ear: Tympanic membrane and external ear normal       Left Ear: Tympanic membrane and external ear normal    Cardiovascular:      Rate and Rhythm: Normal rate and regular rhythm  Heart sounds: Normal heart sounds  No murmur heard  No friction rub  Pulmonary:      Effort: Pulmonary effort is normal  No respiratory distress  Breath sounds: Decreased breath sounds present  No wheezing or rales  Musculoskeletal:      Right lower leg: No edema  Left lower leg: No edema         Amira Dash, DO

## 2023-03-20 NOTE — PATIENT INSTRUCTIONS
Once you finish the antibiotic and prednisone try changing the metoprolol to night time to see if that helps her sleeping

## 2023-05-27 DIAGNOSIS — I10 ESSENTIAL HYPERTENSION: ICD-10-CM

## 2023-05-30 RX ORDER — METOPROLOL SUCCINATE 100 MG/1
TABLET, EXTENDED RELEASE ORAL
Qty: 30 TABLET | Refills: 4 | Status: SHIPPED | OUTPATIENT
Start: 2023-05-30

## 2023-06-05 DIAGNOSIS — J44.9 CHRONIC OBSTRUCTIVE PULMONARY DISEASE, UNSPECIFIED COPD TYPE (HCC): ICD-10-CM

## 2023-06-05 RX ORDER — MOMETASONE FUROATE AND FORMOTEROL FUMARATE DIHYDRATE 200; 5 UG/1; UG/1
AEROSOL RESPIRATORY (INHALATION)
Qty: 13 G | Refills: 5 | Status: SHIPPED | OUTPATIENT
Start: 2023-06-05

## 2023-07-05 NOTE — PATIENT INSTRUCTIONS
Recent Results (from the past 672 hour(s))   Comprehensive metabolic panel    Collection Time: 08/18/22  9:56 AM   Result Value Ref Range    Sodium 139 135 - 147 mmol/L    Potassium 4 0 3 5 - 5 3 mmol/L    Chloride 107 96 - 108 mmol/L    CO2 30 21 - 32 mmol/L    ANION GAP 2 (L) 4 - 13 mmol/L    BUN 7 5 - 25 mg/dL    Creatinine 0 59 (L) 0 60 - 1 30 mg/dL    Glucose, Fasting 94 65 - 99 mg/dL    Calcium 9 1 8 3 - 10 1 mg/dL    Corrected Calcium 9 6 8 3 - 10 1 mg/dL    AST 15 5 - 45 U/L    ALT 14 12 - 78 U/L    Alkaline Phosphatase 70 46 - 116 U/L    Total Protein 6 5 6 4 - 8 4 g/dL    Albumin 3 4 (L) 3 5 - 5 0 g/dL    Total Bilirubin 0 46 0 20 - 1 00 mg/dL    eGFR 88 ml/min/1 73sq m   Lipid Panel with Direct LDL reflex    Collection Time: 08/18/22  9:56 AM   Result Value Ref Range    Cholesterol 155 See Comment mg/dL    Triglycerides 87 See Comment mg/dL    HDL, Direct 66 >=50 mg/dL    LDL Calculated 72 0 - 100 mg/dL   CBC    Collection Time: 08/18/22  9:56 AM   Result Value Ref Range    WBC 5 14 4 31 - 10 16 Thousand/uL    RBC 4 60 3 81 - 5 12 Million/uL    Hemoglobin 14 6 11 5 - 15 4 g/dL    Hematocrit 45 7 34 8 - 46 1 %    MCV 99 (H) 82 - 98 fL    MCH 31 7 26 8 - 34 3 pg    MCHC 31 9 31 4 - 37 4 g/dL    RDW 12 7 11 6 - 15 1 %    Platelets 516 (L) 741 - 390 Thousands/uL    MPV 10 4 8 9 - 12 7 fL       Medicare Preventive Visit Patient Instructions  Thank you for completing your Welcome to Medicare Visit or Medicare Annual Wellness Visit today  Your next wellness visit will be due in one year (8/30/2023)  The screening/preventive services that you may require over the next 5-10 years are detailed below  Some tests may not apply to you based off risk factors and/or age  Screening tests ordered at today's visit but not completed yet may show as past due  Also, please note that scanned in results may not display below    Preventive Screenings:  Service Recommendations Previous Testing/Comments   Colorectal Cancer Screening  * Colonoscopy    * Fecal Occult Blood Test (FOBT)/Fecal Immunochemical Test (FIT)  * Fecal DNA/Cologuard Test  * Flexible Sigmoidoscopy Age: 39-70 years old   Colonoscopy: every 10 years (may be performed more frequently if at higher risk)  OR  FOBT/FIT: every 1 year  OR  Cologuard: every 3 years  OR  Sigmoidoscopy: every 5 years  Screening may be recommended earlier than age 39 if at higher risk for colorectal cancer  Also, an individualized decision between you and your healthcare provider will decide whether screening between the ages of 74-80 would be appropriate  Colonoscopy: Not on file  FOBT/FIT: Not on file  Cologuard: Not on file  Sigmoidoscopy: Not on file          Breast Cancer Screening Age: 36 years old  Frequency: every 1-2 years  Not required if history of left and right mastectomy Mammogram: 02/28/2019        Cervical Cancer Screening Between the ages of 21-29, pap smear recommended once every 3 years  Between the ages of 33-67, can perform pap smear with HPV co-testing every 5 years  Recommendations may differ for women with a history of total hysterectomy, cervical cancer, or abnormal pap smears in past  Pap Smear: Not on file    Screening Not Indicated   Hepatitis C Screening Once for adults born between 1945 and 1965  More frequently in patients at high risk for Hepatitis C Hep C Antibody: 06/14/2018    Screening Current   Diabetes Screening 1-2 times per year if you're at risk for diabetes or have pre-diabetes Fasting glucose: 94 mg/dL (8/18/2022)  A1C: 5 3 % (2/28/2022)  Screening Current   Cholesterol Screening Once every 5 years if you don't have a lipid disorder  May order more often based on risk factors  Lipid panel: 08/18/2022    Screening Current     Other Preventive Screenings Covered by Medicare:  Abdominal Aortic Aneurysm (AAA) Screening: covered once if your at risk  You're considered to be at risk if you have a family history of AAA    Lung Cancer Screening: covers low dose CT scan once per year if you meet all of the following conditions: (1) Age 50-69; (2) No signs or symptoms of lung cancer; (3) Current smoker or have quit smoking within the last 15 years; (4) You have a tobacco smoking history of at least 20 pack years (packs per day multiplied by number of years you smoked); (5) You get a written order from a healthcare provider  Glaucoma Screening: covered annually if you're considered high risk: (1) You have diabetes OR (2) Family history of glaucoma OR (3)  aged 48 and older OR (3)  American aged 72 and older  Osteoporosis Screening: covered every 2 years if you meet one of the following conditions: (1) You're estrogen deficient and at risk for osteoporosis based off medical history and other findings; (2) Have a vertebral abnormality; (3) On glucocorticoid therapy for more than 3 months; (4) Have primary hyperparathyroidism; (5) On osteoporosis medications and need to assess response to drug therapy  Last bone density test (DXA Scan): 08/03/2021  HIV Screening: covered annually if you're between the age of 12-76  Also covered annually if you are younger than 13 and older than 72 with risk factors for HIV infection  For pregnant patients, it is covered up to 3 times per pregnancy      Immunizations:  Immunization Recommendations   Influenza Vaccine Annual influenza vaccination during flu season is recommended for all persons aged >= 6 months who do not have contraindications   Pneumococcal Vaccine   * Pneumococcal conjugate vaccine = PCV13 (Prevnar 13), PCV15 (Vaxneuvance), PCV20 (Prevnar 20)  * Pneumococcal polysaccharide vaccine = PPSV23 (Pneumovax) Adults 25-60 years old: 1-3 doses may be recommended based on certain risk factors  Adults 72 years old: 1-2 doses may be recommended based off what pneumonia vaccine you previously received   Hepatitis B Vaccine 3 dose series if at intermediate or high risk (ex: diabetes, end stage renal disease, liver disease)   Tetanus (Td) Vaccine - COST NOT COVERED BY MEDICARE PART B Following completion of primary series, a booster dose should be given every 10 years to maintain immunity against tetanus  Td may also be given as tetanus wound prophylaxis  Tdap Vaccine - COST NOT COVERED BY MEDICARE PART B Recommended at least once for all adults  For pregnant patients, recommended with each pregnancy  Shingles Vaccine (Shingrix) - COST NOT COVERED BY MEDICARE PART B  2 shot series recommended in those aged 48 and above     Health Maintenance Due:      Topic Date Due    DXA SCAN  08/03/2023    Hepatitis C Screening  Completed     Immunizations Due:      Topic Date Due    COVID-19 Vaccine (1) Never done    Pneumococcal Vaccine: 65+ Years (2 - PCV) 07/18/2014    Influenza Vaccine (1) 09/01/2022     Advance Directives   What are advance directives? Advance directives are legal documents that state your wishes and plans for medical care  These plans are made ahead of time in case you lose your ability to make decisions for yourself  Advance directives can apply to any medical decision, such as the treatments you want, and if you want to donate organs  What are the types of advance directives? There are many types of advance directives, and each state has rules about how to use them  You may choose a combination of any of the following:  Living will: This is a written record of the treatment you want  You can also choose which treatments you do not want, which to limit, and which to stop at a certain time  This includes surgery, medicine, IV fluid, and tube feedings  Durable power of  for healthcare [facility]): This is a written record that states who you want to make healthcare choices for you when you are unable to make them for yourself  This person, called a proxy, is usually a family member or a friend  You may choose more than 1 proxy    Do not resuscitate (DNR) order:  A DNR order is used in case your heart stops beating or you stop breathing  It is a request not to have certain forms of treatment, such as CPR  A DNR order may be included in other types of advance directives  Medical directive: This covers the care that you want if you are in a coma, near death, or unable to make decisions for yourself  You can list the treatments you want for each condition  Treatment may include pain medicine, surgery, blood transfusions, dialysis, IV or tube feedings, and a ventilator (breathing machine)  Values history: This document has questions about your views, beliefs, and how you feel and think about life  This information can help others choose the care that you would choose  Why are advance directives important? An advance directive helps you control your care  Although spoken wishes may be used, it is better to have your wishes written down  Spoken wishes can be misunderstood, or not followed  Treatments may be given even if you do not want them  An advance directive may make it easier for your family to make difficult choices about your care  Fall Prevention    Fall prevention  includes ways to make your home and other areas safer  It also includes ways you can move more carefully to prevent a fall  Health conditions that cause changes in your blood pressure, vision, or muscle strength and coordination may increase your risk for falls  Medicines may also increase your risk for falls if they make you dizzy, weak, or sleepy  Fall prevention tips:   Stand or sit up slowly  Use assistive devices as directed  Wear shoes that fit well and have soles that   Wear a personal alarm  Stay active  Manage your medical conditions  Home Safety Tips:  Add items to prevent falls in the bathroom  Keep paths clear  Install bright lights in your home  Keep items you use often on shelves within reach  [place] or place reflective tape on the edges of your stairs      Urinary Incontinence Urinary incontinence (UI)  is when you lose control of your bladder  UI develops because your bladder cannot store or empty urine properly  The 3 most common types of UI are stress incontinence, urge incontinence, or both  Medicines:   May be given to help strengthen your bladder control  Report any side effects of medication to your healthcare provider  Do pelvic muscle exercises often:  Your pelvic muscles help you stop urinating  Squeeze these muscles tight for 5 seconds, then relax for 5 seconds  Gradually work up to squeezing for 10 seconds  Do 3 sets of 15 repetitions a day, or as directed  This will help strengthen your pelvic muscles and improve bladder control  Train your bladder:  Go to the bathroom at set times, such as every 2 hours, even if you do not feel the urge to go  You can also try to hold your urine when you feel the urge to go  For example, hold your urine for 5 minutes when you feel the urge to go  As that becomes easier, hold your urine for 10 minutes  Self-care:   Keep a UI record  Write down how often you leak urine and how much you leak  Make a note of what you were doing when you leaked urine  Drink liquids as directed  You may need to limit the amount of liquid you drink to help control your urine leakage  Do not drink any liquid right before you go to bed  Limit or do not have drinks that contain caffeine or alcohol  Prevent constipation  Eat a variety of high-fiber foods  Good examples are high-fiber cereals, beans, vegetables, and whole-grain breads  Walking is the best way to trigger your intestines to have a bowel movement  Exercise regularly and maintain a healthy weight  Weight loss and exercise will decrease pressure on your bladder and help you control your leakage  Use a catheter as directed  to help empty your bladder  A catheter is a tiny, plastic tube that is put into your bladder to drain your urine  Go to behavior therapy as directed    Behavior therapy may be used to help you learn to control your urge to urinate  Cigarette Smoking and Your Health   Risks to your health if you smoke:  Nicotine and other chemicals found in tobacco damage every cell in your body  Even if you are a light smoker, you have an increased risk for cancer, heart disease, and lung disease  If you are pregnant or have diabetes, smoking increases your risk for complications  Benefits to your health if you stop smoking: You decrease respiratory symptoms such as coughing, wheezing, and shortness of breath  You reduce your risk for cancers of the lung, mouth, throat, kidney, bladder, pancreas, stomach, and cervix  If you already have cancer, you increase the benefits of chemotherapy  You also reduce your risk for cancer returning or a second cancer from developing  You reduce your risk for heart disease, blood clots, heart attack, and stroke  You reduce your risk for lung infections, and diseases such as pneumonia, asthma, chronic bronchitis, and emphysema  Your circulation improves  More oxygen can be delivered to your body  If you have diabetes, you lower your risk for complications, such as kidney, artery, and eye diseases  You also lower your risk for nerve damage  Nerve damage can lead to amputations, poor vision, and blindness  You improve your body's ability to heal and to fight infections  For more information and support to stop smoking:   Smokefree  [employer]  Phone: 8- 451 - 766-4328  Web Address: www [employer]   [employer] 2018 Information is for End User's use only and may not be sold, redistributed or otherwise used for commercial purposes  All illustrations and images included in CareNotes® are the copyrighted property of A D A M , Inc  or [facility] Wilmar Rooney  Core Strengthening Exercises   AMBULATORY CARE:   What you need to know about core strengthening exercises: Your core includes the muscles of your lower back, hip, pelvis, and abdomen   Core strengthening exercises help heal and strengthen these muscles  This helps prevent another injury, and keeps your pelvis, spine, and hips in the correct position  Contact your healthcare provider if:   You have sharp or worsening pain during exercise or at rest     You have questions or concerns about your shoulder exercises  Safety tips:  Talk to your healthcare provider before you start an exercise program  A physical therapist can teach you how to do core strengthening exercises safely  Do the exercises on a mat or firm surface  A firm surface will support your spine and prevent low back pain  Do not do these exercises on a bed  Move slowly and smoothly  Avoid fast or jerky motions  Stop if you feel pain  Core exercises should not be painful  Stop if you feel pain  Breathe normally during core exercises  Do not hold your breath  This may cause an increase in blood pressure and prevent muscle strengthening  Your healthcare provider will tell you when to inhale and exhale during the exercise  Begin all of your exercises with abdominal bracing  Abdominal bracing helps warm up your core muscles  You can also practice abdominal bracing throughout the day  Lie on your back with your knees bent and feet flat on the floor  Place your arms in a relaxed position beside your body  Tighten your abdominal muscles  Pull your belly button in and up toward your spine  Hold for 5 seconds  Relax your muscles  Repeat 10 times  Core strengthening exercises: Your healthcare provider will tell you how often to do these exercises  The provider will also tell you how many repetitions of each exercise you should do  Hold each exercise for 5 seconds or as directed  As you get stronger, increase your hold to 10 to 15 seconds  You can do some of these exercises on a stability ball, or with a weight   Ask your healthcare provider how to use a stability ball or weight for these exercises:  Bridging:  Lie on your back with your knees bent and feet flat on the floor  Rest your arms at your side  Tighten your buttocks, and then lift your hips 1 inch off the floor  Hold for 5 seconds  When you can do this exercise without pain for 10 seconds, increase the distance you lift your hips  A good goal is to be able to lift your hips so that your shoulders, hips, and knees are in a straight line  Dead bug:  Lie on your back with your knees bent and feet flat on the floor  Place your arms in a relaxed position beside your body  Begin with abdominal bracing  Next, raise one leg, keeping your knee bent  Hold for 5 seconds  Repeat with the other leg  When you can do this exercise without pain for 10 to 15 seconds, you may raise one straight leg and hold  Repeat with the other leg  Quadruped:  Place your hands and knees on the floor  Keep your wrists directly below your shoulders and your knees directly below your hips  Pull your belly button in toward your spine  Do not flatten or arch your back  Tighten your abdominal muscles below your belly button  Hold for 5 seconds  When you can do this exercise without pain for 10 to 15 seconds, you may extend one arm and hold  Repeat on the other side  Side bridge exercises:      Standing side bridge:  Stand next to a wall and extend one arm toward the wall  Place your palm flat on the wall with your fingers pointing upward  Begin with abdominal bracing  Next, without moving your feet, slowly bend your arm to 90 degrees  Hold for 5 seconds  Repeat on the other side  When you can do this exercise without pain for 10 to 15 seconds, you may do the bent leg side bridge on the floor  Bent leg side bridge:  Lie on one side with your legs, hips, and shoulders in a straight line  Prop yourself up onto your forearm so your elbow is directly below your shoulder  Bend your knees back to 90 degrees  Begin with abdominal bracing   Next, lift your hips and balance yourself on your forearm and knees  Hold for 5 seconds  Repeat on the other side  When you can do this exercise without pain for 10 to 15 seconds, you may do the straight leg side bridge on the floor  Straight leg side bridge:  Lie on one side with your legs, hips, and shoulders in a straight line  Prop yourself up onto your forearm so your elbow is directly below your shoulder  Begin with abdominal bracing  Lift your hips off the floor and balance yourself on your forearm and the outside of your flexed foot  Do not let your ankle bend sideways  Hold for 5 seconds  Repeat on the other side  When you can do this exercise without pain for 10 to 15 seconds, ask your healthcare provider for more advanced exercises  Superman:  Lie on your stomach  Extend your arms forward on the floor  Tighten your abdominal muscles and lift your right hand and left leg off the floor  Hold this position  Slowly return to the starting position  Tighten your abdominal muscles and lift your left hand and right leg off the floor  Hold this position  Slowly return to the starting position  Clam:  Lie on your side with your knees bent  Put your bottom arm under your head to keep your neck in line  Put your top hand on your hip to keep your pelvis from moving  Put your heels together, and keep them together during this exercise  Slowly raise your top knee toward the ceiling  Then lower your leg so your knees are together  Repeat this exercise 10 times  Then switch sides and do the exercise 10 times with the other leg  Curl up:  Lie on your back with your knees bent and feet flat on the floor  Place your hands, palms down, underneath your lower back  Next, with your elbows on the floor, lift your shoulders and chest 2 to 3 inches off the floor  Keep your head in line with your shoulders  Hold this position  Slowly return to the starting position  Straight leg raises:  Lie on your back with one leg straight   Bend the other knee and place your foot flat on the floor  Tighten your abdominal muscles  Keep your leg straight and slowly lift it straight up 6 to 12 inches off the floor  Hold this position  Lower your leg slowly  Do as many repetitions as directed on this side  Repeat with the other leg  Plank:  Lie on your stomach  Bend your elbows and place your forearms flat on the floor  Lift your chest, stomach, and knees off the floor  Make sure your elbows are below your shoulders  Your body should be in a straight line  Do not let your hips or lower back sink to the ground  Squeeze your abdominal muscles together and hold for 15 seconds  To make this exercise harder, hold for 30 seconds or lift 1 leg at a time  Bicycles:  Lie on your back  Bend both knees and bring them toward your chest  Your calves should be parallel to the floor  Place the palms of your hands on the back of your head  Straighten your right leg and keep it lifted 2 inches off the floor  Raise your head and shoulders off the floor and twist towards your left  Keep your head and shoulders lifted  Bend your right knee while you straighten your left leg  Keep your left leg 2 inches off the floor  Twist your head and chest towards the left leg  Continue to straighten 1 leg at a time and twist        Follow up with your doctor as directed:  Write down your questions so you remember to ask them during your visits  © [employer] 2022 Information is for End User's use only and may not be sold, redistributed or otherwise used for commercial purposes  All illustrations and images included in CareNotes® are the copyrighted property of A D A M , Inc  or [facility] Wilmar Cook   The above information is an  only  It is not intended as medical advice for individual conditions or treatments  Talk to your doctor, nurse or pharmacist before following any medical regimen to see if it is safe and effective for you    Pavelgel Exercises for Women   AMBULATORY CARE:   Kegel exercises  help strengthen your pelvic muscles  Pelvic muscles hold your pelvic organs, such as your bladder and uterus, in place  Kegel exercises help prevent or control problems with urine incontinence (leakage)  Incontinence may be caused by pregnancy, childbirth, or menopause  Contact your healthcare provider if:   You cannot feel your muscles tighten or relax  You continue to leak urine  You have questions or concerns about your condition or care  Use the correct muscles:  Pelvic muscles are the muscles you use to control urine flow  To target these muscles, stop and start the flow of urine several times  This will help you become familiar with how it feels to tighten and relax these muscles  How to do Kegel exercises:   Empty your bladder  You may lie down, stand up, or sit down to do these exercises  When you first try to do these exercises, it may be easier if you lie down  Tighten or squeeze your pelvic muscles slowly  It may feel like you are trying to hold back urine or gas  Hold this position for 3 seconds  Relax for 3 seconds  Repeat this cycle 10 times  Do 10 sets of Kegel exercises, at least 3 times a day  Do not hold your breath when you do Kegel exercises  Keep your stomach, back, and leg muscles relaxed  As your muscles get stronger, you will be able to hold the squeeze longer  Your healthcare provider may ask that you increase your pelvic muscle squeeze to 10 seconds  After you squeeze for 10 seconds, relax for 10 seconds  What else you should know:   Once you know how to do Kegel exercises, use different positions  You can do these exercises while you lie on the floor, sit at your desk or watch TV, and while you stand  You may notice improved bladder control within about 6 weeks  Tighten your pelvic muscles before you sneeze, cough, or lift to prevent urine leakage      Follow up with your doctor as directed:  Write down your questions so you remember to ask them during your visits  © Copyright [facility] 2022 Information is for End User's use only and may not be sold, redistributed or otherwise used for commercial purposes  All illustrations and images included in CareNotes® are the copyrighted property of A [employer] A M , Inc  or Dot Rooney  The above information is an  only  It is not intended as medical advice for individual conditions or treatments  Talk to your doctor, nurse or pharmacist before following any medical regimen to see if it is safe and effective for you  patient requiring admission for candida and new onset anemia Consideration of Admission/Observation

## 2023-10-11 ENCOUNTER — RA CDI HCC (OUTPATIENT)
Dept: OTHER | Facility: HOSPITAL | Age: 78
End: 2023-10-11

## 2023-10-23 DIAGNOSIS — I10 ESSENTIAL HYPERTENSION: ICD-10-CM

## 2023-10-23 RX ORDER — METOPROLOL SUCCINATE 100 MG/1
TABLET, EXTENDED RELEASE ORAL
Qty: 30 TABLET | Refills: 5 | Status: SHIPPED | OUTPATIENT
Start: 2023-10-23

## 2023-11-27 DIAGNOSIS — J44.9 CHRONIC OBSTRUCTIVE PULMONARY DISEASE, UNSPECIFIED COPD TYPE (HCC): ICD-10-CM

## 2023-11-27 RX ORDER — MOMETASONE FUROATE AND FORMOTEROL FUMARATE DIHYDRATE 200; 5 UG/1; UG/1
AEROSOL RESPIRATORY (INHALATION)
Qty: 13 G | Refills: 5 | Status: SHIPPED | OUTPATIENT
Start: 2023-11-27

## 2023-12-04 ENCOUNTER — TELEPHONE (OUTPATIENT)
Age: 78
End: 2023-12-04

## 2023-12-04 DIAGNOSIS — I10 ESSENTIAL HYPERTENSION: Primary | ICD-10-CM

## 2023-12-04 DIAGNOSIS — E05.90 SUBCLINICAL HYPERTHYROIDISM: ICD-10-CM

## 2023-12-04 DIAGNOSIS — E55.9 VITAMIN D DEFICIENCY: ICD-10-CM

## 2023-12-04 NOTE — TELEPHONE ENCOUNTER
Yes, I would like her to get lab work done  [employer] order in chart (sent to Robert greenberg)  Thank you,  Dior Arguello, DO

## 2023-12-04 NOTE — TELEPHONE ENCOUNTER
Christiano Christine rescheduled her apt with Dr. Keri Durant today to [place].  Does she need to get bloodwork done before her AWV? Please let patient know.      Thank you

## 2024-01-09 NOTE — TELEPHONE ENCOUNTER
Donya Barillas has an apt scheduled with Dr Zeynep Campbell on 2/22  Can we please refill medication  She is aware that she has blood work orders to get done before apt 
No refills, needs as soon as possible please 
Requested medication(s) are due for refill today: Yes  Patient has already received a courtesy refill: No  Other reason request has been forwarded to provider:failed protocol  Patient made appt for later this month- #30 perhaps ?
pablo  And make an appt -over a year since last visit--maurilio
200

## 2024-01-17 ENCOUNTER — RA CDI HCC (OUTPATIENT)
Dept: OTHER | Facility: HOSPITAL | Age: 79
End: 2024-01-17

## 2024-02-26 ENCOUNTER — TELEPHONE (OUTPATIENT)
Age: 79
End: 2024-02-26

## 2024-02-26 NOTE — TELEPHONE ENCOUNTER
Prior auth asking if patient has tried or if you are will to switch to breo elisravanthi or nathalie Pinto  They are faxing over a letter for Dr. Marcus to fill out

## 2024-02-27 ENCOUNTER — TELEPHONE (OUTPATIENT)
Dept: FAMILY MEDICINE CLINIC | Facility: CLINIC | Age: 79
End: 2024-02-27

## 2024-03-25 ENCOUNTER — TELEPHONE (OUTPATIENT)
Age: 79
End: 2024-03-25

## 2024-03-25 NOTE — TELEPHONE ENCOUNTER
Entered patients chart to verify if patient had active lab work.  Advised patient that she does have active lab work in her chart that was ordered in December for her by Dr. Marcus.

## 2024-03-27 LAB
25(OH)D3 SERPL-MCNC: 37 NG/ML (ref 30–100)
ALBUMIN SERPL-MCNC: 4.1 G/DL (ref 3.6–5.1)
ALBUMIN/GLOB SERPL: 2 (CALC) (ref 1–2.5)
ALP SERPL-CCNC: 71 U/L (ref 37–153)
ALT SERPL-CCNC: 9 U/L (ref 6–29)
AST SERPL-CCNC: 18 U/L (ref 10–35)
BILIRUB SERPL-MCNC: 0.5 MG/DL (ref 0.2–1.2)
BUN SERPL-MCNC: 12 MG/DL (ref 7–25)
BUN/CREAT SERPL: 21 (CALC) (ref 6–22)
CALCIUM SERPL-MCNC: 9.4 MG/DL (ref 8.6–10.4)
CHLORIDE SERPL-SCNC: 103 MMOL/L (ref 98–110)
CHOLEST SERPL-MCNC: 161 MG/DL
CHOLEST/HDLC SERPL: 2.6 (CALC)
CO2 SERPL-SCNC: 32 MMOL/L (ref 20–32)
CREAT SERPL-MCNC: 0.56 MG/DL (ref 0.6–0.95)
ERYTHROCYTE [DISTWIDTH] IN BLOOD BY AUTOMATED COUNT: 12.1 % (ref 11–15)
GFR/BSA.PRED SERPLBLD CYS-BASED-ARV: 92 ML/MIN/1.73M2
GLOBULIN SER CALC-MCNC: 2.1 G/DL (CALC) (ref 1.9–3.7)
GLUCOSE SERPL-MCNC: 109 MG/DL (ref 65–99)
HCT VFR BLD AUTO: 47.2 % (ref 35–45)
HDLC SERPL-MCNC: 61 MG/DL
HGB BLD-MCNC: 15.7 G/DL (ref 11.7–15.5)
LDLC SERPL CALC-MCNC: 72 MG/DL (CALC)
MCH RBC QN AUTO: 31.2 PG (ref 27–33)
MCHC RBC AUTO-ENTMCNC: 33.3 G/DL (ref 32–36)
MCV RBC AUTO: 93.7 FL (ref 80–100)
NONHDLC SERPL-MCNC: 100 MG/DL (CALC)
PLATELET # BLD AUTO: 146 THOUSAND/UL (ref 140–400)
PMV BLD REES-ECKER: 11.1 FL (ref 7.5–12.5)
POTASSIUM SERPL-SCNC: 4.2 MMOL/L (ref 3.5–5.3)
PROT SERPL-MCNC: 6.2 G/DL (ref 6.1–8.1)
RBC # BLD AUTO: 5.04 MILLION/UL (ref 3.8–5.1)
SODIUM SERPL-SCNC: 139 MMOL/L (ref 135–146)
T4 FREE SERPL-MCNC: 1.2 NG/DL (ref 0.8–1.8)
TRIGL SERPL-MCNC: 180 MG/DL
TSH SERPL-ACNC: 0.66 MIU/L (ref 0.4–4.5)
WBC # BLD AUTO: 5.3 THOUSAND/UL (ref 3.8–10.8)

## 2024-04-03 ENCOUNTER — TELEPHONE (OUTPATIENT)
Dept: FAMILY MEDICINE CLINIC | Facility: CLINIC | Age: 79
End: 2024-04-03

## 2024-04-03 DIAGNOSIS — J44.9 CHRONIC OBSTRUCTIVE PULMONARY DISEASE, UNSPECIFIED COPD TYPE (HCC): Primary | ICD-10-CM

## 2024-04-03 NOTE — TELEPHONE ENCOUNTER
4/3/2024 2:39 PM Called Patrica regarding her Dulera prescription.    Her prescription and plan sent a letter stating that there are multiple other medication she can try that are lower tier than the Dulera.  I would like to discuss with her different options.    Options I would like her to consider are Breo and Vicente    Left message on her voicemail to call the office.   Janine Marcus, DO

## 2024-04-05 NOTE — TELEPHONE ENCOUNTER
4/5/2024 12:46 PM Called Patrica again    Spoke with Patrica  She has been taking the Wixella at no cost to her.    Janine Marcus, DO

## 2024-04-19 DIAGNOSIS — I10 ESSENTIAL HYPERTENSION: ICD-10-CM

## 2024-04-19 RX ORDER — METOPROLOL SUCCINATE 100 MG/1
100 TABLET, EXTENDED RELEASE ORAL
Qty: 30 TABLET | Refills: 5 | Status: SHIPPED | OUTPATIENT
Start: 2024-04-19

## 2024-05-17 ENCOUNTER — OFFICE VISIT (OUTPATIENT)
Dept: FAMILY MEDICINE CLINIC | Facility: CLINIC | Age: 79
End: 2024-05-17
Payer: MEDICARE

## 2024-05-17 VITALS
WEIGHT: 104.6 LBS | BODY MASS INDEX: 17.86 KG/M2 | RESPIRATION RATE: 18 BRPM | HEIGHT: 64 IN | SYSTOLIC BLOOD PRESSURE: 148 MMHG | DIASTOLIC BLOOD PRESSURE: 96 MMHG | HEART RATE: 84 BPM | TEMPERATURE: 97.2 F

## 2024-05-17 DIAGNOSIS — J20.9 ACUTE BRONCHITIS, UNSPECIFIED ORGANISM: ICD-10-CM

## 2024-05-17 DIAGNOSIS — M79.89 LEFT LEG SWELLING: ICD-10-CM

## 2024-05-17 DIAGNOSIS — M79.89 LEG SWELLING: Primary | ICD-10-CM

## 2024-05-17 DIAGNOSIS — J44.9 CHRONIC OBSTRUCTIVE PULMONARY DISEASE, UNSPECIFIED COPD TYPE (HCC): ICD-10-CM

## 2024-05-17 DIAGNOSIS — R60.0 EDEMA LEG: ICD-10-CM

## 2024-05-17 DIAGNOSIS — Z72.0 TOBACCO USE: ICD-10-CM

## 2024-05-17 PROCEDURE — 99214 OFFICE O/P EST MOD 30 MIN: CPT | Performed by: FAMILY MEDICINE

## 2024-05-17 PROCEDURE — G2211 COMPLEX E/M VISIT ADD ON: HCPCS | Performed by: FAMILY MEDICINE

## 2024-05-17 RX ORDER — CEFDINIR 300 MG/1
300 CAPSULE ORAL 2 TIMES DAILY
Qty: 20 CAPSULE | Refills: 0 | Status: SHIPPED | OUTPATIENT
Start: 2024-05-17 | End: 2024-05-27

## 2024-05-17 NOTE — PROGRESS NOTES
Assessment/Plan:    No problem-specific Assessment & Plan notes found for this encounter.    Echo and venous duplex ordered  Leg swelling   Doubt dvt    Bronchitis new  Use otc mucinex dm also    Tob use/copd  Risks aware  Continue ics/laba  Discussed yariel use for future    Elevated bp  On toprol  Monitor and f/u with pcp     Diagnoses and all orders for this visit:    Leg swelling  -     Echo complete w/ contrast if indicated; Future    Left leg swelling  -     VAS VENOUS DUPLEX -LOWER LIMB UNILATERAL; Future    Acute bronchitis, unspecified organism  -     cefdinir (OMNICEF) 300 mg capsule; Take 1 capsule (300 mg total) by mouth 2 (two) times a day for 10 days    Chronic obstructive pulmonary disease, unspecified COPD type (HCC)    Edema leg  -     VAS VENOUS DUPLEX -LOWER LIMB UNILATERAL; Future    Tobacco use        Return if symptoms worsen or fail to improve.    Subjective:      Patient ID: Patrica Grimaldo is a 79 y.o. female.    Chief Complaint   Patient presents with    Nasal Congestion     X 2 weeks  Belinda Richardson, JADYN        HPI  Clear nasal dc but yellow phlegm  Smoker  Risks aware  Left post nasal drip  No fever but chills  Has no yariel  On wixela    Some allegra  Not much help    No more sob than usual  Smokes about 1/2ppd     Having back pain, recurrent  No cp  Occasional left leg swelling at times  Swelling better in am  Not swollen on right  Worried about her heart  Sister had heart valve surgery    The following portions of the patient's history were reviewed and updated as appropriate: allergies, current medications, past family history, past medical history, past social history, past surgical history and problem list.    Review of Systems   Respiratory:  Positive for cough. Negative for wheezing.          Current Outpatient Medications   Medication Sig Dispense Refill    alendronate (Fosamax) 70 mg tablet Take 1 tablet (70 mg total) by mouth every 7 days 12 tablet 3    ALPRAZolam (XANAX) 0.25 mg  "tablet Take 1 tablet (0.25 mg total) by mouth daily as needed for anxiety 30 tablet 3    cefdinir (OMNICEF) 300 mg capsule Take 1 capsule (300 mg total) by mouth 2 (two) times a day for 10 days 20 capsule 0    Cholecalciferol (VITAMIN D3) 2000 units TABS Take 2,000 Units by mouth daily       esomeprazole (NexIUM) 20 mg capsule Take 20 mg by mouth every morning before breakfast      Fluticasone-Salmeterol (Wixela Inhub) 250-50 mcg/dose inhaler Inhale 1 puff 2 (two) times a day Rinse mouth after use. 60 blister 3    methimazole (TAPAZOLE) 5 mg tablet TAKE 1/2 TABLET BY MOUTH DAILY 45 tablet 1    metoprolol succinate (TOPROL-XL) 100 mg 24 hr tablet TAKE ONE TABLET BY MOUTH AT BEDTIME 30 tablet 5    Multiple Vitamins-Minerals (MULTIVITAMIN ADULT PO) Take 1 tablet by mouth daily       No current facility-administered medications for this visit.       Objective:    /96   Pulse 84   Temp (!) 97.2 °F (36.2 °C)   Resp 18   Ht 5' 4\" (1.626 m)   Wt 47.4 kg (104 lb 9.6 oz)   LMP  (LMP Unknown) Comment: post menapausal  BMI 17.95 kg/m²        Physical Exam  Vitals and nursing note reviewed.   Constitutional:       General: She is not in acute distress.     Appearance: She is well-developed. She is not ill-appearing.   HENT:      Head: Normocephalic.      Right Ear: Tympanic membrane normal.      Left Ear: Tympanic membrane normal.      Mouth/Throat:      Mouth: Mucous membranes are moist.   Eyes:      General: No scleral icterus.     Conjunctiva/sclera: Conjunctivae normal.   Cardiovascular:      Rate and Rhythm: Normal rate and regular rhythm.      Pulses: Normal pulses.      Heart sounds: No murmur heard.  Pulmonary:      Effort: Pulmonary effort is normal. No respiratory distress.      Breath sounds: No stridor. No wheezing.      Comments: Coarse midline cough present.    Abdominal:      Palpations: Abdomen is soft.   Musculoskeletal:         General: No deformity.      Cervical back: Neck supple.      Right " lower leg: No edema.      Left lower leg: Edema present.      Comments: Homans neg   Skin:     General: Skin is warm and dry.   Neurological:      Mental Status: She is alert.      Motor: No weakness.      Gait: Gait normal.   Psychiatric:         Mood and Affect: Mood normal.         Behavior: Behavior normal.         Thought Content: Thought content normal.                Ravi Amaya DO

## 2024-06-28 DIAGNOSIS — J44.9 CHRONIC OBSTRUCTIVE PULMONARY DISEASE, UNSPECIFIED COPD TYPE (HCC): ICD-10-CM

## 2024-06-28 RX ORDER — FLUTICASONE PROPIONATE AND SALMETEROL 250; 50 UG/1; UG/1
POWDER RESPIRATORY (INHALATION)
Qty: 60 BLISTER | Refills: 1 | Status: SHIPPED | OUTPATIENT
Start: 2024-06-28

## 2024-06-28 NOTE — TELEPHONE ENCOUNTER
PT's son called to request this refill to be completed today, since PT only has one dose left and nothing for the weekend.    Thank You

## 2024-07-18 ENCOUNTER — HOSPITAL ENCOUNTER (EMERGENCY)
Facility: HOSPITAL | Age: 79
Discharge: HOME | End: 2024-07-18
Attending: EMERGENCY MEDICINE
Payer: COMMERCIAL

## 2024-07-18 ENCOUNTER — APPOINTMENT (EMERGENCY)
Dept: RADIOLOGY | Facility: HOSPITAL | Age: 79
End: 2024-07-18
Payer: COMMERCIAL

## 2024-07-18 VITALS
TEMPERATURE: 98 F | DIASTOLIC BLOOD PRESSURE: 66 MMHG | RESPIRATION RATE: 20 BRPM | SYSTOLIC BLOOD PRESSURE: 117 MMHG | OXYGEN SATURATION: 95 % | HEART RATE: 98 BPM

## 2024-07-18 DIAGNOSIS — K59.00 CONSTIPATION: ICD-10-CM

## 2024-07-18 DIAGNOSIS — R10.11 RIGHT UPPER QUADRANT ABDOMINAL PAIN: Primary | ICD-10-CM

## 2024-07-18 LAB
ALBUMIN SERPL BCG-MCNC: 4 G/DL (ref 3.5–5)
ALP SERPL-CCNC: 71 U/L (ref 34–104)
ALT SERPL W P-5'-P-CCNC: 7 U/L (ref 7–52)
ANION GAP SERPL CALCULATED.3IONS-SCNC: 7 MMOL/L (ref 4–13)
AST SERPL W P-5'-P-CCNC: 14 U/L (ref 13–39)
BACTERIA UR QL AUTO: NORMAL /HPF
BASOPHILS # BLD AUTO: 0.03 THOUSANDS/ÂΜL (ref 0–0.1)
BASOPHILS NFR BLD AUTO: 0 % (ref 0–1)
BILIRUB SERPL-MCNC: 0.55 MG/DL (ref 0.2–1)
BILIRUB UR QL STRIP: NEGATIVE
BUN SERPL-MCNC: 10 MG/DL (ref 5–25)
CALCIUM SERPL-MCNC: 9.4 MG/DL (ref 8.4–10.2)
CHLORIDE SERPL-SCNC: 99 MMOL/L (ref 96–108)
CLARITY UR: CLEAR
CO2 SERPL-SCNC: 30 MMOL/L (ref 21–32)
COLOR UR: YELLOW
CREAT SERPL-MCNC: 0.56 MG/DL (ref 0.6–1.3)
EOSINOPHIL # BLD AUTO: 0.11 THOUSAND/ÂΜL (ref 0–0.61)
EOSINOPHIL NFR BLD AUTO: 2 % (ref 0–6)
ERYTHROCYTE [DISTWIDTH] IN BLOOD BY AUTOMATED COUNT: 12.2 % (ref 11.6–15.1)
GFR SERPL CREATININE-BSD FRML MDRD: 88 ML/MIN/1.73SQ M
GLUCOSE SERPL-MCNC: 121 MG/DL (ref 65–140)
GLUCOSE UR STRIP-MCNC: NEGATIVE MG/DL
HCT VFR BLD AUTO: 46.2 % (ref 34.8–46.1)
HGB BLD-MCNC: 15.2 G/DL (ref 11.5–15.4)
HGB UR QL STRIP.AUTO: ABNORMAL
IMM GRANULOCYTES # BLD AUTO: 0.03 THOUSAND/UL (ref 0–0.2)
IMM GRANULOCYTES NFR BLD AUTO: 0 % (ref 0–2)
KETONES UR STRIP-MCNC: NEGATIVE MG/DL
LEUKOCYTE ESTERASE UR QL STRIP: ABNORMAL
LIPASE SERPL-CCNC: 20 U/L (ref 11–82)
LYMPHOCYTES # BLD AUTO: 1.54 THOUSANDS/ÂΜL (ref 0.6–4.47)
LYMPHOCYTES NFR BLD AUTO: 22 % (ref 14–44)
MCH RBC QN AUTO: 31.7 PG (ref 26.8–34.3)
MCHC RBC AUTO-ENTMCNC: 32.9 G/DL (ref 31.4–37.4)
MCV RBC AUTO: 96 FL (ref 82–98)
MONOCYTES # BLD AUTO: 0.71 THOUSAND/ÂΜL (ref 0.17–1.22)
MONOCYTES NFR BLD AUTO: 10 % (ref 4–12)
NEUTROPHILS # BLD AUTO: 4.66 THOUSANDS/ÂΜL (ref 1.85–7.62)
NEUTS SEG NFR BLD AUTO: 66 % (ref 43–75)
NITRITE UR QL STRIP: NEGATIVE
NON-SQ EPI CELLS URNS QL MICRO: NORMAL /HPF
NRBC BLD AUTO-RTO: 0 /100 WBCS
PH UR STRIP.AUTO: 6 [PH] (ref 4.5–8)
PLATELET # BLD AUTO: 143 THOUSANDS/UL (ref 149–390)
PMV BLD AUTO: 10 FL (ref 8.9–12.7)
POTASSIUM SERPL-SCNC: 3.8 MMOL/L (ref 3.5–5.3)
PROT SERPL-MCNC: 6.4 G/DL (ref 6.4–8.4)
PROT UR STRIP-MCNC: ABNORMAL MG/DL
RBC # BLD AUTO: 4.8 MILLION/UL (ref 3.81–5.12)
RBC #/AREA URNS AUTO: NORMAL /HPF
SODIUM SERPL-SCNC: 136 MMOL/L (ref 135–147)
SP GR UR STRIP.AUTO: <=1.005 (ref 1–1.03)
UROBILINOGEN UR QL STRIP.AUTO: 0.2 E.U./DL
WBC # BLD AUTO: 7.08 THOUSAND/UL (ref 4.31–10.16)
WBC #/AREA URNS AUTO: NORMAL /HPF

## 2024-07-18 PROCEDURE — 36415 COLL VENOUS BLD VENIPUNCTURE: CPT

## 2024-07-18 PROCEDURE — 83690 ASSAY OF LIPASE: CPT

## 2024-07-18 PROCEDURE — 99285 EMERGENCY DEPT VISIT HI MDM: CPT | Performed by: EMERGENCY MEDICINE

## 2024-07-18 PROCEDURE — 80053 COMPREHEN METABOLIC PANEL: CPT

## 2024-07-18 PROCEDURE — 81001 URINALYSIS AUTO W/SCOPE: CPT

## 2024-07-18 PROCEDURE — 85025 COMPLETE CBC W/AUTO DIFF WBC: CPT

## 2024-07-18 PROCEDURE — 99284 EMERGENCY DEPT VISIT MOD MDM: CPT

## 2024-07-18 PROCEDURE — 74177 CT ABD & PELVIS W/CONTRAST: CPT

## 2024-07-18 PROCEDURE — 93005 ELECTROCARDIOGRAM TRACING: CPT

## 2024-07-18 RX ADMIN — IOHEXOL 80 ML: 350 INJECTION, SOLUTION INTRAVENOUS at 20:28

## 2024-07-18 NOTE — ED PROVIDER NOTES
History  Chief Complaint   Patient presents with    Abdominal Pain     Abdominal pain in lower abdomen radiating from the middle to her RLQ. Has nausea but no vomiting. Pt states the pain/nausea is relieved when she burps/releases gas.      79-year-old female with a history of hypertension, COPD, and appendectomy who presents for evaluation of right upper quadrant pain for the past 2 weeks.  The patient reports that the pain is a dull ache at baseline and becomes sharp at its most severe.  The patient reports that her pain radiates towards her back and down to her right lower abdomen.  The patient reports that her pain worse after eating.  She reports some improvement after belching and passing gas.  The patient reports nausea when her pain is most severe, but denies vomiting.  The patient reports alternating between loose stools and being constipated, however the patient has been more constipated recently.  The patient denies seeing blood in her stool.  The patient denies fever but states that she has had the chills today.  The patient denies chest pain, palpitations, shortness of breath, dysuria, hematuria, pain or swelling in her lower extremities.        Prior to Admission Medications   Prescriptions Last Dose Informant Patient Reported? Taking?   ALPRAZolam (XANAX) 0.25 mg tablet   No No   Sig: Take 1 tablet (0.25 mg total) by mouth daily as needed for anxiety   Cholecalciferol (VITAMIN D3) 2000 units TABS   Yes No   Sig: Take 2,000 Units by mouth daily    Fluticasone-Salmeterol (Advair) 250-50 mcg/dose inhaler   No No   Sig: INHALE 1 PUFF BY MOUTH TWO TIMES A DAY -RINSE MOUTH AFTER USE   Multiple Vitamins-Minerals (MULTIVITAMIN ADULT PO)   Yes No   Sig: Take 1 tablet by mouth daily   alendronate (Fosamax) 70 mg tablet   No No   Sig: Take 1 tablet (70 mg total) by mouth every 7 days   esomeprazole (NexIUM) 20 mg capsule   Yes No   Sig: Take 20 mg by mouth every morning before breakfast   methimazole (TAPAZOLE)  5 mg tablet   No No   Sig: TAKE 1/2 TABLET BY MOUTH DAILY   metoprolol succinate (TOPROL-XL) 100 mg 24 hr tablet   No No   Sig: TAKE ONE TABLET BY MOUTH AT BEDTIME      Facility-Administered Medications: None       Past Medical History:   Diagnosis Date    Elevated blood pressure reading        Past Surgical History:   Procedure Laterality Date    APPENDECTOMY      TONSILLECTOMY         Family History   Problem Relation Age of Onset    Lung cancer Mother     Heart disease Maternal Aunt     Heart disease Maternal Uncle     Heart disease Maternal Grandmother      I have reviewed and agree with the history as documented.    E-Cigarette/Vaping    E-Cigarette Use Never User      E-Cigarette/Vaping Substances    Nicotine No     THC No     CBD No     Flavoring No     Other No     Unknown No      Social History     Tobacco Use    Smoking status: Every Day     Current packs/day: 1.50     Types: Cigarettes    Smokeless tobacco: Never   Vaping Use    Vaping status: Never Used   Substance Use Topics    Alcohol use: Never    Drug use: Never        Review of Systems   Constitutional:  Positive for appetite change and chills. Negative for diaphoresis and fever.   HENT:  Negative for congestion and sore throat.    Eyes:  Negative for pain and redness.   Respiratory:  Negative for cough and shortness of breath.    Cardiovascular:  Negative for chest pain, palpitations and leg swelling.   Gastrointestinal:  Positive for abdominal pain and nausea. Negative for abdominal distention, diarrhea and vomiting.   Genitourinary:  Negative for dysuria, flank pain and hematuria.   Musculoskeletal:  Negative for arthralgias and myalgias.   Skin:  Negative for color change, pallor and rash.   Neurological:  Negative for dizziness, syncope, weakness, light-headedness, numbness and headaches.   All other systems reviewed and are negative.      Physical Exam  ED Triage Vitals [07/18/24 1856]   Temperature Pulse Respirations Blood Pressure SpO2   98  °F (36.7 °C) (!) 106 20 (!) 160/101 98 %      Temp Source Heart Rate Source Patient Position - Orthostatic VS BP Location FiO2 (%)   Oral Monitor Lying Right arm --      Pain Score       --             Orthostatic Vital Signs  Vitals:    07/18/24 1856 07/18/24 2115 07/18/24 2230   BP: (!) 160/101 153/94 117/66   Pulse: (!) 106 104 98   Patient Position - Orthostatic VS: Lying Lying Lying       Physical Exam  Vitals and nursing note reviewed.   Constitutional:       General: She is not in acute distress.     Appearance: Normal appearance. She is not ill-appearing, toxic-appearing or diaphoretic.   HENT:      Head: Normocephalic and atraumatic.      Nose: Nose normal. No congestion or rhinorrhea.      Mouth/Throat:      Mouth: Mucous membranes are moist.      Pharynx: Oropharynx is clear.   Eyes:      General: No scleral icterus.     Extraocular Movements: Extraocular movements intact.      Conjunctiva/sclera: Conjunctivae normal.      Pupils: Pupils are equal, round, and reactive to light.   Cardiovascular:      Rate and Rhythm: Normal rate and regular rhythm.      Pulses: Normal pulses.      Heart sounds: Normal heart sounds. No murmur heard.     No friction rub. No gallop.   Pulmonary:      Effort: Pulmonary effort is normal.      Breath sounds: Normal breath sounds. No wheezing, rhonchi or rales.   Abdominal:      General: Abdomen is flat.      Palpations: Abdomen is soft.      Tenderness: There is abdominal tenderness in the right upper quadrant and epigastric area. There is no right CVA tenderness, left CVA tenderness, guarding or rebound. Positive signs include Hernandez's sign.   Musculoskeletal:         General: No swelling, tenderness, deformity or signs of injury. Normal range of motion.      Cervical back: Normal range of motion and neck supple. No rigidity or tenderness.      Right lower leg: No edema.      Left lower leg: No edema.   Lymphadenopathy:      Cervical: No cervical adenopathy.   Skin:      General: Skin is warm and dry.      Capillary Refill: Capillary refill takes less than 2 seconds.      Coloration: Skin is not jaundiced or pale.      Findings: No bruising, erythema, lesion or rash.   Neurological:      General: No focal deficit present.      Mental Status: She is alert and oriented to person, place, and time.         ED Medications  Medications   iohexol (OMNIPAQUE) 350 MG/ML injection (MULTI-DOSE) 80 mL (80 mL Intravenous Given 7/18/24 2028)       Diagnostic Studies  Results Reviewed       Procedure Component Value Units Date/Time    Urine Microscopic [[id number]]  (Normal) Collected: 07/18/24 2113    Lab Status: Final result Specimen: Urine, Other Updated: 07/18/24 2140     RBC, UA 1-2 /hpf      WBC, UA 1-2 /hpf      Epithelial Cells Occasional /hpf      Bacteria, UA None Seen /hpf     Urine Macroscopic, POC [[id number]]  (Abnormal) Collected: 07/18/24 2113    Lab Status: Final result Specimen: Urine Updated: 07/18/24 2114     Color, UA Yellow     Clarity, UA Clear     pH, UA 6.0     Leukocytes, UA Trace     Nitrite, UA Negative     Protein, UA 30 (1+) mg/dl      Glucose, UA Negative mg/dl      Ketones, UA Negative mg/dl      Urobilinogen, UA 0.2 E.U./dl      Bilirubin, UA Negative     Occult Blood, UA Trace     Specific Gravity, UA <=1.005    Narrative:      CLINITEK RESULT    Comprehensive metabolic panel [[id number]]  (Abnormal) Collected: 07/18/24 1919    Lab Status: Final result Specimen: Blood from Arm, Right Updated: 07/18/24 1949     Sodium 136 mmol/L      Potassium 3.8 mmol/L      Chloride 99 mmol/L      CO2 30 mmol/L      ANION GAP 7 mmol/L      BUN 10 mg/dL      Creatinine 0.56 mg/dL      Glucose 121 mg/dL      Calcium 9.4 mg/dL      AST 14 U/L      ALT 7 U/L      Alkaline Phosphatase 71 U/L      Total Protein 6.4 g/dL      Albumin 4.0 g/dL      Total Bilirubin 0.55 mg/dL      eGFR 88 ml/min/1.73sq m     Narrative:      National Kid[employer] Disease Foundation guidelines for Chronic Kidney  Disease (CKD):     Stage 1 with normal or high GFR (GFR > 90 mL/min/1.73 square meters)    Stage 2 Mild CKD (GFR = 60-89 mL/min/1.73 square meters)    Stage 3A Moderate CKD (GFR = 45-59 mL/min/1.73 square meters)    Stage 3B Moderate CKD (GFR = 30-44 mL/min/1.73 square meters)    Stage 4 Severe CKD (GFR = 15-29 mL/min/1.73 square meters)    Stage 5 End Stage CKD (GFR <15 mL/min/1.73 square meters)  Note: GFR calculation is accurate only with a steady state creatinine    Lipase [[id number]]  (Normal) Collected: 07/18/24 1919    Lab Status: Final result Specimen: Blood from Arm, Right Updated: 07/18/24 1949     Lipase 20 u/L     CBC and differential [[id number]]  (Abnormal) Collected: 07/18/24 1919    Lab Status: Final result Specimen: Blood from Arm, Right Updated: 07/18/24 1930     WBC 7.08 Thousand/uL      RBC 4.80 Million/uL      Hemoglobin 15.2 g/dL      Hematocrit 46.2 %      MCV 96 fL      MCH 31.7 pg      MCHC 32.9 g/dL      RDW 12.2 %      MPV 10.0 fL      Platelets 143 Thousands/uL      nRBC 0 /100 WBCs      Segmented % 66 %      Immature Grans % 0 %      Lymphocytes % 22 %      Monocytes % 10 %      Eosinophils Relative 2 %      Basophils Relative 0 %      Absolute Neutrophils 4.66 Thousands/µL      Absolute Immature Grans 0.03 Thousand/uL      Absolute Lymphocytes 1.54 Thousands/µL      Absolute Monocytes 0.71 Thousand/µL      Eosinophils Absolute 0.11 Thousand/µL      Basophils Absolute 0.03 Thousands/µL                    CT abdomen pelvis with contrast   Final Result by Anmol Quigley DO (07/18 2147)   Mild diffuse small bowel mucosal thickening as well as mucosal thickening of the gastric wall most consistent with gastroenteritis.               Workstation performed: [id number]               Procedures  POC Biliary US    Date/Time: 7/18/2024 7:40 PM    Performed by: Bong Woodard DO  Authorized by: Bong Woodard DO    Patient location:  ED  Performed by:  Resident  Procedure details:      Exam Type:  Educational    Indications: upper right quadrant abdominal pain      Assessment for:  Cholecystitis and cholelithiasis    Views obtained: gallbladder (transverse and longitudinal)      Image quality: limited diagnostic      Image availability:  Images available in PACS  Findings:     Cholelithiasis: identified      Common bile duct:  Unable to visualize    Gallbladder wall:  Normal    Gallbladder wall thickness (mm):  2    Pericholecystic fluid: not identified      Sonographic Hernandez's sign: positive    Interpretation:     Biliary ultrasound impressions: cholelithiasis    Comments:      Difficulty obtaining adequate views due to bowel gas.        ED Course                                       Medical Decision Making  79-year-old female with a history of hypertension, COPD, and appendectomy who presents for evaluation of right upper quadrant pain for the past 2 weeks.  The patient is mildly hypertensive and tachycardic.  The remainder the patient's vitals were within the normal limits.  Physical exam is significant for right upper quadrant tenderness with voluntary guarding and positive Hernandez sign.  The remainder the patient's exam is unremarkable.  Differential diagnosis includes cholecystitis, choledocholithiasis, pancreatitis, gastritis, constipation.  Will order CBC, CMP, lipase, UA, CT of the abdomen pelvis with IV contrast.  The patient declines pain medication at this time.    Lab work is reviewed and without actionable derangements.  Biliary POCUS is limited by bowel gas, however transverse view shows normal size gallbladder with normal wall thickness, no pericholecystic fluid, biliary sludge.  CT of the abdomen and pelvis shows mild gastric and small bowel wall thickening consistent with gastroenteritis.  On my review of CT images the patient does have a large volume of stool in the colon.  The patient's symptoms are possibly due to biliary colic but also may be due to constipation or mild  gastroenteritis.  The patient requests a referral to GI for follow-up.  Return precautions are given and the patient is discharged.    Amount and/or Complexity of Data Reviewed  Labs: ordered.  Radiology: ordered.    Risk  Prescription drug management.          Disposition  Final diagnoses:   Right upper quadrant abdominal pain   Constipation     Time reflects when diagnosis was documented in both MDM as applicable and the Disposition within this note       Time User Action Codes Description Comment    7/18/2024 10:48 PM Bong Woodard Add [R10.11] Right upper quadrant abdominal pain     7/18/2024 10:51 PM Bong Woodard Add [K59.00] Constipation           ED Disposition       ED Disposition   Discharge    Condition   Stable    Date/Time   Thu Jul 18, 2024 10:48 PM    Comment   Patrica Grimaldo discharge to home/self care.                   Follow-up Information       Follow up With Specialties Details Why Contact Info Additional Information    [facility] Emergency Department Emergency Medicine Go to  If symptoms worsen [address] [place] [id number]  [phone] Former[facility] Emergency Department, [address] [place] [address], [id number]   [phone]    Janine Marcus,  Family Medicine   [address] [place] [address]  [phone]               Discharge Medication List as of 7/18/2024 10:54 PM        CONTINUE these medications which have NOT CHANGED    Details   alendronate (Fosamax) 70 mg tablet Take 1 tablet (70 mg total) by mouth every 7 days, Starting Mon 11/22/2021, Normal      ALPRAZolam (XANAX) 0.25 mg tablet Take 1 tablet (0.25 mg total) by mouth daily as needed for anxiety, Starting Thu 11/17/2022, Normal      Cholecalciferol (VITAMIN D3) 2000 units TABS Take 2,000 Units by mouth daily , Historical Med      esomeprazole (NexIUM) 20 mg capsule Take 20 mg by mouth every morning before breakfast, Historical Med       Fluticasone-Salmeterol (Advair) 250-50 mcg/dose inhaler INHALE 1 PUFF BY MOUTH TWO TIMES A DAY -RINSE MOUTH AFTER USE, Normal      methimazole (TAPAZOLE) 5 mg tablet TAKE 1/2 TABLET BY MOUTH DAILY, Normal      metoprolol succinate (TOPROL-XL) 100 mg 24 hr tablet TAKE ONE TABLET BY MOUTH AT BEDTIME, Starting Fri 4/19/2024, Normal      Multiple Vitamins-Minerals (MULTIVITAMIN ADULT PO) Take 1 tablet by mouth daily, Historical Med               PDMP Review         Value Time User    PDMP Reviewed  Yes 11/17/2022  6:47 PM Janine Marcus DO             ED Provider  Attending physically available and evaluated Patrica Grimaldo. I managed the patient along with the ED Attending.    Electronically Signed by           Bong Woodard DO  07/19/24 0020

## 2024-07-19 LAB
ATRIAL RATE: 109 BPM
P AXIS: 85 DEGREES
PR INTERVAL: 178 MS
QRS AXIS: -82 DEGREES
QRSD INTERVAL: 106 MS
QT INTERVAL: 334 MS
QTC INTERVAL: 449 MS
T WAVE AXIS: 68 DEGREES
VENTRICULAR RATE: 109 BPM

## 2024-07-19 PROCEDURE — 93010 ELECTROCARDIOGRAM REPORT: CPT | Performed by: INTERNAL MEDICINE

## 2024-07-19 NOTE — DISCHARGE INSTRUCTIONS
You were seen in the emergency department for right-sided abdominal pain.  Your lab work and CT scan did not show any specific cause of your pain.  Your pain may be due to constipation.  It is okay to take a laxative.  Additionally take medications such as Tylenol for your pain.  A referral to gastroenterology was ordered for follow-up.  If you have worsening pain or any new concerning symptoms please return to the emergency department.

## 2024-07-22 NOTE — ED ATTENDING ATTESTATION
7/18/2024  I, Franco Traylor MD, saw and evaluated the patient. I have discussed the patient with the resident/non-physician practitioner and agree with the resident's/non-physician practitioner's findings, Plan of Care, and MDM as documented in the resident's/non-physician practitioner's note, except where noted. All available labs and Radiology studies were reviewed.  I was present for key portions of any procedure(s) performed by the resident/non-physician practitioner and I was immediately available to provide assistance.       At this point I agree with the current assessment done in the Emergency Department.  I have conducted an independent evaluation of this patient a history and physical is as follows:    ED Course     Impression: Abdominal pain diarrhea dyspepsia  Ddx:  colitis diverticulitis cholecystitis pancreatitis gastreoeneteritis.,  UTI pyelonephritis , Doubt mesenteric ichemia     Plan to check labs UA CBC CMP Lipase CTAP  reassess         Critical Care Time  Procedures

## 2024-07-23 ENCOUNTER — HOSPITAL ENCOUNTER (INPATIENT)
Facility: HOSPITAL | Age: 79
LOS: 8 days | Discharge: HOME HEALTH | DRG: 377 | End: 2024-07-31
Attending: EMERGENCY MEDICINE | Admitting: INTERNAL MEDICINE
Payer: COMMERCIAL

## 2024-07-23 ENCOUNTER — APPOINTMENT (EMERGENCY)
Dept: RADIOLOGY | Facility: HOSPITAL | Age: 79
DRG: 377 | End: 2024-07-23
Payer: COMMERCIAL

## 2024-07-23 ENCOUNTER — TELEPHONE (OUTPATIENT)
Age: 79
End: 2024-07-23

## 2024-07-23 DIAGNOSIS — R10.9 ABDOMINAL PAIN: ICD-10-CM

## 2024-07-23 DIAGNOSIS — R11.2 NAUSEA AND VOMITING: Primary | ICD-10-CM

## 2024-07-23 DIAGNOSIS — K26.9 DUODENAL ULCER: ICD-10-CM

## 2024-07-23 DIAGNOSIS — K92.2 GI BLEED: ICD-10-CM

## 2024-07-23 DIAGNOSIS — E87.20 LACTIC ACIDOSIS: ICD-10-CM

## 2024-07-23 LAB
2HR DELTA HS TROPONIN: 1 NG/L
ALBUMIN SERPL BCG-MCNC: 3.4 G/DL (ref 3.5–5)
ALP SERPL-CCNC: 64 U/L (ref 34–104)
ALT SERPL W P-5'-P-CCNC: 5 U/L (ref 7–52)
ANION GAP SERPL CALCULATED.3IONS-SCNC: 8 MMOL/L (ref 4–13)
AST SERPL W P-5'-P-CCNC: 12 U/L (ref 13–39)
ATRIAL RATE: 80 BPM
BASOPHILS # BLD AUTO: 0.03 THOUSANDS/ÂΜL (ref 0–0.1)
BASOPHILS NFR BLD AUTO: 0 % (ref 0–1)
BILIRUB SERPL-MCNC: 0.54 MG/DL (ref 0.2–1)
BUN SERPL-MCNC: 9 MG/DL (ref 5–25)
CALCIUM ALBUM COR SERPL-MCNC: 8.4 MG/DL (ref 8.3–10.1)
CALCIUM SERPL-MCNC: 7.9 MG/DL (ref 8.4–10.2)
CARDIAC TROPONIN I PNL SERPL HS: 4 NG/L
CARDIAC TROPONIN I PNL SERPL HS: 5 NG/L
CHLORIDE SERPL-SCNC: 100 MMOL/L (ref 96–108)
CO2 SERPL-SCNC: 25 MMOL/L (ref 21–32)
CREAT SERPL-MCNC: 0.42 MG/DL (ref 0.6–1.3)
EOSINOPHIL # BLD AUTO: 0.01 THOUSAND/ÂΜL (ref 0–0.61)
EOSINOPHIL NFR BLD AUTO: 0 % (ref 0–6)
ERYTHROCYTE [DISTWIDTH] IN BLOOD BY AUTOMATED COUNT: 11.9 % (ref 11.6–15.1)
GFR SERPL CREATININE-BSD FRML MDRD: 97 ML/MIN/1.73SQ M
GLUCOSE SERPL-MCNC: 130 MG/DL (ref 65–140)
HCT VFR BLD AUTO: 46.2 % (ref 34.8–46.1)
HGB BLD-MCNC: 15.6 G/DL (ref 11.5–15.4)
IMM GRANULOCYTES # BLD AUTO: 0.05 THOUSAND/UL (ref 0–0.2)
IMM GRANULOCYTES NFR BLD AUTO: 1 % (ref 0–2)
LACTATE SERPL-SCNC: 1 MMOL/L (ref 0.5–2)
LIPASE SERPL-CCNC: 9 U/L (ref 11–82)
LYMPHOCYTES # BLD AUTO: 0.86 THOUSANDS/ÂΜL (ref 0.6–4.47)
LYMPHOCYTES NFR BLD AUTO: 9 % (ref 14–44)
MCH RBC QN AUTO: 31.6 PG (ref 26.8–34.3)
MCHC RBC AUTO-ENTMCNC: 33.8 G/DL (ref 31.4–37.4)
MCV RBC AUTO: 94 FL (ref 82–98)
MONOCYTES # BLD AUTO: 0.71 THOUSAND/ÂΜL (ref 0.17–1.22)
MONOCYTES NFR BLD AUTO: 7 % (ref 4–12)
NEUTROPHILS # BLD AUTO: 8.08 THOUSANDS/ÂΜL (ref 1.85–7.62)
NEUTS SEG NFR BLD AUTO: 83 % (ref 43–75)
NRBC BLD AUTO-RTO: 0 /100 WBCS
P AXIS: 80 DEGREES
PLATELET # BLD AUTO: 149 THOUSANDS/UL (ref 149–390)
PMV BLD AUTO: 9.8 FL (ref 8.9–12.7)
POTASSIUM SERPL-SCNC: 3.5 MMOL/L (ref 3.5–5.3)
PR INTERVAL: 176 MS
PROT SERPL-MCNC: 5.4 G/DL (ref 6.4–8.4)
QRS AXIS: -66 DEGREES
QRSD INTERVAL: 106 MS
QT INTERVAL: 386 MS
QTC INTERVAL: 445 MS
RBC # BLD AUTO: 4.94 MILLION/UL (ref 3.81–5.12)
SODIUM SERPL-SCNC: 133 MMOL/L (ref 135–147)
T WAVE AXIS: 50 DEGREES
VENTRICULAR RATE: 80 BPM
WBC # BLD AUTO: 9.74 THOUSAND/UL (ref 4.31–10.16)

## 2024-07-23 PROCEDURE — 94760 N-INVAS EAR/PLS OXIMETRY 1: CPT

## 2024-07-23 PROCEDURE — 99285 EMERGENCY DEPT VISIT HI MDM: CPT

## 2024-07-23 PROCEDURE — 96366 THER/PROPH/DIAG IV INF ADDON: CPT

## 2024-07-23 PROCEDURE — 36415 COLL VENOUS BLD VENIPUNCTURE: CPT

## 2024-07-23 PROCEDURE — 99223 1ST HOSP IP/OBS HIGH 75: CPT | Performed by: FAMILY MEDICINE

## 2024-07-23 PROCEDURE — 1124F ACP DISCUSS-NO DSCNMKR DOCD: CPT | Performed by: INTERNAL MEDICINE

## 2024-07-23 PROCEDURE — 83605 ASSAY OF LACTIC ACID: CPT

## 2024-07-23 PROCEDURE — 74174 CTA ABD&PLVS W/CONTRAST: CPT

## 2024-07-23 PROCEDURE — 99285 EMERGENCY DEPT VISIT HI MDM: CPT | Performed by: EMERGENCY MEDICINE

## 2024-07-23 PROCEDURE — 85025 COMPLETE CBC W/AUTO DIFF WBC: CPT

## 2024-07-23 PROCEDURE — 71046 X-RAY EXAM CHEST 2 VIEWS: CPT

## 2024-07-23 PROCEDURE — 96375 TX/PRO/DX INJ NEW DRUG ADDON: CPT

## 2024-07-23 PROCEDURE — 96365 THER/PROPH/DIAG IV INF INIT: CPT

## 2024-07-23 PROCEDURE — 83690 ASSAY OF LIPASE: CPT

## 2024-07-23 PROCEDURE — 76705 ECHO EXAM OF ABDOMEN: CPT

## 2024-07-23 PROCEDURE — 93005 ELECTROCARDIOGRAM TRACING: CPT

## 2024-07-23 PROCEDURE — 84484 ASSAY OF TROPONIN QUANT: CPT

## 2024-07-23 PROCEDURE — 80053 COMPREHEN METABOLIC PANEL: CPT

## 2024-07-23 PROCEDURE — 93010 ELECTROCARDIOGRAM REPORT: CPT | Performed by: INTERNAL MEDICINE

## 2024-07-23 RX ORDER — SODIUM CHLORIDE 9 MG/ML
75 INJECTION, SOLUTION INTRAVENOUS CONTINUOUS
Status: DISCONTINUED | OUTPATIENT
Start: 2024-07-23 | End: 2024-07-25

## 2024-07-23 RX ORDER — ALBUTEROL SULFATE 90 UG/1
2 AEROSOL, METERED RESPIRATORY (INHALATION) EVERY 4 HOURS PRN
Status: DISCONTINUED | OUTPATIENT
Start: 2024-07-23 | End: 2024-07-31 | Stop reason: HOSPADM

## 2024-07-23 RX ORDER — METOPROLOL SUCCINATE 100 MG/1
100 TABLET, EXTENDED RELEASE ORAL
Status: DISCONTINUED | OUTPATIENT
Start: 2024-07-23 | End: 2024-07-25

## 2024-07-23 RX ORDER — ACETAMINOPHEN 325 MG/1
650 TABLET ORAL EVERY 6 HOURS PRN
Status: DISCONTINUED | OUTPATIENT
Start: 2024-07-23 | End: 2024-07-31 | Stop reason: HOSPADM

## 2024-07-23 RX ORDER — OXYCODONE HYDROCHLORIDE 5 MG/1
5 TABLET ORAL EVERY 4 HOURS PRN
Status: DISCONTINUED | OUTPATIENT
Start: 2024-07-23 | End: 2024-07-31 | Stop reason: HOSPADM

## 2024-07-23 RX ORDER — HEPARIN SODIUM 5000 [USP'U]/ML
5000 INJECTION, SOLUTION INTRAVENOUS; SUBCUTANEOUS EVERY 8 HOURS SCHEDULED
Status: DISCONTINUED | OUTPATIENT
Start: 2024-07-23 | End: 2024-07-25

## 2024-07-23 RX ORDER — SODIUM CHLORIDE, SODIUM GLUCONATE, SODIUM ACETATE, POTASSIUM CHLORIDE, MAGNESIUM CHLORIDE, SODIUM PHOSPHATE, DIBASIC, AND POTASSIUM PHOSPHATE .53; .5; .37; .037; .03; .012; .00082 G/100ML; G/100ML; G/100ML; G/100ML; G/100ML; G/100ML; G/100ML
500 INJECTION, SOLUTION INTRAVENOUS ONCE
Status: COMPLETED | OUTPATIENT
Start: 2024-07-23 | End: 2024-07-23

## 2024-07-23 RX ORDER — ALPRAZOLAM 0.25 MG/1
0.25 TABLET ORAL DAILY PRN
Status: DISCONTINUED | OUTPATIENT
Start: 2024-07-23 | End: 2024-07-25

## 2024-07-23 RX ORDER — DROPERIDOL 2.5 MG/ML
1 INJECTION, SOLUTION INTRAMUSCULAR; INTRAVENOUS ONCE
Status: COMPLETED | OUTPATIENT
Start: 2024-07-23 | End: 2024-07-23

## 2024-07-23 RX ORDER — FLUTICASONE FUROATE AND VILANTEROL 200; 25 UG/1; UG/1
1 POWDER RESPIRATORY (INHALATION)
Status: DISCONTINUED | OUTPATIENT
Start: 2024-07-24 | End: 2024-07-25

## 2024-07-23 RX ORDER — DOCUSATE SODIUM 100 MG/1
100 CAPSULE, LIQUID FILLED ORAL 2 TIMES DAILY
Status: DISCONTINUED | OUTPATIENT
Start: 2024-07-23 | End: 2024-07-24

## 2024-07-23 RX ORDER — ONDANSETRON 2 MG/ML
4 INJECTION INTRAMUSCULAR; INTRAVENOUS EVERY 6 HOURS PRN
Status: DISCONTINUED | OUTPATIENT
Start: 2024-07-23 | End: 2024-07-31 | Stop reason: HOSPADM

## 2024-07-23 RX ADMIN — SODIUM CHLORIDE, SODIUM GLUCONATE, SODIUM ACETATE, POTASSIUM CHLORIDE, MAGNESIUM CHLORIDE, SODIUM PHOSPHATE, DIBASIC, AND POTASSIUM PHOSPHATE 500 ML: .53; .5; .37; .037; .03; .012; .00082 INJECTION, SOLUTION INTRAVENOUS at 10:32

## 2024-07-23 RX ADMIN — OXYCODONE HYDROCHLORIDE 5 MG: 5 TABLET ORAL at 21:23

## 2024-07-23 RX ADMIN — HEPARIN SODIUM 5000 UNITS: 5000 INJECTION INTRAVENOUS; SUBCUTANEOUS at 21:07

## 2024-07-23 RX ADMIN — MORPHINE SULFATE 2 MG: 2 INJECTION, SOLUTION INTRAMUSCULAR; INTRAVENOUS at 10:31

## 2024-07-23 RX ADMIN — METOPROLOL SUCCINATE 100 MG: 100 TABLET, EXTENDED RELEASE ORAL at 21:11

## 2024-07-23 RX ADMIN — SODIUM CHLORIDE 75 ML/HR: 0.9 INJECTION, SOLUTION INTRAVENOUS at 21:05

## 2024-07-23 RX ADMIN — IOHEXOL 85 ML: 350 INJECTION, SOLUTION INTRAVENOUS at 13:58

## 2024-07-23 NOTE — ED PROVIDER NOTES
History  Chief Complaint   Patient presents with    Abdominal Pain     Seen last week, known gallstones, can't keep anything down     HPI  Patient is 79 y.o. female PMH COPD and htn presenting to ED for evaluation of abdominal pain. Patient reports intermittent epigastric and RUQ pain for several weeks.Patient reports seen in ED 5 days ago for similar symptoms and sent home with laxatives for constipation resulting in multiple loose bowel movements. Reports  worsening pain with several episodes of n/v last night. She states pain is primarily after eating, she gets full quickly then pain decreases. Patient reports feels as though she has lost weight due to being unable to eat due to pain. Patient denies fever, chills, chest pain, SOB, urinary complaints.   Prior to Admission Medications   Prescriptions Last Dose Informant Patient Reported? Taking?   ALPRAZolam (XANAX) 0.25 mg tablet   No No   Sig: Take 1 tablet (0.25 mg total) by mouth daily as needed for anxiety   Cholecalciferol (VITAMIN D3) 2000 units TABS   Yes No   Sig: Take 2,000 Units by mouth daily    Fluticasone-Salmeterol (Advair) 250-50 mcg/dose inhaler   No No   Sig: INHALE 1 PUFF BY MOUTH TWO TIMES A DAY -RINSE MOUTH AFTER USE   Multiple Vitamins-Minerals (MULTIVITAMIN ADULT PO)   Yes No   Sig: Take 1 tablet by mouth daily   alendronate (Fosamax) 70 mg tablet   No No   Sig: Take 1 tablet (70 mg total) by mouth every 7 days   esomeprazole (NexIUM) 20 mg capsule   Yes No   Sig: Take 20 mg by mouth every morning before breakfast   methimazole (TAPAZOLE) 5 mg tablet   No No   Sig: TAKE 1/2 TABLET BY MOUTH DAILY   metoprolol succinate (TOPROL-XL) 100 mg 24 hr tablet   No No   Sig: TAKE ONE TABLET BY MOUTH AT BEDTIME      Facility-Administered Medications: None       Past Medical History:   Diagnosis Date    Elevated blood pressure reading        Past Surgical History:   Procedure Laterality Date    APPENDECTOMY      TONSILLECTOMY         Family History    Problem Relation Age of Onset    Lung cancer Mother     Heart disease Maternal Aunt     Heart disease Maternal Uncle     Heart disease Maternal Grandmother      I have reviewed and agree with the history as documented.    E-Cigarette/Vaping    E-Cigarette Use Never User      E-Cigarette/Vaping Substances    Nicotine No     THC No     CBD No     Flavoring No     Other No     Unknown No      Social History     Tobacco Use    Smoking status: Every Day     Current packs/day: 1.50     Types: Cigarettes    Smokeless tobacco: Never   Vaping Use    Vaping status: Never Used   Substance Use Topics    Alcohol use: Never    Drug use: Never        Review of Systems   Constitutional:  Positive for appetite change and unexpected weight change. Negative for chills and fever.   HENT:  Negative for congestion and sore throat.    Respiratory:  Negative for cough and shortness of breath.    Cardiovascular:  Negative for chest pain and leg swelling.   Gastrointestinal:  Positive for abdominal pain, nausea and vomiting.   Genitourinary:  Negative for dysuria and hematuria.   Skin:  Negative for rash.   Neurological:  Negative for dizziness and headaches.       Physical Exam  ED Triage Vitals   Temperature Pulse Respirations Blood Pressure SpO2   07/23/24 1018 07/23/24 1012 07/23/24 1012 07/23/24 1012 07/23/24 1012   97.8 °F (36.6 °C) 82 18 (!) 183/88 96 %      Temp Source Heart Rate Source Patient Position - Orthostatic VS BP Location FiO2 (%)   07/23/24 1018 07/23/24 1012 -- -- --   Oral Monitor         Pain Score       07/23/24 1012       8             Orthostatic Vital Signs  Vitals:    07/23/24 1100 07/23/24 1200 07/23/24 1300 07/23/24 1430   BP: (!) 177/81 168/75 128/58 161/83   Pulse: 75 65 72 86       Physical Exam  Vitals reviewed.   Constitutional:       General: She is awake.      Comments: Thin cachectic   HENT:      Head: Normocephalic and atraumatic.      Mouth/Throat:      Mouth: Mucous membranes are moist.   Eyes:       Extraocular Movements: Extraocular movements intact.      Right eye: No nystagmus.      Left eye: No nystagmus.      Conjunctiva/sclera: Conjunctivae normal.      Pupils: Pupils are equal, round, and reactive to light.   Cardiovascular:      Rate and Rhythm: Normal rate and regular rhythm.      Pulses: Normal pulses.      Heart sounds: Normal heart sounds, S1 normal and S2 normal. Heart sounds not distant. No murmur heard.     No friction rub. No gallop.   Pulmonary:      Breath sounds: No stridor. No wheezing, rhonchi or rales.      Comments: CTA b/l   Abdominal:      General: Bowel sounds are normal.      Palpations: Abdomen is soft.      Tenderness: There is abdominal tenderness in the right upper quadrant and epigastric area.   Musculoskeletal:      Right lower leg: No edema.      Left lower leg: No edema.   Skin:     General: Skin is warm and dry.      Capillary Refill: Capillary refill takes less than 2 seconds.   Neurological:      Mental Status: She is alert and oriented to person, place, and time.      GCS: GCS eye subscore is 4. GCS verbal subscore is 5. GCS motor subscore is 6.      Cranial Nerves: Cranial nerves 2-12 are intact.      Sensory: Sensation is intact.      Motor: No weakness or pronator drift.      Coordination: Coordination normal. Finger-Nose-Finger Test normal.         ED Medications  Medications   droperidol (FOR EMS ONLY) (INAPSINE) 2.5 mg/mL injection 2.5 mg (0 mg Does not apply Given to EMS 7/23/24 1013)   multi-electrolyte (ISOLYTE-S PH 7.4) bolus 500 mL (0 mL Intravenous Stopped 7/23/24 1306)   morphine injection 2 mg (2 mg Intravenous Given 7/23/24 1031)   iohexol (OMNIPAQUE) 350 MG/ML injection (MULTI-DOSE) 85 mL (85 mL Intravenous Given 7/23/24 1358)       Diagnostic Studies  Results Reviewed       Procedure Component Value Units Date/Time    Lactic acid, plasma (w/reflex if result > 2.0) [[id number]]  (Normal) Collected: 07/23/24 1423    Lab Status: Final result Specimen: Blood  from Arm, Left Updated: 07/23/24 1452     LACTIC ACID 1.0 mmol/L     Narrative:      Result may be elevated if tourniquet was used during collection.    HS Troponin I 2hr [[id number]]  (Normal) Collected: 07/23/24 1314    Lab Status: Final result Specimen: Blood from Arm, Left Updated: 07/23/24 1346     hs TnI 2hr 5 ng/L      Delta 2hr hsTnI 1 ng/L     Comprehensive metabolic panel [[id number]]  (Abnormal) Collected: 07/23/24 1024    Lab Status: Final result Specimen: Blood from Arm, Left Updated: 07/23/24 1251     Sodium 133 mmol/L      Potassium 3.5 mmol/L      Chloride 100 mmol/L      CO2 25 mmol/L      ANION GAP 8 mmol/L      BUN 9 mg/dL      Creatinine 0.42 mg/dL      Glucose 130 mg/dL      Calcium 7.9 mg/dL      Corrected Calcium 8.4 mg/dL      AST 12 U/L      ALT 5 U/L      Alkaline Phosphatase 64 U/L      Total Protein 5.4 g/dL      Albumin 3.4 g/dL      Total Bilirubin 0.54 mg/dL      eGFR 97 ml/min/1.73sq m     Narrative:      [employer] guidelines for Chronic Kidney Disease (CKD):     Stage 1 with normal or high GFR (GFR > 90 mL/min/1.73 square meters)    Stage 2 Mild CKD (GFR = 60-89 mL/min/1.73 square meters)    Stage 3A Moderate CKD (GFR = 45-59 mL/min/1.73 square meters)    Stage 3B Moderate CKD (GFR = 30-44 mL/min/1.73 square meters)    Stage 4 Severe CKD (GFR = 15-29 mL/min/1.73 square meters)    Stage 5 End Stage CKD (GFR <15 mL/min/1.73 square meters)  Note: GFR calculation is accurate only with a steady state creatinine    Lipase [[id number]]  (Abnormal) Collected: 07/23/24 1024    Lab Status: Final result Specimen: Blood from Arm, Left Updated: 07/23/24 1251     Lipase 9 u/L     HS Troponin I 4hr [[id number]]     Lab Status: No result Specimen: Blood     HS Troponin 0hr (reflex protocol) [[id number]]  (Normal) Collected: 07/23/24 1024    Lab Status: Final result Specimen: Blood from Arm, Left Updated: 07/23/24 1058     hs TnI 0hr 4 ng/L     CBC and differential [[id number]]   (Abnormal) Collected: 07/23/24 1024    Lab Status: Final result Specimen: Blood from Arm, Left Updated: 07/23/24 1034     WBC 9.74 Thousand/uL      RBC 4.94 Million/uL      Hemoglobin 15.6 g/dL      Hematocrit 46.2 %      MCV 94 fL      MCH 31.6 pg      MCHC 33.8 g/dL      RDW 11.9 %      MPV 9.8 fL      Platelets 149 Thousands/uL      nRBC 0 /100 WBCs      Segmented % 83 %      Immature Grans % 1 %      Lymphocytes % 9 %      Monocytes % 7 %      Eosinophils Relative 0 %      Basophils Relative 0 %      Absolute Neutrophils 8.08 Thousands/µL      Absolute Immature Grans 0.05 Thousand/uL      Absolute Lymphocytes 0.86 Thousands/µL      Absolute Monocytes 0.71 Thousand/µL      Eosinophils Absolute 0.01 Thousand/µL      Basophils Absolute 0.03 Thousands/µL                    CTA abdomen pelvis w wo contrast   Final Result by Vernon Ocasio MD (07/23 1431)      CTA abdomen and pelvis with mild atherosclerotic disease and widely patent mesenteric vessels.      Diverticulosis.      Left adnexal cyst measuring 3.3 x 2.3 cm unchanged since 11/15/2021.         Workstation performed: [id number]         US right upper quadrant   Final Result by Chevy Childers MD (07/23 1208)      Mild right hydronephrosis.      Workstation performed: [id number]         XR chest 2 views   ED Interpretation by Gudelia Leon DO (07/23 1051)   Lung hyperinflation. No obvious pleural effusions or consolidations.       Final Result by Robby Sky MD (07/23 1128)      No acute cardiopulmonary disease.            Workstation performed: [id number]               Procedures  Procedures      ED Course  ED Course as of 07/23/24 1544   Tue Jul 23, 2024   1050 WBC: 9.74   1050 Hemoglobin(!): 15.6   1050 Platelet Count: 149   1054 Procedure Note: EKG  Date/Time: 07/23/24 10:54 AM   Interpreted by: GUDELIA LEON  Indications / Diagnosis: epigastric pain  ECG reviewed by me, the ED Provider: yes   The EKG demonstrates:  Rhythm:  normal sinus, 80 bpm  Intervals: normal intervals  Axis: left axis  QRS/Blocks: incomplete RBBB  ST Changes: No acute ST Changes, no STD/ISRRAEL.     1104 hs TnI 0hr: 4   1318 LIPASE(!): 9   1318 Sodium(!): 133   1335 On re-evaluation pain improved but patient concerned as pain worse with eating. Will get lactic and CTA abodmen/pelvis    1449 Delta 2hr hsTnI: 1   1449 CTA abdomen pelvis w wo contrast  IMPRESSION:     CTA abdomen and pelvis with mild atherosclerotic disease and widely patent mesenteric vessels.     Diverticulosis.     Left adnexal cyst measuring 3.3 x 2.3 cm unchanged since 11/15/2021.     1455 LACTIC ACID: 1.0                                       Medical Decision Making  Amount and/or Complexity of Data Reviewed  Labs: ordered. Decision-making details documented in ED Course.  Radiology: ordered and independent interpretation performed. Decision-making details documented in ED Course.    Risk  Prescription drug management.  Decision regarding hospitalization.      Patient is 79 y.o. female with PMH of COPD and htn presenting to ED for evaluation of abdominal pain. . See history and physical documented above.       Differential diagnosis included but not limited to gastritis, pancreatitis, cholecystitis, ACS. Plan CBC, CMP, lipase, troponin, EKG, CXR, RUQ US, symptomatic treatment and reassessment.     View ED course above for further discussion on patient workup.       All labs reviewed and utilized in the medical decision making process  All radiology studies independently viewed by me and interpreted by the radiologist.  I reviewed all testing with the patient.     Upon re-evaluation patient reports pain improved, but concerned will go home and pain with start again after eating. Will admit to [facility] for further workup and possible GI consult.          Disposition  Final diagnoses:   Nausea and vomiting   Abdominal pain     Time reflects when diagnosis was documented in both MDM as applicable and the  Disposition within this note       Time User Action Codes Description Comment    7/23/2024  3:38 PM Gudelia Leon [R11.2] Nausea and vomiting     7/23/2024  3:42 PM Gudelia Leon [R10.9] Abdominal pain           ED Disposition       ED Disposition   Admit    Condition   Stable    Date/Time   Tue Jul 23, 2024  3:43 PM    Comment   Case was discussed with   and the patient's admission status was agreed to be Admission Status: inpatient status to the service of Dr. Black  .               Follow-up Information    None         Patient's Medications   Discharge Prescriptions    No medications on file     No discharge procedures on file.    PDMP Review         Value Time User    PDMP Reviewed  Yes 11/17/2022  6:47 PM Janine Marcus DO             ED Provider  Attending physically available and evaluated Patrica Grimaldo. I managed the patient along with the ED Attending.    Electronically Signed by           Gudelia Leon DO  07/23/24 2583

## 2024-07-23 NOTE — PLAN OF CARE
Problem: PAIN - ADULT  Goal: Verbalizes/displays adequate comfort level or baseline comfort level  Description: Interventions:  - Encourage patient to monitor pain and request assistance  - Assess pain using appropriate pain scale  - Administer analgesics based on type and severity of pain and evaluate response  - Implement non-pharmacological measures as appropriate and evaluate response  - Consider cultural and social influences on pain and pain management  - Notify physician/advanced practitioner if interventions unsuccessful or patient reports new pain  Outcome: Progressing     Problem: SAFETY ADULT  Goal: Patient will remain free of falls  Description: INTERVENTIONS:  - Educate patient/family on patient safety including physical limitations  - Instruct patient to call for assistance with activity   - Consult OT/PT to assist with strengthening/mobility   - Keep Call bell within reach  - Keep bed low and locked with side rails adjusted as appropriate  - Keep care items and personal belongings within reach  - Initiate and maintain comfort rounds  - Make Fall Risk Sign visible to staff  - Offer Toileting every 2 Hours, in advance of need  - Initiate/Maintain bed alarm  - Obtain necessary fall risk management equipment  - Apply yellow socks and bracelet for high fall risk patients  - Consider moving patient to room near nurses station  Outcome: Progressing  Goal: Maintain or return to baseline ADL function  Description: INTERVENTIONS:  -  Assess patient's ability to carry out ADLs; assess patient's baseline for ADL function and identify physical deficits which impact ability to perform ADLs (bathing, care of mouth/teeth, toileting, grooming, dressing, etc.)  - Assess/evaluate cause of self-care deficits   - Assess range of motion  - Assess patient's mobility; develop plan if impaired  - Assess patient's need for assistive devices and provide as appropriate  - Encourage maximum independence but intervene and  supervise when necessary  - Involve family in performance of ADLs  - Assess for home care needs following discharge   - Consider OT consult to assist with ADL evaluation and planning for discharge  - Provide patient education as appropriate  Outcome: Progressing  Goal: Maintains/Returns to pre admission functional level  Description: INTERVENTIONS:  - Perform AM-PAC 6 Click Basic Mobility/ Daily Activity assessment daily.  - Set and communicate daily mobility goal to care team and patient/family/caregiver.   - Collaborate with rehabilitation services on mobility goals if consulted  - Perform Range of Motion 4 times a day.  - Reposition patient every 2 hours.  - Dangle patient 4 times a day  - Stand patient 3 times a day  - Ambulate patient 3 times a day  - Out of bed to chair 3 times a day   - Out of bed for meals 3 times a day  - Out of bed for toileting  - Record patient progress and toleration of activity level   Outcome: Progressing     Problem: DISCHARGE PLANNING  Goal: Discharge to home or other facility with appropriate resources  Description: INTERVENTIONS:  - Identify barriers to discharge w/patient and caregiver  - Arrange for needed discharge resources and transportation as appropriate  - Identify discharge learning needs (meds, wound care, etc.)  - Arrange for interpretive services to assist at discharge as needed  - Refer to Case Management Department for coordinating discharge planning if the patient needs post-hospital services based on physician/advanced practitioner order or complex needs related to functional status, cognitive ability, or social support system  Outcome: Progressing     Problem: Knowledge Deficit  Goal: Patient/family/caregiver demonstrates understanding of disease process, treatment plan, medications, and discharge instructions  Description: Complete learning assessment and assess knowledge base.  Interventions:  - Provide teaching at level of understanding  - Provide teaching via  preferred learning methods  Outcome: Progressing

## 2024-07-23 NOTE — TELEPHONE ENCOUNTER
Pt. Was in the ER last Thursday 8/22 for Stomach pain. They found gall stones and that her intestines were backed up because she hadn't had a bm.  She needed an appt. With Dr. Marcus but I couldn't get a hold of the office.  I put her through to the nurses because she said she felt like she needed to go back to the hospital.  I put her through to the nurse line and Janine spoke with her.

## 2024-07-23 NOTE — ASSESSMENT & PLAN NOTE
Patient came to the hospital with recurrent abdominal pain pain is worse after eating.  Pain is located in the periumbilical region and also suprapubic region.  Patient reported that she noted some dark stools at home but no rebeca bleeding  Patient had a CAT scan on 7/18-Mild diffuse small bowel mucosal thickening as well as mucosal thickening of the gastric wall most consistent with gastroenteritis.  Patient currently denies any nausea vomiting diarrhea  Patient had a right upper quadrant ultrasound today-noted to have mild right hydronephrosis but was absent in subsequent.  Patient had CT abdomen which was negative for any concern for mesenteric ischemia  No previous colonoscopy or EGD  Consults GI  Pain control  Hemoglobin within normal limits.-Obtain stool occult for completion sake

## 2024-07-23 NOTE — Clinical Note
Case was discussed with   and the patient's admission status was agreed to be Admission Status: observation status to the service of Dr. Ledesma

## 2024-07-23 NOTE — ASSESSMENT & PLAN NOTE
Patient was on methimazole in the past.  Recent thyroid function studies within normal limits and reported that she is currently off of the methimazole

## 2024-07-23 NOTE — TELEPHONE ENCOUNTER
RN spoke with patient.  She is still c/o 8/10 RUQ pain since her last ED visit on 07/18/2024.  She had 4 episodes of vomiting bile last night.  She is still constipated.  She feels weaker than she did last week.  She does not want to come into the office because she doesn't feel well enough.  RN advised pt to go back to the ED for worsening symptoms.  She is agreeable.  She will call EMS to transport her.

## 2024-07-23 NOTE — ASSESSMENT & PLAN NOTE
Malnutrition Findings:      Patient with BMI of 17.6.  Patient reported that she may have lost some weight.  Encourage p.o. nutrition  Patient reported poor p.o. intake due to abdominal pain    BMI Findings:     Body mass index is 17.16 kg/m².

## 2024-07-23 NOTE — ED ATTENDING ATTESTATION
7/23/2024  I, Anmol Thomas MD, saw and evaluated the patient. I have discussed the patient with the resident/non-physician practitioner and agree with the resident's/non-physician practitioner's findings, Plan of Care, and MDM as documented in the resident's/non-physician practitioner's note, except where noted. All available labs and Radiology studies were reviewed.  I was present for key portions of any procedure(s) performed by the resident/non-physician practitioner and I was immediately available to provide assistance.       At this point I agree with the current assessment done in the Emergency Department.  I have conducted an independent evaluation of this patient a history and physical is as follows:    ED Course     Patient presents for evaluation secondary to epigastric and right upper quadrant abdominal pain.  Patient has known history of gallstones.  She had similar pain last week and was treated for constipation.  Patient states that she has had loose bowel movements but the pain has worsened.  No additional complaints. A/P: Abdominal pain.  Will check labs and will CT abdomen pelvis.  Will treat pain and nausea and reassess.    Critical Care Time  Procedures

## 2024-07-23 NOTE — H&P
[facility]  H&P  Name: Patrica Grimaldo 79 y.o. female I MRN: [id number]  Unit/Bed#: CW2 210-02 I Date of Admission: 7/23/2024   Date of Service: 7/23/2024 I Hospital Day: 0      Assessment & Plan   * Abdominal pain  Assessment & Plan  Patient came to the hospital with recurrent abdominal pain pain is worse after eating.  Pain is located in the periumbilical region and also suprapubic region.  Patient reported that she noted some dark stools at home but no rebeca bleeding  Patient had a CAT scan on 7/18-Mild diffuse small bowel mucosal thickening as well as mucosal thickening of the gastric wall most consistent with gastroenteritis.  Patient currently denies any nausea vomiting diarrhea  Patient had a right upper quadrant ultrasound today-noted to have mild right hydronephrosis but was absent in subsequent.  Patient had CT abdomen which was negative for any concern for mesenteric ischemia  No previous colonoscopy or EGD  Consults GI  Pain control  Hemoglobin within normal limits.-Obtain stool occult for completion sake    Mild protein-calorie malnutrition (HCC)  Assessment & Plan  Malnutrition Findings:      Patient with BMI of 17.6.  Patient reported that she may have lost some weight.  Encourage p.o. nutrition  Patient reported poor p.o. intake due to abdominal pain    BMI Findings:     Body mass index is 17.16 kg/m².       Subclinical hyperthyroidism  Assessment & Plan  Patient was on methimazole in the past.  Recent thyroid function studies within normal limits and reported that she is currently off of the methimazole    Vitamin D deficiency  Assessment & Plan  Continue with supplementation    Essential hypertension  Assessment & Plan  Patient with known history of hypertension.  Continue with metoprolol  Monitor blood pressure    Chronic obstructive pulmonary disease (HCC)  Assessment & Plan  With known history of COPD.  No acute exacerbation.  Continue with respiratory  protocol    Generalized anxiety disorder  Assessment & Plan  By reviewing the PDMP patient has not filled Xanax recently  Continue with xanax while inpatient         VTE Pharmacologic Prophylaxis: VTE Score: 3 Moderate Risk (Score 3-4) - Pharmacological DVT Prophylaxis Ordered: heparin.  Code Status: Level 1 - Full Code   Discussion with family: Updated  (son) at bedside.    Anticipated Length of Stay: Patient will be admitted on an inpatient basis with an anticipated length of stay of greater than 2 midnights secondary to abdominal pain.    Total Time Spent on Date of Encounter in care of patient: 35 mins. This time was spent on one or more of the following: performing physical exam; counseling and coordination of care; obtaining or reviewing history; documenting in the medical record; reviewing/ordering tests, medications or procedures; communicating with other healthcare professionals and discussing with patient's family/caregivers.    Chief Complaint: Abdominal pain    History of Present Illness:  Patrica Grimaldo is a 79 y.o. female with a PMH of COPD, leg edema.,  Tobacco abuse who presents with abdominal pain.  Patient has been having intermittent abdominal pain for more than a week at this time.  Was seen in the emergency room on 7/18 had-CAT scan which was concerning for gastroenteritis.  Patient was discharged home.  Patient continued to have persistent abdominal pain and poor p.o. intake.  Patient reported that she may have lost some weight but she does not check her weight on a regular basis.  Patient with very poor p.o. intake due to abdominal pain.  Reports that after eating her pain is significantly worse.  Pain is located mainly in the periumbilical region with some radiation to the suprapubic region.  Has not noted any other precipitating or relieving factors.  No fever no chills.  Patient reported that recently she was constipated and she took milk of magnesia with subsequent  diarrhea.  Currently she denies any constipation or diarrhea.  She has intermittent vomiting.  Her son in the room is concerned about ulcers.    Review of Systems:  Review of Systems   Constitutional:  Positive for activity change, appetite change, fatigue and unexpected weight change.   HENT: Negative.     Eyes: Negative.    Respiratory: Negative.     Cardiovascular: Negative.    Gastrointestinal:  Positive for abdominal pain, constipation, diarrhea and nausea.   Genitourinary: Negative.  Negative for dysuria and hematuria.   Musculoskeletal: Negative.  Negative for gait problem.   Skin: Negative.    Neurological: Negative.    Hematological: Negative.    Psychiatric/Behavioral: Negative.         Past Medical and Surgical History:   Past Medical History:   Diagnosis Date    Elevated blood pressure reading        Past Surgical History:   Procedure Laterality Date    APPENDECTOMY      TONSILLECTOMY         Meds/Allergies:  Prior to Admission medications    Medication Sig Start Date End Date Taking? Authorizing Provider   alendronate (Fosamax) 70 mg tablet Take 1 tablet (70 mg total) by mouth every 7 days 11/22/21   Janine Marcus DO   ALPRAZolam (XANAX) 0.25 mg tablet Take 1 tablet (0.25 mg total) by mouth daily as needed for anxiety 11/17/22   Janine Marcus DO   Cholecalciferol (VITAMIN D3) 2000 units TABS Take 2,000 Units by mouth daily     Historical Provider, MD   Fluticasone-Salmeterol (Advair) 250-50 mcg/dose inhaler INHALE 1 PUFF BY MOUTH TWO TIMES A DAY -RINSE MOUTH AFTER USE 6/28/24   Janine Marcus DO   metoprolol succinate (TOPROL-XL) 100 mg 24 hr tablet TAKE ONE TABLET BY MOUTH AT BEDTIME 4/19/24   Janine Marcus DO   Multiple Vitamins-Minerals (MULTIVITAMIN ADULT PO) Take 1 tablet by mouth daily    Historical Provider, MD   esomeprazole (NexIUM) 20 mg capsule Take 20 mg by mouth every morning before breakfast  7/23/24  Historical Provider, MD   methimazole (TAPAZOLE) 5 mg tablet TAKE 1/2 TABLET BY MOUTH DAILY  12/6/22 7/23/24  Meghan Sanchez MD     I have reviewed home medications with patient personally.    Allergies:   Allergies   Allergen Reactions    Erythromycin GI Intolerance     Reaction Date: 26Sep2005;     Lexapro [Escitalopram] Other (See Comments)     hyponatremia       Social History:  Marital Status: /Civil Union   Occupation:   Patient Pre-hospital Living Situation: Home  Patient Pre-hospital Level of Mobility: walks with walker  Patient Pre-hospital Diet Restrictions:   Substance Use History:   Social History     Substance and Sexual Activity   Alcohol Use Never     Social History     Tobacco Use   Smoking Status Every Day    Current packs/day: 1.50    Types: Cigarettes   Smokeless Tobacco Never     Social History     Substance and Sexual Activity   Drug Use Never       Family History:  Family History   Problem Relation Age of Onset    Lung cancer Mother     Heart disease Maternal Aunt     Heart disease Maternal Uncle     Heart disease Maternal Grandmother        Physical Exam:     Vitals:   Blood Pressure: 149/73 (07/23/24 1722)  Pulse: 87 (07/23/24 1722)  Temperature: 98.1 °F (36.7 °C) (07/23/24 1722)  Temp Source: Oral (07/23/24 1018)  Respirations: 16 (07/23/24 1722)  Weight - Scale: 45.4 kg (100 lb) (07/23/24 1525)  SpO2: 98 % (07/23/24 1722)    Physical Exam  Constitutional:       General: She is not in acute distress.     Appearance: She is not ill-appearing.   HENT:      Head: Normocephalic and atraumatic.      Right Ear: There is no impacted cerumen.      Nose: Nose normal. No congestion.   Eyes:      General: No scleral icterus.  Cardiovascular:      Rate and Rhythm: Normal rate and regular rhythm.      Heart sounds: No murmur heard.  Pulmonary:      Effort: Pulmonary effort is normal. No respiratory distress.   Abdominal:      Palpations: There is no mass.      Tenderness: There is abdominal tenderness (Mild tenderness in the periumbilical region). There is no guarding or rebound.    Musculoskeletal:         General: No swelling. Normal range of motion.   Skin:     General: Skin is warm.      Coloration: Skin is not jaundiced.   Neurological:      Mental Status: She is alert. Mental status is at baseline.          Additional Data:     Lab Results:  Results from last 7 days   Lab Units 07/23/24  1024   WBC Thousand/uL 9.74   HEMOGLOBIN g/dL 15.6*   HEMATOCRIT % 46.2*   PLATELETS Thousands/uL 149   SEGS PCT % 83*   LYMPHO PCT % 9*   MONO PCT % 7   EOS PCT % 0     Results from last 7 days   Lab Units 07/23/24  1024   SODIUM mmol/L 133*   POTASSIUM mmol/L 3.5   CHLORIDE mmol/L 100   CO2 mmol/L 25   BUN mg/dL 9   CREATININE mg/dL 0.42*   ANION GAP mmol/L 8   CALCIUM mg/dL 7.9*   ALBUMIN g/dL 3.4*   TOTAL BILIRUBIN mg/dL 0.54   ALK PHOS U/L 64   ALT U/L 5*   AST U/L 12*   GLUCOSE RANDOM mg/dL 130             Lab Results   Component Value Date    HGBA1C 5.3 02/28/2022    HGBA1C 5.4 01/21/2020    HGBA1C 5.3 12/14/2016     Results from last 7 days   Lab Units 07/23/24  1423   LACTIC ACID mmol/L 1.0       Lines/Drains:  Invasive Devices       Peripheral Intravenous Line  Duration             Peripheral IV 07/23/24 Left Antecubital <1 day                        Imaging: Reviewed radiology reports from this admission including: CTAabdomen and pelvis  CTA abdomen pelvis w wo contrast   Final Result by Vernon Ocasio MD (07/23 1431)      CTA abdomen and pelvis with mild atherosclerotic disease and widely patent mesenteric vessels.      Diverticulosis.      Left adnexal cyst measuring 3.3 x 2.3 cm unchanged since 11/15/2021.         Workstation performed: [id number]         US right upper quadrant   Final Result by Chevy Childers MD (07/23 1208)      Mild right hydronephrosis.      Workstation performed: [id number]         XR chest 2 views   ED Interpretation by Gudelia Leon DO (07/23 1051)   Lung hyperinflation. No obvious pleural effusions or consolidations.       Final Result by  Robby Sky MD (07/23 1128)      No acute cardiopulmonary disease.            Workstation performed: [id number]             EKG and Other Studies Reviewed on Admission:   EKG: No EKG obtained.    ** Please Note: This note has been constructed using a voice recognition system. **

## 2024-07-24 LAB
ALBUMIN SERPL BCG-MCNC: 3.4 G/DL (ref 3.5–5)
ALP SERPL-CCNC: 56 U/L (ref 34–104)
ALT SERPL W P-5'-P-CCNC: 4 U/L (ref 7–52)
ANION GAP SERPL CALCULATED.3IONS-SCNC: 9 MMOL/L (ref 4–13)
AST SERPL W P-5'-P-CCNC: 12 U/L (ref 13–39)
BILIRUB SERPL-MCNC: 0.55 MG/DL (ref 0.2–1)
BUN SERPL-MCNC: 10 MG/DL (ref 5–25)
CALCIUM ALBUM COR SERPL-MCNC: 9.2 MG/DL (ref 8.3–10.1)
CALCIUM SERPL-MCNC: 8.7 MG/DL (ref 8.4–10.2)
CHLORIDE SERPL-SCNC: 98 MMOL/L (ref 96–108)
CO2 SERPL-SCNC: 25 MMOL/L (ref 21–32)
CREAT SERPL-MCNC: 0.33 MG/DL (ref 0.6–1.3)
ERYTHROCYTE [DISTWIDTH] IN BLOOD BY AUTOMATED COUNT: 12.1 % (ref 11.6–15.1)
GFR SERPL CREATININE-BSD FRML MDRD: 105 ML/MIN/1.73SQ M
GLUCOSE SERPL-MCNC: 92 MG/DL (ref 65–140)
HCT VFR BLD AUTO: 41.4 % (ref 34.8–46.1)
HGB BLD-MCNC: 13.7 G/DL (ref 11.5–15.4)
MAGNESIUM SERPL-MCNC: 1.7 MG/DL (ref 1.9–2.7)
MCH RBC QN AUTO: 31.2 PG (ref 26.8–34.3)
MCHC RBC AUTO-ENTMCNC: 33.1 G/DL (ref 31.4–37.4)
MCV RBC AUTO: 94 FL (ref 82–98)
PLATELET # BLD AUTO: 151 THOUSANDS/UL (ref 149–390)
PMV BLD AUTO: 10.2 FL (ref 8.9–12.7)
POTASSIUM SERPL-SCNC: 3.8 MMOL/L (ref 3.5–5.3)
PROT SERPL-MCNC: 5.6 G/DL (ref 6.4–8.4)
RBC # BLD AUTO: 4.39 MILLION/UL (ref 3.81–5.12)
SODIUM SERPL-SCNC: 132 MMOL/L (ref 135–147)
WBC # BLD AUTO: 9.37 THOUSAND/UL (ref 4.31–10.16)

## 2024-07-24 PROCEDURE — 99233 SBSQ HOSP IP/OBS HIGH 50: CPT | Performed by: INTERNAL MEDICINE

## 2024-07-24 PROCEDURE — 99222 1ST HOSP IP/OBS MODERATE 55: CPT | Performed by: INTERNAL MEDICINE

## 2024-07-24 PROCEDURE — 83735 ASSAY OF MAGNESIUM: CPT | Performed by: FAMILY MEDICINE

## 2024-07-24 PROCEDURE — 85027 COMPLETE CBC AUTOMATED: CPT | Performed by: FAMILY MEDICINE

## 2024-07-24 PROCEDURE — 80053 COMPREHEN METABOLIC PANEL: CPT | Performed by: FAMILY MEDICINE

## 2024-07-24 RX ORDER — SENNOSIDES 8.6 MG
1 TABLET ORAL DAILY
Status: DISCONTINUED | OUTPATIENT
Start: 2024-07-24 | End: 2024-07-25

## 2024-07-24 RX ORDER — PANTOPRAZOLE SODIUM 40 MG/1
40 TABLET, DELAYED RELEASE ORAL
Status: DISCONTINUED | OUTPATIENT
Start: 2024-07-24 | End: 2024-07-25

## 2024-07-24 RX ORDER — POLYETHYLENE GLYCOL 3350 17 G/17G
17 POWDER, FOR SOLUTION ORAL DAILY
Status: DISCONTINUED | OUTPATIENT
Start: 2024-07-24 | End: 2024-07-25

## 2024-07-24 RX ADMIN — POLYETHYLENE GLYCOL 3350 17 G: 17 POWDER, FOR SOLUTION ORAL at 09:02

## 2024-07-24 RX ADMIN — HEPARIN SODIUM 5000 UNITS: 5000 INJECTION INTRAVENOUS; SUBCUTANEOUS at 21:00

## 2024-07-24 RX ADMIN — HEPARIN SODIUM 5000 UNITS: 5000 INJECTION INTRAVENOUS; SUBCUTANEOUS at 05:22

## 2024-07-24 RX ADMIN — FLUTICASONE FUROATE AND VILANTEROL TRIFENATATE 1 PUFF: 200; 25 POWDER RESPIRATORY (INHALATION) at 08:30

## 2024-07-24 RX ADMIN — Medication 1 TABLET: at 09:02

## 2024-07-24 RX ADMIN — ALBUTEROL SULFATE 2 PUFF: 90 AEROSOL, METERED RESPIRATORY (INHALATION) at 21:20

## 2024-07-24 RX ADMIN — SODIUM CHLORIDE 75 ML/HR: 0.9 INJECTION, SOLUTION INTRAVENOUS at 09:08

## 2024-07-24 RX ADMIN — METOPROLOL SUCCINATE 100 MG: 100 TABLET, EXTENDED RELEASE ORAL at 21:00

## 2024-07-24 RX ADMIN — HEPARIN SODIUM 5000 UNITS: 5000 INJECTION INTRAVENOUS; SUBCUTANEOUS at 15:02

## 2024-07-24 RX ADMIN — OXYCODONE HYDROCHLORIDE 5 MG: 5 TABLET ORAL at 20:56

## 2024-07-24 RX ADMIN — OXYCODONE HYDROCHLORIDE 5 MG: 5 TABLET ORAL at 05:26

## 2024-07-24 RX ADMIN — SODIUM CHLORIDE 75 ML/HR: 0.9 INJECTION, SOLUTION INTRAVENOUS at 22:03

## 2024-07-24 RX ADMIN — SENNOSIDES 8.6 MG: 8.6 TABLET, FILM COATED ORAL at 09:02

## 2024-07-24 RX ADMIN — Medication 2000 UNITS: at 09:02

## 2024-07-24 RX ADMIN — PANTOPRAZOLE SODIUM 40 MG: 40 TABLET, DELAYED RELEASE ORAL at 09:04

## 2024-07-24 NOTE — PLAN OF CARE
Problem: PAIN - ADULT  Goal: Verbalizes/displays adequate comfort level or baseline comfort level  Description: Interventions:  - Encourage patient to monitor pain and request assistance  - Assess pain using appropriate pain scale  - Administer analgesics based on type and severity of pain and evaluate response  - Implement non-pharmacological measures as appropriate and evaluate response  - Consider cultural and social influences on pain and pain management  - Notify physician/advanced practitioner if interventions unsuccessful or patient reports new pain  Outcome: Progressing     Problem: SAFETY ADULT  Goal: Patient will remain free of falls  Description: INTERVENTIONS:  - Educate patient/family on patient safety including physical limitations  - Instruct patient to call for assistance with activity   - Consult OT/PT to assist with strengthening/mobility   - Keep Call bell within reach  - Keep bed low and locked with side rails adjusted as appropriate  - Keep care items and personal belongings within reach  - Initiate and maintain comfort rounds  - Make Fall Risk Sign visible to staff  - Offer Toileting every 2 Hours, in advance of need  - Initiate/Maintain bed alarm  - Obtain necessary fall risk management equipment  - Apply yellow socks and bracelet for high fall risk patients  - Consider moving patient to room near nurses station  Outcome: Progressing  Goal: Maintain or return to baseline ADL function  Description: INTERVENTIONS:  -  Assess patient's ability to carry out ADLs; assess patient's baseline for ADL function and identify physical deficits which impact ability to perform ADLs (bathing, care of mouth/teeth, toileting, grooming, dressing, etc.)  - Assess/evaluate cause of self-care deficits   - Assess range of motion  - Assess patient's mobility; develop plan if impaired  - Assess patient's need for assistive devices and provide as appropriate  - Encourage maximum independence but intervene and  supervise when necessary  - Involve family in performance of ADLs  - Assess for home care needs following discharge   - Consider OT consult to assist with ADL evaluation and planning for discharge  - Provide patient education as appropriate  Outcome: Progressing  Goal: Maintains/Returns to pre admission functional level  Description: INTERVENTIONS:  - Perform AM-PAC 6 Click Basic Mobility/ Daily Activity assessment daily.  - Set and communicate daily mobility goal to care team and patient/family/caregiver.   - Collaborate with rehabilitation services on mobility goals if consulted  - Perform Range of Motion 4 times a day.  - Reposition patient every 2 hours.  - Dangle patient 4 times a day  - Stand patient 3 times a day  - Ambulate patient 3 times a day  - Out of bed to chair 3 times a day   - Out of bed for meals 3 times a day  - Out of bed for toileting  - Record patient progress and toleration of activity level   Outcome: Progressing     Problem: DISCHARGE PLANNING  Goal: Discharge to home or other facility with appropriate resources  Description: INTERVENTIONS:  - Identify barriers to discharge w/patient and caregiver  - Arrange for needed discharge resources and transportation as appropriate  - Identify discharge learning needs (meds, wound care, etc.)  - Arrange for interpretive services to assist at discharge as needed  - Refer to Case Management Department for coordinating discharge planning if the patient needs post-hospital services based on physician/advanced practitioner order or complex needs related to functional status, cognitive ability, or social support system  Outcome: Progressing     Problem: Knowledge Deficit  Goal: Patient/family/caregiver demonstrates understanding of disease process, treatment plan, medications, and discharge instructions  Description: Complete learning assessment and assess knowledge base.  Interventions:  - Provide teaching at level of understanding  - Provide teaching via  preferred learning methods  Outcome: Progressing     Problem: GASTROINTESTINAL - ADULT  Goal: Minimal or absence of nausea and/or vomiting  Description: INTERVENTIONS:  - Administer IV fluids if ordered to ensure adequate hydration  - Maintain NPO status until nausea and vomiting are resolved  - Nasogastric tube if ordered  - Administer ordered antiemetic medications as needed  - Provide nonpharmacologic comfort measures as appropriate  - Advance diet as tolerated, if ordered  - Consider nutrition services referral to assist patient with adequate nutrition and appropriate food choices  Outcome: Progressing  Goal: Maintains or returns to baseline bowel function  Description: INTERVENTIONS:  - Assess bowel function  - Encourage oral fluids to ensure adequate hydration  - Administer IV fluids if ordered to ensure adequate hydration  - Administer ordered medications as needed  - Encourage mobilization and activity  - Consider nutritional services referral to assist patient with adequate nutrition and appropriate food choices  Outcome: Progressing  Goal: Maintains adequate nutritional intake  Description: INTERVENTIONS:  - Monitor percentage of each meal consumed  - Identify factors contributing to decreased intake, treat as appropriate  - Assist with meals as needed  - Monitor I&O, weight, and lab values if indicated  - Obtain nutrition services referral as needed  Outcome: Progressing  Goal: Oral mucous membranes remain intact  Description: INTERVENTIONS  - Assess oral mucosa and hygiene practices  - Implement preventative oral hygiene regimen  - Implement oral medicated treatments as ordered  - Initiate Nutrition services referral as needed  Outcome: Progressing

## 2024-07-24 NOTE — PROGRESS NOTES
[facility]  Progress Note  Name: Patrica Grimaldo I  MRN: [id number]  Unit/Bed#: CW2 210-02 I Date of Admission: 7/23/2024   Date of Service: 7/24/2024 I Hospital Day: 1    Assessment & Plan   * Abdominal pain  Assessment & Plan  Patient came to the hospital with recurrent abdominal pain pain is worse after eating.  Pain is located in the periumbilical region and also suprapubic region.  Patient reported that she noted some dark stools at home but no rebeca bleeding  Patient had a CAT scan on 7/18-Mild diffuse small bowel mucosal thickening as well as mucosal thickening of the gastric wall most consistent with gastroenteritis.  Patient currently denies any nausea vomiting diarrhea  Patient had a right upper quadrant ultrasound today-noted to have mild right hydronephrosis but was absent in subsequent.  Patient had CT abdomen which was negative for any concern for mesenteric ischemia  No previous colonoscopy or EGD  Note GI input.  No plans for intervention at this time.  Will continue to monitor symptoms.    Mild protein-calorie malnutrition (HCC)  Assessment & Plan  Malnutrition Findings:      Patient with BMI of 17.6.  Patient reported that she may have lost some weight.  Encourage p.o. nutrition  Patient reported poor p.o. intake due to abdominal pain    BMI Findings:     Body mass index is 17.16 kg/m².       Subclinical hyperthyroidism  Assessment & Plan  Patient was on methimazole in the past.  Recent thyroid function studies within normal limits and reported that she is currently off of the methimazole    Vitamin D deficiency  Assessment & Plan  Continue with supplementation    Chronic obstructive pulmonary disease (HCC)  Assessment & Plan  With known history of COPD.  No acute exacerbation.  Continue with respiratory protocol    Generalized anxiety disorder  Assessment & Plan  By reviewing the PDMP patient has not filled Xanax recently  Continue with xanax while  inpatient             VTE Pharmacologic Prophylaxis:   Pharmacologic: Heparin  Mechanical VTE Prophylaxis in Place: No    Patient Centered Rounds: I have performed bedside rounds with nursing staff today.      Time Spent for Care: 1 hour.  More than 50% of total time spent on counseling and coordination of care as described above.    Current Length of Stay: 1 day(s)    Current Patient Status: Inpatient       Code Status: Level 1 - Full Code      Subjective:   Patient passing gas.  No BM yet.    Objective:     Vitals:   Temp (24hrs), Av °F (36.7 °C), Min:97.8 °F (36.6 °C), Max:98.2 °F (36.8 °C)    Temp:  [97.8 °F (36.6 °C)-98.2 °F (36.8 °C)] 98.2 °F (36.8 °C)  HR:  [65-95] 90  Resp:  [16-22] 16  BP: (120-183)/(58-95) 120/69  SpO2:  [94 %-98 %] 94 %  Body mass index is 17.16 kg/m².     Input and Output Summary (last 24 hours):       Intake/Output Summary (Last 24 hours) at 2024 0845  Last data filed at 2024 0101  Gross per 24 hour   Intake 200 ml   Output 1300 ml   Net -1100 ml       Physical Exam:     Physical Exam  Constitutional:       Appearance: Normal appearance.   HENT:      Head: Normocephalic and atraumatic.   Cardiovascular:      Rate and Rhythm: Normal rate and regular rhythm.      Heart sounds: No murmur heard.  Skin:     General: Skin is warm and dry.   Neurological:      General: No focal deficit present.      Mental Status: She is alert and oriented to person, place, and time.   Psychiatric:         Mood and Affect: Mood normal.         Additional Data:     Labs:    Results from last 7 days   Lab Units 24  0501 24  1024   WBC Thousand/uL 9.37 9.74   HEMOGLOBIN g/dL 13.7 15.6*   HEMATOCRIT % 41.4 46.2*   PLATELETS Thousands/uL 151 149   SEGS PCT %  --  83*   LYMPHO PCT %  --  9*   MONO PCT %  --  7   EOS PCT %  --  0     Results from last 7 days   Lab Units 24  0501   POTASSIUM mmol/L 3.8   CHLORIDE mmol/L 98   CO2 mmol/L 25   BUN mg/dL 10   CREATININE mg/dL 0.33*    CALCIUM mg/dL 8.7   ALK PHOS U/L 56   ALT U/L 4*   AST U/L 12*           Recent Cultures (last 7 days):           Last 24 Hours Medication List:   Current Facility-Administered Medications   Medication Dose Route Frequency Provider Last Rate    acetaminophen  650 mg Oral Q6H PRN Radha Black MD      albuterol  2 puff Inhalation Q4H PRN Radha Black MD      ALPRAZolam  0.25 mg Oral Daily PRN Radha Black MD      Cholecalciferol  2,000 Units Oral Daily Radha Black MD      fluticasone-vilanterol  1 puff Inhalation Daily Radha Black MD      heparin (porcine)  5,000 Units Subcutaneous Q8H KULDIP Radha Black MD      metoprolol succinate  100 mg Oral HS Radha Black MD      morphine injection  2 mg Intravenous Q4H PRN Radha Black MD      multivitamin-minerals  1 tablet Oral Daily Radha Black MD      nicotine  1 patch Transdermal Daily Radha Black MD      ondansetron  4 mg Intravenous Q6H PRN Radha Black MD      oxyCODONE  5 mg Oral Q4H PRN Radha Black MD      oxyCODONE  2.5 mg Oral Q4H PRN Radha Black MD      polyethylene glycol  17 g Oral Daily Vernon Miranda MD      senna  1 tablet Oral Daily Vernon Miranda MD      sodium chloride  75 mL/hr Intravenous Continuous Radha Black MD 75 mL/hr (07/23/24 2105)        Today, Patient Was Seen By: Flavia Vázquez DO    ** Please Note: Dictation voice to text software may have been used in the creation of this document. **

## 2024-07-24 NOTE — CONSULTS
Consultation -  [facility]  Patrica Grimaldo 79 y.o. female MRN: [id number]  Unit/Bed#: CW2 210-02 Encounter: [id number]            Inpatient consult to gastroenterology     Date/Time  7/24/2024 11:48 AM     Performed by  Vernon Miranda MD   Authorized by  Radha Black MD             Reason for Consult / Principal Problem:   Abdominal pain      ASSESSMENT AND PLAN:    80 yo F with history of GERD, COPD, KENYA and hypertension who presented on 7/23 due to right lower quadrant pain as well as reflux found to have largely unremarkable labs as well as repeat CT abdomen showing increase stool burden overall concerning for untreated GERD and constipation leading to GI consult.     1.  Right lower quadrant pain  2.  Constipation  3.  GERD  4.  Tobacco use  - Lipase negative  - Images reviewed in PACS with significant stool burden especially in rectosigmoid from ED visit on 7/23 with slightly improved but still significant stool burden  - Counseled on tobacco cessation  - Okay for regular diet  - Start pantoprazole 40 mg p.o. twice daily  - Start MiraLAX and senna targeting 1 soft bowel movement every 1 to 2 days with plans to continue bowel regimen on discharge  - No current plans for endoscopic evaluation while inpatient.  If symptoms continue can plan for outpatient EGD and colonoscopy to evaluate symptoms if in line with goals of care    ______________________________________________________________________    HPI:  Patrica Grimaldo is our 80 yo F with history of GERD, COPD, KENYA and hypertension who presented on 7/23 due to right lower quadrant pain as well as reflux found to have largely unremarkable labs as well as repeat CT abdomen showing increase stool burden.  She previously presented on 7/18 with similar symptoms leading to CT abdomen showing significant stool burden in rectosigmoid leading to discharge with plan to start laxatives.  She took magnesium citrate 2 days in a row with significant  loose stool.  She continues to have intermittent loose stool about other every other day.  She states the pain is worse after eating.  She states prior to this episode she was having a bowel movement every 1 to 2 days taking MiraLAX a few times a week.  Pain has been on and off for the past couple weeks, but GERD sx's has been for a few months.  No previous endoscopy.  Patient denies NSAID use.  No alcohol use.  Patient continues to smoke a pack cigarettes daily.  No previous endoscopy.  Son with Whittington's esophagus.      REVIEW OF SYSTEMS:    CONSTITUTIONAL: Denies any fever, chills, rigors  HEENT: No earache or tinnitus. Denies hearing loss or visual disturbances.  CARDIOVASCULAR: No chest pain or palpitations.   RESPIRATORY: Denies any cough, hemoptysis, shortness of breath or dyspnea on exertion.  GASTROINTESTINAL: As noted in the History of Present Illness.   GENITOURINARY: No problems with urination. Denies any hematuria or dysuria.  NEUROLOGIC: No dizziness or vertigo, denies headaches.   MUSCULOSKELETAL: Denies any muscle or joint pain.   SKIN: Denies skin rashes or itching.   ENDOCRINE: Denies excessive thirst. Denies intolerance to heat or cold.  PSYCHOSOCIAL: Denies depression or anxiety. Denies any recent memory loss.       Historical Information   Past Medical History:   Diagnosis Date    Elevated blood pressure reading      Past Surgical History:   Procedure Laterality Date    APPENDECTOMY      TONSILLECTOMY       Social History   Social History     Substance and Sexual Activity   Alcohol Use Never     Social History     Substance and Sexual Activity   Drug Use Never     Social History     Tobacco Use   Smoking Status Every Day    Current packs/day: 1.50    Types: Cigarettes   Smokeless Tobacco Never     Family History   Problem Relation Age of Onset    Lung cancer Mother     Heart disease Maternal Aunt     Heart disease Maternal Uncle     Heart disease Maternal Grandmother        Meds/Allergies        Medications Prior to Admission:     alendronate (Fosamax) 70 mg tablet    ALPRAZolam (XANAX) 0.25 mg tablet    Cholecalciferol (VITAMIN D3) 2000 units TABS    Fluticasone-Salmeterol (Advair) 250-50 mcg/dose inhaler    metoprolol succinate (TOPROL-XL) 100 mg 24 hr tablet    Multiple Vitamins-Minerals (MULTIVITAMIN ADULT PO)  Current Facility-Administered Medications   Medication Dose Route Frequency    acetaminophen (TYLENOL) tablet 650 mg  650 mg Oral Q6H PRN    albuterol (PROVENTIL HFA,VENTOLIN HFA) inhaler 2 puff  2 puff Inhalation Q4H PRN    ALPRAZolam (XANAX) tablet 0.25 mg  0.25 mg Oral Daily PRN    Cholecalciferol (VITAMIN D3) tablet 2,000 Units  2,000 Units Oral Daily    fluticasone-vilanterol 200-25 mcg/actuation 1 puff  1 puff Inhalation Daily    heparin (porcine) subcutaneous injection 5,000 Units  5,000 Units Subcutaneous Q8H KULDIP    metoprolol succinate (TOPROL-XL) 24 hr tablet 100 mg  100 mg Oral HS    morphine injection 2 mg  2 mg Intravenous Q4H PRN    multivitamin-minerals (CENTRUM) tablet 1 tablet  1 tablet Oral Daily    nicotine (NICODERM CQ) 7 mg/24hr TD 24 hr patch 1 patch  1 patch Transdermal Daily    ondansetron (ZOFRAN) injection 4 mg  4 mg Intravenous Q6H PRN    oxyCODONE (ROXICODONE) IR tablet 5 mg  5 mg Oral Q4H PRN    oxyCODONE (ROXICODONE) split tablet 2.5 mg  2.5 mg Oral Q4H PRN    pantoprazole (PROTONIX) EC tablet 40 mg  40 mg Oral Early Morning    polyethylene glycol (MIRALAX) packet 17 g  17 g Oral Daily    senna (SENOKOT) tablet 8.6 mg  1 tablet Oral Daily    sodium chloride 0.9 % infusion  75 mL/hr Intravenous Continuous       Allergies   Allergen Reactions    Erythromycin GI Intolerance     Reaction Date: 26Sep2005;     Lexapro [Escitalopram] Other (See Comments)     hyponatremia           Objective     Blood pressure 120/69, pulse 90, temperature 98.2 °F (36.8 °C), resp. rate 16, weight 45.4 kg (100 lb), SpO2 94%. Body mass index is 17.16 kg/m².      Intake/Output Summary  (Last 24 hours) at 7/24/2024 1137  Last data filed at 7/24/2024 0908  Gross per 24 hour   Intake 1103.75 ml   Output 1300 ml   Net -196.25 ml         PHYSICAL EXAM:      General Appearance:   Alert, cooperative, no distress   HEENT:   Normocephalic, atraumatic, anicteric.     Neck:  Supple, symmetrical, trachea midline   Lungs:   No respiratory distress   Heart::   Regular rate and rhythm per monitor   Abdomen:   Soft, mild RLQ tenderness, non-distended; normal bowel sounds; no masses, no organomegaly    Genitalia:   Deferred    Rectal:   Deferred    Extremities:  No cyanosis, clubbing or edema    Pulses:  Pulses present   Skin:  No jaundice, rashes, or lesions    Lymph nodes:  No palpable cervical lymphadenopathy        Lab Results:   Admission on 07/23/2024   Component Date Value    Ventricular Rate 07/23/2024 80     Atrial Rate 07/23/2024 80     AR Interval 07/23/2024 176     QRSD Interval 07/23/2024 106     QT Interval 07/23/2024 386     QTC Interval 07/23/2024 445     P Axis 07/23/2024 80     QRS Axis 07/23/2024 -66     T Wave Axis 07/23/2024 50     WBC 07/23/2024 9.74     RBC 07/23/2024 4.94     Hemoglobin 07/23/2024 15.6 (H)     Hematocrit 07/23/2024 46.2 (H)     MCV 07/23/2024 94     MCH 07/23/2024 31.6     MCHC 07/23/2024 33.8     RDW 07/23/2024 11.9     MPV 07/23/2024 9.8     Platelets 07/23/2024 149     nRBC 07/23/2024 0     Segmented % 07/23/2024 83 (H)     Immature Grans % 07/23/2024 1     Lymphocytes % 07/23/2024 9 (L)     Monocytes % 07/23/2024 7     Eosinophils Relative 07/23/2024 0     Basophils Relative 07/23/2024 0     Absolute Neutrophils 07/23/2024 8.08 (H)     Absolute Immature Grans 07/23/2024 0.05     Absolute Lymphocytes 07/23/2024 0.86     Absolute Monocytes 07/23/2024 0.71     Eosinophils Absolute 07/23/2024 0.01     Basophils Absolute 07/23/2024 0.03     Sodium 07/23/2024 133 (L)     Potassium 07/23/2024 3.5     Chloride 07/23/2024 100     CO2 07/23/2024 25     ANION GAP 07/23/2024 8      BUN 07/23/2024 9     Creatinine 07/23/2024 0.42 (L)     Glucose 07/23/2024 130     Calcium 07/23/2024 7.9 (L)     Corrected Calcium 07/23/2024 8.4     AST 07/23/2024 12 (L)     ALT 07/23/2024 5 (L)     Alkaline Phosphatase 07/23/2024 64     Total Protein 07/23/2024 5.4 (L)     Albumin 07/23/2024 3.4 (L)     Total Bilirubin 07/23/2024 0.54     eGFR 07/23/2024 97     Lipase 07/23/2024 9 (L)     hs TnI 0hr 07/23/2024 4     hs TnI 2hr 07/23/2024 5     Delta 2hr hsTnI 07/23/2024 1     LACTIC ACID 07/23/2024 1.0     Sodium 07/24/2024 132 (L)     Potassium 07/24/2024 3.8     Chloride 07/24/2024 98     CO2 07/24/2024 25     ANION GAP 07/24/2024 9     BUN 07/24/2024 10     Creatinine 07/24/2024 0.33 (L)     Glucose 07/24/2024 92     Calcium 07/24/2024 8.7     Corrected Calcium 07/24/2024 9.2     AST 07/24/2024 12 (L)     ALT 07/24/2024 4 (L)     Alkaline Phosphatase 07/24/2024 56     Total Protein 07/24/2024 5.6 (L)     Albumin 07/24/2024 3.4 (L)     Total Bilirubin 07/24/2024 0.55     eGFR 07/24/2024 105     Magnesium 07/24/2024 1.7 (L)     WBC 07/24/2024 9.37     RBC 07/24/2024 4.39     Hemoglobin 07/24/2024 13.7     Hematocrit 07/24/2024 41.4     MCV 07/24/2024 94     MCH 07/24/2024 31.2     MCHC 07/24/2024 33.1     RDW 07/24/2024 12.1     Platelets 07/24/2024 151     MPV 07/24/2024 10.2        Imaging Studies: I have personally reviewed pertinent imaging studies.

## 2024-07-24 NOTE — ASSESSMENT & PLAN NOTE
Patient came to the hospital with recurrent abdominal pain pain is worse after eating.  Pain is located in the periumbilical region and also suprapubic region.  Patient reported that she noted some dark stools at home but no rebeca bleeding  Patient had a CAT scan on 7/18-Mild diffuse small bowel mucosal thickening as well as mucosal thickening of the gastric wall most consistent with gastroenteritis.  Patient currently denies any nausea vomiting diarrhea  Patient had a right upper quadrant ultrasound today-noted to have mild right hydronephrosis but was absent in subsequent.  Patient had CT abdomen which was negative for any concern for mesenteric ischemia  No previous colonoscopy or EGD  Consults GI  Pain control  Monitor hemoglobin trend

## 2024-07-24 NOTE — RESPIRATORY THERAPY NOTE
RT Protocol Note  Patrica Grimaldo 79 y.o. female MRN: [id number]  Unit/Bed#: CW2 210-02 Encounter: [id number]    Assessment    Principal Problem:    Abdominal pain  Active Problems:    Generalized anxiety disorder    Chronic obstructive pulmonary disease (HCC)    Vitamin D deficiency    Subclinical hyperthyroidism    Mild protein-calorie malnutrition (HCC)      Home Pulmonary Medications:  Advair BID       Past Medical History:   Diagnosis Date    Elevated blood pressure reading      Social History     Socioeconomic History    Marital status: /Civil Union     Spouse name: None    Number of children: None    Years of education: None    Highest education level: None   Occupational History    None   Tobacco Use    Smoking status: Every Day     Current packs/day: 1.50     Types: Cigarettes    Smokeless tobacco: Never   Vaping Use    Vaping status: Never Used   Substance and Sexual Activity    Alcohol use: Never    Drug use: Never    Sexual activity: None   Other Topics Concern    None   Social History Narrative    None     Social Determinants of Health     Financial Resource Strain: Medium Risk (8/29/2022)    Overall Financial Resource Strain (CARDIA)     Difficulty of Paying Living Expenses: Somewhat hard   Food Insecurity: Not on file   Transportation Needs: No Transportation Needs (8/29/2022)    PRAPARE - Transportation     Lack of Transportation (Medical): No     Lack of Transportation (Non-Medical): No   Physical Activity: Not on file   Stress: Not on file   Social Connections: Not on file   Intimate Partner Violence: Not on file   Housing Stability: Not on file       Subjective         Objective    Physical Exam:   Assessment Type: Assess only  General Appearance: Awake  Respiratory Pattern: Normal  Chest Assessment: Chest expansion symmetrical  Bilateral Breath Sounds: Diminished, Clear  Cough: None  O2 Device: RA    Vitals:  Blood pressure 149/73, pulse 87, temperature 98.1 °F (36.7 °C), resp. rate 16,  weight 45.4 kg (100 lb), SpO2 95%.          Imaging and other studies: I have personally reviewed pertinent reports.   and I have personally reviewed pertinent films in PACS    O2 Device: RA     Plan    Respiratory Plan: Home Bronchodilator Patient pathway        Resp Comments: Pt. evaluated at bedside per Respiratory protocol.  Pt. admitted with abdominal pain.  Pt. found on RA SpO2 95% with diminished but clear breath sounds and is in no distress at this time. Pt. has Hx. of COPD and takes Advair BID at home per medication chart.  CXr shows lungs are clear.  Will order Albuterol mdi prn for SOB/Wheezing per Respiratory protocol.

## 2024-07-24 NOTE — UTILIZATION REVIEW
Initial Clinical Review    Admission: Date/Time/Statement:   Admission Orders (From admission, onward)       Ordered        07/23/24 1543  INPATIENT ADMISSION  Once                          Orders Placed This Encounter   Procedures    INPATIENT ADMISSION     Standing Status:   Standing     Number of Occurrences:   1     Order Specific Question:   Level of Care     Answer:   Med Surg [16]     Order Specific Question:   Estimated length of stay     Answer:   More than 2 Midnights     Order Specific Question:   Certification     Answer:   I certify that inpatient services are medically necessary for this patient for a duration of greater than two midnights. See H&P and MD Progress Notes for additional information about the patient's course of treatment.     ED Arrival Information       Expected   -    Arrival   7/23/2024 10:02    Acuity   Urgent              Means of arrival   Ambulance    Escorted by   [facility]    Service   Hospitalist    Admission type   Emergency              Arrival complaint   abd pain             Chief Complaint   Patient presents with    Abdominal Pain     Seen last week, known gallstones, can't keep anything down       Initial Presentation: 79 y.o. female who presented by EMS to [facility] ED. Admitted as Inpatient for evaluation and treatment of  abdominal pain. PMHx:  COPD, tobacco abuse. Presented w/ intermittent abdominal pain for past week. Poor PO intake. Notes after eating, pain is significantly worse in periumbilical region w/ radiation to suprapubic region. Notes intermittent vomiting. On exam, periumbilical tenderness. Imaging shows diverticulosis. Plan: analgesics, check occult stool, encourage PO nutrition, continue PTA meds, supportive care. GI consulted.    Anticipated Length of Stay/Certification Statement: Patient will be admitted on an inpatient basis with an anticipated length of stay of greater than 2 midnights secondary to abdominal  pain.       Date: 07/24/24   Day 2: Pt passing gas, no BM yet. RUQ US showed R hydronephrosis. Exam: 4/5 strength in all extremities, anxiety. Plan: continue currnet meds, supportive care, Trend labs, replete electrolytes as needed. Ensure supplements with meals.     GI: CT showing increased stool burden, concern for untreated GERD and constipation. Plan: PPI BID, miralax, senna. Encouraged tobacco cessation.       Date: 07/25/24  Day 3: Has surpassed a 2nd midnight with active treatments and services. Episode of respiratory distress this morning following abdominal pain; pt tachypneic, diaphoretic, pallor. Diminished lung sounds at bases w/ thick upper airway secretions. Actively coughing. Pt transferred to Critical Care after developing dark brown/black loose bowel movements and a 3g Hgb drop in 4 hours. Notes RUQ/RLQ pain that is described as intermittent cramping. Exam: pallor, dry mucous membranes, epigastric/RLQ/RUQ tenderness. Plan: IVF, transfuse 2 unit pRBCs, monitor stool output, NPO, Supplemental O2, weaned as tolerated; continue current meds, PPI BID, hold bowel regimen, H&H q8h, Trend labs, replete electrolytes as needed.    General Surgery: Pt w/ suspected GI bleed, possible mesenteric ischemia. IVF. Ttherapeutic PPI 80 mg BID. Recommend GI for EGD/colonoscopy. Check CTAP w/ contrast. Trend H&H. Transfuse for Hgb below 7.     GI: Pt w/ episode of melena. Acute drop in Hgb from 15.6 on admit, to 9.6 at 0751 to 6.9 at 1168 today Concern for UGI bleed. IV PPI BID. NPO, trend H&H, transfuse for Hgb below 7, IV reglan prior to EGD, EGD today.       ED Triage Vitals   Temperature Pulse Respirations Blood Pressure SpO2 Pain Score   07/23/24 1018 07/23/24 1012 07/23/24 1012 07/23/24 1012 07/23/24 1012 07/23/24 1012   97.8 °F (36.6 °C) 82 18 (!) 183/88 96 % 8     Weight (last 2 days)       Date/Time Weight    07/25/24 0600 46 (101.41)    07/24/24 0600 45.9 (101.19)    07/23/24 1525 45.4 (100)            Vital  Signs (last 3 days)       Date/Time Temp Pulse Resp BP MAP (mmHg) SpO2 Calculated FIO2 (%) - Nasal Cannula Nasal Cannula O2 Flow Rate (L/min) O2 Device Pain    07/25/24 0756 -- -- -- -- -- 100 % 44 6 L/min Nasal cannula --    07/25/24 0748 -- -- -- -- -- -- -- -- -- 8 07/25/24 06:28:32 99.3 °F (37.4 °C) 75 15 123/71 88 95 % -- -- -- --    07/24/24 23:14:17 98.5 °F (36.9 °C) 83 16 119/73 88 95 % -- -- -- --    07/24/24 2056 -- -- -- -- -- -- -- -- -- 7 07/24/24 20:54:17 98 °F (36.7 °C) 123 -- 156/87 110 94 % -- -- -- --    07/24/24 2000 -- -- -- -- -- -- -- -- None (Room air) 6    07/24/24 14:38:33 98.1 °F (36.7 °C) 81 -- 133/75 94 93 % -- -- -- --    07/24/24 13:48:14 -- 85 -- 132/74 93 93 % -- -- -- --    07/24/24 0900 -- -- -- -- -- -- -- -- -- No Pain    07/24/24 07:07:47 98.2 °F (36.8 °C) 90 16 120/69 86 94 % -- -- -- --    07/24/24 0526 -- -- -- -- -- -- -- -- -- 8 07/23/24 2123 -- -- -- -- -- -- -- -- -- 7 07/23/24 21:10:23 -- 95 -- 143/95 111 95 % -- -- -- --    07/23/24 2100 -- -- -- -- -- -- -- -- None (Room air) No Pain    07/23/24 2014 -- -- -- -- -- 95 % -- -- None (Room air) --    07/23/24 1842 -- -- -- -- -- -- -- -- -- No Pain    07/23/24 17:22:08 98.1 °F (36.7 °C) 87 16 149/73 98 98 % -- -- -- --    07/23/24 1430 -- 86 22 161/83 111 96 % -- -- None (Room air) --    07/23/24 1300 -- 72 17 128/58 84 97 % -- -- None (Room air) --    07/23/24 1200 -- 65 20 168/75 108 98 % -- -- None (Room air) --    07/23/24 1100 -- 75 20 177/81 117 96 % -- -- None (Room air) --    07/23/24 1037 -- -- -- -- -- -- -- -- None (Room air) --    07/23/24 1018 97.8 °F (36.6 °C) -- -- -- -- -- -- -- -- --    07/23/24 1012 -- 82 18 183/88 127 96 % -- -- None (Room air) 8              Pertinent Labs/Diagnostic Test Results:   Radiology:  XR chest PICC line portable   Final Interpretation by Estrellita Lorenzo MD (07/25 1412)      Right jugular catheter in upper SVC with no pneumothorax.      Emphysema.      Trace  right pleural effusion on abdomen CT not visible.            Workstation performed: [id number]         XR chest portable   Final Interpretation by Yaron Jones MD (07/25 1013)      No acute cardiopulmonary disease.            Workstation performed: [id number]         CTA abdomen pelvis w wo contrast   Final Interpretation by Vernon Ocasio MD (07/23 1431)      CTA abdomen and pelvis with mild atherosclerotic disease and widely patent mesenteric vessels.      Diverticulosis.      Left adnexal cyst measuring 3.3 x 2.3 cm unchanged since 11/15/2021.         Workstation performed: [id number]         US right upper quadrant   Final Interpretation by Chevy Childers MD (07/23 1208)      Mild right hydronephrosis.      Workstation performed: [id number]         XR chest 2 views   ED Interpretation by Gudelia Leon DO (07/23 1051)   Lung hyperinflation. No obvious pleural effusions or consolidations.       Final Interpretation by Robby Sky MD (07/23 1128)      No acute cardiopulmonary disease.            Workstation performed: [id number]         CT high volume bleeding scan abdomen pelvis    (Results Pending)     Cardiology:  ECG 12 lead   Final Result by Flako Cat MD (07/23 1810)   Sinus rhythm with Premature atrial complexes with Aberrant conduction   Incomplete right bundle branch block   Left anterior fascicular block   Abnormal ECG   Confirmed by Flako Cat ([id number]) on 7/23/2024 6:10:46 PM              Results from last 7 days   Lab Units 07/25/24  1158 07/25/24  0751 07/24/24  0501 07/23/24  1024 07/18/24 1919   WBC Thousand/uL  --  14.82* 9.37 9.74 7.08   HEMOGLOBIN g/dL 6.9* 9.6* 13.7 15.6* 15.2   HEMATOCRIT % 21.3* 31.1* 41.4 46.2* 46.2*   PLATELETS Thousands/uL  --  162 151 149 143*   TOTAL NEUT ABS Thousands/µL  --  7.95*  --  8.08* 4.66         Results from last 7 days   Lab Units 07/24/24  0501 07/23/24  1024 07/18/24 1919   SODIUM mmol/L 132* 133* 136   POTASSIUM  mmol/L 3.8 3.5 3.8   CHLORIDE mmol/L 98 100 99   CO2 mmol/L 25 25 30   ANION GAP mmol/L 9 8 7   BUN mg/dL 10 9 10   CREATININE mg/dL 0.33* 0.42* 0.56*   EGFR ml/min/1.73sq m 105 97 88   CALCIUM mg/dL 8.7 7.9* 9.4   MAGNESIUM mg/dL 1.7*  --   --      Results from last 7 days   Lab Units 07/24/24  0501 07/23/24  1024 07/18/24  1919   AST U/L 12* 12* 14   ALT U/L 4* 5* 7   ALK PHOS U/L 56 64 71   TOTAL PROTEIN g/dL 5.6* 5.4* 6.4   ALBUMIN g/dL 3.4* 3.4* 4.0   TOTAL BILIRUBIN mg/dL 0.55 0.54 0.55         Results from last 7 days   Lab Units 07/24/24  0501 07/23/24  1024 07/18/24  1919   GLUCOSE RANDOM mg/dL 92 130 121      Results from last 7 days   Lab Units 07/23/24  1314 07/23/24  1024   HS TNI 0HR ng/L  --  4   HS TNI 2HR ng/L 5  --    HSTNI D2 ng/L 1  --       Results from last 7 days   Lab Units 07/23/24  1423   LACTIC ACID mmol/L 1.0      Results from last 7 days   Lab Units 07/23/24  1024 07/18/24  1919   LIPASE u/L 9* 20      Results from last 7 days   Lab Units 07/18/24  2113   CLARITY UA  Clear   COLOR UA  Yellow   SPEC GRAV UA  <=1.005   PH UA  6.0   GLUCOSE UA mg/dl Negative   KETONES UA mg/dl Negative   BLOOD UA  Trace*   PROTEIN UA mg/dl 30 (1+)*   NITRITE UA  Negative   BILIRUBIN UA  Negative   UROBILINOGEN UA E.U./dl 0.2   LEUKOCYTES UA  Trace*   WBC UA /hpf 1-2   RBC UA /hpf 1-2   BACTERIA UA /hpf None Seen   EPITHELIAL CELLS WET PREP /hpf Occasional         ED Treatment-Medication Administration from 07/23/2024 1002 to 07/23/2024 1714         Date/Time Order Dose Route Action     07/23/2024 1013 droperidol (FOR EMS ONLY) (INAPSINE) 2.5 mg/mL injection 2.5 mg 0 mg Does not apply Given to EMS     07/23/2024 1032 multi-electrolyte (ISOLYTE-S PH 7.4) bolus 500 mL 500 mL Intravenous New Bag     07/23/2024 1031 morphine injection 2 mg 2 mg Intravenous Given     07/23/2024 1358 iohexol (OMNIPAQUE) 350 MG/ML injection (MULTI-DOSE) 85 mL 85 mL Intravenous Given            Past Medical History:   Diagnosis Date     Elevated blood pressure reading      Present on Admission:   Chronic obstructive pulmonary disease (HCC)   Generalized anxiety disorder   Mild protein-calorie malnutrition (HCC)   Vitamin D deficiency   Subclinical hyperthyroidism      Admitting Diagnosis: Abdominal pain [R10.9]  Nausea and vomiting [R11.2]  Age/Sex: 79 y.o. female  Admission Orders:  Regular Diet. -- NPO.   Ensure supplement w/ meals.   Up & OOB as tolerated.  DW. I&O. SCDs.  NGT.   Telemetry. Continuous Pulse Ox.      Scheduled Medications:  Cholecalciferol, 2,000 Units, Oral, Daily  fluticasone-vilanterol, 1 puff, Inhalation, Daily  magnesium sulfate, 2 g, Intravenous, Once  metoclopramide, 10 mg, Intravenous, Once  multivitamin-minerals, 1 tablet, Oral, Daily  nicotine, 1 patch, Transdermal, Daily  [START ON 7/26/2024] pantoprazole, 40 mg, Intravenous, Q12H KULDIP    Continuous IV Infusions:  sodium bicarbonate 150 mEq in dextrose 5 % 1,000 mL infusion, 75 mL/hr, Intravenous, Continuous  sodium chloride, 75 mL/hr, Intravenous, Continuous    PRN Meds:  acetaminophen, 650 mg, Oral, Q6H PRN  albuterol, 2 puff, Inhalation, Q4H PRN; 7/24 x1  ALPRAZolam, 0.25 mg, Oral, Daily PRN  morphine injection, 2 mg, Intravenous, Q4H PRN; 7/25 x1  ondansetron, 4 mg, Intravenous, Q6H PRN  oxyCODONE, 5 mg, Oral, Q4H PRN; 7/23 x1, 7/24 x2  oxyCODONE, 2.5 mg, Oral, Q4H PRN    Discontinued & One Time Dose Meds:  heparin (porcine) subcutaneous injection 5,000 Units  Dose: 5,000 Units  Freq: Every 8 hours scheduled Route: SC  Start: 07/23/24 2200 End: 07/25/24 1148  ipratropium-albuterol (DUO-NEB) 0.5-2.5 mg/3 mL inhalation solution 3 mL  Dose: 3 mL  Freq: Once Route: NEBULIZATION  Start: 07/25/24 0745 End: 07/25/24 0755  lactated ringers bolus 1,000 mL  Dose: 1,000 mL  Freq: Once Route: IV  Last Dose: Stopped (07/25/24 1151)  Start: 07/25/24 0945 End: 07/25/24 1148  metoprolol succinate (TOPROL-XL) 24 hr tablet 100 mg  Dose: 100 mg  Freq: Daily at bedtime Route:  PO  Start: 07/23/24 2200 End: 07/25/24 1148  pantoprazole (PROTONIX) 80 mg in sodium chloride 0.9 % 100 mL IVPB  Dose: 80 mg  Freq: Once Route: IV  Last Dose: Stopped (07/25/24 1046)  Start: 07/25/24 1000 End: 07/25/24 1046  pantoprazole (PROTONIX) EC tablet 40 mg  Dose: 40 mg  Freq: Daily (early morning) Route: PO  Start: 07/24/24 0915 End: 07/25/24 0958  polyethylene glycol (MIRALAX) packet 17 g  Dose: 17 g  Freq: Daily Route: PO  Start: 07/24/24 0900 End: 07/25/24 1148  senna (SENOKOT) tablet 8.6 mg  Dose: 1 tablet  Freq: Daily Route: PO  Start: 07/24/24 0900 End: 07/25/24 1148  sucralfate (CARAFATE) tablet 1 g  Dose: 1 g  Freq: Every 6 hours scheduled Route: PO  Start: 07/25/24 0645 End: 07/25/24 1148      IP CONSULT TO GASTROENTEROLOGY    Network Utilization Review Department  ATTENTION: Please call with any questions or concerns to [phone] and carefully listen to the prompts so that you are directed to the right person. All voicemails are confidential.   For Discharge needs, contact Care Management DC Support Team at [phone] opt. 2  Send all requests for admission clinical reviews, approved or denied determinations and any other requests to dedicated fax number below belonging to the campus where the patient is receiving treatment. List of dedicated fax numbers for the Facilities:  FACILITY NAME UR FAX NUMBER   ADMISSION DENIALS (Administrative/Medical Necessity) [phone]   DISCHARGE SUPPORT TEAM (NETWORK) [phone]   PARENT CHILD HEALTH (Maternity/NICU/Pediatrics) [phone]   [facility] [phone]   [facility] [phone]   [facility] [phone]   [facility] [phone]   [facility] [phone]   [facility] [phone]   [facility] [phone]   [facility]  [phone]   [facility] [phone]   [facility] [phone]   [facility] [phone]   [facility] [phone]

## 2024-07-25 ENCOUNTER — ANESTHESIA (INPATIENT)
Dept: GASTROENTEROLOGY | Facility: HOSPITAL | Age: 79
DRG: 377 | End: 2024-07-25
Payer: COMMERCIAL

## 2024-07-25 ENCOUNTER — APPOINTMENT (INPATIENT)
Dept: RADIOLOGY | Facility: HOSPITAL | Age: 79
DRG: 377 | End: 2024-07-25
Payer: COMMERCIAL

## 2024-07-25 ENCOUNTER — APPOINTMENT (INPATIENT)
Dept: GASTROENTEROLOGY | Facility: HOSPITAL | Age: 79
DRG: 377 | End: 2024-07-25
Payer: COMMERCIAL

## 2024-07-25 ENCOUNTER — ANESTHESIA EVENT (INPATIENT)
Dept: GASTROENTEROLOGY | Facility: HOSPITAL | Age: 79
DRG: 377 | End: 2024-07-25
Payer: COMMERCIAL

## 2024-07-25 PROBLEM — E43 SEVERE PROTEIN-CALORIE MALNUTRITION (HCC): Status: ACTIVE | Noted: 2024-07-25

## 2024-07-25 LAB
ABO GROUP BLD: NORMAL
ABO GROUP BLD: NORMAL
ALBUMIN SERPL BCG-MCNC: 2.8 G/DL (ref 3.5–5)
ALP SERPL-CCNC: 46 U/L (ref 34–104)
ALT SERPL W P-5'-P-CCNC: 5 U/L (ref 7–52)
ANION GAP SERPL CALCULATED.3IONS-SCNC: 7 MMOL/L (ref 4–13)
ANION GAP SERPL CALCULATED.3IONS-SCNC: 8 MMOL/L (ref 4–13)
APTT PPP: 27 SECONDS (ref 23–37)
AST SERPL W P-5'-P-CCNC: 14 U/L (ref 13–39)
BASE EX.OXY STD BLDV CALC-SCNC: 46.2 % (ref 60–80)
BASE EX.OXY STD BLDV CALC-SCNC: 67.3 % (ref 60–80)
BASE EX.OXY STD BLDV CALC-SCNC: 78.4 % (ref 60–80)
BASE EXCESS BLDV CALC-SCNC: -2.5 MMOL/L
BASE EXCESS BLDV CALC-SCNC: -5.1 MMOL/L
BASE EXCESS BLDV CALC-SCNC: -9.7 MMOL/L
BASOPHILS # BLD AUTO: 0.07 THOUSANDS/ÂΜL (ref 0–0.1)
BASOPHILS NFR BLD AUTO: 1 % (ref 0–1)
BILIRUB SERPL-MCNC: 0.3 MG/DL (ref 0.2–1)
BLD GP AB SCN SERPL QL: NEGATIVE
BUN SERPL-MCNC: 21 MG/DL (ref 5–25)
BUN SERPL-MCNC: 27 MG/DL (ref 5–25)
CALCIUM ALBUM COR SERPL-MCNC: 9 MG/DL (ref 8.3–10.1)
CALCIUM SERPL-MCNC: 7.3 MG/DL (ref 8.4–10.2)
CALCIUM SERPL-MCNC: 8 MG/DL (ref 8.4–10.2)
CHLORIDE SERPL-SCNC: 106 MMOL/L (ref 96–108)
CHLORIDE SERPL-SCNC: 106 MMOL/L (ref 96–108)
CO2 SERPL-SCNC: 20 MMOL/L (ref 21–32)
CO2 SERPL-SCNC: 21 MMOL/L (ref 21–32)
CREAT SERPL-MCNC: 0.51 MG/DL (ref 0.6–1.3)
CREAT SERPL-MCNC: 0.74 MG/DL (ref 0.6–1.3)
EOSINOPHIL # BLD AUTO: 0.22 THOUSAND/ÂΜL (ref 0–0.61)
EOSINOPHIL NFR BLD AUTO: 2 % (ref 0–6)
ERYTHROCYTE [DISTWIDTH] IN BLOOD BY AUTOMATED COUNT: 12.3 % (ref 11.6–15.1)
FIBRINOGEN PPP-MCNC: 269 MG/DL (ref 207–520)
GFR SERPL CREATININE-BSD FRML MDRD: 76 ML/MIN/1.73SQ M
GFR SERPL CREATININE-BSD FRML MDRD: 91 ML/MIN/1.73SQ M
GLUCOSE SERPL-MCNC: 130 MG/DL (ref 65–140)
GLUCOSE SERPL-MCNC: 163 MG/DL (ref 65–140)
GLUCOSE SERPL-MCNC: 185 MG/DL (ref 65–140)
GLUCOSE SERPL-MCNC: 234 MG/DL (ref 65–140)
GLUCOSE SERPL-MCNC: 93 MG/DL (ref 65–140)
HCO3 BLDV-SCNC: 19.1 MMOL/L (ref 24–30)
HCO3 BLDV-SCNC: 22.3 MMOL/L (ref 24–30)
HCO3 BLDV-SCNC: 24.8 MMOL/L (ref 24–30)
HCT VFR BLD AUTO: 21.3 % (ref 34.8–46.1)
HCT VFR BLD AUTO: 31.1 % (ref 34.8–46.1)
HCT VFR BLD AUTO: 32 % (ref 34.8–46.1)
HGB BLD-MCNC: 10.9 G/DL (ref 11.5–15.4)
HGB BLD-MCNC: 11.2 G/DL (ref 11.5–15.4)
HGB BLD-MCNC: 6.9 G/DL (ref 11.5–15.4)
HGB BLD-MCNC: 9.6 G/DL (ref 11.5–15.4)
IMM GRANULOCYTES # BLD AUTO: 0.06 THOUSAND/UL (ref 0–0.2)
IMM GRANULOCYTES NFR BLD AUTO: 0 % (ref 0–2)
INR PPP: 1.42 (ref 0.84–1.19)
LACTATE SERPL-SCNC: 3.8 MMOL/L (ref 0.5–2)
LACTATE SERPL-SCNC: 8.3 MMOL/L (ref 0.5–2)
LYMPHOCYTES # BLD AUTO: 5.23 THOUSANDS/ÂΜL (ref 0.6–4.47)
LYMPHOCYTES NFR BLD AUTO: 35 % (ref 14–44)
MAGNESIUM SERPL-MCNC: 1.6 MG/DL (ref 1.9–2.7)
MCH RBC QN AUTO: 32.2 PG (ref 26.8–34.3)
MCHC RBC AUTO-ENTMCNC: 30.9 G/DL (ref 31.4–37.4)
MCV RBC AUTO: 104 FL (ref 82–98)
MONOCYTES # BLD AUTO: 1.29 THOUSAND/ÂΜL (ref 0.17–1.22)
MONOCYTES NFR BLD AUTO: 9 % (ref 4–12)
NASAL CANNULA: 4
NEUTROPHILS # BLD AUTO: 7.95 THOUSANDS/ÂΜL (ref 1.85–7.62)
NEUTS SEG NFR BLD AUTO: 53 % (ref 43–75)
NRBC BLD AUTO-RTO: 0 /100 WBCS
O2 CT BLDV-SCNC: 11.1 ML/DL
O2 CT BLDV-SCNC: 13.3 ML/DL
O2 CT BLDV-SCNC: 6.3 ML/DL
PCO2 BLDV: 52 MM HG (ref 42–50)
PCO2 BLDV: 53.8 MM HG (ref 42–50)
PCO2 BLDV: 57.3 MM HG (ref 42–50)
PH BLDV: 7.14 [PH] (ref 7.3–7.4)
PH BLDV: 7.25 [PH] (ref 7.3–7.4)
PH BLDV: 7.28 [PH] (ref 7.3–7.4)
PHOSPHATE SERPL-MCNC: 3.7 MG/DL (ref 2.3–4.1)
PLATELET # BLD AUTO: 162 THOUSANDS/UL (ref 149–390)
PMV BLD AUTO: 10.4 FL (ref 8.9–12.7)
PO2 BLDV: 33.8 MM HG (ref 35–45)
PO2 BLDV: 37.4 MM HG (ref 35–45)
PO2 BLDV: 45.1 MM HG (ref 35–45)
POTASSIUM SERPL-SCNC: 4.3 MMOL/L (ref 3.5–5.3)
POTASSIUM SERPL-SCNC: 4.6 MMOL/L (ref 3.5–5.3)
PROT SERPL-MCNC: 4.7 G/DL (ref 6.4–8.4)
PROTHROMBIN TIME: 17.2 SECONDS (ref 11.6–14.5)
RBC # BLD AUTO: 2.98 MILLION/UL (ref 3.81–5.12)
RH BLD: POSITIVE
RH BLD: POSITIVE
SODIUM SERPL-SCNC: 134 MMOL/L (ref 135–147)
SODIUM SERPL-SCNC: 134 MMOL/L (ref 135–147)
SPECIMEN EXPIRATION DATE: NORMAL
WBC # BLD AUTO: 14.82 THOUSAND/UL (ref 4.31–10.16)

## 2024-07-25 PROCEDURE — 99223 1ST HOSP IP/OBS HIGH 75: CPT | Performed by: SURGERY

## 2024-07-25 PROCEDURE — 85610 PROTHROMBIN TIME: CPT

## 2024-07-25 PROCEDURE — 94664 DEMO&/EVAL PT USE INHALER: CPT

## 2024-07-25 PROCEDURE — 85025 COMPLETE CBC W/AUTO DIFF WBC: CPT

## 2024-07-25 PROCEDURE — 82805 BLOOD GASES W/O2 SATURATION: CPT

## 2024-07-25 PROCEDURE — 86900 BLOOD TYPING SEROLOGIC ABO: CPT | Performed by: NURSE PRACTITIONER

## 2024-07-25 PROCEDURE — 82948 REAGENT STRIP/BLOOD GLUCOSE: CPT

## 2024-07-25 PROCEDURE — NC001 PR NO CHARGE: Performed by: INTERNAL MEDICINE

## 2024-07-25 PROCEDURE — 80048 BASIC METABOLIC PNL TOTAL CA: CPT

## 2024-07-25 PROCEDURE — 86923 COMPATIBILITY TEST ELECTRIC: CPT

## 2024-07-25 PROCEDURE — 85384 FIBRINOGEN ACTIVITY: CPT

## 2024-07-25 PROCEDURE — 85018 HEMOGLOBIN: CPT

## 2024-07-25 PROCEDURE — 80053 COMPREHEN METABOLIC PANEL: CPT

## 2024-07-25 PROCEDURE — 99233 SBSQ HOSP IP/OBS HIGH 50: CPT | Performed by: INTERNAL MEDICINE

## 2024-07-25 PROCEDURE — 02HV33Z INSERTION OF INFUSION DEVICE INTO SUPERIOR VENA CAVA, PERCUTANEOUS APPROACH: ICD-10-PCS | Performed by: FAMILY MEDICINE

## 2024-07-25 PROCEDURE — 43255 EGD CONTROL BLEEDING ANY: CPT | Performed by: INTERNAL MEDICINE

## 2024-07-25 PROCEDURE — 94640 AIRWAY INHALATION TREATMENT: CPT

## 2024-07-25 PROCEDURE — 85730 THROMBOPLASTIN TIME PARTIAL: CPT

## 2024-07-25 PROCEDURE — 71045 X-RAY EXAM CHEST 1 VIEW: CPT

## 2024-07-25 PROCEDURE — P9016 RBC LEUKOCYTES REDUCED: HCPCS

## 2024-07-25 PROCEDURE — 94760 N-INVAS EAR/PLS OXIMETRY 1: CPT

## 2024-07-25 PROCEDURE — 36556 INSERT NON-TUNNEL CV CATH: CPT | Performed by: INTERNAL MEDICINE

## 2024-07-25 PROCEDURE — 74178 CT ABD&PLV WO CNTR FLWD CNTR: CPT

## 2024-07-25 PROCEDURE — 83735 ASSAY OF MAGNESIUM: CPT

## 2024-07-25 PROCEDURE — 85018 HEMOGLOBIN: CPT | Performed by: INTERNAL MEDICINE

## 2024-07-25 PROCEDURE — 84100 ASSAY OF PHOSPHORUS: CPT

## 2024-07-25 PROCEDURE — 85014 HEMATOCRIT: CPT

## 2024-07-25 PROCEDURE — 0W3P8ZZ CONTROL BLEEDING IN GASTROINTESTINAL TRACT, VIA NATURAL OR ARTIFICIAL OPENING ENDOSCOPIC: ICD-10-PCS | Performed by: INTERNAL MEDICINE

## 2024-07-25 PROCEDURE — 86901 BLOOD TYPING SEROLOGIC RH(D): CPT | Performed by: NURSE PRACTITIONER

## 2024-07-25 PROCEDURE — 99291 CRITICAL CARE FIRST HOUR: CPT | Performed by: INTERNAL MEDICINE

## 2024-07-25 PROCEDURE — 86850 RBC ANTIBODY SCREEN: CPT | Performed by: NURSE PRACTITIONER

## 2024-07-25 PROCEDURE — 83605 ASSAY OF LACTIC ACID: CPT | Performed by: INTERNAL MEDICINE

## 2024-07-25 PROCEDURE — C1052 HEMOSTATIC AGENT, GI, TOPIC: HCPCS

## 2024-07-25 RX ORDER — LIDOCAINE HYDROCHLORIDE 20 MG/ML
INJECTION, SOLUTION EPIDURAL; INFILTRATION; INTRACAUDAL; PERINEURAL AS NEEDED
Status: DISCONTINUED | OUTPATIENT
Start: 2024-07-25 | End: 2024-07-25

## 2024-07-25 RX ORDER — EPHEDRINE SULFATE 50 MG/ML
INJECTION INTRAVENOUS AS NEEDED
Status: DISCONTINUED | OUTPATIENT
Start: 2024-07-25 | End: 2024-07-25

## 2024-07-25 RX ORDER — SUCCINYLCHOLINE/SOD CL,ISO/PF 100 MG/5ML
SYRINGE (ML) INTRAVENOUS AS NEEDED
Status: DISCONTINUED | OUTPATIENT
Start: 2024-07-25 | End: 2024-07-25

## 2024-07-25 RX ORDER — FENTANYL CITRATE 50 UG/ML
INJECTION, SOLUTION INTRAMUSCULAR; INTRAVENOUS AS NEEDED
Status: DISCONTINUED | OUTPATIENT
Start: 2024-07-25 | End: 2024-07-25

## 2024-07-25 RX ORDER — CALCIUM CHLORIDE 100 MG/ML
INJECTION INTRAVENOUS; INTRAVENTRICULAR AS NEEDED
Status: DISCONTINUED | OUTPATIENT
Start: 2024-07-25 | End: 2024-07-25

## 2024-07-25 RX ORDER — MAGNESIUM SULFATE HEPTAHYDRATE 40 MG/ML
2 INJECTION, SOLUTION INTRAVENOUS ONCE
Status: COMPLETED | OUTPATIENT
Start: 2024-07-25 | End: 2024-07-25

## 2024-07-25 RX ORDER — IPRATROPIUM BROMIDE AND ALBUTEROL SULFATE 2.5; .5 MG/3ML; MG/3ML
3 SOLUTION RESPIRATORY (INHALATION) ONCE
Status: COMPLETED | OUTPATIENT
Start: 2024-07-25 | End: 2024-07-25

## 2024-07-25 RX ORDER — METOCLOPRAMIDE HYDROCHLORIDE 5 MG/ML
10 INJECTION INTRAMUSCULAR; INTRAVENOUS ONCE
Status: COMPLETED | OUTPATIENT
Start: 2024-07-25 | End: 2024-07-25

## 2024-07-25 RX ORDER — PANTOPRAZOLE SODIUM 40 MG/10ML
40 INJECTION, POWDER, LYOPHILIZED, FOR SOLUTION INTRAVENOUS EVERY 12 HOURS SCHEDULED
Status: DISCONTINUED | OUTPATIENT
Start: 2024-07-26 | End: 2024-07-26

## 2024-07-25 RX ORDER — DEXTROSE MONOHYDRATE 50 MG/ML
75 INJECTION, SOLUTION INTRAVENOUS CONTINUOUS
Status: DISCONTINUED | OUTPATIENT
Start: 2024-07-25 | End: 2024-07-26

## 2024-07-25 RX ORDER — PROPOFOL 10 MG/ML
INJECTION, EMULSION INTRAVENOUS AS NEEDED
Status: DISCONTINUED | OUTPATIENT
Start: 2024-07-25 | End: 2024-07-25

## 2024-07-25 RX ORDER — FLUTICASONE FUROATE AND VILANTEROL 200; 25 UG/1; UG/1
1 POWDER RESPIRATORY (INHALATION)
Status: DISCONTINUED | OUTPATIENT
Start: 2024-07-25 | End: 2024-07-26

## 2024-07-25 RX ORDER — METHYLPREDNISOLONE SODIUM SUCCINATE 125 MG/2ML
125 INJECTION, POWDER, LYOPHILIZED, FOR SOLUTION INTRAMUSCULAR; INTRAVENOUS ONCE
Status: DISCONTINUED | OUTPATIENT
Start: 2024-07-25 | End: 2024-07-25

## 2024-07-25 RX ORDER — SODIUM CHLORIDE 9 MG/ML
INJECTION, SOLUTION INTRAVENOUS CONTINUOUS PRN
Status: DISCONTINUED | OUTPATIENT
Start: 2024-07-25 | End: 2024-07-25

## 2024-07-25 RX ORDER — SUCRALFATE 1 G/1
1 TABLET ORAL EVERY 6 HOURS SCHEDULED
Status: DISCONTINUED | OUTPATIENT
Start: 2024-07-25 | End: 2024-07-25

## 2024-07-25 RX ADMIN — MAGNESIUM SULFATE HEPTAHYDRATE 2 G: 40 INJECTION, SOLUTION INTRAVENOUS at 13:28

## 2024-07-25 RX ADMIN — PROPOFOL 80 MG: 10 INJECTION, EMULSION INTRAVENOUS at 16:42

## 2024-07-25 RX ADMIN — FENTANYL CITRATE 25 MCG: 50 INJECTION INTRAMUSCULAR; INTRAVENOUS at 17:11

## 2024-07-25 RX ADMIN — Medication 80 MG: at 16:42

## 2024-07-25 RX ADMIN — SODIUM CHLORIDE: 0.9 INJECTION, SOLUTION INTRAVENOUS at 17:17

## 2024-07-25 RX ADMIN — DEXTROSE 75 ML/HR: 5 SOLUTION INTRAVENOUS at 21:36

## 2024-07-25 RX ADMIN — FENTANYL CITRATE 25 MCG: 50 INJECTION INTRAMUSCULAR; INTRAVENOUS at 16:42

## 2024-07-25 RX ADMIN — PHENYLEPHRINE HYDROCHLORIDE 100 MCG: 10 INJECTION INTRAVENOUS at 16:42

## 2024-07-25 RX ADMIN — IPRATROPIUM BROMIDE AND ALBUTEROL SULFATE 3 ML: 2.5; .5 SOLUTION RESPIRATORY (INHALATION) at 07:55

## 2024-07-25 RX ADMIN — CALCIUM CHLORIDE 0.25 G: 100 INJECTION INTRAVENOUS; INTRAVENTRICULAR at 17:48

## 2024-07-25 RX ADMIN — CALCIUM CHLORIDE 0.25 G: 100 INJECTION INTRAVENOUS; INTRAVENTRICULAR at 16:59

## 2024-07-25 RX ADMIN — CALCIUM CHLORIDE 0.25 G: 100 INJECTION INTRAVENOUS; INTRAVENTRICULAR at 17:11

## 2024-07-25 RX ADMIN — PANTOPRAZOLE SODIUM 40 MG: 40 TABLET, DELAYED RELEASE ORAL at 05:33

## 2024-07-25 RX ADMIN — SODIUM CHLORIDE: 0.9 INJECTION, SOLUTION INTRAVENOUS at 16:39

## 2024-07-25 RX ADMIN — PHENYLEPHRINE HYDROCHLORIDE 100 MCG: 10 INJECTION INTRAVENOUS at 16:47

## 2024-07-25 RX ADMIN — LIDOCAINE HYDROCHLORIDE 100 MG: 20 INJECTION, SOLUTION EPIDURAL; INFILTRATION; INTRACAUDAL; PERINEURAL at 16:42

## 2024-07-25 RX ADMIN — IOHEXOL 85 ML: 350 INJECTION, SOLUTION INTRAVENOUS at 13:17

## 2024-07-25 RX ADMIN — SUCRALFATE 1 G: 1 TABLET ORAL at 07:04

## 2024-07-25 RX ADMIN — EPHEDRINE SULFATE 5 MG: 50 INJECTION, SOLUTION INTRAVENOUS at 16:47

## 2024-07-25 RX ADMIN — HEPARIN SODIUM 5000 UNITS: 5000 INJECTION INTRAVENOUS; SUBCUTANEOUS at 05:33

## 2024-07-25 RX ADMIN — SODIUM CHLORIDE 80 MG: 9 INJECTION, SOLUTION INTRAVENOUS at 10:24

## 2024-07-25 RX ADMIN — METOCLOPRAMIDE HYDROCHLORIDE 10 MG: 5 INJECTION INTRAMUSCULAR; INTRAVENOUS at 14:46

## 2024-07-25 RX ADMIN — SODIUM CHLORIDE, SODIUM LACTATE, POTASSIUM CHLORIDE, AND CALCIUM CHLORIDE 1000 ML: .6; .31; .03; .02 INJECTION, SOLUTION INTRAVENOUS at 10:04

## 2024-07-25 RX ADMIN — MORPHINE SULFATE 2 MG: 2 INJECTION, SOLUTION INTRAMUSCULAR; INTRAVENOUS at 07:48

## 2024-07-25 RX ADMIN — SODIUM BICARBONATE 75 ML/HR: 84 INJECTION, SOLUTION INTRAVENOUS at 09:15

## 2024-07-25 RX ADMIN — PHENYLEPHRINE HYDROCHLORIDE 50 MCG/MIN: 10 INJECTION INTRAVENOUS at 16:51

## 2024-07-25 NOTE — MALNUTRITION/BMI
This medical record reflects one or more clinical indicators suggestive of malnutrition.    Malnutrition Findings:   Adult Malnutrition type: Acute illness  Adult Degree of Malnutrition: Other severe protein calorie malnutrition  Malnutrition Characteristics: Fat loss, Muscle loss, Inadequate energy                  360 Statement: severe malnutrition r/t acute illness as evidenced by PO intake <50% of estimated needs for at least 5 days, moderate-severe muscle loss around clavicles, moderate muscle loss shoulders and temples, mild buccal fat pad loss. Treatment: as medically able, oral diet with oral nutrition supplements    BMI Findings:  Adult BMI Classifications: Underweight < 18.5        Body mass index is 17.41 kg/m².     See Nutrition note dated 7/25/24 for additional details.  Completed nutrition assessment is viewable in the nutrition documentation.    Resume oral diet and Ensure Plus High Protein supplements TID as medically able/as tolerated. RD to continue to follow.

## 2024-07-25 NOTE — ASSESSMENT & PLAN NOTE
Patient came to the hospital with recurrent abdominal pain pain is worse after eating.  Pain is located in the periumbilical region and also suprapubic region.  Patient reported that she noted some dark stools at home but no rebeca bleeding  Patient with worsening abdominal pain last night.  Note stat VBG findings.  Patient with respiratory difficulty as well.  Patient articulated respiratory difficulty due to pain.  Note acidosis on VBG.  Repeat CT and lactic acid now.  Monitor symptoms closely.  Bicarb drip  Message to GI sent

## 2024-07-25 NOTE — ANESTHESIA POSTPROCEDURE EVALUATION
Post-Op Assessment Note    CV Status:  Stable  Pain Score: 0    Pain management: adequate       Mental Status:  Alert and awake   Hydration Status:  Euvolemic   PONV Controlled:  Controlled   Airway Patency:  Patent     Post Op Vitals Reviewed: Yes    No anethesia notable event occurred.    Staff: Anesthesiologist               /56 (07/25/24 1738)    Temp 99.1 °F (37.3 °C) (07/25/24 1738)    Pulse 98 (07/25/24 1738)   Resp 16 (07/25/24 1738)    SpO2 100 % (07/25/24 1738)

## 2024-07-25 NOTE — PROCEDURES
Central Line Insertion    Date/Time: 7/25/2024 2:33 PM    Performed by: Sulaiman Rosario DO  Authorized by: Sulaiman Rosario DO    Patient location:  Bedside  Other Assisting Provider: No    Consent:     Consent obtained:  Written    Consent given by:  Spouse    Risks discussed:  Arterial puncture, infection, incorrect placement and bleeding    Alternatives discussed:  Delayed treatment, alternative treatment, observation and no treatment  Universal protocol:     Patient identity confirmed:  Arm band, provided demographic data and hospital-assigned identification number  Pre-procedure details:     Hand hygiene: Hand hygiene performed prior to insertion      Sterile barrier technique: All elements of maximal sterile technique followed      Skin preparation:  2% chlorhexidine    Skin preparation agent: Skin preparation agent completely dried prior to procedure    Indications:     Central line indications: medications requiring central line and no peripheral vascular access      Site selection rationale:  Not enough peripheral access  Sedation:     Sedation type:  Moderate (conscious) sedation  Anesthesia (see MAR for exact dosages):     Anesthesia method:  Local infiltration    Local anesthetic:  Lidocaine 1% w/o epi  Procedure details:     Location:  Right internal jugular    Vessel type: vein      Laterality:  Right    Approach: percutaneous technique used      Patient position:  Flat    Catheter type:  Triple lumen 16cm    Catheter size:  7 Fr    Landmarks identified: yes      Ultrasound guidance: yes      Ultrasound image availability:  Not obtained due to urgency    Number of attempts:  1    Successful placement: yes      Catheter tip vessel location: superior vena cava    Post-procedure details:     Post-procedure:  Line sutured and dressing applied    Assessment:  No pneumothorax on x-ray, blood return through all ports, free fluid flow and placement verified by x-ray    Post-procedure complications: none      Patient  tolerance of procedure:  Tolerated well, no immediate complications

## 2024-07-25 NOTE — PROGRESS NOTES
[facility]  Progress Note  Name: Patrica Grimaldo I  MRN: [id number]  Unit/Bed#: CW2 210-02 I Date of Admission: 7/23/2024   Date of Service: 7/25/2024 I Hospital Day: 2    Assessment & Plan   * Abdominal pain  Assessment & Plan  Patient came to the hospital with recurrent abdominal pain pain is worse after eating.  Pain is located in the periumbilical region and also suprapubic region.  Patient reported that she noted some dark stools at home but no rebeca bleeding  Patient with worsening abdominal pain last night.  Note stat VBG findings.  Patient with respiratory difficulty as well.  Patient articulated respiratory difficulty due to pain.  Extensive discussion with patient and son at bedside.  Discussed case with surgery and with critical care as as well as GI.  Patient transferred to ICU for higher level care.  Note bloody BMs overnight with associated abdominal pain.  Transfuse packed red blood cells ordered.  No elevated lactic acid.  Continue to monitor in ICU under critical care.      Mild protein-calorie malnutrition (HCC)  Assessment & Plan  Malnutrition Findings:      Patient with BMI of 17.6.  Patient reported that she may have lost some weight.  Encourage p.o. nutrition  Patient reported poor p.o. intake due to abdominal pain    BMI Findings:     Body mass index is 17.41 kg/m².       Subclinical hyperthyroidism  Assessment & Plan  Patient was on methimazole in the past.  Recent thyroid function studies within normal limits and reported that she is currently off of the methimazole    Vitamin D deficiency  Assessment & Plan  Continue with supplementation    Chronic obstructive pulmonary disease (HCC)  Assessment & Plan  With known history of COPD.  No acute exacerbation.  Continue with respiratory protocol         VTE Pharmacologic Prophylaxis:   Pharmacologic:  gi bleed  Mechanical VTE Prophylaxis in Place: No    Patient Centered Rounds: I have performed bedside rounds  with nursing staff today.        Time Spent for Care: 1 hour.  More than 50% of total time spent on counseling and coordination of care as described above.    Current Length of Stay: 2 day(s)        Code Status: Level 1 - Full Code      Subjective:   nad    Objective:     Vitals:   Temp (24hrs), Av.5 °F (36.9 °C), Min:98 °F (36.7 °C), Max:99.3 °F (37.4 °C)    Temp:  [98 °F (36.7 °C)-99.3 °F (37.4 °C)] 99.3 °F (37.4 °C)  HR:  [] 75  Resp:  [15-16] 15  BP: (119-156)/(71-87) 123/71  SpO2:  [93 %-100 %] 100 %  Body mass index is 17.41 kg/m².     Input and Output Summary (last 24 hours):       Intake/Output Summary (Last 24 hours) at 2024 0844  Last data filed at 2024 0001  Gross per 24 hour   Intake 2220 ml   Output --   Net 2220 ml       Physical Exam:     Physical Exam  Constitutional:       Appearance: Normal appearance.   HENT:      Head: Normocephalic and atraumatic.   Cardiovascular:      Rate and Rhythm: Normal rate and regular rhythm.      Pulses: Normal pulses.      Heart sounds: Normal heart sounds.   Pulmonary:      Effort: Pulmonary effort is normal.      Breath sounds: Normal breath sounds.   Abdominal:      General: Abdomen is flat.      Palpations: Abdomen is soft.   Neurological:      General: No focal deficit present.      Mental Status: She is alert and oriented to person, place, and time.   Psychiatric:         Mood and Affect: Mood normal.         Additional Data:     Labs:    Results from last 7 days   Lab Units 24  0501 24  1024   WBC Thousand/uL 9.37 9.74   HEMOGLOBIN g/dL 13.7 15.6*   HEMATOCRIT % 41.4 46.2*   PLATELETS Thousands/uL 151 149   SEGS PCT %  --  83*   LYMPHO PCT %  --  9*   MONO PCT %  --  7   EOS PCT %  --  0     Results from last 7 days   Lab Units 24  0751   POTASSIUM mmol/L 4.3   CHLORIDE mmol/L 106   CO2 mmol/L 20*   BUN mg/dL 21   CREATININE mg/dL 0.51*   CALCIUM mg/dL 8.0*   ALK PHOS U/L 46   ALT U/L 5*   AST U/L 14           * I Have  Reviewed All Lab Data Listed Above.  * Additional Pertinent Lab Tests Reviewed: All Labs Within Last 24 Hours Reviewed    Recent Cultures (last 7 days):           Last 24 Hours Medication List:   Current Facility-Administered Medications   Medication Dose Route Frequency Provider Last Rate    acetaminophen  650 mg Oral Q6H PRN Radha Black MD      albuterol  2 puff Inhalation Q4H PRN Radha Black MD      ALPRAZolam  0.25 mg Oral Daily PRN Radha Black MD      Cholecalciferol  2,000 Units Oral Daily Radha Black MD      fluticasone-vilanterol  1 puff Inhalation Daily Radha Black MD      heparin (porcine)  5,000 Units Subcutaneous Q8H KULDIP Radha Black MD      metoprolol succinate  100 mg Oral HS Radha Black MD      morphine injection  2 mg Intravenous Q4H PRN Radha Black MD      multivitamin-minerals  1 tablet Oral Daily Radha Black MD      nicotine  1 patch Transdermal Daily Radha Black MD      ondansetron  4 mg Intravenous Q6H PRN Radha Black MD      oxyCODONE  5 mg Oral Q4H PRN Radha Black MD      oxyCODONE  2.5 mg Oral Q4H PRN Radha Black MD      pantoprazole  40 mg Oral Early Morning Vernon Miranda MD      polyethylene glycol  17 g Oral Daily Vernon Miranda MD      senna  1 tablet Oral Daily Vernon Miranda MD      sodium bicarbonate 150 mEq in dextrose 5 % 1,000 mL infusion  75 mL/hr Intravenous Continuous Flavia Vázquez DO      sodium chloride  75 mL/hr Intravenous Continuous Radha Black MD 75 mL/hr (07/24/24 2203)    sucralfate  1 g Oral Q6H [facility] Vernon Miranda MD          Today, Patient Was Seen By: Flavia Vázquez DO    ** Please Note: Dictation voice to text software may have been used in the creation of this document. **

## 2024-07-25 NOTE — ANESTHESIA PREPROCEDURE EVALUATION
Procedure:  EGD    Relevant Problems   CARDIO   (+) Essential hypertension      ENDO   (+) Subclinical hyperthyroidism      MUSCULOSKELETAL   (+) Primary osteoarthritis of right knee      NEURO/PSYCH   (+) Generalized anxiety disorder      PULMONARY   (+) Chronic obstructive pulmonary disease (HCC)      Behavioral Health   (+) Nicotine abuse      Blood   (+) Decreased platelet count (HCC)      Other   (+) Mild protein-calorie malnutrition (HCC)       Latest Reference Range & Units 07/25/24 12:06   Sodium 135 - 147 mmol/L 134 (L)   Potassium 3.5 - 5.3 mmol/L 4.6   Chloride 96 - 108 mmol/L 106   Carbon Dioxide 21 - 32 mmol/L 21   ANION GAP 4 - 13 mmol/L 7   BUN 5 - 25 mg/dL 27 (H)   Creatinine 0.60 - 1.30 mg/dL 0.74   GLUCOSE 65 - 140 mg/dL 185 (H)   Calcium 8.4 - 10.2 mg/dL 7.3 (L)   (L): Data is abnormally low  (H): Data is abnormally high   Latest Reference Range & Units 07/25/24 08:39 07/25/24 11:58   LACTIC ACID 0.5 - 2.0 mmol/L 8.3 (HH) 3.8 (H)   (HH): Data is critically high  (H): Data is abnormally high     Latest Reference Range & Units 07/25/24 11:58 07/25/24 14:24   Hemoglobin 11.5 - 15.4 g/dL 6.9 (L) 11.2 (L)   (L): Data is abnormally low  Physical Exam    Airway    Mallampati score: II  TM Distance: >3 FB  Neck ROM: full     Dental       Cardiovascular      Pulmonary      Other Findings  post-pubertal.      Anesthesia Plan  ASA Score- 4 Emergent    Anesthesia Type- general with ASA Monitors.         Additional Monitors:     Airway Plan: ETT.           Plan Factors-Exercise tolerance (METS): <4 METS.    Chart reviewed. EKG reviewed. Imaging results reviewed. Existing labs reviewed. Patient summary reviewed.    Patient is a current smoker.  Patient did not smoke on day of surgery.            Induction- intravenous.    Postoperative Plan- Plan for postoperative opioid use. Planned trial extubation    Perioperative Resuscitation Plan - Level 1 - Full Code.       Informed Consent- Anesthetic plan and risks  discussed with patient.  I personally reviewed this patient with the CRNA. Discussed and agreed on the Anesthesia Plan with the CRNA..         risk factors

## 2024-07-25 NOTE — QUICK NOTE
Received call from nursing at around 7:30 requesting urgent eval as pt was in respiratory distress.    Arrived at bedside and noted pt to be alert and oriented, however was visibly ill appearing. On exam, was pale, tachypneic and clammy.     Reported having difficulty breathing. Denied chest pain. Lungs sounded diminished at the bases with thick upper airway secretions at the apices which pt was actively coughing up.     Breathing began to nomalize during my exam, and pt reported that the abdominal pain was causing her initial SOB, respiratory distress. States the pain crept up on her while sleeping. Stated the pain was no worse than prior. Felt like gas. Abdomen was soft with generalized tenderness. Pt reported this is unchanged from prior. Pain was not out of proportion to exam - pt rated 8/10.    Ordered STAT cbc, cmp, and vbg. CXR was unremarkable per my read. Medicated pt for pain and ordered a breathing treatment.     Discussed with oncoming provider to follow up on lab data.

## 2024-07-25 NOTE — CONSULTS
Consultation - General Surgery   Patrica Grimaldo 79 y.o. female MRN: [id number]  Unit/Bed#: CW2 210-02 Encounter: [id number]    Assessment & Plan     Assessment:  79 y.o. female with PMHx of hyperthyroidism, HTN, and COPD who presents with abdominal pain in the setting of suspected GI bleed.     Plan:    - 1L LR bolus  - Therapeutic Protonix 80mg bid   - Recommend GI consult for endoscopy/colonoscopy  - Recommend higher level of care  - F/u CT abd/pelvis with contrast   - Type and screen  - Trend Hgb  - Transfuse if Hgb <7    Please contact us with questions or concerns.     History of Present Illness     HPI:  Patrica Grimaldo is a 79 y.o. female with PMHx of hyperthyroidism, HTN, and COPD who presents with recurrent abdominal pain. She endorses pain in the periumbilical region, worse after eating, and associated with dark stools at home. Pain began worsening yesterday. VBG 7/25 AM shows pH 7.14, pCO2 57.3, O2 33.8, HCO3 19.1 with lactate of 8.3. Hgb decreased from 13.7 (7/24 AM) to 9.6 (7/25 AM). No previous colonoscopy or EGD.     Surgery consulted for possible mesenteric ischemia. CT abdomen/pelvis (7/18) shows Mild diffuse small bowel mucosal thickening as well as mucosal thickening of the gastric wall most consistent with gastroenteritis. CTA abdomen and pelvis (7/23) demonstrates mild atherosclerotic disease and widely patent mesenteric vessels.      Inpatient consult to Acute Care Surgery  Consult performed by: Harry Dean MD  Consult ordered by: BIJAN Fairchild        Review of Systems   Constitutional:  Negative for chills and fever.   Respiratory:  Positive for shortness of breath.    Gastrointestinal:  Positive for abdominal pain and blood in stool. Negative for abdominal distention.   Skin:  Positive for pallor.   All other systems reviewed and are negative.      Historical Information   Past Medical History:   Diagnosis Date    Elevated blood pressure reading      Past Surgical History:    Procedure Laterality Date    APPENDECTOMY      TONSILLECTOMY       Social History   Social History     Substance and Sexual Activity   Alcohol Use Never     Social History     Substance and Sexual Activity   Drug Use Never     E-Cigarette/Vaping    E-Cigarette Use Never User      E-Cigarette/Vaping Substances    Nicotine No     THC No     CBD No     Flavoring No     Other No     Unknown No      Social History     Tobacco Use   Smoking Status Every Day    Current packs/day: 1.50    Types: Cigarettes   Smokeless Tobacco Never     Family History:   Family History   Problem Relation Age of Onset    Lung cancer Mother     Heart disease Maternal Aunt     Heart disease Maternal Uncle     Heart disease Maternal Grandmother        Meds/Allergies   current meds:   Current Facility-Administered Medications   Medication Dose Route Frequency    acetaminophen (TYLENOL) tablet 650 mg  650 mg Oral Q6H PRN    albuterol (PROVENTIL HFA,VENTOLIN HFA) inhaler 2 puff  2 puff Inhalation Q4H PRN    ALPRAZolam (XANAX) tablet 0.25 mg  0.25 mg Oral Daily PRN    Cholecalciferol (VITAMIN D3) tablet 2,000 Units  2,000 Units Oral Daily    fluticasone-vilanterol 200-25 mcg/actuation 1 puff  1 puff Inhalation Daily    heparin (porcine) subcutaneous injection 5,000 Units  5,000 Units Subcutaneous Q8H KULDIP    lactated ringers bolus 1,000 mL  1,000 mL Intravenous Once    metoprolol succinate (TOPROL-XL) 24 hr tablet 100 mg  100 mg Oral HS    morphine injection 2 mg  2 mg Intravenous Q4H PRN    multivitamin-minerals (CENTRUM) tablet 1 tablet  1 tablet Oral Daily    nicotine (NICODERM CQ) 7 mg/24hr TD 24 hr patch 1 patch  1 patch Transdermal Daily    ondansetron (ZOFRAN) injection 4 mg  4 mg Intravenous Q6H PRN    oxyCODONE (ROXICODONE) IR tablet 5 mg  5 mg Oral Q4H PRN    oxyCODONE (ROXICODONE) split tablet 2.5 mg  2.5 mg Oral Q4H PRN    pantoprazole (PROTONIX) 80 mg in sodium chloride 0.9 % 100 mL IVPB  80 mg Intravenous Once    [START ON 7/26/2024]  pantoprazole (PROTONIX) injection 40 mg  40 mg Intravenous Q12H [facility]    polyethylene glycol (MIRALAX) packet 17 g  17 g Oral Daily    senna (SENOKOT) tablet 8.6 mg  1 tablet Oral Daily    sodium bicarbonate 150 mEq in dextrose 5 % 1,000 mL infusion  75 mL/hr Intravenous Continuous    sodium chloride 0.9 % infusion  75 mL/hr Intravenous Continuous    sucralfate (CARAFATE) tablet 1 g  1 g Oral Q6H [facility]     Allergies   Allergen Reactions    Erythromycin GI Intolerance     Reaction Date: 26Sep2005;     Lexapro [Escitalopram] Other (See Comments)     hyponatremia       Objective   First Vitals:   Blood Pressure: (!) 183/88 (07/23/24 1012)  Pulse: 82 (07/23/24 1012)  Temperature: 97.8 °F (36.6 °C) (07/23/24 1018)  Temp Source: Oral (07/23/24 1018)  Respirations: 18 (07/23/24 1012)  Weight - Scale: 45.4 kg (100 lb) (07/23/24 1525)  SpO2: 96 % (07/23/24 1012)    Current Vitals:   Blood Pressure: 123/71 (07/25/24 0628)  Pulse: 75 (07/25/24 0628)  Temperature: 99.3 °F (37.4 °C) (07/25/24 0628)  Temp Source: Oral (07/23/24 1018)  Respirations: 15 (07/25/24 0628)  Weight - Scale: 46 kg (101 lb 6.6 oz) (07/25/24 0600)  SpO2: 100 % (07/25/24 0756)      Intake/Output Summary (Last 24 hours) at 7/25/2024 0955  Last data filed at 7/25/2024 0001  Gross per 24 hour   Intake 1316.25 ml   Output --   Net 1316.25 ml       Invasive Devices       Peripheral Intravenous Line  Duration             Peripheral IV 07/24/24 Right;Ventral (anterior) Forearm <1 day                    Physical Exam  Constitutional:       General: She is not in acute distress.     Appearance: She is ill-appearing.   Cardiovascular:      Rate and Rhythm: Normal rate.   Pulmonary:      Effort: Pulmonary effort is normal.   Abdominal:      General: There is no distension.      Palpations: Abdomen is soft.      Tenderness: There is abdominal tenderness (right-sided abdominal tenderness). There is no guarding or rebound.   Skin:     General: Skin is warm and dry.    Neurological:      Mental Status: She is alert.       Lab Results: CBC:   Lab Results   Component Value Date    WBC 14.82 (H) 07/25/2024    HGB 9.6 (L) 07/25/2024    HCT 31.1 (L) 07/25/2024     (H) 07/25/2024     07/25/2024    RBC 2.98 (L) 07/25/2024    MCH 32.2 07/25/2024    MCHC 30.9 (L) 07/25/2024    RDW 12.3 07/25/2024    MPV 10.4 07/25/2024    NRBC 0 07/25/2024   , CMP:   Lab Results   Component Value Date    SODIUM 134 (L) 07/25/2024    K 4.3 07/25/2024     07/25/2024    CO2 20 (L) 07/25/2024    BUN 21 07/25/2024    CREATININE 0.51 (L) 07/25/2024    CALCIUM 8.0 (L) 07/25/2024    AST 14 07/25/2024    ALT 5 (L) 07/25/2024    ALKPHOS 46 07/25/2024    EGFR 91 07/25/2024     Imaging:     CTA abdomen pelvis w wo contrast    Result Date: 7/23/2024  Impression: CTA abdomen and pelvis with mild atherosclerotic disease and widely patent mesenteric vessels. Diverticulosis. Left adnexal cyst measuring 3.3 x 2.3 cm unchanged since 11/15/2021. Workstation performed: [id number]     US right upper quadrant    Result Date: 7/23/2024  Impression: Mild right hydronephrosis. Workstation performed: [id number]     XR chest 2 views    Result Date: 7/23/2024  Impression: No acute cardiopulmonary disease. Workstation performed: [id number]       EKG, Pathology, and Other Studies: I have personally reviewed pertinent reports.      Harry Dean MD  Surgery PGY1

## 2024-07-25 NOTE — PROGRESS NOTES
Progress Note- Patrica Grimaldo 79 y.o. female MRN: [id number]    Unit/Bed#: MICU 13 Encounter: [id number]      Assessment and Plan:  78 yo F with history of GERD, COPD, KENYA and hypertension who presented on 7/23 due to right lower quadrant pain as well as reflux found to have largely unremarkable labs as well as repeat CT abdomen showing increase stool burden overall concerning for untreated GERD and constipation leading to GI consult.  Earlier this morning she had an episode of melena with acute drop in hemoglobin from 15.6 on admission down to 6.9 overall concerning for upper GI bleed with differential including peptic ulcer disease versus Dieulafoy's lesion versus AVM bleeding versus esophagitis with plan for EGD later today to evaluate.     1.  Right lower quadrant pain  2.  Constipation  3.  GERD  4.  Tobacco use  5.  Melena  - Lipase negative  - Images reviewed in PACS with significant stool burden especially in rectosigmoid from ED visit on 7/23 with slightly improved but still significant stool burden  - Counseled on tobacco cessation  -Continue pantoprazole 40 mg IV twice daily  - Keep patient n.p.o.  - Maintain active type and screen, 2 large-bore IVs  - Trend hemoglobin, monitor stools  - Transfuse for hemoglobin less than 7  - Please give Reglan 10 mg IV once prior to endoscopy  - Given melena plan for EGD later today to evaluate for upper GI bleed  ______________________________________________________________________    Subjective:  Patient pale with continued pain this morning.  She appeared somnolent.  Denies nausea, vomiting.    Medication Administration - last 24 hours from 07/24/2024 1406 to 07/25/2024 1406         Date/Time Order Dose Route Action Action by     07/24/2024 2100 EDT metoprolol succinate (TOPROL-XL) 24 hr tablet 100 mg 100 mg Oral Given Kristine Vargas RN     07/25/2024 1224 EDT multivitamin-minerals (CENTRUM) tablet 1 tablet 1 tablet Oral Not Given Rand Vasquez RN     07/25/2024  1224 EDT Cholecalciferol (VITAMIN D3) tablet 2,000 Units 2,000 Units Oral Not Given Rand Vasquez RN     07/24/2024 2203 EDT sodium chloride 0.9 % infusion 75 mL/hr Intravenous New Bag Kristine Vargas RN     07/25/2024 0533 EDT heparin (porcine) subcutaneous injection 5,000 Units 5,000 Units Subcutaneous Given Kristine Vargas RN     07/24/2024 2100 EDT heparin (porcine) subcutaneous injection 5,000 Units 5,000 Units Subcutaneous Given Kristine Vargas RN     07/24/2024 1502 EDT heparin (porcine) subcutaneous injection 5,000 Units 5,000 Units Subcutaneous Given Ewelina Crews RN     07/24/2024 2056 EDT oxyCODONE (ROXICODONE) IR tablet 5 mg 5 mg Oral Given Kristine Vargas RN     07/25/2024 0748 EDT morphine injection 2 mg 2 mg Intravenous Given Ewelina Crews RN     07/24/2024 2120 EDT albuterol (PROVENTIL HFA,VENTOLIN HFA) inhaler 2 puff 2 puff Inhalation Given Kristine Vargas RN     07/25/2024 1006 EDT polyethylene glycol (MIRALAX) packet 17 g 17 g Oral Not Given Ewelina Crews RN     07/25/2024 1225 EDT senna (SENOKOT) tablet 8.6 mg 8.6 mg Oral Not Given Rand Vasquez RN     07/25/2024 0533 EDT pantoprazole (PROTONIX) EC tablet 40 mg 40 mg Oral Given Kristine Vargas RN     07/25/2024 0704 EDT sucralfate (CARAFATE) tablet 1 g 1 g Oral Given Kristine Vargas RN     07/25/2024 0753 EDT methylPREDNISolone sodium succinate (Solu-MEDROL) injection 125 mg 125 mg Intravenous Not Given Ewelina Crews RN     07/25/2024 0755 EDT ipratropium-albuterol (DUO-NEB) 0.5-2.5 mg/3 mL inhalation solution 3 mL 3 mL Nebulization Given Garret Michele, RT     07/25/2024 0915 EDT sodium bicarbonate 150 mEq in dextrose 5 % 1,000 mL infusion 75 mL/hr Intravenous New Bag Ewelina Crews RN     07/25/2024 1151 EDT lactated ringers bolus 1,000 mL 0 mL Intravenous Stopped Ewelina Crews RN     07/25/2024 1004 EDT lactated ringers bolus 1,000 mL 1,000 mL Intravenous New Bag Ewelina Crews RN     07/25/2024 1046 EDT pantoprazole (PROTONIX) 80 mg in sodium chloride 0.9 % 100 mL  IVPB 0 mg Intravenous Stopped Ewelina Crews RN     07/25/2024 1024 EDT pantoprazole (PROTONIX) 80 mg in sodium chloride 0.9 % 100 mL IVPB 80 mg Intravenous New Bag Ewelina Crews RN     07/25/2024 1328 EDT magnesium sulfate 2 g/50 mL IVPB (premix) 2 g 2 g Intravenous New Bag Rand Vasquez RN     07/25/2024 1317 EDT iohexol (OMNIPAQUE) 350 MG/ML injection (MULTI-DOSE) 85 mL 85 mL Intravenous Given Byron Pastula            Objective:     Vitals: Blood pressure 99/63, pulse 70, temperature 97.5 °F (36.4 °C), resp. rate 15, weight 46 kg (101 lb 6.6 oz), SpO2 100%.,Body mass index is 17.41 kg/m².      Intake/Output Summary (Last 24 hours) at 7/25/2024 1406  Last data filed at 7/25/2024 1218  Gross per 24 hour   Intake 2016.25 ml   Output --   Net 2016.25 ml       Physical Exam:   General Appearance: Awake and alert, in no acute distress, somnolent  Abdomen: Soft, abd tenderness, non-distended; bowel sounds normal; no masses or no organomegaly    Invasive Devices       Peripheral Intravenous Line  Duration             Peripheral IV 07/24/24 Right;Ventral (anterior) Forearm <1 day    Peripheral IV 07/25/24 Left;Ventral (anterior) Forearm <1 day                    Lab Results:  Admission on 07/23/2024   Component Date Value    Ventricular Rate 07/23/2024 80     Atrial Rate 07/23/2024 80     AK Interval 07/23/2024 176     QRSD Interval 07/23/2024 106     QT Interval 07/23/2024 386     QTC Interval 07/23/2024 445     P Axis 07/23/2024 80     QRS Axis 07/23/2024 -66     T Wave Axis 07/23/2024 50     WBC 07/23/2024 9.74     RBC 07/23/2024 4.94     Hemoglobin 07/23/2024 15.6 (H)     Hematocrit 07/23/2024 46.2 (H)     MCV 07/23/2024 94     MCH 07/23/2024 31.6     MCHC 07/23/2024 33.8     RDW 07/23/2024 11.9     MPV 07/23/2024 9.8     Platelets 07/23/2024 149     nRBC 07/23/2024 0     Segmented % 07/23/2024 83 (H)     Immature Grans % 07/23/2024 1     Lymphocytes % 07/23/2024 9 (L)     Monocytes % 07/23/2024 7     Eosinophils  Relative 07/23/2024 0     Basophils Relative 07/23/2024 0     Absolute Neutrophils 07/23/2024 8.08 (H)     Absolute Immature Grans 07/23/2024 0.05     Absolute Lymphocytes 07/23/2024 0.86     Absolute Monocytes 07/23/2024 0.71     Eosinophils Absolute 07/23/2024 0.01     Basophils Absolute 07/23/2024 0.03     Sodium 07/23/2024 133 (L)     Potassium 07/23/2024 3.5     Chloride 07/23/2024 100     CO2 07/23/2024 25     ANION GAP 07/23/2024 8     BUN 07/23/2024 9     Creatinine 07/23/2024 0.42 (L)     Glucose 07/23/2024 130     Calcium 07/23/2024 7.9 (L)     Corrected Calcium 07/23/2024 8.4     AST 07/23/2024 12 (L)     ALT 07/23/2024 5 (L)     Alkaline Phosphatase 07/23/2024 64     Total Protein 07/23/2024 5.4 (L)     Albumin 07/23/2024 3.4 (L)     Total Bilirubin 07/23/2024 0.54     eGFR 07/23/2024 97     Lipase 07/23/2024 9 (L)     hs TnI 0hr 07/23/2024 4     hs TnI 2hr 07/23/2024 5     Delta 2hr hsTnI 07/23/2024 1     LACTIC ACID 07/23/2024 1.0     Sodium 07/24/2024 132 (L)     Potassium 07/24/2024 3.8     Chloride 07/24/2024 98     CO2 07/24/2024 25     ANION GAP 07/24/2024 9     BUN 07/24/2024 10     Creatinine 07/24/2024 0.33 (L)     Glucose 07/24/2024 92     Calcium 07/24/2024 8.7     Corrected Calcium 07/24/2024 9.2     AST 07/24/2024 12 (L)     ALT 07/24/2024 4 (L)     Alkaline Phosphatase 07/24/2024 56     Total Protein 07/24/2024 5.6 (L)     Albumin 07/24/2024 3.4 (L)     Total Bilirubin 07/24/2024 0.55     eGFR 07/24/2024 105     Magnesium 07/24/2024 1.7 (L)     WBC 07/24/2024 9.37     RBC 07/24/2024 4.39     Hemoglobin 07/24/2024 13.7     Hematocrit 07/24/2024 41.4     MCV 07/24/2024 94     MCH 07/24/2024 31.2     MCHC 07/24/2024 33.1     RDW 07/24/2024 12.1     Platelets 07/24/2024 151     MPV 07/24/2024 10.2     pH, Loc 07/25/2024 7.141 (LL)     pCO2, Loc 07/25/2024 57.3 (H)     pO2, Loc 07/25/2024 33.8 (L)     HCO3, Loc 07/25/2024 19.1 (L)     Base Excess, Loc 07/25/2024 -9.7     O2 Content, Loc  07/25/2024 6.3     O2 HGB, VENOUS 07/25/2024 46.2 (L)     WBC 07/25/2024 14.82 (H)     RBC 07/25/2024 2.98 (L)     Hemoglobin 07/25/2024 9.6 (L)     Hematocrit 07/25/2024 31.1 (L)     MCV 07/25/2024 104 (H)     MCH 07/25/2024 32.2     MCHC 07/25/2024 30.9 (L)     RDW 07/25/2024 12.3     MPV 07/25/2024 10.4     Platelets 07/25/2024 162     nRBC 07/25/2024 0     Segmented % 07/25/2024 53     Immature Grans % 07/25/2024 0     Lymphocytes % 07/25/2024 35     Monocytes % 07/25/2024 9     Eosinophils Relative 07/25/2024 2     Basophils Relative 07/25/2024 1     Absolute Neutrophils 07/25/2024 7.95 (H)     Absolute Immature Grans 07/25/2024 0.06     Absolute Lymphocytes 07/25/2024 5.23 (H)     Absolute Monocytes 07/25/2024 1.29 (H)     Eosinophils Absolute 07/25/2024 0.22     Basophils Absolute 07/25/2024 0.07     Sodium 07/25/2024 134 (L)     Potassium 07/25/2024 4.3     Chloride 07/25/2024 106     CO2 07/25/2024 20 (L)     ANION GAP 07/25/2024 8     BUN 07/25/2024 21     Creatinine 07/25/2024 0.51 (L)     Glucose 07/25/2024 163 (H)     Calcium 07/25/2024 8.0 (L)     Corrected Calcium 07/25/2024 9.0     AST 07/25/2024 14     ALT 07/25/2024 5 (L)     Alkaline Phosphatase 07/25/2024 46     Total Protein 07/25/2024 4.7 (L)     Albumin 07/25/2024 2.8 (L)     Total Bilirubin 07/25/2024 0.30     eGFR 07/25/2024 91     POC Glucose 07/25/2024 234 (H)     LACTIC ACID 07/25/2024 8.3 (HH)     LACTIC ACID 07/25/2024 3.8 (H)     ABO Grouping 07/25/2024 O     Rh Factor 07/25/2024 Positive     Antibody Screen 07/25/2024 Negative     Specimen Expiration Date 07/25/2024 [id number]     Unit Product Code 07/25/2024 X4819G49     Unit Number 07/25/2024 [id number]     Unit ABO 07/25/2024 O     Unit RH 07/25/2024 POS     Crossmatch 07/25/2024 Compatible     Unit Dispense Status 07/25/2024 Issued     Unit Product Volume 07/25/2024 350     Unit Product Code 07/25/2024 Q1084V55     Unit Number 07/25/2024 [id number]     Unit ABO 07/25/2024  O     Unit RH 07/25/2024 POS     Crossmatch 07/25/2024 Compatible     Unit Dispense Status 07/25/2024 Issued     Unit Product Volume 07/25/2024 350     ABO Grouping 07/25/2024 O     Rh Factor 07/25/2024 Positive     Protime 07/25/2024 17.2 (H)     INR 07/25/2024 1.42 (H)     PTT 07/25/2024 27     Fibrinogen 07/25/2024 269     Hemoglobin 07/25/2024 6.9 (L)     Hematocrit 07/25/2024 21.3 (L)     Sodium 07/25/2024 134 (L)     Potassium 07/25/2024 4.6     Chloride 07/25/2024 106     CO2 07/25/2024 21     ANION GAP 07/25/2024 7     BUN 07/25/2024 27 (H)     Creatinine 07/25/2024 0.74     Glucose 07/25/2024 185 (H)     Calcium 07/25/2024 7.3 (L)     eGFR 07/25/2024 76     Magnesium 07/25/2024 1.6 (L)     Phosphorus 07/25/2024 3.7        Imaging Studies: I have personally reviewed pertinent imaging studies.

## 2024-07-25 NOTE — PLAN OF CARE
Problem: PAIN - ADULT  Goal: Verbalizes/displays adequate comfort level or baseline comfort level  Description: Interventions:  - Encourage patient to monitor pain and request assistance  - Assess pain using appropriate pain scale  - Administer analgesics based on type and severity of pain and evaluate response  - Implement non-pharmacological measures as appropriate and evaluate response  - Consider cultural and social influences on pain and pain management  - Notify physician/advanced practitioner if interventions unsuccessful or patient reports new pain  Outcome: Progressing     Problem: SAFETY ADULT  Goal: Patient will remain free of falls  Description: INTERVENTIONS:  - Educate patient/family on patient safety including physical limitations  - Instruct patient to call for assistance with activity   - Consult OT/PT to assist with strengthening/mobility   - Keep Call bell within reach  - Keep bed low and locked with side rails adjusted as appropriate  - Keep care items and personal belongings within reach  - Initiate and maintain comfort rounds  - Make Fall Risk Sign visible to staff  - Offer Toileting every  Hours, in advance of need  - Initiate/Maintain alarm  - Obtain necessary fall risk management equipment  - Apply yellow socks and bracelet for high fall risk patients  - Consider moving patient to room near nurses station  Outcome: Progressing  Goal: Maintain or return to baseline ADL function  Description: INTERVENTIONS:  -  Assess patient's ability to carry out ADLs; assess patient's baseline for ADL function and identify physical deficits which impact ability to perform ADLs (bathing, care of mouth/teeth, toileting, grooming, dressing, etc.)  - Assess/evaluate cause of self-care deficits   - Assess range of motion  - Assess patient's mobility; develop plan if impaired  - Assess patient's need for assistive devices and provide as appropriate  - Encourage maximum independence but intervene and supervise  when necessary  - Involve family in performance of ADLs  - Assess for home care needs following discharge   - Consider OT consult to assist with ADL evaluation and planning for discharge  - Provide patient education as appropriate  Outcome: Progressing  Goal: Maintains/Returns to pre admission functional level  Description: INTERVENTIONS:  - Perform AM-PAC 6 Click Basic Mobility/ Daily Activity assessment daily.  - Set and communicate daily mobility goal to care team and patient/family/caregiver.   - Collaborate with rehabilitation services on mobility goals if consulted  - Perform Range of Motion times a day.  - Reposition patient every  hours.  - Dangle patient  times a day  - Stand patient  times a day  - Ambulate patient  times a day  - Out of bed to chair  times a day   - Out of bed for meals  times a day  - Out of bed for toileting  - Record patient progress and toleration of activity level   Outcome: Progressing     Problem: DISCHARGE PLANNING  Goal: Discharge to home or other facility with appropriate resources  Description: INTERVENTIONS:  - Identify barriers to discharge w/patient and caregiver  - Arrange for needed discharge resources and transportation as appropriate  - Identify discharge learning needs (meds, wound care, etc.)  - Arrange for interpretive services to assist at discharge as needed  - Refer to Case Management Department for coordinating discharge planning if the patient needs post-hospital services based on physician/advanced practitioner order or complex needs related to functional status, cognitive ability, or social support system  Outcome: Progressing     Problem: GASTROINTESTINAL - ADULT  Goal: Minimal or absence of nausea and/or vomiting  Description: INTERVENTIONS:  - Administer IV fluids if ordered to ensure adequate hydration  - Maintain NPO status until nausea and vomiting are resolved  - Nasogastric tube if ordered  - Administer ordered antiemetic medications as needed  -  Provide nonpharmacologic comfort measures as appropriate  - Advance diet as tolerated, if ordered  - Consider nutrition services referral to assist patient with adequate nutrition and appropriate food choices  Outcome: Progressing  Goal: Maintains or returns to baseline bowel function  Description: INTERVENTIONS:  - Assess bowel function  - Encourage oral fluids to ensure adequate hydration  - Administer IV fluids if ordered to ensure adequate hydration  - Administer ordered medications as needed  - Encourage mobilization and activity  - Consider nutritional services referral to assist patient with adequate nutrition and appropriate food choices  Outcome: Progressing  Goal: Maintains adequate nutritional intake  Description: INTERVENTIONS:  - Monitor percentage of each meal consumed  - Identify factors contributing to decreased intake, treat as appropriate  - Assist with meals as needed  - Monitor I&O, weight, and lab values if indicated  - Obtain nutrition services referral as needed  Outcome: Progressing  Goal: Oral mucous membranes remain intact  Description: INTERVENTIONS  - Assess oral mucosa and hygiene practices  - Implement preventative oral hygiene regimen  - Implement oral medicated treatments as ordered  - Initiate Nutrition services referral as needed  Outcome: Progressing     Problem: Knowledge Deficit  Goal: Patient/family/caregiver demonstrates understanding of disease process, treatment plan, medications, and discharge instructions  Description: Complete learning assessment and assess knowledge base.  Interventions:  - Provide teaching at level of understanding  - Provide teaching via preferred learning methods  Outcome: Progressing     Problem: Nutrition/Hydration-ADULT  Goal: Nutrient/Hydration intake appropriate for improving, restoring or maintaining nutritional needs  Description: Monitor and assess patient's nutrition/hydration status for malnutrition. Collaborate with interdisciplinary team  and initiate plan and interventions as ordered.  Monitor patient's weight and dietary intake as ordered or per policy. Utilize nutrition screening tool and intervene as necessary. Determine patient's food preferences and provide high-protein, high-caloric foods as appropriate.     INTERVENTIONS:  - Monitor oral intake, urinary output, labs, and treatment plans  - Assess nutrition and hydration status and recommend course of action  - Evaluate amount of meals eaten  - Assist patient with eating if necessary   - Allow adequate time for meals  - Recommend/ encourage appropriate diets, oral nutritional supplements, and vitamin/mineral supplements  - Order, calculate, and assess calorie counts as needed  - Recommend, monitor, and adjust tube feedings and TPN/PPN based on assessed needs  - Assess need for intravenous fluids  - Provide specific nutrition/hydration education as appropriate  - Include patient/family/caregiver in decisions related to nutrition  Outcome: Progressing     Problem: Prexisting or High Potential for Compromised Skin Integrity  Goal: Skin integrity is maintained or improved  Description: INTERVENTIONS:  - Identify patients at risk for skin breakdown  - Assess and monitor skin integrity  - Assess and monitor nutrition and hydration status  - Monitor labs   - Assess for incontinence   - Turn and reposition patient  - Assist with mobility/ambulation  - Relieve pressure over bony prominences  - Avoid friction and shearing  - Provide appropriate hygiene as needed including keeping skin clean and dry  - Evaluate need for skin moisturizer/barrier cream  - Collaborate with interdisciplinary team   - Patient/family teaching  - Consider wound care consult   Outcome: Progressing

## 2024-07-25 NOTE — PROGRESS NOTES
[facility]  Progress Note: Critical Care  Name: Patrica Grimaldo 79 y.o. female I MRN: [id number]  Unit/Bed#: CW2 210-02 I Date of Admission: 7/23/2024   Date of Service: 7/25/2024 I Hospital Day: 2    Assessment & Plan   Anxiety  COPD  Melena  Active Tobacco Use  Constipation  Abdominal pain  Hypotension  Subclinical hyperthyroidism  ABLA 2/2 to GI bleed      Neuro:   Diagnosis: Anxiety  Takes Xanax on an as needed basis  Plan:   Continue home xanax prn    CV:  Diagnosis: Hypotension  Hemoglobin drop this morning to 9.6 with large melanotic bowel movement with large melanotic bowel movement today with a drop to 6.9 with lactate to 8.3.   Plan: s/p 1 L LR  Being transfused 1 unit of blood now  GI consulted, appreciate recs  2 large bore IV  Monitor stool output  Type and screen  Transfuse when Hb < 7  NPO    Pulm:  Diagnosis: COPD with active tobacco use  Plan:   Wean oxygen to spo2 88-92%  Respiratory protocol  Continue home inhalers  Encourage nicotine cessation    GI:   Diagnosis: Abdominal pain with melena  Diagnosis: GERD  Reports dark brown/black stool with 5 loose bowel movements in the past 24 hours. Hemoglobin drop this morning to 9.6 with large melanotic bowel movement with large melanotic bowel movement today with a drop to 6.9 with lactate to 8.3. Lipase negative   Plan: CTA bleeding scan  GI consulted, appreciate recs  Protonix 40 mg BID  Bowel regimen on hold   Being transfused 1 unit of blood now  GI consulted, appreciate recs  2 large bore IV  Monitor stool output  H&H q8  Type and screen  Transfuse when Hb < 7  NPO    :   No acute issues    F/E/N:   F: None  E: replete prn   N: NPO for possible procedure/GI intervention    Heme/Onc:   Diagnosis: ABLA  Hemoglobin drop this morning to 9.6 with large melanotic bowel movement with large melanotic bowel movement today with a drop to 6.9 with lactate to 8.3  Plan:   Please see GI as above    Endo:   Diagnosis:  Subclinical hyperthyroidism  Was on methimazole in the past. Recent thyroid function studies from 3/2024 within normal limits and reported that she is currently off of the methimazole  Plan:   F/u OP  Will need repeat Thyroid function studies OP    ID:   No active issues    MSK/Skin:   Plan: OOB to chair  PT/OT    Disposition: Critical care    ICU Core Measures     A: Assess, Prevent, and Manage Pain Has pain been assessed? Yes  Need for changes to pain regimen? No   B: Both SAT/SAT  N/A   C: Choice of Sedation RASS Goal: 0 Alert and Calm  Need for changes to sedation or analgesia regimen? No   D: Delirium CAM-ICU: Negative   E: Early Mobility  Plan for early mobility? Yes   F: Family Engagement Plan for family engagement today? Yes         Prophylaxis:  VTE VTE covered by:  heparin (porcine), Subcutaneous, 5,000 Units at 07/25/24 0533       Stress Ulcer  covered bypantoprazole (PROTONIX) injection 40 mg [[id number]]        Significant 24hr Events     24hr events: Dark brown/black loose bowel movements in the past 24 hours with a 3 g hemoglobin drop in 4 hours.     Subjective this is a 79-year-old female with past medical history of GERD, COPD, KENYA, htn who initially presented on 7/18 for RLQ abdominal pain and constipation.  CT abdomen pelvis at that time revealed significant stool burden and was discharged with milk of mag which she states she was taking and caused 2-3 loose bowel movements a day.  She represented on 7/23 for worsening abdominal pain that is worse after eating. She states in the past 24 hours she has had 5 loose dark brown/black bowel movements with this morning having a large melanotic bowel movement this morning with acute 3 g hemoglobin drop from AM cbc and overall 5 g drop from yesterday. She reports RUQ/RLQ abdominal pain that is cramping and will come and go.      Review of Systems: See HPI for Review of Systems     Objective                            Vitals I/O      Most Recent Min/Max in  24hrs   Temp 99.3 °F (37.4 °C) Temp  Min: 98 °F (36.7 °C)  Max: 99.3 °F (37.4 °C)   Pulse 74 Pulse  Min: 74  Max: 123   Resp 15 Resp  Min: 15  Max: 16   BP (!) 88/55 BP  Min: 82/49  Max: 156/87   O2 Sat 100 % SpO2  Min: 93 %  Max: 100 %      Intake/Output Summary (Last 24 hours) at 7/25/2024 1139  Last data filed at 7/25/2024 0001  Gross per 24 hour   Intake 1316.25 ml   Output --   Net 1316.25 ml       Diet NPO; Sips with meds    Invasive Monitoring           Physical Exam   Physical Exam  Skin:     General: Skin is warm and dry.      Coloration: Skin is pale.   HENT:      Head: Normocephalic and atraumatic.      Mouth/Throat:      Mouth: Mucous membranes are dry.   Cardiovascular:      Rate and Rhythm: Normal rate and regular rhythm.   Musculoskeletal:      Right lower leg: No edema.      Left lower leg: No edema.   Abdominal: General: There is no distension.      Palpations: Abdomen is soft.      Tenderness: There is abdominal tenderness.      Comments: Epigastric/RLQ/RUQ TTP   Constitutional:       General: She is not in acute distress.     Appearance: She is not ill-appearing.      Interventions: She is not intubated.  Pulmonary:      Effort: Pulmonary effort is normal. No respiratory distress. She is not intubated.      Breath sounds: Normal breath sounds.   Neurological:      General: No focal deficit present.      Mental Status: She is alert and oriented to person, place and time.      Motor: Strength full and intact in all extremities.            Diagnostic Studies      EKG:   Imaging:  I have personally reviewed pertinent reports.       Medications:  Scheduled PRN   Cholecalciferol, 2,000 Units, Daily  fluticasone-vilanterol, 1 puff, Daily  heparin (porcine), 5,000 Units, Q8H KULDIP  lactated ringers, 1,000 mL, Once  metoprolol succinate, 100 mg, HS  multivitamin-minerals, 1 tablet, Daily  nicotine, 1 patch, Daily  [START ON 7/26/2024] pantoprazole, 40 mg, Q12H KULDIP  polyethylene glycol, 17 g, Daily  senna, 1  tablet, Daily  sucralfate, 1 g, Q6H KULDIP      acetaminophen, 650 mg, Q6H PRN  albuterol, 2 puff, Q4H PRN  ALPRAZolam, 0.25 mg, Daily PRN  morphine injection, 2 mg, Q4H PRN  ondansetron, 4 mg, Q6H PRN  oxyCODONE, 5 mg, Q4H PRN  oxyCODONE, 2.5 mg, Q4H PRN       Continuous    sodium bicarbonate 150 mEq in dextrose 5 % 1,000 mL infusion, 75 mL/hr, Last Rate: 75 mL/hr (07/25/24 0915)  sodium chloride, 75 mL/hr, Last Rate: 75 mL/hr (07/24/24 2203)         Labs:    CBC    Recent Labs     07/24/24  0501 07/25/24  0751   WBC 9.37 14.82*   HGB 13.7 9.6*   HCT 41.4 31.1*    162     BMP    Recent Labs     07/24/24  0501 07/25/24  0751   SODIUM 132* 134*   K 3.8 4.3   CL 98 106   CO2 25 20*   AGAP 9 8   BUN 10 21   CREATININE 0.33* 0.51*   CALCIUM 8.7 8.0*       Coags    No recent results     Additional Electrolytes  Recent Labs     07/24/24  0501   MG 1.7*          Blood Gas    No recent results  Recent Labs     07/25/24  0804   PHVEN 7.141*   NQT9TDV 57.3*   PO2VEN 33.8*   OUT1VGY 19.1*   BEVEN -9.7   C4TWRYF 46.2*    LFTs  Recent Labs     07/24/24  0501 07/25/24  0751   ALT 4* 5*   AST 12* 14   ALKPHOS 56 46   ALB 3.4* 2.8*   TBILI 0.55 0.30       Infectious  No recent results  Glucose  Recent Labs     07/24/24  0501 07/25/24  0751   GLUC 92 163*               Kirk Hoang MD

## 2024-07-25 NOTE — ASSESSMENT & PLAN NOTE
Malnutrition Findings:      Patient with BMI of 17.6.  Patient reported that she may have lost some weight.  Encourage p.o. nutrition  Patient reported poor p.o. intake due to abdominal pain    BMI Findings:     Body mass index is 17.41 kg/m².

## 2024-07-25 NOTE — CASE MANAGEMENT
Case Management Assessment & Discharge Planning Note    Patient name Patrica Grimaldo  Location MICU 13/MICU 13 MRN [id number]  : 1945 Date 2024       Current Admission Date: 2024  Current Admission Diagnosis:Abdominal pain   Patient Active Problem List    Diagnosis Date Noted Date Diagnosed    Abdominal pain 2024     Increased MCV 2022     Encounter for vitamin deficiency screening 2022     Itching 2022     Mild protein-calorie malnutrition (HCC) 2022     Primary osteoarthritis of right knee 2021     Age-related osteoporosis without current pathological fracture 2021     Lightheadedness 06/15/2021     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PASC) 2021     Subclinical hyperthyroidism 2019     Weight loss 2019     Fatigue 2019     Nicotine abuse 2019     Multiple thyroid nodules 2018     Abnormal thyroid function test 2018     Low TSH level 2018     Thyroid nodule 2018     Essential hypertension 2017     Dense breasts 2016     Chronic obstructive pulmonary disease (HCC) 2015     Thyroid disorder 10/11/2012     Allergic rhinitis 2012     Generalized anxiety disorder 2012     Impaired fasting glucose 2012     Vitamin D deficiency 2012     Decreased platelet count (HCC) 2012       LOS (days): 2  Geometric Mean LOS (GMLOS) (days): 2.6  Days to GMLOS:0.7     OBJECTIVE:    Risk of Unplanned Readmission Score: 11.95         Current admission status: Inpatient       Preferred Pharmacy:   [id number] - [place] [address] [address]  Phone: [phone] Fax: [phone]    Primary Care Provider: Janine Marcus DO    Primary Insurance: SCOTT ROSENTHAL  Secondary Insurance:     ASSESSMENT:  Active Health Care Proxies    There are no active Health Care Proxies on file.       Advance Directives  Does patient have a  Health Care POA?: Yes  Primary Contact: reyna Nugent              Patient Information  Admitted from:: Home  Mental Status: Alert  Assessment information provided by:: Son  Primary Caregiver: Self  Support Systems: Son, Family members, Friends/neighbors  What city do you live in?: [place]  Home entry access options. Select all that apply.: Elevator  Type of Current Residence: Apartment  Floor Level: 2  Upon entering residence, is there a bedroom on the main floor (no further steps)?: Yes  Upon entering residence, is there a bathroom on the main floor (no further steps)?: Yes  Living Arrangements: Lives Alone    Activities of Daily Living Prior to Admission  Functional Status: Independent  Completes ADLs independently?: Yes  Ambulates independently?: Yes  Does patient use assisted devices?: Yes  Assisted Devices (DME) used: Walker  Does patient currently own DME?: Yes  What DME does the patient currently own?: Walker  Does patient have a history of Outpatient Therapy (PT/OT)?: No  Does the patient have a history of Short-Term Rehab?: No  Does patient have a history of HHC?: No         Patient Information Continued  Income Source: SSI/SSD  Does patient have prescription coverage?: Yes  Does patient receive dialysis treatments?: No  Does patient have a history of substance abuse?: No  Does patient have a history of Mental Health Diagnosis?: No         Means of Transportation  Means of Transport to Appts:: Family transport      Social Determinants of Health (SDOH)      Flowsheet Row Most Recent Value   Housing Stability    In the last 12 months, was there a time when you were not able to pay the mortgage or rent on time? N   In the past 12 months, how many times have you moved where you were living? 1   At any time in the past 12 months, were you homeless or living in a shelter (including now)? N   Transportation Needs    In the past 12 months, has lack of transportation kept you from medical appointments or from getting  medications? no   In the past 12 months, has lack of transportation kept you from meetings, work, or from getting things needed for daily living? No   Food Insecurity    Within the past 12 months, you worried that your food would run out before you got the money to buy more. Never true   Within the past 12 months, the food you bought just didn't last and you didn't have money to get more. Never true   Utilities    In the past 12 months has the electric, gas, oil, or water company threatened to shut off services in your home? No            DISCHARGE DETAILS:    Discharge planning discussed with:: reyna Raffi  Freedom of Choice: Yes                   Contacts  Patient Contacts: reyna Nugent  Relationship to Patient:: Family  Contact Method: Phone  Phone Number: [phone]  Reason/Outcome: Continuity of Care, Emergency Contact, Discharge Planning                                                                     Additional Comments: resides alone, drives, I PTA

## 2024-07-26 LAB
ABO GROUP BLD BPU: NORMAL
ANION GAP SERPL CALCULATED.3IONS-SCNC: 3 MMOL/L (ref 4–13)
ANION GAP SERPL CALCULATED.3IONS-SCNC: 4 MMOL/L (ref 4–13)
BASOPHILS # BLD AUTO: 0.04 THOUSANDS/ÂΜL (ref 0–0.1)
BASOPHILS NFR BLD AUTO: 1 % (ref 0–1)
BPU ID: NORMAL
BUN SERPL-MCNC: 26 MG/DL (ref 5–25)
BUN SERPL-MCNC: 28 MG/DL (ref 5–25)
CA-I BLD-SCNC: 1.24 MMOL/L (ref 1.12–1.32)
CALCIUM SERPL-MCNC: 7.3 MG/DL (ref 8.4–10.2)
CALCIUM SERPL-MCNC: 8 MG/DL (ref 8.4–10.2)
CHLORIDE SERPL-SCNC: 104 MMOL/L (ref 96–108)
CHLORIDE SERPL-SCNC: 105 MMOL/L (ref 96–108)
CO2 SERPL-SCNC: 26 MMOL/L (ref 21–32)
CO2 SERPL-SCNC: 26 MMOL/L (ref 21–32)
CREAT SERPL-MCNC: 0.43 MG/DL (ref 0.6–1.3)
CREAT SERPL-MCNC: 0.48 MG/DL (ref 0.6–1.3)
CROSSMATCH: NORMAL
EOSINOPHIL # BLD AUTO: 0.04 THOUSAND/ÂΜL (ref 0–0.61)
EOSINOPHIL NFR BLD AUTO: 1 % (ref 0–6)
ERYTHROCYTE [DISTWIDTH] IN BLOOD BY AUTOMATED COUNT: 14.8 % (ref 11.6–15.1)
GFR SERPL CREATININE-BSD FRML MDRD: 92 ML/MIN/1.73SQ M
GFR SERPL CREATININE-BSD FRML MDRD: 96 ML/MIN/1.73SQ M
GLUCOSE SERPL-MCNC: 102 MG/DL (ref 65–140)
GLUCOSE SERPL-MCNC: 110 MG/DL (ref 65–140)
GLUCOSE SERPL-MCNC: 117 MG/DL (ref 65–140)
GLUCOSE SERPL-MCNC: 147 MG/DL (ref 65–140)
GLUCOSE SERPL-MCNC: 72 MG/DL (ref 65–140)
GLUCOSE SERPL-MCNC: 90 MG/DL (ref 65–140)
GLUCOSE SERPL-MCNC: 91 MG/DL (ref 65–140)
GLUCOSE SERPL-MCNC: 95 MG/DL (ref 65–140)
HCT VFR BLD AUTO: 25.3 % (ref 34.8–46.1)
HCT VFR BLD AUTO: 26.2 % (ref 34.8–46.1)
HCT VFR BLD AUTO: 26.4 % (ref 34.8–46.1)
HCT VFR BLD AUTO: 26.8 % (ref 34.8–46.1)
HCT VFR BLD AUTO: 27.2 % (ref 34.8–46.1)
HGB BLD-MCNC: 8.6 G/DL (ref 11.5–15.4)
HGB BLD-MCNC: 8.8 G/DL (ref 11.5–15.4)
HGB BLD-MCNC: 8.9 G/DL (ref 11.5–15.4)
HGB BLD-MCNC: 9 G/DL (ref 11.5–15.4)
HGB BLD-MCNC: 9.2 G/DL (ref 11.5–15.4)
IMM GRANULOCYTES # BLD AUTO: 0.04 THOUSAND/UL (ref 0–0.2)
IMM GRANULOCYTES NFR BLD AUTO: 1 % (ref 0–2)
LACTATE SERPL-SCNC: 0.4 MMOL/L (ref 0.5–2)
LYMPHOCYTES # BLD AUTO: 1.47 THOUSANDS/ÂΜL (ref 0.6–4.47)
LYMPHOCYTES NFR BLD AUTO: 19 % (ref 14–44)
MAGNESIUM SERPL-MCNC: 1.7 MG/DL (ref 1.9–2.7)
MCH RBC QN AUTO: 31.2 PG (ref 26.8–34.3)
MCHC RBC AUTO-ENTMCNC: 33.8 G/DL (ref 31.4–37.4)
MCV RBC AUTO: 92 FL (ref 82–98)
MONOCYTES # BLD AUTO: 0.91 THOUSAND/ÂΜL (ref 0.17–1.22)
MONOCYTES NFR BLD AUTO: 12 % (ref 4–12)
NEUTROPHILS # BLD AUTO: 5.22 THOUSANDS/ÂΜL (ref 1.85–7.62)
NEUTS SEG NFR BLD AUTO: 66 % (ref 43–75)
NRBC BLD AUTO-RTO: 0 /100 WBCS
PHOSPHATE SERPL-MCNC: 3 MG/DL (ref 2.3–4.1)
PLATELET # BLD AUTO: 106 THOUSANDS/UL (ref 149–390)
PMV BLD AUTO: 10.1 FL (ref 8.9–12.7)
POTASSIUM SERPL-SCNC: 3.4 MMOL/L (ref 3.5–5.3)
POTASSIUM SERPL-SCNC: 3.9 MMOL/L (ref 3.5–5.3)
RBC # BLD AUTO: 2.95 MILLION/UL (ref 3.81–5.12)
SODIUM SERPL-SCNC: 134 MMOL/L (ref 135–147)
SODIUM SERPL-SCNC: 134 MMOL/L (ref 135–147)
UNIT DISPENSE STATUS: NORMAL
UNIT PRODUCT CODE: NORMAL
UNIT PRODUCT VOLUME: 350 ML
UNIT RH: NORMAL
WBC # BLD AUTO: 7.72 THOUSAND/UL (ref 4.31–10.16)

## 2024-07-26 PROCEDURE — 85025 COMPLETE CBC W/AUTO DIFF WBC: CPT

## 2024-07-26 PROCEDURE — 85014 HEMATOCRIT: CPT

## 2024-07-26 PROCEDURE — 83605 ASSAY OF LACTIC ACID: CPT

## 2024-07-26 PROCEDURE — 80048 BASIC METABOLIC PNL TOTAL CA: CPT

## 2024-07-26 PROCEDURE — 83735 ASSAY OF MAGNESIUM: CPT

## 2024-07-26 PROCEDURE — NC001 PR NO CHARGE: Performed by: INTERNAL MEDICINE

## 2024-07-26 PROCEDURE — 99233 SBSQ HOSP IP/OBS HIGH 50: CPT | Performed by: INTERNAL MEDICINE

## 2024-07-26 PROCEDURE — 85018 HEMOGLOBIN: CPT

## 2024-07-26 PROCEDURE — 84100 ASSAY OF PHOSPHORUS: CPT

## 2024-07-26 PROCEDURE — 82948 REAGENT STRIP/BLOOD GLUCOSE: CPT

## 2024-07-26 PROCEDURE — 99232 SBSQ HOSP IP/OBS MODERATE 35: CPT | Performed by: SURGERY

## 2024-07-26 PROCEDURE — 99232 SBSQ HOSP IP/OBS MODERATE 35: CPT | Performed by: INTERNAL MEDICINE

## 2024-07-26 PROCEDURE — 82330 ASSAY OF CALCIUM: CPT

## 2024-07-26 RX ORDER — DEXTROSE MONOHYDRATE AND SODIUM CHLORIDE 5; .45 G/100ML; G/100ML
75 INJECTION, SOLUTION INTRAVENOUS CONTINUOUS
Status: DISCONTINUED | OUTPATIENT
Start: 2024-07-26 | End: 2024-07-26

## 2024-07-26 RX ORDER — POTASSIUM CHLORIDE 14.9 MG/ML
20 INJECTION INTRAVENOUS ONCE
Status: COMPLETED | OUTPATIENT
Start: 2024-07-26 | End: 2024-07-26

## 2024-07-26 RX ORDER — FLUTICASONE FUROATE AND VILANTEROL 200; 25 UG/1; UG/1
1 POWDER RESPIRATORY (INHALATION) DAILY
Status: DISCONTINUED | OUTPATIENT
Start: 2024-07-26 | End: 2024-07-26

## 2024-07-26 RX ORDER — MAGNESIUM SULFATE HEPTAHYDRATE 40 MG/ML
2 INJECTION, SOLUTION INTRAVENOUS ONCE
Status: COMPLETED | OUTPATIENT
Start: 2024-07-26 | End: 2024-07-26

## 2024-07-26 RX ORDER — CALCIUM GLUCONATE 20 MG/ML
2 INJECTION, SOLUTION INTRAVENOUS ONCE
Status: COMPLETED | OUTPATIENT
Start: 2024-07-26 | End: 2024-07-26

## 2024-07-26 RX ORDER — FLUTICASONE FUROATE AND VILANTEROL 200; 25 UG/1; UG/1
1 POWDER RESPIRATORY (INHALATION) 2 TIMES DAILY
Status: DISCONTINUED | OUTPATIENT
Start: 2024-07-26 | End: 2024-07-26

## 2024-07-26 RX ORDER — FLUTICASONE FUROATE AND VILANTEROL 200; 25 UG/1; UG/1
1 POWDER RESPIRATORY (INHALATION) 2 TIMES DAILY
Status: DISCONTINUED | OUTPATIENT
Start: 2024-07-26 | End: 2024-07-31 | Stop reason: HOSPADM

## 2024-07-26 RX ORDER — DEXTROSE MONOHYDRATE 50 MG/ML
75 INJECTION, SOLUTION INTRAVENOUS CONTINUOUS
Status: DISCONTINUED | OUTPATIENT
Start: 2024-07-26 | End: 2024-07-26

## 2024-07-26 RX ADMIN — CALCIUM GLUCONATE 2 G: 20 INJECTION, SOLUTION INTRAVENOUS at 02:10

## 2024-07-26 RX ADMIN — DEXTROSE 250 ML: 5 SOLUTION INTRAVENOUS at 00:31

## 2024-07-26 RX ADMIN — FLUTICASONE FUROATE AND VILANTEROL TRIFENATATE 1 PUFF: 200; 25 POWDER RESPIRATORY (INHALATION) at 21:09

## 2024-07-26 RX ADMIN — MAGNESIUM SULFATE HEPTAHYDRATE 2 G: 40 INJECTION, SOLUTION INTRAVENOUS at 07:18

## 2024-07-26 RX ADMIN — DEXTROSE AND SODIUM CHLORIDE 75 ML/HR: 5; .45 INJECTION, SOLUTION INTRAVENOUS at 07:14

## 2024-07-26 RX ADMIN — POTASSIUM CHLORIDE 20 MEQ: 14.9 INJECTION, SOLUTION INTRAVENOUS at 02:13

## 2024-07-26 RX ADMIN — DEXTROSE 75 ML/HR: 5 SOLUTION INTRAVENOUS at 11:19

## 2024-07-26 RX ADMIN — DEXTROSE AND SODIUM CHLORIDE 75 ML/HR: 5; .45 INJECTION, SOLUTION INTRAVENOUS at 01:10

## 2024-07-26 RX ADMIN — PANTOPRAZOLE SODIUM 40 MG: 40 INJECTION, POWDER, FOR SOLUTION INTRAVENOUS at 08:09

## 2024-07-26 RX ADMIN — SODIUM CHLORIDE 8 MG/HR: 9 INJECTION, SOLUTION INTRAVENOUS at 13:57

## 2024-07-26 RX ADMIN — POTASSIUM CHLORIDE 20 MEQ: 14.9 INJECTION, SOLUTION INTRAVENOUS at 07:18

## 2024-07-26 NOTE — CASE MANAGEMENT
Case Management Discharge Planning Note    Patient name Patrica Grimaldo  Location MICU 13/MICU 13 MRN [id number]  : 1945 Date 2024       Current Admission Date: 2024  Current Admission Diagnosis:Abdominal pain   Patient Active Problem List    Diagnosis Date Noted Date Diagnosed    Severe protein-calorie malnutrition (HCC) 2024     Abdominal pain 2024     Increased MCV 2022     Encounter for vitamin deficiency screening 2022     Itching 2022     Mild protein-calorie malnutrition (HCC) 2022     Primary osteoarthritis of right knee 2021     Age-related osteoporosis without current pathological fracture 2021     Lightheadedness 06/15/2021     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PASC) 2021     Subclinical hyperthyroidism 2019     Weight loss 2019     Fatigue 2019     Nicotine abuse 2019     Multiple thyroid nodules 2018     Abnormal thyroid function test 2018     Low TSH level 2018     Thyroid nodule 2018     Essential hypertension 2017     Dense breasts 2016     Chronic obstructive pulmonary disease (HCC) 2015     Thyroid disorder 10/11/2012     Allergic rhinitis 2012     Generalized anxiety disorder 2012     Impaired fasting glucose 2012     Vitamin D deficiency 2012     Decreased platelet count (HCC) 2012       LOS (days): 3  Geometric Mean LOS (GMLOS) (days): 4.6  Days to GMLOS:1.6     OBJECTIVE:  Risk of Unplanned Readmission Score: 17.38         Current admission status: Inpatient   Preferred Pharmacy:   [facility] [address] [place] [id number]  Phone: [phone] Fax: [phone]    Primary Care Provider: Janine Marcus DO    Primary Insurance: SCOTT ROSENTHAL  Secondary Insurance:     DISCHARGE DETAILS:           Other Referral/Resources/Interventions Provided:  Referral Comments: Per  MICU rounds/chart review: Pt transfer from [facility] [place] s/p RR.  Received 1U PRBC's. Pend repeat EGD. Will need PT/OT when medically stable.

## 2024-07-26 NOTE — PROGRESS NOTES
Progress Note - General Surgery  Patrica Grimaldo 79 y.o. female MRN: [id number]  Unit/Bed#: MICU 13 Encounter: [id number]    Assessment:  79 y.o. female with PMHx of hyperthyroidism, HTN, and COPD who presents with abdominal pain in the setting of upper GI bleeding for duodenal ulcer.    Plan:  NPO  Continue protonix  Transfuse for hgb < 7  PRN pain meds  PRN anti nausea meds  Appreciate GI recs  Critical care level of care    Subjective/Objective     Subjective:   No acute events overnight. Patient denies having nausea, vomiting, fevers, chills, chest pain, shortness of breath. Currently NPO. No bowel movements and no flatus. Voiding without difficulty. Overall, no complaints.    Objective:     Blood pressure 96/52, pulse (!) 111, temperature 98.4 °F (36.9 °C), resp. rate 15, weight 46 kg (101 lb 6.6 oz), SpO2 99%.,Body mass index is 17.41 kg/m².      Intake/Output Summary (Last 24 hours) at 7/26/2024 0908  Last data filed at 7/26/2024 0801  Gross per 24 hour   Intake 4225.5 ml   Output --   Net 4225.5 ml       Invasive Devices       Central Venous Catheter Line  Duration             CVC Central Lines 07/25/24 Triple 16cm <1 day              Peripheral Intravenous Line  Duration             Peripheral IV 07/24/24 Right;Ventral (anterior) Forearm 1 day    Peripheral IV 07/25/24 Left;Ventral (anterior) Forearm <1 day                    General: NAD  HENT: NCAT MMM  Neck: supple, no JVD  CV: nl rate  Lungs: nl wob. No resp distress  ABD: Soft, minimally tender, nondistended  Extrem: No CCE  Neuro: AAOx3       Scheduled Meds:  Current Facility-Administered Medications   Medication Dose Route Frequency Provider Last Rate    acetaminophen  650 mg Oral Q6H PRN Radha Black MD      albuterol  2 puff Inhalation Q4H PRN Radha Black MD      Cholecalciferol  2,000 Units Oral Daily Radha Black MD      dextrose 5 % and sodium chloride 0.45 %  75 mL/hr Intravenous Continuous Luke Jack, DO 75 mL/hr (07/26/24 0714)     fluticasone-vilanterol  1 puff Inhalation BID Peña Ragab, DO      magnesium sulfate  2 g Intravenous Once Peña Ragab, DO 2 g (07/26/24 0718)    morphine injection  2 mg Intravenous Q4H PRN Radha Black MD      multivitamin-minerals  1 tablet Oral Daily Radha Black MD      nicotine  1 patch Transdermal Daily Radha Black MD      ondansetron  4 mg Intravenous Q6H PRN Radha Black MD      oxyCODONE  5 mg Oral Q4H PRN Radha Black MD      oxyCODONE  2.5 mg Oral Q4H PRN Radha Black MD      pantoprazole  40 mg Intravenous Q12H [facility] Anmol Kim, DO      potassium chloride  20 mEq Intravenous Once Peña Ragab, DO 20 mEq (07/26/24 0718)     Continuous Infusions:dextrose 5 % and sodium chloride 0.45 %, 75 mL/hr, Last Rate: 75 mL/hr (07/26/24 0714)      PRN Meds:.  acetaminophen    albuterol    morphine injection    ondansetron    oxyCODONE    oxyCODONE    Labs:  CBC - WBC/Hgb/Hct/Plts: --/9.0*/26.8*/-- (07/26 0812)  CHEM - BUN/Cr/glu/ALT/AST/amyl/lip:26*/0.43*/--/--/--/--/-- (07/26 0435)  COAG - PT/INR/PTT: 17.2*/1.42*/27 (07/25 1158)  LYTES - Na/K/Cl/CO2: 134*/3.9/105/26 (07/26 0435)    Lab, Imaging and other studies:I have personally reviewed pertinent lab results.    VTE Pharmacologic Prophylaxis: none  VTE Mechanical Prophylaxis: sequential compression device      Rony Solis MD  7/26/2024 9:08 AM

## 2024-07-26 NOTE — PLAN OF CARE
Problem: PAIN - ADULT  Goal: Verbalizes/displays adequate comfort level or baseline comfort level  Description: Interventions:  - Encourage patient to monitor pain and request assistance  - Assess pain using appropriate pain scale  - Administer analgesics based on type and severity of pain and evaluate response  - Implement non-pharmacological measures as appropriate and evaluate response  - Consider cultural and social influences on pain and pain management  - Notify physician/advanced practitioner if interventions unsuccessful or patient reports new pain  Outcome: Progressing     Problem: SAFETY ADULT  Goal: Patient will remain free of falls  Description: INTERVENTIONS:  - Educate patient/family on patient safety including physical limitations  - Instruct patient to call for assistance with activity   - Consult OT/PT to assist with strengthening/mobility   - Keep Call bell within reach  - Keep bed low and locked with side rails adjusted as appropriate  - Keep care items and personal belongings within reach  - Initiate and maintain comfort rounds  - Make Fall Risk Sign visible to staff  - Offer Toileting every 2 Hours, in advance of need  - Initiate/Maintain bed alarm  - Apply yellow socks and bracelet for high fall risk patients  - Consider moving patient to room near nurses station  Outcome: Progressing  Goal: Maintain or return to baseline ADL function  Description: INTERVENTIONS:  -  Assess patient's ability to carry out ADLs; assess patient's baseline for ADL function and identify physical deficits which impact ability to perform ADLs (bathing, care of mouth/teeth, toileting, grooming, dressing, etc.)  - Assess/evaluate cause of self-care deficits   - Assess range of motion  - Assess patient's mobility; develop plan if impaired  - Assess patient's need for assistive devices and provide as appropriate  - Encourage maximum independence but intervene and supervise when necessary  - Involve family in performance  of ADLs  - Assess for home care needs following discharge   - Consider OT consult to assist with ADL evaluation and planning for discharge  - Provide patient education as appropriate  Outcome: Progressing  Goal: Maintains/Returns to pre admission functional level  Description: INTERVENTIONS:  - Perform AM-PAC 6 Click Basic Mobility/ Daily Activity assessment daily.  - Set and communicate daily mobility goal to care team and patient/family/caregiver.   - Collaborate with rehabilitation services on mobility goals if consulted  - Perform Range of Motion 3 times a day.  - Reposition patient every 2 hours.  - Dangle patient 2 times a day  - Record patient progress and toleration of activity level   Outcome: Progressing     Problem: DISCHARGE PLANNING  Goal: Discharge to home or other facility with appropriate resources  Description: INTERVENTIONS:  - Identify barriers to discharge w/patient and caregiver  - Arrange for needed discharge resources and transportation as appropriate  - Identify discharge learning needs (meds, wound care, etc.)  - Arrange for interpretive services to assist at discharge as needed  - Refer to Case Management Department for coordinating discharge planning if the patient needs post-hospital services based on physician/advanced practitioner order or complex needs related to functional status, cognitive ability, or social support system  Outcome: Progressing     Problem: Knowledge Deficit  Goal: Patient/family/caregiver demonstrates understanding of disease process, treatment plan, medications, and discharge instructions  Description: Complete learning assessment and assess knowledge base.  Interventions:  - Provide teaching at level of understanding  - Provide teaching via preferred learning methods  Outcome: Progressing     Problem: GASTROINTESTINAL - ADULT  Goal: Minimal or absence of nausea and/or vomiting  Description: INTERVENTIONS:  - Administer IV fluids if ordered to ensure adequate  hydration  - Maintain NPO status until nausea and vomiting are resolved  - Nasogastric tube if ordered  - Administer ordered antiemetic medications as needed  - Provide nonpharmacologic comfort measures as appropriate  - Advance diet as tolerated, if ordered  - Consider nutrition services referral to assist patient with adequate nutrition and appropriate food choices  Outcome: Progressing  Goal: Maintains or returns to baseline bowel function  Description: INTERVENTIONS:  - Assess bowel function  - Encourage oral fluids to ensure adequate hydration  - Administer IV fluids if ordered to ensure adequate hydration  - Administer ordered medications as needed  - Encourage mobilization and activity  - Consider nutritional services referral to assist patient with adequate nutrition and appropriate food choices  Outcome: Progressing  Goal: Maintains adequate nutritional intake  Description: INTERVENTIONS:  - Monitor percentage of each meal consumed  - Identify factors contributing to decreased intake, treat as appropriate  - Assist with meals as needed  - Monitor I&O, weight, and lab values if indicated  - Obtain nutrition services referral as needed  Outcome: Progressing  Goal: Oral mucous membranes remain intact  Description: INTERVENTIONS  - Assess oral mucosa and hygiene practices  - Implement preventative oral hygiene regimen  - Implement oral medicated treatments as ordered  - Initiate Nutrition services referral as needed  Outcome: Progressing     Problem: Nutrition/Hydration-ADULT  Goal: Nutrient/Hydration intake appropriate for improving, restoring or maintaining nutritional needs  Description: Monitor and assess patient's nutrition/hydration status for malnutrition. Collaborate with interdisciplinary team and initiate plan and interventions as ordered.  Monitor patient's weight and dietary intake as ordered or per policy. Utilize nutrition screening tool and intervene as necessary. Determine patient's food  preferences and provide high-protein, high-caloric foods as appropriate.     INTERVENTIONS:  - Monitor oral intake, urinary output, labs, and treatment plans  - Assess nutrition and hydration status and recommend course of action  - Evaluate amount of meals eaten  - Assist patient with eating if necessary   - Allow adequate time for meals  - Recommend/ encourage appropriate diets, oral nutritional supplements, and vitamin/mineral supplements  - Order, calculate, and assess calorie counts as needed  - Recommend, monitor, and adjust tube feedings and TPN/PPN based on assessed needs  - Assess need for intravenous fluids  - Provide specific nutrition/hydration education as appropriate  - Include patient/family/caregiver in decisions related to nutrition  Outcome: Progressing     Problem: Prexisting or High Potential for Compromised Skin Integrity  Goal: Skin integrity is maintained or improved  Description: INTERVENTIONS:  - Identify patients at risk for skin breakdown  - Assess and monitor skin integrity  - Assess and monitor nutrition and hydration status  - Monitor labs   - Assess for incontinence   - Turn and reposition patient  - Assist with mobility/ambulation  - Relieve pressure over bony prominences  - Avoid friction and shearing  - Provide appropriate hygiene as needed including keeping skin clean and dry  - Evaluate need for skin moisturizer/barrier cream  - Collaborate with interdisciplinary team   - Patient/family teaching  - Consider wound care consult   Outcome: Progressing

## 2024-07-26 NOTE — PROGRESS NOTES
[facility]  Progress Note: Critical Care  Name: Patrica Grimaldo 79 y.o. female I MRN: [id number]  Unit/Bed#: MICU 13 I Date of Admission: 7/23/2024   Date of Service: 7/26/2024 I Hospital Day: 3    Assessment & Plan   Anxiety  COPD  Melena  Active Tobacco Use  Constipation  Abdominal pain  Hypotension  Subclinical hyperthyroidism  ABLA 2/2 to GI bleed        Neuro:   Diagnosis: Anxiety  Takes Xanax on an as needed basis  Plan:   Will hold at this time     CV:  Diagnosis: Hypotension  Hemoglobin drop yesterday morning to 9.6 with large melanotic bowel movement with large melanotic bowel movement today with a drop to 6.9 with lactate to 8.3. - S/P 2 units PRBCs w/ repeat Hgb 11.9, follow ups stable w/ steady decline to 10.9 and 8.9, no repeat large bloody BM - GI EGD performed with evidence of large bleeding ulcer, temporary hemostasis achieved, plan for   Plan:   GI consulted, appreciate recs - plan for repeat EGD today  2 large bore IV  Monitor stool output  Type and screen completed  Transfuse when Hb < 7  NPO  Every 4 glucose checks, hypoglycemia/glucose replacement addressed below  Electrolytes repleted in setting of transfusions, no evidence of arrhythmia     Pulm:  Diagnosis: COPD with active tobacco use  Plan:   Wean oxygen to spo2 88-92%  Respiratory protocol  Continue home inhalers  Encourage nicotine cessation     GI:   Diagnosis: Abdominal pain with melena  Diagnosis: GERD  Hemoglobin drop yesterday morning to 9.6 with large melanotic bowel movement with large melanotic bowel movement today with a drop to 6.9 with lactate to 8.3. - S/P 2 units PRBCs w/ repeat Hgb 11.9, follow ups stable w/ steady decline to 10.9 and 8.9, no repeat large bloody BM - GI EGD performed with evidence of large bleeding ulcer, temporary hemostasis achieved, plan for repeat EGD with definitive management   Plan:   GI consulted, appreciate recs - plan for repeat EGD today  Protonix 40 mg  BID  Bowel regimen on hold   Status post transfusion x 2 units PRBCs  GI consulted, appreciate recs  2 large bore IV  Monitor stool output  H&H q8  Type and screen  Transfuse when Hb < 7  If becomes HDUS Stat consult IR  NPO     :   No acute issues     F/E/N:   F: D5 half normal saline @ 75cc/hr in setting of NPO w/ hypoglycemia  E: replete prn, hypokalemia and hypocalcemia repleted  N: NPO for possible procedure/GI intervention     Heme/Onc:   Diagnosis: ABLA  Hemoglobin drop this morning to 9.6 with large melanotic bowel movement with large melanotic bowel movement today with a drop to 6.9 with lactate to 8.3  Plan:   Please see GI as above     Endo:   Diagnosis: Hypogylcemia  Extended n.p.o. status due to upper intestinal bleeding x 1 EGD yesterday and with plan for return to the operating room and secondary EGD this morning, patient was hypoglycemic morning, placed on a D5 W drip and then switched to D5 half-normal saline with appropriate improvement in hypoglycemia  Plan:   N.p.o. status  Every 4 glucose checks  Continue with D5 half-normal saline until euglycemic, DC/switch to crystalloid maintenance fluids    Diagnosis: Subclinical hyperthyroidism  Was on methimazole in the past. Recent thyroid function studies from 3/2024 within normal limits and reported that she is currently off of the methimazole  Plan:   F/u OP  Will need repeat Thyroid function studies OP     ID:   No active issues     MSK/Skin:   Plan: OOB to chair  PT/OT    Disposition: Critical care    ICU Core Measures     A: Assess, Prevent, and Manage Pain Has pain been assessed? Yes  Need for changes to pain regimen? No   B: Both SAT/SAT  N/A   C: Choice of Sedation RASS Goal: 0 Alert and Calm  Need for changes to sedation or analgesia regimen? No   D: Delirium CAM-ICU: Negative   E: Early Mobility  Plan for early mobility? Yes   F: Family Engagement Plan for family engagement today? Yes       Review of Invasive Devices:      Central access  plan: Medications requiring central line      Prophylaxis:  VTE Contraindicated secondary to: GI Bleed   Stress Ulcer  covered bypantoprazole (PROTONIX) injection 40 mg [[id number]]        Significant 24hr Events     24hr events: Overnight, patient started on D5 water, switch to D5 half-normal saline for hypoglycemia, patient was intermittently hypotensive, did not require pressors, electrolytes were checked and repleted following the 2 units PRBCs from yesterday, had x 2 bloody smears, but no large bloody bowel movements, no abdominal pain.     Subjective     Review of Systems: See HPI for Review of Systems     Objective                            Vitals I/O      Most Recent Min/Max in 24hrs   Temp 98.6 °F (37 °C) Temp  Min: 97.5 °F (36.4 °C)  Max: 99.7 °F (37.6 °C)   Pulse 95 Pulse  Min: 70  Max: 108   Resp 20 Resp  Min: 12  Max: 22   BP (!) 94/49 BP  Min: 82/49  Max: 126/53   O2 Sat 97 % SpO2  Min: 89 %  Max: 100 %      Intake/Output Summary (Last 24 hours) at 7/26/2024 0306  Last data filed at 7/26/2024 0200  Gross per 24 hour   Intake 3725.5 ml   Output --   Net 3725.5 ml       Diet NPO; Sips with meds    Invasive Monitoring           Physical Exam   Physical Exam  Eyes:      Extraocular Movements: Extraocular movements intact.      Pupils: Pupils are equal, round, and reactive to light.   Skin:     General: Skin is warm and dry.      Coloration: Skin is pale.   HENT:      Head: Normocephalic and atraumatic.      Nose: No congestion or rhinorrhea.   Cardiovascular:      Rate and Rhythm: Regular rhythm. Tachycardia present.      Heart sounds: No murmur heard.     No friction rub. No gallop.   Musculoskeletal:         General: No swelling or tenderness.      Right lower leg: No edema.      Left lower leg: No edema.   Abdominal:      Palpations: Abdomen is soft.      Tenderness: There is no abdominal tenderness. There is no guarding.   Constitutional:       Appearance: She is well-developed and well-nourished. She  is ill-appearing. She is not toxic-appearing.      Interventions: She is not intubated and not restrained.  Pulmonary:      Effort: Pulmonary effort is normal. She is not intubated.      Breath sounds: No wheezing, rhonchi or rales.   Neurological:      General: No focal deficit present.      Mental Status: She is alert and oriented to person, place and time. Mental status is at baseline.            Diagnostic Studies      EKG: No new imaging  Imaging:     CT high-volume bleeding scan  IMPRESSION:     Large volume blood products throughout the GI tract including mixed density/acute products within the stomach.  No active arterial extravasation identified.  Note of a proximal duodenal diverticulum with similar appearance recent July priors, suggestion of minimal surrounding inflammatory change.     Mild interlobular septal thickening. Small right pleural effusion. Small pericardial effusion.  Findings can be seen with mild pulmonary edema/volume overload, slightly increased from exam 2 days prior.          Medications:  Scheduled PRN   calcium gluconate, 2 g, Once  Cholecalciferol, 2,000 Units, Daily  fluticasone-vilanterol, 1 puff, Daily  multivitamin-minerals, 1 tablet, Daily  nicotine, 1 patch, Daily  pantoprazole, 40 mg, Q12H KULDIP  potassium chloride, 20 mEq, Once      acetaminophen, 650 mg, Q6H PRN  albuterol, 2 puff, Q4H PRN  morphine injection, 2 mg, Q4H PRN  ondansetron, 4 mg, Q6H PRN  oxyCODONE, 5 mg, Q4H PRN  oxyCODONE, 2.5 mg, Q4H PRN       Continuous    dextrose 5 % and sodium chloride 0.45 %, 75 mL/hr, Last Rate: 75 mL/hr (07/26/24 0110)         Labs:    CBC    Recent Labs     07/24/24  0501 07/25/24  0751 07/25/24  1158 07/25/24  1935 07/26/24  0038   WBC 9.37 14.82*  --   --   --    HGB 13.7 9.6*   < > 10.9* 8.9*   HCT 41.4 31.1*   < > 32.0* 26.2*    162  --   --   --     < > = values in this interval not displayed.     BMP    Recent Labs     07/25/24  1206 07/26/24  0111   SODIUM 134* 134*   K  4.6 3.4*    104   CO2 21 26   AGAP 7 4   BUN 27* 28*   CREATININE 0.74 0.48*   CALCIUM 7.3* 7.3*       Coags    Recent Labs     07/25/24  1158   INR 1.42*   PTT 27        Additional Electrolytes  Recent Labs     07/24/24  0501 07/25/24  1206   MG 1.7* 1.6*   PHOS  --  3.7          Blood Gas    No recent results  Recent Labs     07/25/24  1820   PHVEN 7.251*   CXF5PAJ 52.0*   PO2VEN 37.4   KBM3NYV 22.3*   BEVEN -5.1   [id number] 67.3    LFTs  Recent Labs     07/24/24  0501 07/25/24  0751   ALT 4* 5*   AST 12* 14   ALKPHOS 56 46   ALB 3.4* 2.8*   TBILI 0.55 0.30       Infectious  No recent results  Glucose  Recent Labs     07/24/24  0501 07/25/24  0751 07/25/24  1206 07/26/24  0111   GLUC 92 163* 185* 147*               Isai Santiago DO

## 2024-07-26 NOTE — PROGRESS NOTES
Progress Note- Patrica Grimaldo 79 y.o. female MRN: [id number]    Unit/Bed#: MICU 13 Encounter: [id number]      Assessment and Plan:  78 yo F with history of GERD, COPD, KENYA and hypertension who presented on 7/23 due to right lower quadrant pain as well as reflux found to have largely unremarkable labs as well as repeat CT abdomen showing increase stool burden overall concerning for untreated GERD and constipation leading to GI consult.  Earlier this morning she had an episode of melena with acute drop in hemoglobin from 15.6 on admission down to 6.9 overall concerning for upper GI bleed leading to EGD on 7/25 showing a single large, cratered ulcer in the duodenal bulb with deep nonbleeding visible vessel treated with Nexpowder.      1.  Right lower quadrant pain  2.  Constipation  3.  GERD  4.  Tobacco use  5.  Melena  6.  Duodenal ulcer  - Lipase negative  - Images reviewed in PACS with significant stool burden especially in rectosigmoid from ED visit on 7/23 with slightly improved but still significant stool burden  - Counseled on tobacco cessation  - Continue pantoprazole 40 mg IV twice daily  - Maintain active type and screen, 2 large-bore IVs  - Trend hemoglobin, monitor stools  - Transfuse for hemoglobin less than 7  - Will evaluate tomorrow for possible EGD  - Please make n.p.o. at midnight  ______________________________________________________________________    Subjective:  Patient pale with continued pain this morning.  She appeared somnolent.  Denies nausea, vomiting.    Medication Administration - last 24 hours from 07/25/2024 1707 to 07/26/2024 1707         Date/Time Order Dose Route Action Action by     07/26/2024 0900 EDT multivitamin-minerals (CENTRUM) tablet 1 tablet 0 tablet Oral Hold Wendy Betts RN     07/26/2024 0900 EDT Cholecalciferol (VITAMIN D3) tablet 2,000 Units 0 Units Oral Hold Wendy Betts RN     07/26/2024 0811 EDT nicotine (NICODERM CQ) 7 mg/24hr TD 24 hr patch 1 patch 1 patch  Transdermal Not Given Dipika Hernadez RN     07/26/2024 0809 EDT pantoprazole (PROTONIX) injection 40 mg 40 mg Intravenous Given Dipika Hernadez RN     07/25/2024 2117 EDT magnesium sulfate 2 g/50 mL IVPB (premix) 2 g 0 g Intravenous Stopped Nellie Dumont RN     07/26/2024 0108 EDT dextrose 5 % infusion 0 mL/hr Intravenous Stopped Nellie Dumont RN     07/25/2024 2136 EDT dextrose 5 % infusion 75 mL/hr Intravenous New Bag Nellie Dumont RN     07/26/2024 0206 EDT fluticasone-vilanterol 200-25 mcg/actuation 1 puff 1 puff Inhalation Not Given Nellie Dumont RN     07/26/2024 0713 EDT dextrose 5 % bolus 250 mL 0 mL Intravenous Stopped Wendy Betts RN     07/26/2024 0031 EDT dextrose 5 % bolus 250 mL 250 mL Intravenous New Matt Dumont RN     07/26/2024 1118 EDT dextrose 5 % and sodium chloride 0.45 % infusion 0 mL/hr Intravenous Stopped Dipika Hernadez RN     07/26/2024 0714 EDT dextrose 5 % and sodium chloride 0.45 % infusion 75 mL/hr Intravenous New Matt Betts RN     07/26/2024 0110 EDT dextrose 5 % and sodium chloride 0.45 % infusion 75 mL/hr Intravenous New Bag Nellie Dumont RN     07/26/2024 0713 EDT potassium chloride 20 mEq IVPB (premix) 0 mEq Intravenous Stopped Wendy Betts RN     07/26/2024 0213 EDT potassium chloride 20 mEq IVPB (premix) 20 mEq Intravenous New Bag Nellie Dumont RN     07/26/2024 0708 EDT calcium gluconate 2 g in sodium chloride 0.9% 100 mL (premix) 0 g Intravenous Stopped Wendy Betts RN     07/26/2024 0210 EDT calcium gluconate 2 g in sodium chloride 0.9% 100 mL (premix) 2 g Intravenous New Bag Nellie Dumont RN     07/26/2024 0934 EDT magnesium sulfate 2 g/50 mL IVPB (premix) 2 g 0 g Intravenous Stopped Dipika Hernadez RN     07/26/2024 0718 EDT magnesium sulfate 2 g/50 mL IVPB (premix) 2 g 2 g Intravenous New Bag Dipika Hernadez RN     07/26/2024 0934 EDT potassium chloride 20 mEq IVPB (premix) 0 mEq Intravenous Stopped Dipika Hernadez RN      "07/26/2024 0718 EDT potassium chloride 20 mEq IVPB (premix) 20 mEq Intravenous New Bag Dipika Hernadez RN     07/26/2024 1122 EDT fluticasone-vilanterol 200-25 mcg/actuation 1 puff 0 puff Inhalation Hold Dipika Hernadez RN     07/26/2024 1501 EDT dextrose 5 % infusion 0 mL/hr Intravenous Stopped Dipika Hernadez RN     07/26/2024 1119 EDT dextrose 5 % infusion 75 mL/hr Intravenous New Bag Dipika Hernadez RN     07/26/2024 1357 EDT pantoprazole (PROTONIX) 80 mg in sodium chloride 0.9 % 100 mL infusion 8 mg/hr Intravenous New Bag Dipika Hernadez RN            Objective:     Vitals: Blood pressure 102/56, pulse 104, temperature 98.4 °F (36.9 °C), resp. rate 22, height 5' 4\" (1.626 m), weight 51.7 kg (113 lb 15.7 oz), SpO2 100%.,Body mass index is 19.56 kg/m².      Intake/Output Summary (Last 24 hours) at 7/26/2024 1707  Last data filed at 7/26/2024 1600  Gross per 24 hour   Intake 2978.5 ml   Output --   Net 2978.5 ml       Physical Exam:   General Appearance: Awake and alert, in no acute distress, somnolent  Abdomen: Soft, abd tenderness, non-distended; bowel sounds normal; no masses or no organomegaly    Invasive Devices       Central Venous Catheter Line  Duration             CVC Central Lines 07/25/24 Triple 16cm 1 day              Peripheral Intravenous Line  Duration             Peripheral IV 07/24/24 Right;Ventral (anterior) Forearm 1 day    Peripheral IV 07/25/24 Left;Ventral (anterior) Forearm 1 day                    Lab Results:  No results displayed because visit has over 200 results.          Imaging Studies: I have personally reviewed pertinent imaging studies.      "

## 2024-07-27 LAB
ANION GAP SERPL CALCULATED.3IONS-SCNC: 4 MMOL/L (ref 4–13)
BASOPHILS # BLD AUTO: 0.02 THOUSANDS/ÂΜL (ref 0–0.1)
BASOPHILS NFR BLD AUTO: 0 % (ref 0–1)
BUN SERPL-MCNC: 13 MG/DL (ref 5–25)
CALCIUM SERPL-MCNC: 7.6 MG/DL (ref 8.4–10.2)
CHLORIDE SERPL-SCNC: 104 MMOL/L (ref 96–108)
CO2 SERPL-SCNC: 27 MMOL/L (ref 21–32)
CREAT SERPL-MCNC: 0.41 MG/DL (ref 0.6–1.3)
EOSINOPHIL # BLD AUTO: 0.15 THOUSAND/ÂΜL (ref 0–0.61)
EOSINOPHIL NFR BLD AUTO: 3 % (ref 0–6)
ERYTHROCYTE [DISTWIDTH] IN BLOOD BY AUTOMATED COUNT: 14.1 % (ref 11.6–15.1)
GFR SERPL CREATININE-BSD FRML MDRD: 97 ML/MIN/1.73SQ M
GLUCOSE SERPL-MCNC: 139 MG/DL (ref 65–140)
GLUCOSE SERPL-MCNC: 156 MG/DL (ref 65–140)
GLUCOSE SERPL-MCNC: 83 MG/DL (ref 65–140)
GLUCOSE SERPL-MCNC: 83 MG/DL (ref 65–140)
GLUCOSE SERPL-MCNC: 95 MG/DL (ref 65–140)
HCT VFR BLD AUTO: 24.1 % (ref 34.8–46.1)
HCT VFR BLD AUTO: 24.5 % (ref 34.8–46.1)
HCT VFR BLD AUTO: 24.8 % (ref 34.8–46.1)
HCT VFR BLD AUTO: 28.2 % (ref 34.8–46.1)
HGB BLD-MCNC: 7.9 G/DL (ref 11.5–15.4)
HGB BLD-MCNC: 8.1 G/DL (ref 11.5–15.4)
HGB BLD-MCNC: 8.3 G/DL (ref 11.5–15.4)
HGB BLD-MCNC: 9.4 G/DL (ref 11.5–15.4)
IMM GRANULOCYTES # BLD AUTO: 0.03 THOUSAND/UL (ref 0–0.2)
IMM GRANULOCYTES NFR BLD AUTO: 1 % (ref 0–2)
LYMPHOCYTES # BLD AUTO: 1.16 THOUSANDS/ÂΜL (ref 0.6–4.47)
LYMPHOCYTES NFR BLD AUTO: 20 % (ref 14–44)
MAGNESIUM SERPL-MCNC: 1.5 MG/DL (ref 1.9–2.7)
MCH RBC QN AUTO: 30.9 PG (ref 26.8–34.3)
MCHC RBC AUTO-ENTMCNC: 33.1 G/DL (ref 31.4–37.4)
MCV RBC AUTO: 94 FL (ref 82–98)
MONOCYTES # BLD AUTO: 0.54 THOUSAND/ÂΜL (ref 0.17–1.22)
MONOCYTES NFR BLD AUTO: 9 % (ref 4–12)
NEUTROPHILS # BLD AUTO: 3.91 THOUSANDS/ÂΜL (ref 1.85–7.62)
NEUTS SEG NFR BLD AUTO: 67 % (ref 43–75)
NRBC BLD AUTO-RTO: 0 /100 WBCS
PHOSPHATE SERPL-MCNC: 2.3 MG/DL (ref 2.3–4.1)
PLATELET # BLD AUTO: 116 THOUSANDS/UL (ref 149–390)
PMV BLD AUTO: 10.4 FL (ref 8.9–12.7)
POTASSIUM SERPL-SCNC: 3.6 MMOL/L (ref 3.5–5.3)
RBC # BLD AUTO: 2.62 MILLION/UL (ref 3.81–5.12)
SODIUM SERPL-SCNC: 135 MMOL/L (ref 135–147)
WBC # BLD AUTO: 5.81 THOUSAND/UL (ref 4.31–10.16)

## 2024-07-27 PROCEDURE — 99232 SBSQ HOSP IP/OBS MODERATE 35: CPT | Performed by: SURGERY

## 2024-07-27 PROCEDURE — 82948 REAGENT STRIP/BLOOD GLUCOSE: CPT

## 2024-07-27 PROCEDURE — 85018 HEMOGLOBIN: CPT

## 2024-07-27 PROCEDURE — 85014 HEMATOCRIT: CPT

## 2024-07-27 PROCEDURE — 84100 ASSAY OF PHOSPHORUS: CPT

## 2024-07-27 PROCEDURE — 80048 BASIC METABOLIC PNL TOTAL CA: CPT

## 2024-07-27 PROCEDURE — 83735 ASSAY OF MAGNESIUM: CPT

## 2024-07-27 PROCEDURE — 85025 COMPLETE CBC W/AUTO DIFF WBC: CPT

## 2024-07-27 PROCEDURE — 99233 SBSQ HOSP IP/OBS HIGH 50: CPT | Performed by: INTERNAL MEDICINE

## 2024-07-27 PROCEDURE — 99232 SBSQ HOSP IP/OBS MODERATE 35: CPT | Performed by: INTERNAL MEDICINE

## 2024-07-27 PROCEDURE — NC001 PR NO CHARGE: Performed by: INTERNAL MEDICINE

## 2024-07-27 RX ORDER — SODIUM CHLORIDE, SODIUM GLUCONATE, SODIUM ACETATE, POTASSIUM CHLORIDE, MAGNESIUM CHLORIDE, SODIUM PHOSPHATE, DIBASIC, AND POTASSIUM PHOSPHATE .53; .5; .37; .037; .03; .012; .00082 G/100ML; G/100ML; G/100ML; G/100ML; G/100ML; G/100ML; G/100ML
1000 INJECTION, SOLUTION INTRAVENOUS ONCE
Status: COMPLETED | OUTPATIENT
Start: 2024-07-27 | End: 2024-07-27

## 2024-07-27 RX ORDER — POTASSIUM CHLORIDE 20 MEQ/1
40 TABLET, EXTENDED RELEASE ORAL ONCE
Status: DISCONTINUED | OUTPATIENT
Start: 2024-07-27 | End: 2024-07-27

## 2024-07-27 RX ORDER — POTASSIUM CHLORIDE 29.8 MG/ML
40 INJECTION INTRAVENOUS ONCE
Status: COMPLETED | OUTPATIENT
Start: 2024-07-27 | End: 2024-07-27

## 2024-07-27 RX ORDER — MAGNESIUM SULFATE HEPTAHYDRATE 40 MG/ML
4 INJECTION, SOLUTION INTRAVENOUS ONCE
Status: COMPLETED | OUTPATIENT
Start: 2024-07-27 | End: 2024-07-27

## 2024-07-27 RX ORDER — CALCIUM GLUCONATE 20 MG/ML
1 INJECTION, SOLUTION INTRAVENOUS ONCE
Status: COMPLETED | OUTPATIENT
Start: 2024-07-27 | End: 2024-07-27

## 2024-07-27 RX ADMIN — SODIUM CHLORIDE, SODIUM GLUCONATE, SODIUM ACETATE, POTASSIUM CHLORIDE, MAGNESIUM CHLORIDE, SODIUM PHOSPHATE, DIBASIC, AND POTASSIUM PHOSPHATE 1000 ML: .53; .5; .37; .037; .03; .012; .00082 INJECTION, SOLUTION INTRAVENOUS at 11:23

## 2024-07-27 RX ADMIN — SODIUM CHLORIDE 8 MG/HR: 9 INJECTION, SOLUTION INTRAVENOUS at 01:02

## 2024-07-27 RX ADMIN — FLUTICASONE FUROATE AND VILANTEROL TRIFENATATE 1 PUFF: 200; 25 POWDER RESPIRATORY (INHALATION) at 08:10

## 2024-07-27 RX ADMIN — FLUTICASONE FUROATE AND VILANTEROL TRIFENATATE 1 PUFF: 200; 25 POWDER RESPIRATORY (INHALATION) at 21:09

## 2024-07-27 RX ADMIN — SODIUM CHLORIDE 8 MG/HR: 9 INJECTION, SOLUTION INTRAVENOUS at 11:06

## 2024-07-27 RX ADMIN — MAGNESIUM SULFATE HEPTAHYDRATE 4 G: 40 INJECTION, SOLUTION INTRAVENOUS at 08:13

## 2024-07-27 RX ADMIN — CALCIUM GLUCONATE 1 G: 20 INJECTION, SOLUTION INTRAVENOUS at 08:12

## 2024-07-27 RX ADMIN — POTASSIUM CHLORIDE 40 MEQ: 29.8 INJECTION, SOLUTION INTRAVENOUS at 08:13

## 2024-07-27 NOTE — PLAN OF CARE
Problem: PAIN - ADULT  Goal: Verbalizes/displays adequate comfort level or baseline comfort level  Description: Interventions:  - Encourage patient to monitor pain and request assistance  - Assess pain using appropriate pain scale  - Administer analgesics based on type and severity of pain and evaluate response  - Implement non-pharmacological measures as appropriate and evaluate response  - Consider cultural and social influences on pain and pain management  - Notify physician/advanced practitioner if interventions unsuccessful or patient reports new pain  Outcome: Progressing     Problem: SAFETY ADULT  Goal: Patient will remain free of falls  Description: INTERVENTIONS:  - Educate patient/family on patient safety including physical limitations  - Instruct patient to call for assistance with activity   - Consult OT/PT to assist with strengthening/mobility   - Keep Call bell within reach  - Keep bed low and locked with side rails adjusted as appropriate  - Keep care items and personal belongings within reach  - Initiate and maintain comfort rounds  - Make Fall Risk Sign visible to staff  - Offer Toileting every 2 Hours, in advance of need  - Initiate/Maintain bed alarm  - Apply yellow socks and bracelet for high fall risk patients  - Consider moving patient to room near nurses station  Outcome: Progressing  Goal: Maintain or return to baseline ADL function  Description: INTERVENTIONS:  -  Assess patient's ability to carry out ADLs; assess patient's baseline for ADL function and identify physical deficits which impact ability to perform ADLs (bathing, care of mouth/teeth, toileting, grooming, dressing, etc.)  - Assess/evaluate cause of self-care deficits   - Assess range of motion  - Assess patient's mobility; develop plan if impaired  - Assess patient's need for assistive devices and provide as appropriate  - Encourage maximum independence but intervene and supervise when necessary  - Involve family in performance  of ADLs  - Assess for home care needs following discharge   - Consider OT consult to assist with ADL evaluation and planning for discharge  - Provide patient education as appropriate  Outcome: Progressing  Goal: Maintains/Returns to pre admission functional level  Description: INTERVENTIONS:  - Perform AM-PAC 6 Click Basic Mobility/ Daily Activity assessment daily.  - Set and communicate daily mobility goal to care team and patient/family/caregiver.   - Collaborate with rehabilitation services on mobility goals if consulted  - Perform Range of Motion 3 times a day.  - Reposition patient every 2 hours.  - Record patient progress and toleration of activity level   Outcome: Progressing     Problem: DISCHARGE PLANNING  Goal: Discharge to home or other facility with appropriate resources  Description: INTERVENTIONS:  - Identify barriers to discharge w/patient and caregiver  - Arrange for needed discharge resources and transportation as appropriate  - Identify discharge learning needs (meds, wound care, etc.)  - Arrange for interpretive services to assist at discharge as needed  - Refer to Case Management Department for coordinating discharge planning if the patient needs post-hospital services based on physician/advanced practitioner order or complex needs related to functional status, cognitive ability, or social support system  Outcome: Progressing     Problem: Knowledge Deficit  Goal: Patient/family/caregiver demonstrates understanding of disease process, treatment plan, medications, and discharge instructions  Description: Complete learning assessment and assess knowledge base.  Interventions:  - Provide teaching at level of understanding  - Provide teaching via preferred learning methods  Outcome: Progressing     Problem: GASTROINTESTINAL - ADULT  Goal: Minimal or absence of nausea and/or vomiting  Description: INTERVENTIONS:  - Administer IV fluids if ordered to ensure adequate hydration  - Maintain NPO status  until nausea and vomiting are resolved  - Nasogastric tube if ordered  - Administer ordered antiemetic medications as needed  - Provide nonpharmacologic comfort measures as appropriate  - Advance diet as tolerated, if ordered  - Consider nutrition services referral to assist patient with adequate nutrition and appropriate food choices  Outcome: Progressing  Goal: Maintains or returns to baseline bowel function  Description: INTERVENTIONS:  - Assess bowel function  - Encourage oral fluids to ensure adequate hydration  - Administer IV fluids if ordered to ensure adequate hydration  - Administer ordered medications as needed  - Encourage mobilization and activity  - Consider nutritional services referral to assist patient with adequate nutrition and appropriate food choices  Outcome: Progressing  Goal: Maintains adequate nutritional intake  Description: INTERVENTIONS:  - Monitor percentage of each meal consumed  - Identify factors contributing to decreased intake, treat as appropriate  - Assist with meals as needed  - Monitor I&O, weight, and lab values if indicated  - Obtain nutrition services referral as needed  Outcome: Progressing  Goal: Oral mucous membranes remain intact  Description: INTERVENTIONS  - Assess oral mucosa and hygiene practices  - Implement preventative oral hygiene regimen  - Implement oral medicated treatments as ordered  - Initiate Nutrition services referral as needed  Outcome: Progressing     Problem: Nutrition/Hydration-ADULT  Goal: Nutrient/Hydration intake appropriate for improving, restoring or maintaining nutritional needs  Description: Monitor and assess patient's nutrition/hydration status for malnutrition. Collaborate with interdisciplinary team and initiate plan and interventions as ordered.  Monitor patient's weight and dietary intake as ordered or per policy. Utilize nutrition screening tool and intervene as necessary. Determine patient's food preferences and provide high-protein,  high-caloric foods as appropriate.     INTERVENTIONS:  - Monitor oral intake, urinary output, labs, and treatment plans  - Assess nutrition and hydration status and recommend course of action  - Evaluate amount of meals eaten  - Assist patient with eating if necessary   - Allow adequate time for meals  - Recommend/ encourage appropriate diets, oral nutritional supplements, and vitamin/mineral supplements  - Order, calculate, and assess calorie counts as needed  - Recommend, monitor, and adjust tube feedings and TPN/PPN based on assessed needs  - Assess need for intravenous fluids  - Provide specific nutrition/hydration education as appropriate  - Include patient/family/caregiver in decisions related to nutrition  Outcome: Progressing     Problem: Prexisting or High Potential for Compromised Skin Integrity  Goal: Skin integrity is maintained or improved  Description: INTERVENTIONS:  - Identify patients at risk for skin breakdown  - Assess and monitor skin integrity  - Assess and monitor nutrition and hydration status  - Monitor labs   - Assess for incontinence   - Turn and reposition patient  - Assist with mobility/ambulation  - Relieve pressure over bony prominences  - Avoid friction and shearing  - Provide appropriate hygiene as needed including keeping skin clean and dry  - Evaluate need for skin moisturizer/barrier cream  - Collaborate with interdisciplinary team   - Patient/family teaching  - Consider wound care consult   Outcome: Progressing

## 2024-07-27 NOTE — PROGRESS NOTES
"Progress Note - General Surgery  Patrica Grimaldo 79 y.o. female MRN: [id number]  Unit/Bed#: MICU 13 Encounter: [id number]    Assessment:  79 y.o. female with PMHx of hyperthyroidism, HTN, and COPD who presents with abdominal pain in the setting of upper GI bleeding for duodenal ulcer.    Afebrile.  Tachycardic to 119 in past 24 hours   cc and 5x unrecorded output  WBC 5.8 from 7.7  Hgb 8.1 from 8,3  Cr 0.41 from 0.43    Plan:  Advance diet as tolerated  Continue protonix  EGD vs IR embolization if rebleed event occurs  Transfuse for hgb < 7  Daily hgb checks  PRN pain meds  PRN anti nausea meds  Appreciate GI recs: No repeat EGD at this time  Critical care level of care  General surgery will sign off given no surgical needs at this time    Subjective/Objective     Subjective:   No acute events overnight. Patient denies having nausea, vomiting, fevers, chills, chest pain, shortness of breath. Currently NPO. No bowel movements and no flatus. Voiding without difficulty. Overall, no complaints.    Objective:     Blood pressure 116/61, pulse (!) 108, temperature 98.8 °F (37.1 °C), temperature source Oral, resp. rate (!) 26, height 5' 4\" (1.626 m), weight 52.6 kg (115 lb 15.4 oz), SpO2 97%.,Body mass index is 19.9 kg/m².      Intake/Output Summary (Last 24 hours) at 7/27/2024 1252  Last data filed at 7/27/2024 1224  Gross per 24 hour   Intake 1756.75 ml   Output 900 ml   Net 856.75 ml       Invasive Devices       Central Venous Catheter Line  Duration             CVC Central Lines 07/25/24 Triple 16cm 1 day              Peripheral Intravenous Line  Duration             Peripheral IV 07/25/24 Left;Ventral (anterior) Forearm 2 days    Peripheral IV 07/27/24 Left;Proximal;Ventral (anterior) Forearm <1 day                    General: NAD  HENT: NCAT MMM  Neck: supple, no JVD  CV: nl rate  Lungs: nl wob. No resp distress  ABD: Soft, minimally tender, nondistended  Extrem: No CCE  Neuro: AAOx3       Scheduled " Meds:  Current Facility-Administered Medications   Medication Dose Route Frequency Provider Last Rate    acetaminophen  650 mg Oral Q6H PRN Radha Black MD      albuterol  2 puff Inhalation Q4H PRN Radha Black MD      Cholecalciferol  2,000 Units Oral Daily Radha Black MD      fluticasone-vilanterol  1 puff Inhalation BID Isai Santiago DO      morphine injection  2 mg Intravenous Q4H PRN Radha Black MD      multivitamin-minerals  1 tablet Oral Daily Radha Black MD      nicotine  1 patch Transdermal Daily Radha Black MD      ondansetron  4 mg Intravenous Q6H PRN Radha Black MD      oxyCODONE  5 mg Oral Q4H PRN Radha Black MD      oxyCODONE  2.5 mg Oral Q4H PRN Radah Black MD      pantoprazole (PROTONIX) 80 mg in sodium chloride 0.9 % 100 mL infusion  8 mg/hr Intravenous Continuous Isai Jerome MD 8 mg/hr (07/27/24 1106)    potassium-sodium phosphateS  1 tablet Oral Once Mariana Crowley DO       Continuous Infusions:pantoprazole (PROTONIX) 80 mg in sodium chloride 0.9 % 100 mL infusion, 8 mg/hr, Last Rate: 8 mg/hr (07/27/24 1106)      PRN Meds:.  acetaminophen    albuterol    morphine injection    ondansetron    oxyCODONE    oxyCODONE    Labs:  CBC - WBC/Hgb/Hct/Plts: --/7.9*/24.1*/-- (07/27 1218)  CHEM - BUN/Cr/glu/ALT/AST/amyl/lip:13/0.41*/--/--/--/--/-- (07/27 0514)     LYTES - Na/K/Cl/CO2: 135/3.6/104/27 (07/27 0514)    Lab, Imaging and other studies:I have personally reviewed pertinent lab results.    VTE Pharmacologic Prophylaxis: none  VTE Mechanical Prophylaxis: sequential compression device      Rony Solis MD  7/27/2024 12:52 PM

## 2024-07-27 NOTE — PROGRESS NOTES
Critical Care Interval Transfer Note:    Brief Hospital Summary:   79-year-old female with past medical history of GERD, COPD, KENYA, htn who initially presented on 7/18 for RLQ abdominal pain and constipation.  CT abdomen pelvis at that time revealed significant stool burden and was discharged with milk of mag which she states she was taking and caused 2-3 loose bowel movements a day.  She represented on 7/23 for worsening abdominal pain that is worse after eating. She states in the past 24 hours she has had 5 loose dark brown/black bowel movements with this morning having a large melanotic bowel movement this morning with acute 3 g hemoglobin drop from AM cbc and overall 5 g drop from yesterday. She reports RUQ/RLQ abdominal pain that is cramping and will come and go.  EGD done 2 days ago and found large nonbleeding ulcer in duodenal bulb. GI sprayed hemostatic powder due to concern for perforation with more definitive management. Hgb has been stable with no bloody bowel movements since. GI following. IF becomes HDUS/significantly bloody bm stat page IR.       Barriers to discharge:   Ensure no more bleeding episodes  Protonix gtt for total of 72 hours per GI  CLD, advance diet as tolerated per GI recs  If has further melena/GI bleed stat page IR  PT/OT     Consults: IP CONSULT TO GASTROENTEROLOGY  IP CONSULT TO ACUTE CARE SURGERY  IP CONSULT TO GASTROENTEROLOGY         Discharge Plan: Anticipate discharge in 48-72 hrs to discharge location to be determined pending rehab evaluations.            Patient seen and evaluated by Critical Care today and deemed to be appropriate for transfer to Med Surg with Telemetry. Spoke to Dr. Carlisle from [facility] to accept transfer. Critical care can be contacted via [employer] Secure Chat with any questions or concerns.

## 2024-07-27 NOTE — PROGRESS NOTES
[facility]  Progress Note: Critical Care  Name: Patrica Grimaldo 79 y.o. female I MRN: [id number]  Unit/Bed#: MICU 13 I Date of Admission: 7/23/2024   Date of Service: 7/27/2024 I Hospital Day: 4    Assessment & Plan   Anxiety  COPD  Melena  Active Tobacco Use  Constipation  Abdominal pain  Hypotension  Subclinical hyperthyroidism  ABLA 2/2 to GI bleed        Neuro:   Diagnosis: Anxiety  Takes Xanax on an as needed basis  Plan:   Will hold at this time     CV:  Diagnosis: Hypotension  Hemoglobin drop yesterday morning to 9.6 with large melanotic bowel movement with large melanotic bowel movement today with a drop to 6.9 with lactate to 8.3. - S/P 2 units PRBCs w/ repeat Hgb 11.9, follow ups stable w/ steady decline to 10.9 and 8.9, no repeat large bloody BM - GI EGD performed with evidence of large bleeding ulcer, temporary hemostasis achieved, plan for   Plan:   GI consulted, appreciate recs  IF becomes HDUS stat page IR  2 large bore IV  Monitor stool output  Type and screen completed  Transfuse when Hb < 7  NPO  CLD     Pulm:  Diagnosis: COPD with active tobacco use  Plan:   Wean oxygen to spo2 88-92%  Respiratory protocol  Continue home inhalers  Encourage nicotine cessation     GI:   Diagnosis: Abdominal pain with melena  Diagnosis: GERD  Hemoglobin drop yesterday morning to 9.6 with large melanotic bowel movement with large melanotic bowel movement today with a drop to 6.9 with lactate to 8.3. - S/P 2 units PRBCs w/ repeat Hgb 11.9, follow ups stable w/ steady decline to 10.9 and 8.9, no repeat large bloody BM - GI EGD performed with evidence of large bleeding ulcer, temporary hemostasis achieved, plan for repeat EGD with definitive management   Plan:   GI consulted, appreciate recs   Protonix drip  Bowel regimen on hold   Status post transfusion x 2 units PRBCs  2 large bore IV  Monitor stool output  H&H q8  Transfuse when Hb < 7  If becomes HDUS Stat consult IR  CLD      :   No acute issues     F/E/N:   F: D5 half normal saline @ 75cc/hr in setting of NPO w/ hypoglycemia  E: replete prn, hypokalemia and hypocalcemia repleted  N: NPO for possible procedure/GI intervention     Heme/Onc:   Diagnosis: ABLA  Hemoglobin drop this morning to 9.6 with large melanotic bowel movement with large melanotic bowel movement today with a drop to 6.9 with lactate to 8.3  Plan:   Please see GI as above     Endo:   Diagnosis: Hypogylcemia  Extended n.p.o. status due to upper intestinal bleeding x 1 EGD yesterday and with plan for return to the operating room and secondary EGD this morning, patient was hypoglycemic morning, placed on a D5 W drip and then switched to D5 half-normal saline with appropriate improvement in hypoglycemia  Plan:   Resolved on CLD     Diagnosis: Subclinical hyperthyroidism  Was on methimazole in the past. Recent thyroid function studies from 3/2024 within normal limits and reported that she is currently off of the methimazole  Plan:   F/u OP  Will need repeat Thyroid function studies OP     ID:   No active issues     MSK/Skin:   Plan: OOB to chair  PT/OT    Disposition: Med Surg with Telemetry    ICU Core Measures     A: Assess, Prevent, and Manage Pain Has pain been assessed? Yes  Need for changes to pain regimen? No   B: Both SAT/SAT  N/A   C: Choice of Sedation RASS Goal: 0 Alert and Calm  Need for changes to sedation or analgesia regimen? No   D: Delirium CAM-ICU: Negative   E: Early Mobility  Plan for early mobility? Yes   F: Family Engagement Plan for family engagement today? Yes       Review of Invasive Devices:            Prophylaxis:  VTE Contraindicated secondary to: GI Bleed   Stress Ulcer  covered bypantoprazole (PROTONIX) 80 mg in sodium chloride 0.9 % 100 mL infusion [[id number]]        Significant 24hr Events     24hr events: NAEON     Subjective denies chest pain, shortness of breath, fever, chills, nausea, vomiting, constipation or diarrhea. No bowel  movement in the past 24 hours    Review of Systems: See HPI for Review of Systems     Objective                            Vitals I/O      Most Recent Min/Max in 24hrs   Temp 98.8 °F (37.1 °C) Temp  Min: 98.8 °F (37.1 °C)  Max: 99.4 °F (37.4 °C)   Pulse 98 Pulse  Min: 93  Max: 119   Resp (!) 24 Resp  Min: 16  Max: 28   /55 BP  Min: 94/53  Max: 121/56   O2 Sat 96 % SpO2  Min: 81 %  Max: 100 %      Intake/Output Summary (Last 24 hours) at 7/27/2024 1455  Last data filed at 7/27/2024 1357  Gross per 24 hour   Intake 2606.75 ml   Output 1250 ml   Net 1356.75 ml       Diet Clear Liquid    Invasive Monitoring           Physical Exam   Physical Exam  Eyes:      Extraocular Movements: Extraocular movements intact.      Pupils: Pupils are equal, round, and reactive to light.   Skin:     General: Skin is warm and dry.      Coloration: Skin is pale.   HENT:      Head: Normocephalic and atraumatic.      Nose: No congestion or rhinorrhea.   Cardiovascular:      Rate and Rhythm: Regular rhythm. Tachycardia present.      Heart sounds: No murmur heard.     No friction rub. No gallop.   Musculoskeletal:         General: No swelling or tenderness.      Right lower leg: No edema.      Left lower leg: No edema.   Abdominal:      Palpations: Abdomen is soft.      Tenderness: There is no abdominal tenderness. There is no guarding.   Constitutional:       Appearance: She is well-developed and well-nourished. She is ill-appearing. She is not toxic-appearing.      Interventions: She is not intubated and not restrained.  Pulmonary:      Effort: Pulmonary effort is normal. She is not intubated.      Breath sounds: No wheezing, rhonchi or rales.   Neurological:      General: No focal deficit present.      Mental Status: She is alert and oriented to person, place and time. Mental status is at baseline.            Diagnostic Studies      EKG:   Imaging:       Medications:  Scheduled PRN   Cholecalciferol, 2,000 Units,  Daily  fluticasone-vilanterol, 1 puff, BID  multivitamin-minerals, 1 tablet, Daily  nicotine, 1 patch, Daily  potassium-sodium phosphateS, 1 tablet, Once      acetaminophen, 650 mg, Q6H PRN  albuterol, 2 puff, Q4H PRN  morphine injection, 2 mg, Q4H PRN  ondansetron, 4 mg, Q6H PRN  oxyCODONE, 5 mg, Q4H PRN  oxyCODONE, 2.5 mg, Q4H PRN       Continuous    pantoprazole (PROTONIX) 80 mg in sodium chloride 0.9 % 100 mL infusion, 8 mg/hr, Last Rate: 8 mg/hr (07/27/24 1106)         Labs:    CBC    Recent Labs     07/26/24  0435 07/26/24  0812 07/27/24  0514 07/27/24  1218   WBC 7.72  --  5.81  --    HGB 9.2*   < > 8.1* 7.9*   HCT 27.2*   < > 24.5* 24.1*   *  --  116*  --     < > = values in this interval not displayed.     BMP    Recent Labs     07/26/24  0435 07/27/24  0514   SODIUM 134* 135   K 3.9 3.6    104   CO2 26 27   AGAP 3* 4   BUN 26* 13   CREATININE 0.43* 0.41*   CALCIUM 8.0* 7.6*       Coags    No recent results     Additional Electrolytes  Recent Labs     07/26/24  0435 07/27/24  0514   MG 1.7* 1.5*   PHOS 3.0 2.3   CAIONIZED 1.24  --           Blood Gas    No recent results  Recent Labs     07/25/24  1820   PHVEN 7.251*   [id number] 52.0*   PO2VEN 37.4   [id number] 22.3*   BEVEN -5.1   [id number] 67.3    LFTs  No recent results    Infectious  No recent results  Glucose  Recent Labs     07/26/24  0111 07/26/24  0435 07/27/24  0514   GLUC 147* 117 83               Mariana Crowley, DO

## 2024-07-27 NOTE — PLAN OF CARE
Problem: PAIN - ADULT  Goal: Verbalizes/displays adequate comfort level or baseline comfort level  Description: Interventions:  - Encourage patient to monitor pain and request assistance  - Assess pain using appropriate pain scale  - Administer analgesics based on type and severity of pain and evaluate response  - Implement non-pharmacological measures as appropriate and evaluate response  - Consider cultural and social influences on pain and pain management  - Notify physician/advanced practitioner if interventions unsuccessful or patient reports new pain  Outcome: Progressing     Problem: SAFETY ADULT  Goal: Maintain or return to baseline ADL function  Description: INTERVENTIONS:  -  Assess patient's ability to carry out ADLs; assess patient's baseline for ADL function and identify physical deficits which impact ability to perform ADLs (bathing, care of mouth/teeth, toileting, grooming, dressing, etc.)  - Assess/evaluate cause of self-care deficits   - Assess range of motion  - Assess patient's mobility; develop plan if impaired  - Assess patient's need for assistive devices and provide as appropriate  - Encourage maximum independence but intervene and supervise when necessary  - Involve family in performance of ADLs  - Assess for home care needs following discharge   - Consider OT consult to assist with ADL evaluation and planning for discharge  - Provide patient education as appropriate  Outcome: Progressing     Problem: GASTROINTESTINAL - ADULT  Goal: Minimal or absence of nausea and/or vomiting  Description: INTERVENTIONS:  - Administer IV fluids if ordered to ensure adequate hydration  - Maintain NPO status until nausea and vomiting are resolved  - Nasogastric tube if ordered  - Administer ordered antiemetic medications as needed  - Provide nonpharmacologic comfort measures as appropriate  - Advance diet as tolerated, if ordered  - Consider nutrition services referral to assist patient with adequate  nutrition and appropriate food choices  Outcome: Progressing     Problem: GASTROINTESTINAL - ADULT  Goal: Maintains or returns to baseline bowel function  Description: INTERVENTIONS:  - Assess bowel function  - Encourage oral fluids to ensure adequate hydration  - Administer IV fluids if ordered to ensure adequate hydration  - Administer ordered medications as needed  - Encourage mobilization and activity  - Consider nutritional services referral to assist patient with adequate nutrition and appropriate food choices  Outcome: Progressing     Problem: GASTROINTESTINAL - ADULT  Goal: Maintains adequate nutritional intake  Description: INTERVENTIONS:  - Monitor percentage of each meal consumed  - Identify factors contributing to decreased intake, treat as appropriate  - Assist with meals as needed  - Monitor I&O, weight, and lab values if indicated  - Obtain nutrition services referral as needed  Outcome: Progressing     Problem: Nutrition/Hydration-ADULT  Goal: Nutrient/Hydration intake appropriate for improving, restoring or maintaining nutritional needs  Description: Monitor and assess patient's nutrition/hydration status for malnutrition. Collaborate with interdisciplinary team and initiate plan and interventions as ordered.  Monitor patient's weight and dietary intake as ordered or per policy. Utilize nutrition screening tool and intervene as necessary. Determine patient's food preferences and provide high-protein, high-caloric foods as appropriate.     INTERVENTIONS:  - Monitor oral intake, urinary output, labs, and treatment plans  - Assess nutrition and hydration status and recommend course of action  - Evaluate amount of meals eaten  - Assist patient with eating if necessary   - Allow adequate time for meals  - Recommend/ encourage appropriate diets, oral nutritional supplements, and vitamin/mineral supplements  - Order, calculate, and assess calorie counts as needed  - Recommend, monitor, and adjust tube  feedings and TPN/PPN based on assessed needs  - Assess need for intravenous fluids  - Provide specific nutrition/hydration education as appropriate  - Include patient/family/caregiver in decisions related to nutrition  Outcome: Progressing     Problem: Prexisting or High Potential for Compromised Skin Integrity  Goal: Skin integrity is maintained or improved  Description: INTERVENTIONS:  - Identify patients at risk for skin breakdown  - Assess and monitor skin integrity  - Assess and monitor nutrition and hydration status  - Monitor labs   - Assess for incontinence   - Turn and reposition patient  - Assist with mobility/ambulation  - Relieve pressure over bony prominences  - Avoid friction and shearing  - Provide appropriate hygiene as needed including keeping skin clean and dry  - Evaluate need for skin moisturizer/barrier cream  - Collaborate with interdisciplinary team   - Patient/family teaching  - Consider wound care consult   Outcome: Progressing

## 2024-07-27 NOTE — PROGRESS NOTES
Progress Note -  [facility]  Patrica Grimaldo 79 y.o. female MRN: [id number]  [facility]: [id number]      ASSESSMENT AND PLAN:      78 yo F with history of GERD, COPD, KENYA and hypertension who presented on 7/23 due to right lower quadrant pain.  GI following due to melena, duodenal ulcer.     Melena  Duodenal ulcer  EGD with large duodenal ulcer with nonbleeding visible vessel treated with next powder.  Fortunately she has no signs of further bleeding currently and her hemoglobin is stable at 7.9.  Continue Protonix gtt. until 7/29/2024 then transition to Protonix 40 mg twice daily for at least 6 weeks.  If patient exhibits any signs of rebleeding which include increasing melena, bright red blood per rectum or hematemesis change in hemodynamics including tachycardia and hypotension recommend IR evaluation for EGD embolization as ulcer is extensive and is unlikely to be successfully treated endoscopically.  Clear liquid diet, advance tomorrow if hemoglobin stable.  As needed Zofran for nausea.  Monitor bowel movements.  Monitor hemoglobin, transfuse for less than 7.  GI will follow-up on 7/29/2024.  Please contact with any questions.      Rest of care per primary team.    ______________________________________________________________________    Subjective: Seen and examined.  No episodes of melena, bright red blood per rectum.  Tolerating liquid diet.  Denies any nausea, vomiting.      REVIEW OF SYSTEMS:    Review of Systems   Constitutional:  Negative for chills and fever.   HENT:  Negative for congestion and sinus pressure.    Respiratory:  Negative for cough and shortness of breath.    Cardiovascular:  Negative for chest pain, palpitations and leg swelling.   Gastrointestinal:  Negative for abdominal pain, diarrhea, nausea and vomiting.   Genitourinary:  Negative for dysuria and hematuria.   Musculoskeletal:  Negative for arthralgias and back pain.   Skin:  Negative for color  change and rash.   Neurological:  Negative for dizziness and headaches.   Psychiatric/Behavioral:  Negative for agitation and confusion.    All other systems reviewed and are negative.         Historical Information   Past Medical History:   Diagnosis Date    Elevated blood pressure reading      Past Surgical History:   Procedure Laterality Date    APPENDECTOMY      TONSILLECTOMY       Social History   Social History     Substance and Sexual Activity   Alcohol Use Never     Social History     Substance and Sexual Activity   Drug Use Never     Social History     Tobacco Use   Smoking Status Every Day    Current packs/day: 1.50    Types: Cigarettes   Smokeless Tobacco Never     Family History   Problem Relation Age of Onset    Lung cancer Mother     Heart disease Maternal Aunt     Heart disease Maternal Uncle     Heart disease Maternal Grandmother        Meds/Allergies       Medications Prior to Admission:     ALPRAZolam (XANAX) 0.25 mg tablet    Cholecalciferol (VITAMIN D3) 2000 units TABS    Fluticasone-Salmeterol (Advair) 250-50 mcg/dose inhaler    metoprolol succinate (TOPROL-XL) 100 mg 24 hr tablet    Multiple Vitamins-Minerals (MULTIVITAMIN ADULT PO)    alendronate (Fosamax) 70 mg tablet  Current Facility-Administered Medications   Medication Dose Route Frequency    acetaminophen (TYLENOL) tablet 650 mg  650 mg Oral Q6H PRN    albuterol (PROVENTIL HFA,VENTOLIN HFA) inhaler 2 puff  2 puff Inhalation Q4H PRN    Cholecalciferol (VITAMIN D3) tablet 2,000 Units  2,000 Units Oral Daily    fluticasone-vilanterol 200-25 mcg/actuation 1 puff  1 puff Inhalation BID    magnesium sulfate 4 g/100 mL IVPB (premix) 4 g  4 g Intravenous Once    morphine injection 2 mg  2 mg Intravenous Q4H PRN    multivitamin-minerals (CENTRUM) tablet 1 tablet  1 tablet Oral Daily    nicotine (NICODERM CQ) 7 mg/24hr TD 24 hr patch 1 patch  1 patch Transdermal Daily    ondansetron (ZOFRAN) injection 4 mg  4 mg Intravenous Q6H PRN    oxyCODONE  "(ROXICODONE) IR tablet 5 mg  5 mg Oral Q4H PRN    oxyCODONE (ROXICODONE) split tablet 2.5 mg  2.5 mg Oral Q4H PRN    pantoprazole (PROTONIX) 80 mg in sodium chloride 0.9 % 100 mL infusion  8 mg/hr Intravenous Continuous    potassium chloride 40 mEq IVPB (premix)  40 mEq Intravenous Once    potassium-sodium phosphateS (K-PHOS,PHOSPHA 250) -250 mg tablet 1 tablet  1 tablet Oral Once       Allergies   Allergen Reactions    Erythromycin GI Intolerance     Reaction Date: 26Sep2005;     Lexapro [Escitalopram] Other (See Comments)     hyponatremia           Objective     Blood pressure 116/61, pulse (!) 108, temperature 98.8 °F (37.1 °C), temperature source Oral, resp. rate (!) 26, height 5' 4\" (1.626 m), weight 52.6 kg (115 lb 15.4 oz), SpO2 97%. Body mass index is 19.9 kg/m².      Intake/Output Summary (Last 24 hours) at 7/27/2024 0909  Last data filed at 7/27/2024 0600  Gross per 24 hour   Intake 1345.5 ml   Output 350 ml   Net 995.5 ml         PHYSICAL EXAM:      Physical Exam  Vitals and nursing note reviewed.   Constitutional:       General: She is not in acute distress.     Appearance: Normal appearance. She is ill-appearing.   HENT:      Head: Normocephalic and atraumatic.      Mouth/Throat:      Mouth: Mucous membranes are moist.   Eyes:      Extraocular Movements: Extraocular movements intact.      Conjunctiva/sclera: Conjunctivae normal.   Cardiovascular:      Pulses: Normal pulses.   Pulmonary:      Effort: Pulmonary effort is normal.   Abdominal:      General: Abdomen is flat. Bowel sounds are normal. There is no distension.      Palpations: Abdomen is soft.      Tenderness: There is no abdominal tenderness. There is no guarding.   Skin:     General: Skin is warm and dry.   Neurological:      General: No focal deficit present.      Mental Status: She is alert and oriented to person, place, and time.   Psychiatric:         Mood and Affect: Mood normal.         Behavior: Behavior normal.          Lab " Results:   No results displayed because visit has over 200 results.          Imaging Studies: I have personally reviewed pertinent imaging studies.    Adriano Calixto D.O.  Gastroenterology Fellow  PGY-[id number]  Available via [employer]  7/27/2024 9:09 AM

## 2024-07-28 PROBLEM — K26.9 DUODENAL ULCER: Status: ACTIVE | Noted: 2024-07-23

## 2024-07-28 PROBLEM — D64.9 ANEMIA: Status: ACTIVE | Noted: 2024-07-28

## 2024-07-28 LAB
ALBUMIN SERPL BCG-MCNC: 2.4 G/DL (ref 3.5–5)
ALP SERPL-CCNC: 43 U/L (ref 34–104)
ALT SERPL W P-5'-P-CCNC: 15 U/L (ref 7–52)
ANION GAP SERPL CALCULATED.3IONS-SCNC: 5 MMOL/L (ref 4–13)
AST SERPL W P-5'-P-CCNC: 13 U/L (ref 13–39)
ATRIAL RATE: 153 BPM
ATRIAL RATE: 161 BPM
BASOPHILS # BLD AUTO: 0.02 THOUSANDS/ÂΜL (ref 0–0.1)
BASOPHILS NFR BLD AUTO: 0 % (ref 0–1)
BILIRUB SERPL-MCNC: 0.42 MG/DL (ref 0.2–1)
BUN SERPL-MCNC: 8 MG/DL (ref 5–25)
CALCIUM ALBUM COR SERPL-MCNC: 8.9 MG/DL (ref 8.3–10.1)
CALCIUM SERPL-MCNC: 7.6 MG/DL (ref 8.4–10.2)
CHLORIDE SERPL-SCNC: 102 MMOL/L (ref 96–108)
CO2 SERPL-SCNC: 30 MMOL/L (ref 21–32)
CREAT SERPL-MCNC: 0.42 MG/DL (ref 0.6–1.3)
EOSINOPHIL # BLD AUTO: 0.15 THOUSAND/ÂΜL (ref 0–0.61)
EOSINOPHIL NFR BLD AUTO: 3 % (ref 0–6)
ERYTHROCYTE [DISTWIDTH] IN BLOOD BY AUTOMATED COUNT: 13.6 % (ref 11.6–15.1)
GFR SERPL CREATININE-BSD FRML MDRD: 97 ML/MIN/1.73SQ M
GLUCOSE SERPL-MCNC: 91 MG/DL (ref 65–140)
HCT VFR BLD AUTO: 24.6 % (ref 34.8–46.1)
HCT VFR BLD AUTO: 25.7 % (ref 34.8–46.1)
HCT VFR BLD AUTO: 25.9 % (ref 34.8–46.1)
HCT VFR BLD AUTO: 26.6 % (ref 34.8–46.1)
HGB BLD-MCNC: 8.2 G/DL (ref 11.5–15.4)
HGB BLD-MCNC: 8.5 G/DL (ref 11.5–15.4)
HGB BLD-MCNC: 8.6 G/DL (ref 11.5–15.4)
HGB BLD-MCNC: 8.8 G/DL (ref 11.5–15.4)
IMM GRANULOCYTES # BLD AUTO: 0.02 THOUSAND/UL (ref 0–0.2)
IMM GRANULOCYTES NFR BLD AUTO: 0 % (ref 0–2)
LYMPHOCYTES # BLD AUTO: 0.82 THOUSANDS/ÂΜL (ref 0.6–4.47)
LYMPHOCYTES NFR BLD AUTO: 15 % (ref 14–44)
MCH RBC QN AUTO: 30.8 PG (ref 26.8–34.3)
MCHC RBC AUTO-ENTMCNC: 33.1 G/DL (ref 31.4–37.4)
MCV RBC AUTO: 93 FL (ref 82–98)
MONOCYTES # BLD AUTO: 0.58 THOUSAND/ÂΜL (ref 0.17–1.22)
MONOCYTES NFR BLD AUTO: 11 % (ref 4–12)
NEUTROPHILS # BLD AUTO: 3.85 THOUSANDS/ÂΜL (ref 1.85–7.62)
NEUTS SEG NFR BLD AUTO: 71 % (ref 43–75)
NRBC BLD AUTO-RTO: 0 /100 WBCS
P AXIS: 59 DEGREES
PLATELET # BLD AUTO: 115 THOUSANDS/UL (ref 149–390)
PMV BLD AUTO: 10 FL (ref 8.9–12.7)
POTASSIUM SERPL-SCNC: 3.7 MMOL/L (ref 3.5–5.3)
PR INTERVAL: 114 MS
PROT SERPL-MCNC: 4 G/DL (ref 6.4–8.4)
QRS AXIS: -86 DEGREES
QRS AXIS: -87 DEGREES
QRSD INTERVAL: 102 MS
QRSD INTERVAL: 104 MS
QT INTERVAL: 306 MS
QT INTERVAL: 306 MS
QTC INTERVAL: 488 MS
QTC INTERVAL: 500 MS
RBC # BLD AUTO: 2.76 MILLION/UL (ref 3.81–5.12)
SODIUM SERPL-SCNC: 137 MMOL/L (ref 135–147)
T WAVE AXIS: 74 DEGREES
T WAVE AXIS: 78 DEGREES
VENTRICULAR RATE: 153 BPM
VENTRICULAR RATE: 161 BPM
WBC # BLD AUTO: 5.44 THOUSAND/UL (ref 4.31–10.16)

## 2024-07-28 PROCEDURE — 85018 HEMOGLOBIN: CPT | Performed by: INTERNAL MEDICINE

## 2024-07-28 PROCEDURE — 85018 HEMOGLOBIN: CPT

## 2024-07-28 PROCEDURE — 85014 HEMATOCRIT: CPT

## 2024-07-28 PROCEDURE — 93010 ELECTROCARDIOGRAM REPORT: CPT | Performed by: INTERNAL MEDICINE

## 2024-07-28 PROCEDURE — 99232 SBSQ HOSP IP/OBS MODERATE 35: CPT | Performed by: STUDENT IN AN ORGANIZED HEALTH CARE EDUCATION/TRAINING PROGRAM

## 2024-07-28 PROCEDURE — 85014 HEMATOCRIT: CPT | Performed by: INTERNAL MEDICINE

## 2024-07-28 PROCEDURE — 85025 COMPLETE CBC W/AUTO DIFF WBC: CPT

## 2024-07-28 PROCEDURE — 93005 ELECTROCARDIOGRAM TRACING: CPT

## 2024-07-28 PROCEDURE — 80053 COMPREHEN METABOLIC PANEL: CPT

## 2024-07-28 RX ORDER — METOPROLOL SUCCINATE 50 MG/1
50 TABLET, EXTENDED RELEASE ORAL
Status: DISCONTINUED | OUTPATIENT
Start: 2024-07-28 | End: 2024-07-31 | Stop reason: HOSPADM

## 2024-07-28 RX ORDER — METOPROLOL TARTRATE 1 MG/ML
5 INJECTION, SOLUTION INTRAVENOUS ONCE
Status: COMPLETED | OUTPATIENT
Start: 2024-07-28 | End: 2024-07-28

## 2024-07-28 RX ORDER — ECHINACEA PURPUREA EXTRACT 125 MG
1 TABLET ORAL
Status: DISCONTINUED | OUTPATIENT
Start: 2024-07-28 | End: 2024-07-31 | Stop reason: HOSPADM

## 2024-07-28 RX ADMIN — SODIUM CHLORIDE 8 MG/HR: 9 INJECTION, SOLUTION INTRAVENOUS at 17:26

## 2024-07-28 RX ADMIN — FLUTICASONE FUROATE AND VILANTEROL TRIFENATATE 1 PUFF: 200; 25 POWDER RESPIRATORY (INHALATION) at 07:58

## 2024-07-28 RX ADMIN — METOPROLOL SUCCINATE 50 MG: 50 TABLET, EXTENDED RELEASE ORAL at 21:05

## 2024-07-28 RX ADMIN — Medication 2000 UNITS: at 07:57

## 2024-07-28 RX ADMIN — SODIUM CHLORIDE 8 MG/HR: 9 INJECTION, SOLUTION INTRAVENOUS at 07:58

## 2024-07-28 RX ADMIN — NICOTINE 1 PATCH: 7 PATCH, EXTENDED RELEASE TRANSDERMAL at 07:57

## 2024-07-28 RX ADMIN — FLUTICASONE FUROATE AND VILANTEROL TRIFENATATE 1 PUFF: 200; 25 POWDER RESPIRATORY (INHALATION) at 21:15

## 2024-07-28 RX ADMIN — METOROPROLOL TARTRATE 5 MG: 5 INJECTION, SOLUTION INTRAVENOUS at 12:41

## 2024-07-28 RX ADMIN — Medication 1 TABLET: at 07:57

## 2024-07-28 NOTE — PROGRESS NOTES
[facility]  Progress Note  Name: Patrica Grimaldo I  MRN: [id number]  Unit/Bed#: [facility] 830-01 I Date of Admission: 7/23/2024   Date of Service: 7/28/2024 I Hospital Day: 5    Assessment & Plan   * Duodenal ulcer  Assessment & Plan  Patient came to the hospital with recurrent abdominal pain pain is worse after eating.  Pain is located in the periumbilical region and also suprapubic region.  Patient reported that she noted some dark stools at home but no rebeca bleeding  Patient was noted to have multiple large melenic bowel movements with acute drop in hemoglobin  EGD with large duodenal ulcer with nonbleeding visible vessel treated with nexpowder  Hemoglobin stable at 8.8  PPI gtt. until tomorrow  GI following, appreciate recommendations  Diet advanced today  Monitor bowel movement and transfuse as needed    Anemia  Assessment & Plan  Secondary to GI bleed  Follow-up iron panel  Hemoglobin stable at 8.8  Transfuse for hemoglobin <7    Mild protein-calorie malnutrition (HCC)  Assessment & Plan  Malnutrition Findings:   Adult Malnutrition type: Acute illness  Patient with BMI of 17.6.  Patient reported that she may have lost some weight.  Encourage p.o. nutrition  Patient reported poor p.o. intake due to abdominal pain    BMI Findings:     Body mass index is 19 kg/m².       Subclinical hyperthyroidism  Assessment & Plan  Patient was on methimazole in the past.  Recent thyroid function studies within normal limits and reported that she is currently off of the methimazole    Vitamin D deficiency  Assessment & Plan  Continue with supplementation    Essential hypertension  Assessment & Plan  Patient with known history of hypertension.  Home regimen Toprol- mg at bedtime  Resume Toprol XL 50 mg at bedtime, uptitrate as tolerated  Monitor blood pressure    Chronic obstructive pulmonary disease (HCC)  Assessment & Plan  With known history of COPD.  No acute exacerbation.  Continue  with respiratory protocol    Generalized anxiety disorder  Assessment & Plan  By reviewing the PDMP patient has not filled Xanax recently  Monitor              VTE Pharmacologic Prophylaxis: VTE Score: 3 Moderate Risk (Score 3-4) - Pharmacological DVT Prophylaxis Contraindicated. Sequential Compression Devices Ordered.    Mobility:   Basic Mobility Inpatient Raw Score: 15  JH-HLM Goal: 4: Move to chair/commode  JH-HLM Achieved: 4: Move to chair/commode  JH-HLM Goal achieved. Continue to encourage appropriate mobility.    Patient Centered Rounds: I performed bedside rounds with nursing staff today.   Discussions with Specialists or Other Care Team Provider: GI    Education and Discussions with Family / Patient: Attempted to update  (son) via phone. Left voicemail.     Total Time Spent on Date of Encounter in care of patient: 25 mins. This time was spent on one or more of the following: performing physical exam; counseling and coordination of care; obtaining or reviewing history; documenting in the medical record; reviewing/ordering tests, medications or procedures; communicating with other healthcare professionals and discussing with patient's family/caregivers.    Current Length of Stay: 5 day(s)  Current Patient Status: Inpatient   Certification Statement: The patient will continue to require additional inpatient hospital stay due to IV Protonix drip, anemia requiring closer monitoring in the setting of duodenal ulcer with acute blood loss anemia  Discharge Plan: Anticipate discharge in 24-48 hrs to discharge location to be determined pending rehab evaluations.    Code Status: Level 1 - Full Code    Subjective:   Patient assessed at bedside.  No complaints.    Patient reassessed in the afternoon with tachycardia with heart rates in the 150s.  Asymptomatic.  EKG obtained with sinus tachycardia.  Patient placed on telemetry.  IV Lopressor given with improvement in heart rates to high 90/low 100s.      Objective:     Vitals:   Temp (24hrs), Av.1 °F (37.3 °C), Min:98.8 °F (37.1 °C), Max:99.6 °F (37.6 °C)    Temp:  [98.8 °F (37.1 °C)-99.6 °F (37.6 °C)] 99.6 °F (37.6 °C)  HR:  [100-118] 100  Resp:  [16-31] 16  BP: (100-122)/(53-70) 100/57  SpO2:  [93 %-98 %] 93 %  Body mass index is 19 kg/m².     Input and Output Summary (last 24 hours):     Intake/Output Summary (Last 24 hours) at 2024 1528  Last data filed at 2024 1240  Gross per 24 hour   Intake 420 ml   Output 1475 ml   Net -1055 ml       Physical Exam:   Physical Exam  Vitals reviewed.   Constitutional:       General: She is not in acute distress.     Appearance: Normal appearance. She is not ill-appearing.   HENT:      Head: Normocephalic and atraumatic.   Cardiovascular:      Rate and Rhythm: Normal rate and regular rhythm.      Heart sounds: Normal heart sounds. No murmur heard.  Pulmonary:      Effort: Pulmonary effort is normal. No respiratory distress.      Breath sounds: No wheezing or rales.   Abdominal:      General: Bowel sounds are normal.      Tenderness: There is abdominal tenderness (RUQ).   Musculoskeletal:      Right lower leg: No edema.      Left lower leg: No edema.   Skin:     General: Skin is warm and dry.   Neurological:      Mental Status: She is alert. Mental status is at baseline.          Additional Data:     Labs:  Results from last 7 days   Lab Units 24  1353 24  0633   WBC Thousand/uL  --  5.44   HEMOGLOBIN g/dL 8.8* 8.5*   HEMATOCRIT % 26.6* 25.7*   PLATELETS Thousands/uL  --  115*   SEGS PCT %  --  71   LYMPHO PCT %  --  15   MONO PCT %  --  11   EOS PCT %  --  3     Results from last 7 days   Lab Units 24  0633   SODIUM mmol/L 137   POTASSIUM mmol/L 3.7   CHLORIDE mmol/L 102   CO2 mmol/L 30   BUN mg/dL 8   CREATININE mg/dL 0.42*   ANION GAP mmol/L 5   CALCIUM mg/dL 7.6*   ALBUMIN g/dL 2.4*   TOTAL BILIRUBIN mg/dL 0.42   ALK PHOS U/L 43   ALT U/L 15   AST U/L 13   GLUCOSE RANDOM mg/dL 91      Results from last 7 days   Lab Units 07/25/24  1158   INR  1.42*     Results from last 7 days   Lab Units 07/27/24  1747 07/27/24  1221 07/27/24  0833 07/27/24  0004 07/26/24  2025 07/26/24  1704 07/26/24  1205 07/26/24  0819 07/26/24  0506 07/26/24  0000 07/25/24 2027 07/25/24  1818   POC GLUCOSE mg/dl 156* 139 95 83 102 91 110 90 95 72 93 130         Results from last 7 days   Lab Units 07/26/24  0435 07/25/24  1158 07/25/24  0839 07/23/24  1423   LACTIC ACID mmol/L 0.4* 3.8* 8.3* 1.0       Lines/Drains:  Invasive Devices       Peripheral Intravenous Line  Duration             Peripheral IV 07/25/24 Left;Ventral (anterior) Forearm 3 days    Peripheral IV 07/27/24 Left;Proximal;Ventral (anterior) Forearm 1 day                      Telemetry:  Telemetry Orders (From admission, onward)               24 Hour Telemetry Monitoring  Continuous x 24 Hours (Telem)        Question:  Reason for 24 Hour Telemetry  Answer:  Arrhythmias requiring acute medical intervention / PPM or ICD malfunction                     Telemetry Reviewed: Sinus Tachycardia  Indication for Continued Telemetry Use: Arrthymias requiring medical therapy             Imaging: Reviewed radiology reports from this admission including: abdominal/pelvic CT    Recent Cultures (last 7 days):         Last 24 Hours Medication List:   Current Facility-Administered Medications   Medication Dose Route Frequency Provider Last Rate    acetaminophen  650 mg Oral Q6H PRN Peña Ragab, DO      albuterol  2 puff Inhalation Q4H PRN Peña Ragab, DO      Cholecalciferol  2,000 Units Oral Daily Peña Ragab, DO      fluticasone-vilanterol  1 puff Inhalation BID Peña Ragab, DO      metoprolol succinate  50 mg Oral HS Misty Jaeger, DO      morphine injection  2 mg Intravenous Q4H PRN Peña Ragab, DO      multivitamin-minerals  1 tablet Oral Daily Peña Ragab, DO      nicotine  1 patch Transdermal Daily Peña Ragab, DO      ondansetron  4 mg  Intravenous Q6H PRN Peña Ragab, DO      oxyCODONE  5 mg Oral Q4H PRN Peña Ragab, DO      oxyCODONE  2.5 mg Oral Q4H PRN Peña Ragab, DO      pantoprazole (PROTONIX) 80 mg in sodium chloride 0.9 % 100 mL infusion  8 mg/hr Intravenous Continuous Peña Ragab, DO 8 mg/hr (07/28/24 0758)    potassium-sodium phosphateS  1 tablet Oral Once Peña Ragab, DO      sodium chloride  1 spray Each Nare Q1H PRN Misty Jaeger DO          Today, Patient Was Seen By: Misty Jaeger DO    **Please Note: This note may have been constructed using a voice recognition system.**

## 2024-07-28 NOTE — PLAN OF CARE
Problem: PAIN - ADULT  Goal: Verbalizes/displays adequate comfort level or baseline comfort level  Description: Interventions:  - Encourage patient to monitor pain and request assistance  - Assess pain using appropriate pain scale  - Administer analgesics based on type and severity of pain and evaluate response  - Implement non-pharmacological measures as appropriate and evaluate response  - Consider cultural and social influences on pain and pain management  - Notify physician/advanced practitioner if interventions unsuccessful or patient reports new pain  Outcome: Progressing     Problem: SAFETY ADULT  Goal: Patient will remain free of falls  Description: INTERVENTIONS:  - Educate patient/family on patient safety including physical limitations  - Instruct patient to call for assistance with activity   - Consult OT/PT to assist with strengthening/mobility   - Keep Call bell within reach  - Keep bed low and locked with side rails adjusted as appropriate  - Keep care items and personal belongings within reach  - Initiate and maintain comfort rounds  Problem: DISCHARGE PLANNING  Goal: Discharge to home or other facility with appropriate resources  Description: INTERVENTIONS:  - Identify barriers to discharge w/patient and caregiver  - Arrange for needed discharge resources and transportation as appropriate  - Identify discharge learning needs (meds, wound care, etc.)  - Arrange for interpretive services to assist at discharge as needed  - Refer to Case Management Department for coordinating discharge planning if the patient needs post-hospital services based on physician/advanced practitioner order or complex needs related to functional status, cognitive ability, or social support system  Outcome: Progressing     Problem: Knowledge Deficit  Goal: Patient/family/caregiver demonstrates understanding of disease process, treatment plan, medications, and discharge instructions  Description: Complete learning assessment  and assess knowledge base.  Interventions:  - Provide teaching at level of understanding  - Provide teaching via preferred learning methods  Outcome: Progressing     Problem: GASTROINTESTINAL - ADULT  Goal: Minimal or absence of nausea and/or vomiting  Description: INTERVENTIONS:  - Administer IV fluids if ordered to ensure adequate hydration  - Maintain NPO status until nausea and vomiting are resolved  - Nasogastric tube if ordered  - Administer ordered antiemetic medications as needed  - Provide nonpharmacologic comfort measures as appropriate  - Advance diet as tolerated, if ordered  - Consider nutrition services referral to assist patient with adequate nutrition and appropriate food choices  Outcome: Progressing     Problem: GASTROINTESTINAL - ADULT  Goal: Maintains or returns to baseline bowel function  Description: INTERVENTIONS:  - Assess bowel function  - Encourage oral fluids to ensure adequate hydration  - Administer IV fluids if ordered to ensure adequate hydration  - Administer ordered medications as needed  - Encourage mobilization and activity  - Consider nutritional services referral to assist patient with adequate nutrition and appropriate food choices  Outcome: Progressing     Problem: GASTROINTESTINAL - ADULT  Goal: Maintains adequate nutritional intake  Description: INTERVENTIONS:  - Monitor percentage of each meal consumed  - Identify factors contributing to decreased intake, treat as appropriate  - Assist with meals as needed  - Monitor I&O, weight, and lab values if indicated  - Obtain nutrition services referral as needed  Outcome: Progressing     Problem: Nutrition/Hydration-ADULT  Goal: Nutrient/Hydration intake appropriate for improving, restoring or maintaining nutritional needs  Description: Monitor and assess patient's nutrition/hydration status for malnutrition. Collaborate with interdisciplinary team and initiate plan and interventions as ordered.  Monitor patient's weight and dietary  intake as ordered or per policy. Utilize nutrition screening tool and intervene as necessary. Determine patient's food preferences and provide high-protein, high-caloric foods as appropriate.     INTERVENTIONS:  - Monitor oral intake, urinary output, labs, and treatment plans  - Assess nutrition and hydration status and recommend course of action  - Evaluate amount of meals eaten  - Assist patient with eating if necessary   - Allow adequate time for meals  - Recommend/ encourage appropriate diets, oral nutritional supplements, and vitamin/mineral supplements  - Order, calculate, and assess calorie counts as needed  - Recommend, monitor, and adjust tube feedings and TPN/PPN based on assessed needs  - Assess need for intravenous fluids  - Provide specific nutrition/hydration education as appropriate  - Include patient/family/caregiver in decisions related to nutrition  Outcome: Progressing     Problem: Prexisting or High Potential for Compromised Skin Integrity  Goal: Skin integrity is maintained or improved  Description: INTERVENTIONS:  - Identify patients at risk for skin breakdown  - Assess and monitor skin integrity  - Assess and monitor nutrition and hydration status  - Monitor labs   - Assess for incontinence   - Turn and reposition patient  - Assist with mobility/ambulation  - Relieve pressure over bony prominences  - Avoid friction and shearing  - Provide appropriate hygiene as needed including keeping skin clean and dry  - Evaluate need for skin moisturizer/barrier cream  - Collaborate with interdisciplinary team   - Patient/family teaching  - Consider wound care consult   Outcome: Progressing     Problem: RESPIRATORY - ADULT  Goal: Achieves optimal ventilation and oxygenation  Description: INTERVENTIONS:  - Assess for changes in respiratory status  - Assess for changes in mentation and behavior  - Position to facilitate oxygenation and minimize respiratory effort  - Oxygen administered by appropriate  delivery if ordered  - Initiate smoking cessation education as indicated  - Encourage broncho-pulmonary hygiene including cough, deep breathe, Incentive Spirometry  - Assess the need for suctioning and aspirate as needed  - Assess and instruct to report SOB or any respiratory difficulty  - Respiratory Therapy support as indicated  Outcome: Progressing     Problem: METABOLIC, FLUID AND ELECTROLYTES - ADULT  Goal: Electrolytes maintained within normal limits  Description: INTERVENTIONS:  - Monitor labs and assess patient for signs and symptoms of electrolyte imbalances  - Administer electrolyte replacement as ordered  - Monitor response to electrolyte replacements, including repeat lab results as appropriate  - Instruct patient on fluid and nutrition as appropriate  Outcome: Progressing     Problem: METABOLIC, FLUID AND ELECTROLYTES - ADULT  Goal: Fluid balance maintained  Description: INTERVENTIONS:  - Monitor labs   - Monitor I/O and WT  - Instruct patient on fluid and nutrition as appropriate  - Assess for signs & symptoms of volume excess or deficit  Outcome: Progressing     Problem: HEMATOLOGIC - ADULT  Goal: Maintains hematologic stability  Description: INTERVENTIONS  - Assess for signs and symptoms of bleeding or hemorrhage  - Monitor labs  - Administer supportive blood products/factors as ordered and appropriate  Outcome: Progressing     - Apply yellow socks and bracelet for high fall risk patients  - Consider moving patient to room near nurses station  Outcome: Progressing

## 2024-07-28 NOTE — ASSESSMENT & PLAN NOTE
Patient came to the hospital with recurrent abdominal pain pain is worse after eating.  Pain is located in the periumbilical region and also suprapubic region.  Patient reported that she noted some dark stools at home but no rebeca bleeding  Patient was noted to have multiple large melenic bowel movements with acute drop in hemoglobin  EGD with large duodenal ulcer with nonbleeding visible vessel treated with nexpowder  Hemoglobin stable at 8.8  PPI gtt. until tomorrow  GI following, appreciate recommendations  Diet advanced today  Monitor bowel movement and transfuse as needed

## 2024-07-28 NOTE — ASSESSMENT & PLAN NOTE
Secondary to GI bleed  Follow-up iron panel  Hemoglobin stable at 8.8  Transfuse for hemoglobin <7

## 2024-07-28 NOTE — ASSESSMENT & PLAN NOTE
Malnutrition Findings:   Adult Malnutrition type: Acute illness  Patient with BMI of 17.6.  Patient reported that she may have lost some weight.  Encourage p.o. nutrition  Patient reported poor p.o. intake due to abdominal pain    BMI Findings:     Body mass index is 19 kg/m².

## 2024-07-28 NOTE — RESTORATIVE TECHNICIAN NOTE
Restorative Technician Note      Patient Name: Patrica Grimaldo     [facility] Tech Visit Date: 07/28/24  Note Type: Mobility (RN stated concerns about pt being fearful and unsure about mobility - RN assisted pt to bedside chair - pt is from home)  Patient Position Upon Consult: Bedside chair  Education Provided: Yes; Family or social support of family present for education by provider; Other (comment) (Educated pt on importance of mobility and participation for safe discharge planning; Pt fearful and lacking confidence in mobility at this time; also stating she is dizzy - vitals WFLs. Pt agreeable to participate after lunch; will follow up)  Patient Position at End of Consult: All needs within reach; Bed/Chair alarm activated; Bedside chair    Divine Guerrier  DPT, Restorative Technician

## 2024-07-28 NOTE — PROGRESS NOTES
"Yony reading heart rate of 156; pt eating lunch; no signs of distress noted; apical on assessment 140 and regular; bp 118/72; pt stating she \"does not feel like my heart is racing\"; pt denies chest pain/shortness of breath; no diaphoresis noted; dr suarez aware; orders rec'd   "

## 2024-07-28 NOTE — PROGRESS NOTES
Apical on assessment after iv metoprolol given, 104 and regular; pt remains asymptomatic; will continue care

## 2024-07-28 NOTE — PLAN OF CARE
Problem: PAIN - ADULT  Goal: Verbalizes/displays adequate comfort level or baseline comfort level  Description: Interventions:  - Encourage patient to monitor pain and request assistance  - Assess pain using appropriate pain scale  - Administer analgesics based on type and severity of pain and evaluate response  - Implement non-pharmacological measures as appropriate and evaluate response  - Consider cultural and social influences on pain and pain management  - Notify physician/advanced practitioner if interventions unsuccessful or patient reports new pain  Outcome: Progressing     Problem: SAFETY ADULT  Goal: Patient will remain free of falls  Description: INTERVENTIONS:  - Educate patient/family on patient safety including physical limitations  - Instruct patient to call for assistance with activity   - Consult OT/PT to assist with strengthening/mobility   - Keep Call bell within reach  - Keep bed low and locked with side rails adjusted as appropriate  - Keep care items and personal belongings within reach  - Initiate and maintain comfort rounds  - Make Fall Risk Sign visible to staff  - Apply yellow socks and bracelet for high fall risk patients  - Consider moving patient to room near nurses station  Outcome: Progressing  Goal: Maintain or return to baseline ADL function  Description: INTERVENTIONS:  -  Assess patient's ability to carry out ADLs; assess patient's baseline for ADL function and identify physical deficits which impact ability to perform ADLs (bathing, care of mouth/teeth, toileting, grooming, dressing, etc.)  - Assess/evaluate cause of self-care deficits   - Assess range of motion  - Assess patient's mobility; develop plan if impaired  - Assess patient's need for assistive devices and provide as appropriate  - Encourage maximum independence but intervene and supervise when necessary  - Involve family in performance of ADLs  - Assess for home care needs following discharge   - Consider OT consult  to assist with ADL evaluation and planning for discharge  - Provide patient education as appropriate  Outcome: Progressing  Goal: Maintains/Returns to pre admission functional level  Description: INTERVENTIONS:  - Perform AM-PAC 6 Click Basic Mobility/ Daily Activity assessment daily.  - Set and communicate daily mobility goal to care team and patient/family/caregiver.   - Collaborate with rehabilitation services on mobility goals if consulted  Problem: DISCHARGE PLANNING  Goal: Discharge to home or other facility with appropriate resources  Description: INTERVENTIONS:  - Identify barriers to discharge w/patient and caregiver  - Arrange for needed discharge resources and transportation as appropriate  - Identify discharge learning needs (meds, wound care, etc.)  - Arrange for interpretive services to assist at discharge as needed  - Refer to Case Management Department for coordinating discharge planning if the patient needs post-hospital services based on physician/advanced practitioner order or complex needs related to functional status, cognitive ability, or social support system  Outcome: Progressing     Problem: Knowledge Deficit  Goal: Patient/family/caregiver demonstrates understanding of disease process, treatment plan, medications, and discharge instructions  Description: Complete learning assessment and assess knowledge base.  Interventions:  - Provide teaching at level of understanding  - Provide teaching via preferred learning methods  Outcome: Progressing     - Record patient progress and toleration of activity level   Outcome: Progressing

## 2024-07-28 NOTE — ASSESSMENT & PLAN NOTE
Patient with known history of hypertension.  Home regimen Toprol- mg at bedtime  Resume Toprol XL 50 mg at bedtime, uptitrate as tolerated  Monitor blood pressure

## 2024-07-29 PROBLEM — R50.9 FEVER: Status: ACTIVE | Noted: 2024-07-29

## 2024-07-29 LAB
ANION GAP SERPL CALCULATED.3IONS-SCNC: 5 MMOL/L (ref 4–13)
BASOPHILS # BLD AUTO: 0.02 THOUSANDS/ÂΜL (ref 0–0.1)
BASOPHILS NFR BLD AUTO: 0 % (ref 0–1)
BUN SERPL-MCNC: 14 MG/DL (ref 5–25)
CALCIUM SERPL-MCNC: 7.6 MG/DL (ref 8.4–10.2)
CHLORIDE SERPL-SCNC: 99 MMOL/L (ref 96–108)
CO2 SERPL-SCNC: 29 MMOL/L (ref 21–32)
CREAT SERPL-MCNC: 0.44 MG/DL (ref 0.6–1.3)
EOSINOPHIL # BLD AUTO: 0.11 THOUSAND/ÂΜL (ref 0–0.61)
EOSINOPHIL NFR BLD AUTO: 2 % (ref 0–6)
ERYTHROCYTE [DISTWIDTH] IN BLOOD BY AUTOMATED COUNT: 13.5 % (ref 11.6–15.1)
FERRITIN SERPL-MCNC: 101 NG/ML (ref 11–307)
FLUAV RNA RESP QL NAA+PROBE: NEGATIVE
FLUBV RNA RESP QL NAA+PROBE: NEGATIVE
GFR SERPL CREATININE-BSD FRML MDRD: 95 ML/MIN/1.73SQ M
GLUCOSE SERPL-MCNC: 105 MG/DL (ref 65–140)
GLUCOSE SERPL-MCNC: 151 MG/DL (ref 65–140)
HCT VFR BLD AUTO: 24.5 % (ref 34.8–46.1)
HCT VFR BLD AUTO: 24.9 % (ref 34.8–46.1)
HCT VFR BLD AUTO: 28 % (ref 34.8–46.1)
HGB BLD-MCNC: 8 G/DL (ref 11.5–15.4)
HGB BLD-MCNC: 8.2 G/DL (ref 11.5–15.4)
HGB BLD-MCNC: 9.3 G/DL (ref 11.5–15.4)
IMM GRANULOCYTES # BLD AUTO: 0.03 THOUSAND/UL (ref 0–0.2)
IMM GRANULOCYTES NFR BLD AUTO: 0 % (ref 0–2)
IRON SATN MFR SERPL: 9 % (ref 15–50)
IRON SERPL-MCNC: 15 UG/DL (ref 50–212)
LYMPHOCYTES # BLD AUTO: 0.87 THOUSANDS/ÂΜL (ref 0.6–4.47)
LYMPHOCYTES NFR BLD AUTO: 13 % (ref 14–44)
MCH RBC QN AUTO: 32.2 PG (ref 26.8–34.3)
MCHC RBC AUTO-ENTMCNC: 33.2 G/DL (ref 31.4–37.4)
MCV RBC AUTO: 97 FL (ref 82–98)
MONOCYTES # BLD AUTO: 0.79 THOUSAND/ÂΜL (ref 0.17–1.22)
MONOCYTES NFR BLD AUTO: 12 % (ref 4–12)
NEUTROPHILS # BLD AUTO: 5 THOUSANDS/ÂΜL (ref 1.85–7.62)
NEUTS SEG NFR BLD AUTO: 73 % (ref 43–75)
NRBC BLD AUTO-RTO: 0 /100 WBCS
PLATELET # BLD AUTO: 134 THOUSANDS/UL (ref 149–390)
PMV BLD AUTO: 10.2 FL (ref 8.9–12.7)
POTASSIUM SERPL-SCNC: 3.4 MMOL/L (ref 3.5–5.3)
RBC # BLD AUTO: 2.89 MILLION/UL (ref 3.81–5.12)
RSV RNA RESP QL NAA+PROBE: NEGATIVE
SARS-COV-2 RNA RESP QL NAA+PROBE: NEGATIVE
SODIUM SERPL-SCNC: 133 MMOL/L (ref 135–147)
TIBC SERPL-MCNC: 160 UG/DL (ref 250–450)
UIBC SERPL-MCNC: 145 UG/DL (ref 155–355)
WBC # BLD AUTO: 6.82 THOUSAND/UL (ref 4.31–10.16)

## 2024-07-29 PROCEDURE — 83540 ASSAY OF IRON: CPT | Performed by: STUDENT IN AN ORGANIZED HEALTH CARE EDUCATION/TRAINING PROGRAM

## 2024-07-29 PROCEDURE — 99232 SBSQ HOSP IP/OBS MODERATE 35: CPT | Performed by: INTERNAL MEDICINE

## 2024-07-29 PROCEDURE — 0241U HB NFCT DS VIR RESP RNA 4 TRGT: CPT | Performed by: STUDENT IN AN ORGANIZED HEALTH CARE EDUCATION/TRAINING PROGRAM

## 2024-07-29 PROCEDURE — 82728 ASSAY OF FERRITIN: CPT | Performed by: STUDENT IN AN ORGANIZED HEALTH CARE EDUCATION/TRAINING PROGRAM

## 2024-07-29 PROCEDURE — 85025 COMPLETE CBC W/AUTO DIFF WBC: CPT | Performed by: STUDENT IN AN ORGANIZED HEALTH CARE EDUCATION/TRAINING PROGRAM

## 2024-07-29 PROCEDURE — 85018 HEMOGLOBIN: CPT | Performed by: STUDENT IN AN ORGANIZED HEALTH CARE EDUCATION/TRAINING PROGRAM

## 2024-07-29 PROCEDURE — 80048 BASIC METABOLIC PNL TOTAL CA: CPT | Performed by: STUDENT IN AN ORGANIZED HEALTH CARE EDUCATION/TRAINING PROGRAM

## 2024-07-29 PROCEDURE — 83550 IRON BINDING TEST: CPT | Performed by: STUDENT IN AN ORGANIZED HEALTH CARE EDUCATION/TRAINING PROGRAM

## 2024-07-29 PROCEDURE — 82948 REAGENT STRIP/BLOOD GLUCOSE: CPT

## 2024-07-29 PROCEDURE — 85014 HEMATOCRIT: CPT | Performed by: STUDENT IN AN ORGANIZED HEALTH CARE EDUCATION/TRAINING PROGRAM

## 2024-07-29 PROCEDURE — 99232 SBSQ HOSP IP/OBS MODERATE 35: CPT | Performed by: STUDENT IN AN ORGANIZED HEALTH CARE EDUCATION/TRAINING PROGRAM

## 2024-07-29 PROCEDURE — 97163 PT EVAL HIGH COMPLEX 45 MIN: CPT

## 2024-07-29 PROCEDURE — 97167 OT EVAL HIGH COMPLEX 60 MIN: CPT

## 2024-07-29 RX ORDER — POTASSIUM CHLORIDE 20 MEQ/1
40 TABLET, EXTENDED RELEASE ORAL ONCE
Status: COMPLETED | OUTPATIENT
Start: 2024-07-29 | End: 2024-07-29

## 2024-07-29 RX ORDER — PANTOPRAZOLE SODIUM 40 MG/1
40 TABLET, DELAYED RELEASE ORAL
Status: DISCONTINUED | OUTPATIENT
Start: 2024-07-29 | End: 2024-07-31 | Stop reason: HOSPADM

## 2024-07-29 RX ORDER — SODIUM CHLORIDE, SODIUM GLUCONATE, SODIUM ACETATE, POTASSIUM CHLORIDE, MAGNESIUM CHLORIDE, SODIUM PHOSPHATE, DIBASIC, AND POTASSIUM PHOSPHATE .53; .5; .37; .037; .03; .012; .00082 G/100ML; G/100ML; G/100ML; G/100ML; G/100ML; G/100ML; G/100ML
75 INJECTION, SOLUTION INTRAVENOUS CONTINUOUS
Status: DISPENSED | OUTPATIENT
Start: 2024-07-29 | End: 2024-07-30

## 2024-07-29 RX ADMIN — POTASSIUM CHLORIDE 40 MEQ: 1500 TABLET, EXTENDED RELEASE ORAL at 09:08

## 2024-07-29 RX ADMIN — FLUTICASONE FUROATE AND VILANTEROL TRIFENATATE 1 PUFF: 200; 25 POWDER RESPIRATORY (INHALATION) at 20:22

## 2024-07-29 RX ADMIN — Medication 2000 UNITS: at 09:09

## 2024-07-29 RX ADMIN — ACETAMINOPHEN 650 MG: 325 TABLET, FILM COATED ORAL at 01:28

## 2024-07-29 RX ADMIN — SODIUM CHLORIDE 8 MG/HR: 9 INJECTION, SOLUTION INTRAVENOUS at 01:28

## 2024-07-29 RX ADMIN — SODIUM CHLORIDE, SODIUM GLUCONATE, SODIUM ACETATE, POTASSIUM CHLORIDE, MAGNESIUM CHLORIDE, SODIUM PHOSPHATE, DIBASIC, AND POTASSIUM PHOSPHATE 75 ML/HR: .53; .5; .37; .037; .03; .012; .00082 INJECTION, SOLUTION INTRAVENOUS at 14:48

## 2024-07-29 RX ADMIN — PANTOPRAZOLE SODIUM 40 MG: 40 TABLET, DELAYED RELEASE ORAL at 17:06

## 2024-07-29 RX ADMIN — SODIUM CHLORIDE 8 MG/HR: 9 INJECTION, SOLUTION INTRAVENOUS at 10:41

## 2024-07-29 RX ADMIN — METOPROLOL SUCCINATE 50 MG: 50 TABLET, EXTENDED RELEASE ORAL at 20:23

## 2024-07-29 RX ADMIN — Medication 1 TABLET: at 09:08

## 2024-07-29 RX ADMIN — FLUTICASONE FUROATE AND VILANTEROL TRIFENATATE 1 PUFF: 200; 25 POWDER RESPIRATORY (INHALATION) at 09:08

## 2024-07-29 NOTE — PHYSICAL THERAPY NOTE
Physical Therapy Evaluation     Patient's Name: Patrica Grimaldo    Admitting Diagnosis  Abdominal pain [R10.9]  Nausea and vomiting [R11.2]    Problem List  Patient Active Problem List   Diagnosis    Allergic rhinitis    Generalized anxiety disorder    Chronic obstructive pulmonary disease (HCC)    Dense breasts    Essential hypertension    Impaired fasting glucose    Thyroid disorder    Vitamin D deficiency    Decreased platelet count (HCC)    Low TSH level    Thyroid nodule    Multiple thyroid nodules    Abnormal thyroid function test    Nicotine abuse    Subclinical hyperthyroidism    Weight loss    Fatigue    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PASC)    Lightheadedness    Age-related osteoporosis without current pathological fracture    Primary osteoarthritis of right knee    Mild protein-calorie malnutrition (HCC)    Itching    Increased MCV    Encounter for vitamin deficiency screening    Duodenal ulcer    Severe protein-calorie malnutrition (HCC)    Anemia       Past Medical History  Past Medical History:   Diagnosis Date    Elevated blood pressure reading        Past Surgical History  Past Surgical History:   Procedure Laterality Date    APPENDECTOMY      TONSILLECTOMY            07/29/24 0953   PT Last Visit   PT Visit Date 07/29/24   Note Type   Note type Evaluation   Education   Education Provided Yes   Pain Assessment   Pain Assessment Tool 0-10   Pain Score 5   Pain Location/Orientation Location: Abdomen   Pain Onset/Description Onset: Ongoing   Effect of Pain on Daily Activities Decreased activity tolerance   Patient's Stated Pain Goal No pain   Hospital Pain Intervention(s) Repositioned;Ambulation/increased activity;Emotional support   Restrictions/Precautions   Weight Bearing Precautions Per Order No   Other Precautions Chair Alarm;Bed Alarm;Multiple lines;Fall Risk   Home Living   Type of Home Apartment   Home Layout One level;Stairs to enter with rails  (4STE with HR)   Bathroom Shower/Tub  Tub/shower unit   Bathroom Toilet Standard   Bathroom Equipment Other (Comment)  (Pt reports she mostly stands at sink to wash and sponge bathe)   Home Equipment Walker   Additional Comments Uses walker prn at home   Prior Function   Level of [place] Independent with functional mobility;Independent with ADLs;Needs assistance with IADLS   Lives With Son   Receives Help From Family   IADLs Family/Friend/Other provides transportation;Family/Friend/Other provides meals   Falls in the last 6 months 0   Vocational Retired   Comments Pt lives with son who is retired. Pt reports ambulating household distances at baseline   General   Family/Caregiver Present No   Cognition   Overall Cognitive Status WFL   Arousal/Participation Alert   Attention Attends with cues to redirect   Orientation Level Oriented X4   Following Commands Follows one step commands with increased time or repetition   Comments Pt cooperative and pleasant   Subjective   Subjective Agreeable to mobilize   RLE Assessment   RLE Assessment WFL   LLE Assessment   LLE Assessment WFL   Bed Mobility   Supine to Sit 5  Supervision   Additional items Verbal cues;HOB elevated;Bedrails;Increased time required   Sit to Supine Unable to assess   Additional Comments Pt in bed upon arrival. Pt c/o mild lightheadedness with sitting EOB, BP checked  125/68. Symptoms stabilize with rest   Transfers   Sit to Stand 5  Supervision   Additional items Increased time required;Verbal cues   Stand to Sit 5  Supervision   Additional items Increased time required;Verbal cues   Additional Comments w/RW   Ambulation/Elevation   Gait pattern Wide JHONY;Excessively slow   Gait Assistance 4  Minimal assist  (CG A)   Additional items Verbal cues   Assistive Device Rolling walker   Distance 6 ft   Ambulation/Elevation Additional Comments Limited ambulation distance 2* lightheadedness, assisted to recliner, BP rechecked 108/64. Symptoms resolve with rest.   Balance   Static Sitting Good    Dynamic Sitting Fair +   Static Standing Fair   Dynamic Standing Fair -   Ambulatory Fair -   Endurance Deficit   Endurance Deficit Yes   Activity Tolerance   Activity Tolerance Patient limited by fatigue   Medical Staff Made Aware Co-evalw ith OT 2* medical complexity and multiple co morbidities   Nurse Made Aware RN cleared pt for therapy   Assessment   Prognosis Fair   Problem List Decreased endurance;Decreased mobility   Assessment Pt is a 79 y.o. female seen for PT evaluation s/p admit to [facility] on 7/23/2024. Pt was admitted with a primary dx of: Duodenal ulcer, Anemia, Severe protein calorie malnutrition. PMH sx for COPD, HTN, Vit D deficiency, Generalized anxiety disorder, Thyroid nodule, Fatigue,Allergic rhinitis.   PT now consulted for assessment of mobility and d/c needs. Pt with Activity as tolerated orders.   Pts current clinical presentation is Unstable/ Unpredictable (high complexity) due to Ongoing medical management for primary dx, Decreased activity tolerance compared to baseline, Fall risk, Increased assistance needed from caregiver at current time. Prior to admission, pt was Independent for mobility and ADLs, uses RW prn at home. Pt lives with son, who is able to assist as needed. Lives in 1 level apartment with 4STE.  Upon evaluation, pt currently is requiring supervision for bed mobility and transfers , ambulates with RW, limited distance today 2* lightheadedness and fatigue, + c/o abd pain. Pt presents at PT eval functioning below baseline and currently w/ overall mobility deficits 2* to: decreased endurance, pain, decreased activity tolerance compared to baseline, decreased functional mobility tolerance compared to baseline. Pt currently at a fall risk 2* to impairments listed above.  Pt will continue to benefit from skilled acute PT interventions to address stated impairments; to maximize functional mobility; for ongoing pt/ family training; and DME needs. At conclusion of PT  session pt returned back in chair, chair alarm engaged, all needs in reach, and RN notified of session findings/recommendations with phone and call bell within reach. Pt denies any further questions at this time. The patient's AM-PAC Basic Mobility Inpatient Short Form Raw Score is 17. A Raw score of greater than 16 suggests the patient may benefit from discharge to home. Please also refer to the recommendation of the Physical Therapist for safe discharge planning. Recommend Level 3 upon hospital D/C with continued family support, pending further mobility progress/stair assessment as limited today 2* lightheadedness.   Goals   Patient Goals to get up   STG Expiration Date 08/12/24   Short Term Goal #1 STG 1. Pt will be able to perform bed mobility tasks with Mod I in order to improve overall functional mobility and assist in safe d/c. STG 2. Pt with sit EOB for at least 25 minutes at Ind level in order to strengthen abdominal musculature and assist in future transfers/ ambulation. STG 3. Pt will be able to perform functional transfer with Sup in order to improve overall functional mobility and assist in safe d/c. STG 4. Pt will be able to ambulate at least 100 feet with least restrictive device with sup A in order to improve overall functional mobility and assist in safe d/c. STG 5. Pt will improve sitting/standing static/dynamic balance 1/2 grade in order to improve functional mobility and assist in safe d/c. STG 6 Pt will be able to negotiate at least 4 stairs with least restrictive device with sup A in order to improve overall functional mobility and assist in safe d/c.   PT Treatment Day 0   Plan   Treatment/Interventions Functional transfer training;Therapeutic exercise;Elevations;Bed mobility;Gait training;Spoke to nursing;Spoke to case management;OT   PT Frequency 2-3x/wk   Discharge Recommendation   Rehab Resource Intensity Level, PT III (Minimum Resource Intensity)  (pending further progress, stair trial)    Equipment Recommended Walker   AM-PAC Basic Mobility Inpatient   Turning in Flat Bed Without Bedrails 3   Lying on Back to Sitting on Edge of Flat Bed Without Bedrails 3   Moving Bed to Chair 3   Standing Up From Chair Using Arms 3   Walk in Room 3   Climb 3-5 Stairs With Railing 2   Basic Mobility Inpatient Raw Score 17   Basic Mobility Standardized Score 39.67   [facility] Highest Level Of Mobility   -[facility] Goal 5: Stand one or more mins   -HL Achieved 5: Stand (1 or more minutes)   Modified [place] Scale   Modified [place] Scale 4   End of Consult   Patient Position at End of Consult All needs within reach;Bed/Chair alarm activated;Bedside chair         Madina Braswell, PT DPT

## 2024-07-29 NOTE — ASSESSMENT & PLAN NOTE
Temp 100.7 yesterday evening, resolved today  Complains of some nasal congestion and drainage  No hypoxia or shortness of breath  RSV/COVID/flu negative  CBC ordered without any leukocytosis  Monitor

## 2024-07-29 NOTE — ASSESSMENT & PLAN NOTE
Secondary to GI bleed  Follow-up iron panel  Hemoglobin slightly lower at 8.0 this morning  Repeat again this afternoon  Transfuse for hemoglobin <7

## 2024-07-29 NOTE — ASSESSMENT & PLAN NOTE
Malnutrition Findings:   Adult Malnutrition type: Acute illness  Patient with BMI of 17.6.  Patient reported that she may have lost some weight.  Encourage p.o. nutrition  Patient reported poor p.o. intake due to abdominal pain    BMI Findings:     Body mass index is 18.13 kg/m².

## 2024-07-29 NOTE — PLAN OF CARE
Problem: OCCUPATIONAL THERAPY ADULT  Goal: Performs self-care activities at highest level of function for planned discharge setting.  See evaluation for individualized goals.  Description: Treatment Interventions: ADL retraining, Functional transfer training, Endurance training, Cognitive reorientation, Patient/family training, Equipment evaluation/education, Compensatory technique education, Continued evaluation, Fine motor coordination activities, Energy conservation, Activityengagement, UE strengthening/ROM          See flowsheet documentation for full assessment, interventions and recommendations.   Note: Limitation: Decreased ADL status, Decreased endurance, Decreased self-care trans, Decreased high-level ADLs  Prognosis: Good  Assessment: 79 year old pt seen today for an OT evaluation following admission to [facility] 2/2 duodenal ulcer, abdominal pain, nausea with present symptoms significant for pain, fatigue, weakness, decreased ADL status, decreased functional mobility. Pt  has a past medical history of Elevated blood pressure reading. Pt reported living with son in an apartment with 4STE. Pt reports being IND with most ADLs PTA, sponge bathes at sink. Son completes most IADLs PTA including cooking, cleaning, driving. Pt manages her own medications. Pt uses a RW PRN PTA. (-). Pt states that she has had a more difficult time completing ADLs recently 2/2 fatigue, abdominal pain, and weakness. Pt reports that son is retired and will be able to assist PRN upon d/c. Pt very pleasant and cooperative t/o session. Pt completed functional bed mobility with SUP. SUP for functional STS txfs with RW. Min A for functional mobility with RW. Pt was SUP for UB ADLs and Min A for LB ADLs. The patient's raw score on the AM-PAC Daily Activity Inpatient Short Form is 18. A raw score of less than 19 suggests the patient may benefit from discharge to post-acute rehabilitation services. Please refer to  the recommendation of the Occupational Therapist for safe discharge planning. Pt is functioning below baseline level of function and will continue to benefit from skilled acute OT to promote increased independence and return to PLOF. At this time, current OT recommendation is Level 3 resources - (+)increased family support. Will continue to follow and assess.     Rehab Resource Intensity Level, OT: III (Minimum Resource Intensity) (+increased family support)

## 2024-07-29 NOTE — ASSESSMENT & PLAN NOTE
Malnutrition Findings:   Adult Malnutrition type: Acute illness  Adult Degree of Malnutrition: Other severe protein calorie malnutrition  Malnutrition Characteristics: Fat loss, Muscle loss, Inadequate energy                  360 Statement: severe malnutrition r/t acute illness as evidenced by PO intake <50% of estimated needs for at least 5 days, moderate-severe muscle loss around clavicles, moderate muscle loss shoulders and temples, mild buccal fat pad loss. Treatment: as medically able, oral diet with oral nutrition supplements    BMI Findings:  Adult BMI Classifications: Underweight < 18.5        Body mass index is 18.13 kg/m².

## 2024-07-29 NOTE — PLAN OF CARE
Problem: SAFETY ADULT  Goal: Patient will remain free of falls  Description: INTERVENTIONS:  - Educate patient/family on patient safety including physical limitations  - Instruct patient to call for assistance with activity   - Consult OT/PT to assist with strengthening/mobility   - Keep Call bell within reach  - Keep bed low and locked with side rails adjusted as appropriate  - Keep care items and personal belongings within reach  - Initiate and maintain comfort rounds  - Make Fall Risk Sign visible to staff  - Offer Toileting every 2 Hours, in advance of need  - Initiate/Maintain bed alarm  - Obtain necessary fall risk management equipment: assistive devices  - Apply yellow socks and bracelet for high fall risk patients  - Consider moving patient to room near nurses station  Outcome: Progressing     Problem: PAIN - ADULT  Goal: Verbalizes/displays adequate comfort level or baseline comfort level  Description: Interventions:  - Encourage patient to monitor pain and request assistance  - Assess pain using appropriate pain scale  - Administer analgesics based on type and severity of pain and evaluate response  - Implement non-pharmacological measures as appropriate and evaluate response  - Consider cultural and social influences on pain and pain management  - Notify physician/advanced practitioner if interventions unsuccessful or patient reports new pain  Outcome: Progressing     Problem: HEMATOLOGIC - ADULT  Goal: Maintains hematologic stability  Description: INTERVENTIONS  - Assess for signs and symptoms of bleeding or hemorrhage  - Monitor labs  - Administer supportive blood products/factors as ordered and appropriate  Outcome: Progressing     Problem: RESPIRATORY - ADULT  Goal: Achieves optimal ventilation and oxygenation  Description: INTERVENTIONS:  - Assess for changes in respiratory status  - Assess for changes in mentation and behavior  - Position to facilitate oxygenation and minimize respiratory  effort  - Oxygen administered by appropriate delivery if ordered  - Initiate smoking cessation education as indicated  - Encourage broncho-pulmonary hygiene including cough, deep breathe, Incentive Spirometry  - Assess the need for suctioning and aspirate as needed  - Assess and instruct to report SOB or any respiratory difficulty  - Respiratory Therapy support as indicated  Outcome: Progressing     Problem: METABOLIC, FLUID AND ELECTROLYTES - ADULT  Goal: Electrolytes maintained within normal limits  Description: INTERVENTIONS:  - Monitor labs and assess patient for signs and symptoms of electrolyte imbalances  - Administer electrolyte replacement as ordered  - Monitor response to electrolyte replacements, including repeat lab results as appropriate  - Instruct patient on fluid and nutrition as appropriate  Outcome: Progressing     Problem: METABOLIC, FLUID AND ELECTROLYTES - ADULT  Goal: Fluid balance maintained  Description: INTERVENTIONS:  - Monitor labs   - Monitor I/O and WT  - Instruct patient on fluid and nutrition as appropriate  - Assess for signs & symptoms of volume excess or deficit  Outcome: Progressing     Problem: GASTROINTESTINAL - ADULT  Goal: Maintains or returns to baseline bowel function  Description: INTERVENTIONS:  - Assess bowel function  - Encourage oral fluids to ensure adequate hydration  - Administer IV fluids if ordered to ensure adequate hydration  - Administer ordered medications as needed  - Encourage mobilization and activity  - Consider nutritional services referral to assist patient with adequate nutrition and appropriate food choices  Outcome: Progressing     Problem: Knowledge Deficit  Goal: Patient/family/caregiver demonstrates understanding of disease process, treatment plan, medications, and discharge instructions  Description: Complete learning assessment and assess knowledge base.  Interventions:  - Provide teaching at level of understanding  - Provide teaching via preferred  learning methods  Outcome: Progressing     Problem: DISCHARGE PLANNING  Goal: Discharge to home or other facility with appropriate resources  Description: INTERVENTIONS:  - Identify barriers to discharge w/patient and caregiver  - Arrange for needed discharge resources and transportation as appropriate  - Identify discharge learning needs (meds, wound care, etc.)  - Arrange for interpretive services to assist at discharge as needed  - Refer to Case Management Department for coordinating discharge planning if the patient needs post-hospital services based on physician/advanced practitioner order or complex needs related to functional status, cognitive ability, or social support system  Outcome: Progressing

## 2024-07-29 NOTE — ASSESSMENT & PLAN NOTE
Patient came to the hospital with recurrent abdominal pain pain is worse after eating.  Pain is located in the periumbilical region and also suprapubic region.  Patient reported that she noted some dark stools at home but no rebeca bleeding  Patient was noted to have multiple large melenic bowel movements with acute drop in hemoglobin  EGD with large duodenal ulcer with nonbleeding visible vessel treated with nexpowder  Hemoglobin stable at 8, down from 8.8 yesterday  PPI gtt.  GI following, appreciate recommendations  Monitor bowel movement and transfuse as needed

## 2024-07-29 NOTE — ASSESSMENT & PLAN NOTE
Patient with known history of hypertension.  Home regimen Toprol- mg at bedtime  /68 this morning  Resume Toprol XL 50 mg at bedtime, uptitrate as tolerated  Monitor blood pressure

## 2024-07-29 NOTE — CASE MANAGEMENT
Case Management Discharge Planning Note    Patient name Patirca Grimaldo  Location [facility]30/[facility]-01 MRN [id number]  : 1945 Date 2024       Current Admission Date: 2024  Current Admission Diagnosis:Duodenal ulcer   Patient Active Problem List    Diagnosis Date Noted Date Diagnosed    Anemia 2024     Severe protein-calorie malnutrition (HCC) 2024     Duodenal ulcer 2024     Increased MCV 2022     Encounter for vitamin deficiency screening 2022     Itching 2022     Mild protein-calorie malnutrition (HCC) 2022     Primary osteoarthritis of right knee 2021     Age-related osteoporosis without current pathological fracture 2021     Lightheadedness 06/15/2021     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PASC) 2021     Subclinical hyperthyroidism 2019     Weight loss 2019     Fatigue 2019     Nicotine abuse 2019     Multiple thyroid nodules 2018     Abnormal thyroid function test 2018     Low TSH level 2018     Thyroid nodule 2018     Essential hypertension 2017     Dense breasts 2016     Chronic obstructive pulmonary disease (HCC) 2015     Thyroid disorder 10/11/2012     Allergic rhinitis 2012     Generalized anxiety disorder 2012     Impaired fasting glucose 2012     Vitamin D deficiency 2012     Decreased platelet count (HCC) 2012       LOS (days): 6  Geometric Mean LOS (GMLOS) (days): 4.6  Days to GMLOS:-1.3     OBJECTIVE:  Risk of Unplanned Readmission Score: 14.81         Current admission status: Inpatient   Preferred Pharmacy:   [id number] - [place]  [address] [address]  Phone: [phone] Fax: [phone]    Primary Care Provider: Janine Marcus DO    Primary Insurance: SCOTT ROSENTHAL  Secondary Insurance:     DISCHARGE DETAILS:           Reviewed [facility] rec with patient- pt reports her  "street is a \" parking authority\" street, meaning that anyone that monsivais on it that does not have a parking pass has to pay a fine. Inquired if there are any other streets nearby that can be used, pt reports there are not.  She is open to OP Therapy, and reports her son will drive her   Provided with OP list, and will ensure pt has scripts on d/c.    "

## 2024-07-29 NOTE — ASSESSMENT & PLAN NOTE
Patient came to the hospital with recurrent abdominal pain pain is worse after eating.  Pain is located in the periumbilical region and also suprapubic region.  Patient reported that she noted some dark stools at home but no rebeca bleeding  Patient was noted to have multiple large melenic bowel movements with acute drop in hemoglobin  EGD with large duodenal ulcer with nonbleeding visible vessel treated with nexpowder  Hemoglobin stable  PPI gtt.  GI following, appreciate recommendations  Monitor bowel movement and transfuse as needed

## 2024-07-29 NOTE — OCCUPATIONAL THERAPY NOTE
Occupational Therapy Evaluation     Patient Name: Patrica Grimaldo  Today's Date: 7/29/2024  Problem List  Principal Problem:    Duodenal ulcer  Active Problems:    Generalized anxiety disorder    Chronic obstructive pulmonary disease (HCC)    Essential hypertension    Vitamin D deficiency    Subclinical hyperthyroidism    Mild protein-calorie malnutrition (HCC)    Severe protein-calorie malnutrition (HCC)    Anemia    Past Medical History  Past Medical History:   Diagnosis Date    Elevated blood pressure reading      Past Surgical History  Past Surgical History:   Procedure Laterality Date    APPENDECTOMY      TONSILLECTOMY             07/29/24 0954   OT Last Visit   OT Visit Date 07/29/24   Note Type   Note type Evaluation   Pain Assessment   Pain Assessment Tool 0-10   Pain Score 5   Pain Location/Orientation Location: Abdomen   Pain Onset/Description Onset: Ongoing   Effect of Pain on Daily Activities Reports increased pain with pressure/movement.   Patient's Stated Pain Goal No pain   Hospital Pain Intervention(s) Repositioned;Ambulation/increased activity;Emotional support   Restrictions/Precautions   Weight Bearing Precautions Per Order No   Other Precautions Chair Alarm;Bed Alarm;Multiple lines;Fall Risk;Pain   Home Living   Type of Home Apartment   Home Layout One level;Performs ADLs on one level;Able to live on main level with bedroom/bathroom;Stairs to enter with rails  (4STE)   Bathroom Shower/Tub Tub/shower unit   Bathroom Toilet Standard   Bathroom Equipment   (no gb, no SC. Pt reports she mainly stands at sink to wash and sponge bathes mostly)   Home Equipment Walker  (used PRN)   Additional Comments Apt, 4STE, RW used PTA   Prior Function   Level of [place] Independent with ADLs;Independent with functional mobility;Needs assistance with IADLS   Lives With Son  (retired)   Receives Help From Family   IADLs Independent with medication management;Family/Friend/Other provides  "meals;Family/Friend/Other provides transportation   Falls in the last 6 months 0   Vocational Retired   Comments Pt lives with son who is retired   Lifestyle   Autonomy IND PTA with ADLs and functional mobility with RW. Son completes most IADLs. (-). RW used PTA PRN.   Reciprocal Relationships Lives with supportive son who can assist PRN   Service to Others Retired   Intrinsic Gratification Has cat at home   General   Family/Caregiver Present No   Additional General Comments Pt reports son cooks most meals, does the shopping, cleaning, and drives her to appts PRN. Pt reports he is retired and can be available to assist PRN upon d/c.   Subjective   Subjective \"I think it's a sinus thing\"   ADL   Where Assessed Edge of bed   Eating Assistance 7  Independent   Grooming Assistance 7  Independent   UB Bathing Assistance 5  Supervision/Setup   LB Bathing Assistance 4  Minimal Assistance   UB Dressing Assistance 5  Supervision/Setup   LB Dressing Assistance 4  Minimal Assistance   Toileting Assistance  5  Supervision/Setup   Functional Assistance 5  Supervision/Setup   Bed Mobility   Supine to Sit 5  Supervision   Additional items HOB elevated;Verbal cues;Increased time required   Sit to Supine Unable to assess   Additional Comments Pt OOB in recliner post session, all needs met, call bell within reach. +chair alarm on   Transfers   Sit to Stand 5  Supervision   Additional items Assist x 1;Increased time required;Verbal cues   Stand to Sit 5  Supervision   Additional items Increased time required;Verbal cues   Additional Comments RW in stance   Functional Mobility   Functional Mobility 4  Minimal assistance   Additional Comments household distances. Pt reported feeling increasing lightheadeness with functional mobility. BP taken in sitting to be 125/68, BP taken following functional mobility to be 108/64. RN updated. Pt returned to seated in recliner with legs elevated.   Additional items Rolling walker   Balance "   Static Sitting Good   Dynamic Sitting Fair +   Static Standing Fair   Dynamic Standing Fair -   Ambulatory Fair -   Activity Tolerance   Activity Tolerance Patient limited by fatigue;Patient limited by pain   Medical Staff Made Aware PT Madina cokandace 2/2 increased medical complexity and comorbidities.   Nurse Made Aware RN cleared for therapy   RUE Assessment   RUE Assessment WFL   LUE Assessment   LUE Assessment WFL   Hand Function   Gross Motor Coordination Functional   Fine Motor Coordination Functional   Cognition   Overall Cognitive Status WFL   Arousal/Participation Alert;Cooperative   Attention Attends with cues to redirect   Orientation Level Oriented X4  (initially stating wrong date, but able to correct with time.)   Memory Within functional limits   Following Commands Follows one step commands with increased time or repetition   Comments Pt very pleasant and cooperative. Mildly anxious regarding functional mobility and participation. Able to participate with encouragement. .   Assessment   Limitation Decreased ADL status;Decreased endurance;Decreased self-care trans;Decreased high-level ADLs   Prognosis Good   Assessment 79 year old pt seen today for an OT evaluation following admission to [facility] 2/2 duodenal ulcer, abdominal pain, nausea with present symptoms significant for pain, fatigue, weakness, decreased ADL status, decreased functional mobility. Pt  has a past medical history of Elevated blood pressure reading. Pt reported living with son in an apartment with 4STE. Pt reports being IND with most ADLs PTA, sponge bathes at sink. Son completes most IADLs PTA including cooking, cleaning, driving. Pt manages her own medications. Pt uses a RW PRN PTA. (-). Pt states that she has had a more difficult time completing ADLs recently 2/2 fatigue, abdominal pain, and weakness. Pt reports that son is retired and will be able to assist PRN upon d/c. Pt very pleasant and cooperative  t/o session. Pt completed functional bed mobility with SUP. SUP for functional STS txfs with RW. Min A for functional mobility with RW. Pt was SUP for UB ADLs and Min A for LB ADLs. The patient's raw score on the AM-PAC Daily Activity Inpatient Short Form is 18. A raw score of less than 19 suggests the patient may benefit from discharge to post-acute rehabilitation services. Please refer to the recommendation of the Occupational Therapist for safe discharge planning. Pt is functioning below baseline level of function and will continue to benefit from skilled acute OT to promote increased independence and return to PLOF. At this time, current OT recommendation is Level 3 resources - (+)increased family support. Will continue to follow and assess.   Goals   Patient Goals to feel better   LTG Time Frame 10-14   Long Term Goal #1 See below for goals   Plan   Treatment Interventions ADL retraining;Functional transfer training;Endurance training;Cognitive reorientation;Patient/family training;Equipment evaluation/education;Compensatory technique education;Continued evaluation;Fine motor coordination activities;Energy conservation;Activityengagement;UE strengthening/ROM   Goal Expiration Date 08/12/24   OT Frequency 2-3x/wk   Discharge Recommendation   Rehab Resource Intensity Level, OT III (Minimum Resource Intensity)  (+increased family support)   AM-[facility] Daily Activity Inpatient   Lower Body Dressing 2   Bathing 3   Toileting 3   Upper Body Dressing 3   Grooming 3   Eating 4   Daily Activity Raw Score 18   Daily Activity Standardized Score (Calc for Raw Score >=11) 38.66   AM-PAC Applied Cognition Inpatient   Following a Speech/Presentation 3   Understanding Ordinary Conversation 4   Taking Medications 4   Remembering Where Things Are Placed or Put Away 4   Remembering List of 4-5 Errands 4   Taking Care of Complicated Tasks 3   Applied Cognition Raw Score 22   Applied Cognition Standardized Score 47.83   End of Consult    Education Provided Yes   Patient Position at End of Consult Bedside chair;Bed/Chair alarm activated;All needs within reach   Nurse Communication Nurse aware of consult         Occupational Therapy Goals    Pt will be Mod I with bed mobility with good sitting balance/tolerance to engage in self care tasks within this plan of care.      Pt will be Mod I for UB dressing and bathing with use of assistive devices PRN within this plan of care.     Pt will be Mod I for LB dressing and bathing with use of assistive devices PRN within this plan of care.     Pt will demonstrate standing tolerance of 2-3 minutes increase independence for toileting ADLs/tasks with use of assistive devices PRN within this plan of care.     Pt will completed functional transfers with mod I, with use of appropriate assistive devices within this plan of care.     Pt will demonstrate good carryover of safety awareness with use of appropriate assistive devices during functional tasks within this plan of care.     Pt will demonstrated increased activity tolerance to 30 mins for participation in ADLs and functional tasks within this plan of care.     Pt will complete further functional cognitive assessment with good attention/participation to assist with safe d/c planning recommendations.        Tadeo Webber MS, OTR/L     [facility] CERTIFIED RATER ID [id number]

## 2024-07-29 NOTE — PLAN OF CARE
Problem: PHYSICAL THERAPY ADULT  Goal: Performs mobility at highest level of function for planned discharge setting.  See evaluation for individualized goals.  Description: Treatment/Interventions: Functional transfer training, Therapeutic exercise, Elevations, Bed mobility, Gait training, Spoke to nursing, Spoke to case management, OT  Equipment Recommended: Walker       See flowsheet documentation for full assessment, interventions and recommendations.  Note: Prognosis: Fair  Problem List: Decreased endurance, Decreased mobility  Assessment: Pt is a 79 y.o. female seen for PT evaluation s/p admit to [facility] on 7/23/2024. Pt was admitted with a primary dx of: Duodenal ulcer, Anemia, Severe protein calorie malnutrition. PMH sx for COPD, HTN, Vit D deficiency, Generalized anxiety disorder, Thyroid nodule, Fatigue,Allergic rhinitis.   PT now consulted for assessment of mobility and d/c needs. Pt with Activity as tolerated orders.   Pts current clinical presentation is Unstable/ Unpredictable (high complexity) due to Ongoing medical management for primary dx, Decreased activity tolerance compared to baseline, Fall risk, Increased assistance needed from caregiver at current time. Prior to admission, pt was Independent for mobility and ADLs, uses RW prn at home. Pt lives with son, who is able to assist as needed. Lives in 1 level apartment with 4STE.  Upon evaluation, pt currently is requiring supervision for bed mobility and transfers , ambulates with RW, limited distance today 2* lightheadedness and fatigue. Pt presents at PT eval functioning below baseline and currently w/ overall mobility deficits 2* to: decreased endurance, pain, decreased activity tolerance compared to baseline, decreased functional mobility tolerance compared to baseline. Pt currently at a fall risk 2* to impairments listed above.  Pt will continue to benefit from skilled acute PT interventions to address stated impairments; to  maximize functional mobility; for ongoing pt/ family training; and DME needs. At conclusion of PT session pt returned back in chair, chair alarm engaged, all needs in reach, and RN notified of session findings/recommendations with phone and call bell within reach. Pt denies any further questions at this time. The patient's AM-PAC Basic Mobility Inpatient Short Form Raw Score is 17. A Raw score of greater than 16 suggests the patient may benefit from discharge to home. Please also refer to the recommendation of the Physical Therapist for safe discharge planning. Recommend Level 3 upon hospital D/C with continued family support, pending further mobility progress/stair assessment as limited today 2* lightheadedness.        Rehab Resource Intensity Level, PT: III (Minimum Resource Intensity) (pending further progress, stair trial)    See flowsheet documentation for full assessment.

## 2024-07-29 NOTE — PROGRESS NOTES
[facility]  Progress Note  Name: Patrica Grimaldo I  MRN: [id number]  Unit/Bed#: [facility] 830-01 I Date of Admission: 7/23/2024   Date of Service: 7/29/2024 I Hospital Day: 6    Assessment & Plan   * Duodenal ulcer  Assessment & Plan  Patient came to the hospital with recurrent abdominal pain pain is worse after eating.  Pain is located in the periumbilical region and also suprapubic region.  Patient reported that she noted some dark stools at home but no rebeca bleeding  Patient was noted to have multiple large melenic bowel movements with acute drop in hemoglobin  EGD with large duodenal ulcer with nonbleeding visible vessel treated with nexpowder  Hemoglobin stable at 8, down from 8.8 yesterday  PPI gtt.  GI following, appreciate recommendations  Monitor bowel movement and transfuse as needed    Fever  Assessment & Plan  Temp 100.7 yesterday evening, resolved today  Complains of some nasal congestion and drainage  No hypoxia or shortness of breath  RSV/COVID/flu negative  CBC ordered without any leukocytosis  Monitor    Anemia  Assessment & Plan  Secondary to GI bleed  Follow-up iron panel  Hemoglobin slightly lower at 8.0 this morning  Repeat again this afternoon  Transfuse for hemoglobin <7    Severe protein-calorie malnutrition (HCC)  Assessment & Plan  Malnutrition Findings:   Adult Malnutrition type: Acute illness  Adult Degree of Malnutrition: Other severe protein calorie malnutrition  Malnutrition Characteristics: Fat loss, Muscle loss, Inadequate energy                  360 Statement: severe malnutrition r/t acute illness as evidenced by PO intake <50% of estimated needs for at least 5 days, moderate-severe muscle loss around clavicles, moderate muscle loss shoulders and temples, mild buccal fat pad loss. Treatment: as medically able, oral diet with oral nutrition supplements    BMI Findings:  Adult BMI Classifications: Underweight < 18.5        Body mass index is 18.13  kg/m².       Mild protein-calorie malnutrition (HCC)  Assessment & Plan  Malnutrition Findings:   Adult Malnutrition type: Acute illness  Patient with BMI of 17.6.  Patient reported that she may have lost some weight.  Encourage p.o. nutrition  Patient reported poor p.o. intake due to abdominal pain    BMI Findings:     Body mass index is 18.13 kg/m².       Subclinical hyperthyroidism  Assessment & Plan  Patient was on methimazole in the past.  Recent thyroid function studies within normal limits and reported that she is currently off of the methimazole    Vitamin D deficiency  Assessment & Plan  Continue with supplementation    Essential hypertension  Assessment & Plan  Patient with known history of hypertension.  Home regimen Toprol- mg at bedtime  /68 this morning  Resume Toprol XL 50 mg at bedtime, uptitrate as tolerated  Monitor blood pressure    Chronic obstructive pulmonary disease (HCC)  Assessment & Plan  With known history of COPD.  No acute exacerbation.  Continue with respiratory protocol    Generalized anxiety disorder  Assessment & Plan  By reviewing the PDMP patient has not filled Xanax recently  Monitor              VTE Pharmacologic Prophylaxis: VTE Score: 3 Moderate Risk (Score 3-4) - Pharmacological DVT Prophylaxis Contraindicated. Sequential Compression Devices Ordered.    Mobility:   Basic Mobility Inpatient Raw Score: 17  JH-HLM Goal: 5: Stand one or more mins  JH-HLM Achieved: 5: Stand (1 or more minutes)  JH-HLM Goal achieved. Continue to encourage appropriate mobility.    Patient Centered Rounds: I performed bedside rounds with nursing staff today.   Discussions with Specialists or Other Care Team Provider: primary RN    Education and Discussions with Family / Patient:  Patient.     Total Time Spent on Date of Encounter in care of patient: 25 mins. This time was spent on one or more of the following: performing physical exam; counseling and coordination of care; obtaining or  reviewing history; documenting in the medical record; reviewing/ordering tests, medications or procedures; communicating with other healthcare professionals and discussing with patient's family/caregivers.    Current Length of Stay: 6 day(s)  Current Patient Status: Inpatient   Certification Statement: The patient will continue to require additional inpatient hospital stay due to protonix gtt, anemia monitoring  Discharge Plan: Anticipate discharge in 24-48 hrs to discharge location to be determined pending rehab evaluations.    Code Status: Level 1 - Full Code    Subjective:   Patient assessed at bedside.  Mild right sided abdominal pain unchanged from yesterday.  Tolerating diet. Fevers last night resolved.     Objective:     Vitals:   Temp (24hrs), Av.9 °F (37.2 °C), Min:97.9 °F (36.6 °C), Max:100.7 °F (38.2 °C)    Temp:  [97.9 °F (36.6 °C)-100.7 °F (38.2 °C)] 98.7 °F (37.1 °C)  HR:  [] 99  Resp:  [16-21] 21  BP: ()/(52-68) 93/52  SpO2:  [92 %-97 %] 95 %  Body mass index is 18.13 kg/m².     Input and Output Summary (last 24 hours):     Intake/Output Summary (Last 24 hours) at 2024 1658  Last data filed at 2024 1529  Gross per 24 hour   Intake 576.33 ml   Output 1225 ml   Net -648.67 ml       Physical Exam:   Physical Exam  Vitals reviewed.   Constitutional:       General: She is not in acute distress.     Appearance: Normal appearance. She is not ill-appearing.   HENT:      Head: Normocephalic and atraumatic.   Cardiovascular:      Rate and Rhythm: Regular rhythm. Tachycardia present.      Heart sounds: Normal heart sounds.   Pulmonary:      Effort: Pulmonary effort is normal. No respiratory distress.      Breath sounds: No wheezing or rales.   Abdominal:      General: Bowel sounds are normal.      Palpations: Abdomen is soft.      Tenderness: There is abdominal tenderness.   Musculoskeletal:      Right lower leg: No edema.      Left lower leg: No edema.   Neurological:      Mental  Status: She is alert. Mental status is at baseline.          Additional Data:     Labs:  Results from last 7 days   Lab Units 07/29/24  1302   WBC Thousand/uL 6.82   HEMOGLOBIN g/dL 9.3*   HEMATOCRIT % 28.0*   PLATELETS Thousands/uL 134*   SEGS PCT % 73   LYMPHO PCT % 13*   MONO PCT % 12   EOS PCT % 2     Results from last 7 days   Lab Units 07/29/24  0415 07/28/24  0633   SODIUM mmol/L 133* 137   POTASSIUM mmol/L 3.4* 3.7   CHLORIDE mmol/L 99 102   CO2 mmol/L 29 30   BUN mg/dL 14 8   CREATININE mg/dL 0.44* 0.42*   ANION GAP mmol/L 5 5   CALCIUM mg/dL 7.6* 7.6*   ALBUMIN g/dL  --  2.4*   TOTAL BILIRUBIN mg/dL  --  0.42   ALK PHOS U/L  --  43   ALT U/L  --  15   AST U/L  --  13   GLUCOSE RANDOM mg/dL 105 91     Results from last 7 days   Lab Units 07/25/24  1158   INR  1.42*     Results from last 7 days   Lab Units 07/29/24  1048 07/27/24  1747 07/27/24  1221 07/27/24  0833 07/27/24  0004 07/26/24  2025 07/26/24  1704 07/26/24  1205 07/26/24  0819 07/26/24  0506 07/26/24  0000 07/25/24 2027   POC GLUCOSE mg/dl 151* 156* 139 95 83 102 91 110 90 95 72 93         Results from last 7 days   Lab Units 07/26/24  0435 07/25/24  1158 07/25/24  0839 07/23/24  1423   LACTIC ACID mmol/L 0.4* 3.8* 8.3* 1.0       Lines/Drains:  Invasive Devices       Peripheral Intravenous Line  Duration             Peripheral IV 07/29/24 Right;Ventral (anterior) Forearm <1 day                      Telemetry:  Telemetry Orders (From admission, onward)               24 Hour Telemetry Monitoring  Continuous x 24 Hours (Telem)        Question:  Reason for 24 Hour Telemetry  Answer:  Arrhythmias requiring acute medical intervention / PPM or ICD malfunction                     Telemetry Reviewed: Sinus Tachycardia  Indication for Continued Telemetry Use: No indication for continued use. Will discontinue.              Imaging: No pertinent imaging reviewed.    Recent Cultures (last 7 days):         Last 24 Hours Medication List:   Current  Facility-Administered Medications   Medication Dose Route Frequency Provider Last Rate    acetaminophen  650 mg Oral Q6H PRN Peña Ragab, DO      albuterol  2 puff Inhalation Q4H PRN Peña Ragab, DO      Cholecalciferol  2,000 Units Oral Daily Peña Ragab, DO      fluticasone-vilanterol  1 puff Inhalation BID Peña Ragab, DO      metoprolol succinate  50 mg Oral HS Misty Jaeger, DO      morphine injection  2 mg Intravenous Q4H PRN Peña Ragab, DO      multi-electrolyte  75 mL/hr Intravenous Continuous Misty Jaeger, DO 75 mL/hr (07/29/24 1448)    multivitamin-minerals  1 tablet Oral Daily Peña Ragab, DO      nicotine  1 patch Transdermal Daily Peña Ragab, DO      ondansetron  4 mg Intravenous Q6H PRN Peña Ragab, DO      oxyCODONE  5 mg Oral Q4H PRN Peña Ragab, DO      oxyCODONE  2.5 mg Oral Q4H PRN Peña Ragab, DO      pantoprazole  40 mg Oral BID AC Nora Martinez MD      sodium chloride  1 spray Each Nare Q1H PRN Misty Jaeger DO          Today, Patient Was Seen By: Misty Jaeger DO    **Please Note: This note may have been constructed using a voice recognition system.**

## 2024-07-29 NOTE — TREATMENT PLAN
Blood pressure this afternoon 91/53.  Patient assessed at bedside, asymptomatic.  No overt GI bleeding noted.  Repeat hemoglobin stable at 9.3.  Discussed with GI about monitoring hemodynamics.  Should patient's vital signs worsen will involve IR for embolization.  Will start gentle IVF.  H&H q8hr ordered. Son updated.

## 2024-07-29 NOTE — PROGRESS NOTES
Progress Note -  [facility]  Patrica Grimadlo 79 y.o. female MRN: [id number]  Unit/Bed#: [facility] [place] 830-01 Encounter: [id number]      ASSESSMENT AND PLAN:      80 yo F with history of GERD, COPD, KENYA and hypertension who presented on 7/23 due to right lower quadrant pain.  GI following due to melena, duodenal ulcer.     Melena  Duodenal ulcer    EGD with large duodenal ulcer with nonbleeding visible vessel treated with next powder.  Se has no signs of further bleeding currently and her hemoglobin is stable at 80.    Plan  Transition from Protonix gtt to Protonix 40 mg  po twice daily for at least 6 weeks.  Give IV iron if able  If patient exhibits any signs of rebleeding which include increasing melena, bright red blood per rectum or hematemesis change in hemodynamics including tachycardia and hypotension recommend IR evaluation for EGD embolization as ulcer is extensive and is unlikely to be successfully treated endoscopically.  Obtain H pylori stool antigen test  No plan for follow up repeat EGD outpatient.   Advance diet as tolerated  As needed Zofran for nausea.  Monitor bowel movements.  Monitor hemoglobin, transfuse for less than 7.   Please contact with any questions.      Rest of care per primary team. GI will sign off at this time. Please do not hesitate to reach out with further questions    ______________________________________________________________________    Subjective:  Patient seen and examined at bedside this morning.  Denies any melena, bright red blood per rectum, nausea, vomiting or worsening abdominal pain.  Tolerating full diet this morningen and examined.        REVIEW OF SYSTEMS:    Review of Systems   Constitutional:  Negative for chills and fever.   HENT:  Negative for congestion and sinus pressure.    Respiratory:  Negative for cough and shortness of breath.    Cardiovascular:  Negative for chest pain, palpitations and leg swelling.   Gastrointestinal:  Negative for abdominal  pain, diarrhea, nausea and vomiting.   Genitourinary:  Negative for dysuria and hematuria.   Musculoskeletal:  Negative for arthralgias and back pain.   Skin:  Negative for color change and rash.   Neurological:  Negative for dizziness and headaches.   Psychiatric/Behavioral:  Negative for agitation and confusion.    All other systems reviewed and are negative.         Historical Information   Past Medical History:   Diagnosis Date    Elevated blood pressure reading      Past Surgical History:   Procedure Laterality Date    APPENDECTOMY      TONSILLECTOMY       Social History   Social History     Substance and Sexual Activity   Alcohol Use Never     Social History     Substance and Sexual Activity   Drug Use Never     Social History     Tobacco Use   Smoking Status Every Day    Current packs/day: 1.50    Types: Cigarettes   Smokeless Tobacco Never     Family History   Problem Relation Age of Onset    Lung cancer Mother     Heart disease Maternal Aunt     Heart disease Maternal Uncle     Heart disease Maternal Grandmother        Meds/Allergies       Medications Prior to Admission:     ALPRAZolam (XANAX) 0.25 mg tablet    Cholecalciferol (VITAMIN D3) 2000 units TABS    Fluticasone-Salmeterol (Advair) 250-50 mcg/dose inhaler    metoprolol succinate (TOPROL-XL) 100 mg 24 hr tablet    Multiple Vitamins-Minerals (MULTIVITAMIN ADULT PO)    alendronate (Fosamax) 70 mg tablet  Current Facility-Administered Medications   Medication Dose Route Frequency    acetaminophen (TYLENOL) tablet 650 mg  650 mg Oral Q6H PRN    albuterol (PROVENTIL HFA,VENTOLIN HFA) inhaler 2 puff  2 puff Inhalation Q4H PRN    Cholecalciferol (VITAMIN D3) tablet 2,000 Units  2,000 Units Oral Daily    fluticasone-vilanterol 200-25 mcg/actuation 1 puff  1 puff Inhalation BID    metoprolol succinate (TOPROL-XL) 24 hr tablet 50 mg  50 mg Oral HS    morphine injection 2 mg  2 mg Intravenous Q4H PRN    multivitamin-minerals (CENTRUM) tablet 1 tablet  1 tablet  "Oral Daily    nicotine (NICODERM CQ) 7 mg/24hr TD 24 hr patch 1 patch  1 patch Transdermal Daily    ondansetron (ZOFRAN) injection 4 mg  4 mg Intravenous Q6H PRN    oxyCODONE (ROXICODONE) IR tablet 5 mg  5 mg Oral Q4H PRN    oxyCODONE (ROXICODONE) split tablet 2.5 mg  2.5 mg Oral Q4H PRN    pantoprazole (PROTONIX) 80 mg in sodium chloride 0.9 % 100 mL infusion  8 mg/hr Intravenous Continuous    potassium-sodium phosphateS (K-PHOS,PHOSPHA 250) -250 mg tablet 1 tablet  1 tablet Oral Once    sodium chloride (OCEAN) 0.65 % nasal spray 1 spray  1 spray Each Nare Q1H PRN       Allergies   Allergen Reactions    Erythromycin GI Intolerance     Reaction Date: 26Sep2005;     Lexapro [Escitalopram] Other (See Comments)     hyponatremia           Objective     Blood pressure 90/54, pulse 100, temperature 98.4 °F (36.9 °C), resp. rate 16, height 5' 4\" (1.626 m), weight 47.9 kg (105 lb 9.6 oz), SpO2 94%. Body mass index is 18.13 kg/m².      Intake/Output Summary (Last 24 hours) at 7/29/2024 1236  Last data filed at 7/29/2024 0915  Gross per 24 hour   Intake 776.33 ml   Output 925 ml   Net -148.67 ml         PHYSICAL EXAM:      Physical Exam  Vitals and nursing note reviewed.   Constitutional:       General: She is not in acute distress.     Appearance: Normal appearance. She is not ill-appearing.   HENT:      Head: Normocephalic and atraumatic.      Mouth/Throat:      Mouth: Mucous membranes are moist.   Eyes:      Extraocular Movements: Extraocular movements intact.      Conjunctiva/sclera: Conjunctivae normal.   Cardiovascular:      Pulses: Normal pulses.   Pulmonary:      Effort: Pulmonary effort is normal.   Abdominal:      General: Abdomen is flat. Bowel sounds are normal. There is no distension.      Palpations: Abdomen is soft.      Tenderness: There is no abdominal tenderness. There is no guarding.   Skin:     General: Skin is warm and dry.   Neurological:      General: No focal deficit present.      Mental Status: " She is alert and oriented to person, place, and time.   Psychiatric:         Mood and Affect: Mood normal.         Behavior: Behavior normal.          Lab Results:   No results displayed because visit has over 200 results.          Imaging Studies: I have personally reviewed pertinent imaging studies.    Nora Martinez MD  Gastroenterology Fellow, PGY- 4  Available on EPIC Secure Chat  7/29/2024 12:36 PM

## 2024-07-30 ENCOUNTER — TELEPHONE (OUTPATIENT)
Dept: GASTROENTEROLOGY | Facility: CLINIC | Age: 79
End: 2024-07-30

## 2024-07-30 LAB
ANION GAP SERPL CALCULATED.3IONS-SCNC: 5 MMOL/L (ref 4–13)
BUN SERPL-MCNC: 11 MG/DL (ref 5–25)
CALCIUM SERPL-MCNC: 8.4 MG/DL (ref 8.4–10.2)
CHLORIDE SERPL-SCNC: 100 MMOL/L (ref 96–108)
CO2 SERPL-SCNC: 32 MMOL/L (ref 21–32)
CREAT SERPL-MCNC: 0.49 MG/DL (ref 0.6–1.3)
GFR SERPL CREATININE-BSD FRML MDRD: 92 ML/MIN/1.73SQ M
GLUCOSE SERPL-MCNC: 101 MG/DL (ref 65–140)
HCT VFR BLD AUTO: 26.5 % (ref 34.8–46.1)
HCT VFR BLD AUTO: 29.5 % (ref 34.8–46.1)
HGB BLD-MCNC: 8.7 G/DL (ref 11.5–15.4)
HGB BLD-MCNC: 9.4 G/DL (ref 11.5–15.4)
POTASSIUM SERPL-SCNC: 4 MMOL/L (ref 3.5–5.3)
SODIUM SERPL-SCNC: 137 MMOL/L (ref 135–147)

## 2024-07-30 PROCEDURE — 85014 HEMATOCRIT: CPT | Performed by: STUDENT IN AN ORGANIZED HEALTH CARE EDUCATION/TRAINING PROGRAM

## 2024-07-30 PROCEDURE — 80048 BASIC METABOLIC PNL TOTAL CA: CPT | Performed by: STUDENT IN AN ORGANIZED HEALTH CARE EDUCATION/TRAINING PROGRAM

## 2024-07-30 PROCEDURE — 87338 HPYLORI STOOL AG IA: CPT | Performed by: STUDENT IN AN ORGANIZED HEALTH CARE EDUCATION/TRAINING PROGRAM

## 2024-07-30 PROCEDURE — 85014 HEMATOCRIT: CPT | Performed by: FAMILY MEDICINE

## 2024-07-30 PROCEDURE — 85018 HEMOGLOBIN: CPT | Performed by: STUDENT IN AN ORGANIZED HEALTH CARE EDUCATION/TRAINING PROGRAM

## 2024-07-30 PROCEDURE — 85018 HEMOGLOBIN: CPT | Performed by: FAMILY MEDICINE

## 2024-07-30 PROCEDURE — 97530 THERAPEUTIC ACTIVITIES: CPT

## 2024-07-30 PROCEDURE — 97116 GAIT TRAINING THERAPY: CPT

## 2024-07-30 PROCEDURE — 99232 SBSQ HOSP IP/OBS MODERATE 35: CPT | Performed by: FAMILY MEDICINE

## 2024-07-30 RX ADMIN — FLUTICASONE FUROATE AND VILANTEROL TRIFENATATE 1 PUFF: 200; 25 POWDER RESPIRATORY (INHALATION) at 08:30

## 2024-07-30 RX ADMIN — Medication 1 TABLET: at 08:24

## 2024-07-30 RX ADMIN — PANTOPRAZOLE SODIUM 40 MG: 40 TABLET, DELAYED RELEASE ORAL at 06:31

## 2024-07-30 RX ADMIN — PANTOPRAZOLE SODIUM 40 MG: 40 TABLET, DELAYED RELEASE ORAL at 15:45

## 2024-07-30 RX ADMIN — FLUTICASONE FUROATE AND VILANTEROL TRIFENATATE 1 PUFF: 200; 25 POWDER RESPIRATORY (INHALATION) at 20:48

## 2024-07-30 RX ADMIN — Medication 2000 UNITS: at 08:24

## 2024-07-30 RX ADMIN — METOPROLOL SUCCINATE 50 MG: 50 TABLET, EXTENDED RELEASE ORAL at 20:40

## 2024-07-30 NOTE — PHYSICAL THERAPY NOTE
Physical Therapy Treatment Note    Patient's Name: Patrica Grimaldo  : 24 1425   PT Last Visit   PT Visit Date 24   Note Type   Note Type Treatment   Pain Assessment   Pain Assessment Tool 0-10   Pain Score No Pain   Restrictions/Precautions   Weight Bearing Precautions Per Order No   Other Precautions Chair Alarm;Bed Alarm;Fall Risk  (anxious)   General   Chart Reviewed Yes   Additional Pertinent History (S)  Vitals assessed in supine (/65, ), sitting (/83, ), + standing (/69, ). (+) lightheadedness. SpO2 90% and above RA throughout gait training, +SOB.   Response to Previous Treatment Patient with no complaints from previous session.   Family/Caregiver Present No   Subjective   Subjective Agreeable to mobilize.   Bed Mobility   Supine to Sit 5  Supervision   Additional items HOB elevated   Sit to Supine 5  Supervision   Additional Comments Pt greeted in supine.   Transfers   Sit to Stand 5  Supervision   Additional items Increased time required;Verbal cues   Stand to Sit 5  Supervision   Additional items Increased time required;Verbal cues   Toilet transfer   (CGA)   Additional items Assist x 1;Increased time required;Verbal cues;Standard toilet  (grab bar)   Additional Comments RW   Ambulation/Elevation   Gait pattern Excessively slow;Short stride   Gait Assistance   (CGA -> CS)   Additional items Verbal cues   Assistive Device Rolling walker   Distance 50' + 30' + 80' + 10'x2   Stair Management Assistance   (CGA)   Additional items Assist x 1;Verbal cues;Tactile cues   Stair Management Technique Two rails;Nonreciprocal  (ascending forward/descending backward)   Number of Stairs 4   Balance   Static Sitting Good   Dynamic Sitting Fair +   Static Standing Fair   Dynamic Standing Fair -   Ambulatory Fair -  (RW)   Endurance Deficit   Endurance Deficit Yes   Endurance Deficit Description SOB (SpO2 90% and above RA), lightheadedness, fatigue    Activity Tolerance   Activity Tolerance Patient limited by fatigue  (SOB, anxiousness)   Nurse Made Aware yes - updated   Assessment   Prognosis Good   Problem List Decreased endurance;Decreased mobility;Decreased strength;Impaired balance   Assessment Pt seen for PT treatment session w/ interventions consisting of bed mobility training, t/f training, gait training, + stair training. Pt demonstrated good progress this session w/ increased distance covered during gait training + ability to participate in stair training. Pt c/o lightheadedness w/ transitions - reported slight decrease in lightheadedness throughout session. Primary factor that contributed to rest breaks was SOB. Pt highly anxious w/ PT session and required continuous encouragement + reassurance throughout. Based on pt's performance, recommend HHPT when medically cleared.   Barriers to Discharge None   Goals   Patient Goals feel better   PT Treatment Day 1   Plan   Treatment/Interventions Functional transfer training;LE strengthening/ROM;Elevations;Therapeutic exercise;Endurance training;Patient/family training;Equipment eval/education;Bed mobility;Gait training;Compensatory technique education;Spoke to nursing;OT   Progress Progressing toward goals   PT Frequency 2-3x/wk   Discharge Recommendation   Rehab Resource Intensity Level, PT III (Minimum Resource Intensity)   Equipment Recommended Walker   Walker Package Recommended Wheeled walker   Change/add to Walker Package? No   AM-PAC Basic Mobility Inpatient   Turning in Flat Bed Without Bedrails 3   Lying on Back to Sitting on Edge of Flat Bed Without Bedrails 3   Moving Bed to Chair 3   Standing Up From Chair Using Arms 3   Walk in Room 3   Climb 3-5 Stairs With Railing 3   Basic Mobility Inpatient Raw Score 18   Basic Mobility Standardized Score 41.05   [facility] Highest Level Of Mobility   JH-HLM Goal 6: Walk 10 steps or more   -HLM Achieved 7: Walk 25 feet or more   Education   Education  Provided Mobility training;Assistive device   Patient Demonstrates acceptance/verbal understanding;Reinforcement needed   End of Consult   Patient Position at End of Consult Supine;Bed/Chair alarm activated;All needs within reach  (pt requested to return to bed for a nap at end of session; encouraged pt to sit out in chair for dinner w/ nsg)     Gudelia Nguyen, PT, DPT

## 2024-07-30 NOTE — PLAN OF CARE
Problem: PAIN - ADULT  Goal: Verbalizes/displays adequate comfort level or baseline comfort level  Description: Interventions:  - Encourage patient to monitor pain and request assistance  - Assess pain using appropriate pain scale  - Administer analgesics based on type and severity of pain and evaluate response  - Implement non-pharmacological measures as appropriate and evaluate response  - Consider cultural and social influences on pain and pain management  - Notify physician/advanced practitioner if interventions unsuccessful or patient reports new pain  Outcome: Progressing     Problem: SAFETY ADULT  Goal: Patient will remain free of falls  Description: INTERVENTIONS:  - Educate patient/family on patient safety including physical limitations  - Instruct patient to call for assistance with activity   - Consult OT/PT to assist with strengthening/mobility   - Keep Call bell within reach  - Keep bed low and locked with side rails adjusted as appropriate  - Keep care items and personal belongings within reach  - Initiate and maintain comfort rounds  - Make Fall Risk Sign visible to staff  - Offer Toileting every 2 Hours, in advance of need  - Initiate/Maintain bed alarm  - Obtain necessary fall risk management equipment: assistive devices  - Apply yellow socks and bracelet for high fall risk patients  - Consider moving patient to room near nurses station  Outcome: Progressing  Goal: Maintain or return to baseline ADL function  Description: INTERVENTIONS:  -  Assess patient's ability to carry out ADLs; assess patient's baseline for ADL function and identify physical deficits which impact ability to perform ADLs (bathing, care of mouth/teeth, toileting, grooming, dressing, etc.)  - Assess/evaluate cause of self-care deficits   - Assess range of motion  - Assess patient's mobility; develop plan if impaired  - Assess patient's need for assistive devices and provide as appropriate  - Encourage maximum independence but  intervene and supervise when necessary  - Involve family in performance of ADLs  - Assess for home care needs following discharge   - Consider OT consult to assist with ADL evaluation and planning for discharge  - Provide patient education as appropriate  Outcome: Progressing  Goal: Maintains/Returns to pre admission functional level  Description: INTERVENTIONS:  - Perform AM-PAC 6 Click Basic Mobility/ Daily Activity assessment daily.  - Set and communicate daily mobility goal to care team and patient/family/caregiver.   - Collaborate with rehabilitation services on mobility goals if consulted  - Out of bed for toileting  - Record patient progress and toleration of activity level   Outcome: Progressing     Problem: DISCHARGE PLANNING  Goal: Discharge to home or other facility with appropriate resources  Description: INTERVENTIONS:  - Identify barriers to discharge w/patient and caregiver  - Arrange for needed discharge resources and transportation as appropriate  - Identify discharge learning needs (meds, wound care, etc.)  - Arrange for interpretive services to assist at discharge as needed  - Refer to Case Management Department for coordinating discharge planning if the patient needs post-hospital services based on physician/advanced practitioner order or complex needs related to functional status, cognitive ability, or social support system  Outcome: Progressing     Problem: Knowledge Deficit  Goal: Patient/family/caregiver demonstrates understanding of disease process, treatment plan, medications, and discharge instructions  Description: Complete learning assessment and assess knowledge base.  Interventions:  - Provide teaching at level of understanding  - Provide teaching via preferred learning methods  Outcome: Progressing     Problem: GASTROINTESTINAL - ADULT  Goal: Minimal or absence of nausea and/or vomiting  Description: INTERVENTIONS:  - Administer IV fluids if ordered to ensure adequate hydration  -  Maintain NPO status until nausea and vomiting are resolved  - Nasogastric tube if ordered  - Administer ordered antiemetic medications as needed  - Provide nonpharmacologic comfort measures as appropriate  - Advance diet as tolerated, if ordered  - Consider nutrition services referral to assist patient with adequate nutrition and appropriate food choices  Outcome: Progressing  Goal: Maintains or returns to baseline bowel function  Description: INTERVENTIONS:  - Assess bowel function  - Encourage oral fluids to ensure adequate hydration  - Administer IV fluids if ordered to ensure adequate hydration  - Administer ordered medications as needed  - Encourage mobilization and activity  - Consider nutritional services referral to assist patient with adequate nutrition and appropriate food choices  Outcome: Progressing  Goal: Maintains adequate nutritional intake  Description: INTERVENTIONS:  - Monitor percentage of each meal consumed  - Identify factors contributing to decreased intake, treat as appropriate  - Assist with meals as needed  - Monitor I&O, weight, and lab values if indicated  - Obtain nutrition services referral as needed  Outcome: Progressing  Goal: Oral mucous membranes remain intact  Description: INTERVENTIONS  - Assess oral mucosa and hygiene practices  - Implement preventative oral hygiene regimen  - Implement oral medicated treatments as ordered  - Initiate Nutrition services referral as needed  Outcome: Progressing     Problem: RESPIRATORY - ADULT  Goal: Achieves optimal ventilation and oxygenation  Description: INTERVENTIONS:  - Assess for changes in respiratory status  - Assess for changes in mentation and behavior  - Position to facilitate oxygenation and minimize respiratory effort  - Oxygen administered by appropriate delivery if ordered  - Initiate smoking cessation education as indicated  - Encourage broncho-pulmonary hygiene including cough, deep breathe, Incentive Spirometry  - Assess the  need for suctioning and aspirate as needed  - Assess and instruct to report SOB or any respiratory difficulty  - Respiratory Therapy support as indicated  Outcome: Progressing     Problem: METABOLIC, FLUID AND ELECTROLYTES - ADULT  Goal: Electrolytes maintained within normal limits  Description: INTERVENTIONS:  - Monitor labs and assess patient for signs and symptoms of electrolyte imbalances  - Administer electrolyte replacement as ordered  - Monitor response to electrolyte replacements, including repeat lab results as appropriate  - Instruct patient on fluid and nutrition as appropriate  Outcome: Progressing  Goal: Fluid balance maintained  Description: INTERVENTIONS:  - Monitor labs   - Monitor I/O and WT  - Instruct patient on fluid and nutrition as appropriate  - Assess for signs & symptoms of volume excess or deficit  Outcome: Progressing     Problem: HEMATOLOGIC - ADULT  Goal: Maintains hematologic stability  Description: INTERVENTIONS  - Assess for signs and symptoms of bleeding or hemorrhage  - Monitor labs  - Administer supportive blood products/factors as ordered and appropriate  Outcome: Progressing     Problem: Nutrition/Hydration-ADULT  Goal: Nutrient/Hydration intake appropriate for improving, restoring or maintaining nutritional needs  Description: Monitor and assess patient's nutrition/hydration status for malnutrition. Collaborate with interdisciplinary team and initiate plan and interventions as ordered.  Monitor patient's weight and dietary intake as ordered or per policy. Utilize nutrition screening tool and intervene as necessary. Determine patient's food preferences and provide high-protein, high-caloric foods as appropriate.     INTERVENTIONS:  - Monitor oral intake, urinary output, labs, and treatment plans  - Assess nutrition and hydration status and recommend course of action  - Evaluate amount of meals eaten  - Assist patient with eating if necessary   - Allow adequate time for meals  -  Recommend/ encourage appropriate diets, oral nutritional supplements, and vitamin/mineral supplements  - Order, calculate, and assess calorie counts as needed  - Recommend, monitor, and adjust tube feedings and TPN/PPN based on assessed needs  - Assess need for intravenous fluids  - Provide specific nutrition/hydration education as appropriate  - Include patient/family/caregiver in decisions related to nutrition  Outcome: Progressing     Problem: Prexisting or High Potential for Compromised Skin Integrity  Goal: Skin integrity is maintained or improved  Description: INTERVENTIONS:  - Identify patients at risk for skin breakdown  - Assess and monitor skin integrity  - Assess and monitor nutrition and hydration status  - Monitor labs   - Assess for incontinence   - Turn and reposition patient  - Assist with mobility/ambulation  - Relieve pressure over bony prominences  - Avoid friction and shearing  - Provide appropriate hygiene as needed including keeping skin clean and dry  - Evaluate need for skin moisturizer/barrier cream  - Collaborate with interdisciplinary team   - Patient/family teaching  - Consider wound care consult   Outcome: Progressing

## 2024-07-30 NOTE — PLAN OF CARE
Problem: PAIN - ADULT  Goal: Verbalizes/displays adequate comfort level or baseline comfort level  Description: Interventions:  - Encourage patient to monitor pain and request assistance  - Assess pain using appropriate pain scale  - Administer analgesics based on type and severity of pain and evaluate response  - Implement non-pharmacological measures as appropriate and evaluate response  - Consider cultural and social influences on pain and pain management  - Notify physician/advanced practitioner if interventions unsuccessful or patient reports new pain  Outcome: Progressing     Problem: SAFETY ADULT  Goal: Patient will remain free of falls  Description: INTERVENTIONS:  - Educate patient/family on patient safety including physical limitations  - Instruct patient to call for assistance with activity   - Consult OT/PT to assist with strengthening/mobility   - Keep Call bell within reach  - Keep bed low and locked with side rails adjusted as appropriate  - Keep care items and personal belongings within reach  - Initiate and maintain comfort rounds  - Make Fall Risk Sign visible to staff  - Offer Toileting every 2 Hours, in advance of need  - Initiate/Maintain bed alarm  - Obtain necessary fall risk management equipment: assistive devices  - Apply yellow socks and bracelet for high fall risk patients  - Consider moving patient to room near nurses station  Outcome: Progressing  Goal: Maintain or return to baseline ADL function  Description: INTERVENTIONS:  -  Assess patient's ability to carry out ADLs; assess patient's baseline for ADL function and identify physical deficits which impact ability to perform ADLs (bathing, care of mouth/teeth, toileting, grooming, dressing, etc.)  - Assess/evaluate cause of self-care deficits   - Assess range of motion  - Assess patient's mobility; develop plan if impaired  - Assess patient's need for assistive devices and provide as appropriate  - Encourage maximum independence but  intervene and supervise when necessary  - Involve family in performance of ADLs  - Assess for home care needs following discharge   - Consider OT consult to assist with ADL evaluation and planning for discharge  - Provide patient education as appropriate  Outcome: Progressing  Goal: Maintains/Returns to pre admission functional level  Description: INTERVENTIONS:  - Perform AM-PAC 6 Click Basic Mobility/ Daily Activity assessment daily.  - Set and communicate daily mobility goal to care team and patient/family/caregiver.   - Collaborate with rehabilitation services on mobility goals if consulted  - Stand patient 3 times a day  - Ambulate patient 3 times a day  - Out of bed to chair 3 times a day   - Out of bed for meals 3 times a day  - Out of bed for toileting  - Record patient progress and toleration of activity level   Outcome: Progressing     Problem: DISCHARGE PLANNING  Goal: Discharge to home or other facility with appropriate resources  Description: INTERVENTIONS:  - Identify barriers to discharge w/patient and caregiver  - Arrange for needed discharge resources and transportation as appropriate  - Identify discharge learning needs (meds, wound care, etc.)  - Arrange for interpretive services to assist at discharge as needed  - Refer to Case Management Department for coordinating discharge planning if the patient needs post-hospital services based on physician/advanced practitioner order or complex needs related to functional status, cognitive ability, or social support system  Outcome: Progressing     Problem: Knowledge Deficit  Goal: Patient/family/caregiver demonstrates understanding of disease process, treatment plan, medications, and discharge instructions  Description: Complete learning assessment and assess knowledge base.  Interventions:  - Provide teaching at level of understanding  - Provide teaching via preferred learning methods  Outcome: Progressing     Problem: GASTROINTESTINAL - ADULT  Goal:  Minimal or absence of nausea and/or vomiting  Description: INTERVENTIONS:  - Administer IV fluids if ordered to ensure adequate hydration  - Maintain NPO status until nausea and vomiting are resolved  - Nasogastric tube if ordered  - Administer ordered antiemetic medications as needed  - Provide nonpharmacologic comfort measures as appropriate  - Advance diet as tolerated, if ordered  - Consider nutrition services referral to assist patient with adequate nutrition and appropriate food choices  Outcome: Progressing  Goal: Maintains or returns to baseline bowel function  Description: INTERVENTIONS:  - Assess bowel function  - Encourage oral fluids to ensure adequate hydration  - Administer IV fluids if ordered to ensure adequate hydration  - Administer ordered medications as needed  - Encourage mobilization and activity  - Consider nutritional services referral to assist patient with adequate nutrition and appropriate food choices  Outcome: Progressing  Goal: Maintains adequate nutritional intake  Description: INTERVENTIONS:  - Monitor percentage of each meal consumed  - Identify factors contributing to decreased intake, treat as appropriate  - Assist with meals as needed  - Monitor I&O, weight, and lab values if indicated  - Obtain nutrition services referral as needed  Outcome: Progressing  Goal: Oral mucous membranes remain intact  Description: INTERVENTIONS  - Assess oral mucosa and hygiene practices  - Implement preventative oral hygiene regimen  - Implement oral medicated treatments as ordered  - Initiate Nutrition services referral as needed  Outcome: Progressing     Problem: Nutrition/Hydration-ADULT  Goal: Nutrient/Hydration intake appropriate for improving, restoring or maintaining nutritional needs  Description: Monitor and assess patient's nutrition/hydration status for malnutrition. Collaborate with interdisciplinary team and initiate plan and interventions as ordered.  Monitor patient's weight and  dietary intake as ordered or per policy. Utilize nutrition screening tool and intervene as necessary. Determine patient's food preferences and provide high-protein, high-caloric foods as appropriate.     INTERVENTIONS:  - Monitor oral intake, urinary output, labs, and treatment plans  - Assess nutrition and hydration status and recommend course of action  - Evaluate amount of meals eaten  - Assist patient with eating if necessary   - Allow adequate time for meals  - Recommend/ encourage appropriate diets, oral nutritional supplements, and vitamin/mineral supplements  - Order, calculate, and assess calorie counts as needed  - Recommend, monitor, and adjust tube feedings and TPN/PPN based on assessed needs  - Assess need for intravenous fluids  - Provide specific nutrition/hydration education as appropriate  - Include patient/family/caregiver in decisions related to nutrition  Outcome: Progressing     Problem: Prexisting or High Potential for Compromised Skin Integrity  Goal: Skin integrity is maintained or improved  Description: INTERVENTIONS:  - Identify patients at risk for skin breakdown  - Assess and monitor skin integrity  - Assess and monitor nutrition and hydration status  - Monitor labs   - Assess for incontinence   - Turn and reposition patient  - Assist with mobility/ambulation  - Relieve pressure over bony prominences  - Avoid friction and shearing  - Provide appropriate hygiene as needed including keeping skin clean and dry  - Evaluate need for skin moisturizer/barrier cream  - Collaborate with interdisciplinary team   - Patient/family teaching  - Consider wound care consult   Outcome: Progressing     Problem: RESPIRATORY - ADULT  Goal: Achieves optimal ventilation and oxygenation  Description: INTERVENTIONS:  - Assess for changes in respiratory status  - Assess for changes in mentation and behavior  - Position to facilitate oxygenation and minimize respiratory effort  - Oxygen administered by appropriate  delivery if ordered  - Initiate smoking cessation education as indicated  - Encourage broncho-pulmonary hygiene including cough, deep breathe, Incentive Spirometry  - Assess the need for suctioning and aspirate as needed  - Assess and instruct to report SOB or any respiratory difficulty  - Respiratory Therapy support as indicated  Outcome: Progressing     Problem: METABOLIC, FLUID AND ELECTROLYTES - ADULT  Goal: Electrolytes maintained within normal limits  Description: INTERVENTIONS:  - Monitor labs and assess patient for signs and symptoms of electrolyte imbalances  - Administer electrolyte replacement as ordered  - Monitor response to electrolyte replacements, including repeat lab results as appropriate  - Instruct patient on fluid and nutrition as appropriate  Outcome: Progressing  Goal: Fluid balance maintained  Description: INTERVENTIONS:  - Monitor labs   - Monitor I/O and WT  - Instruct patient on fluid and nutrition as appropriate  - Assess for signs & symptoms of volume excess or deficit  Outcome: Progressing     Problem: HEMATOLOGIC - ADULT  Goal: Maintains hematologic stability  Description: INTERVENTIONS  - Assess for signs and symptoms of bleeding or hemorrhage  - Monitor labs  - Administer supportive blood products/factors as ordered and appropriate  Outcome: Progressing

## 2024-07-30 NOTE — TELEPHONE ENCOUNTER
Spoke with son he will call back to schedule for his mom and himself one his mom is discharged from hospital. ----- Message from Carolyn Villafuerte DO sent at 7/29/2024  5:37 PM EDT -----  Regarding: hospital follow up  Good morning,    Can you please contact patient to arrange follow up in office in 8-12 weeks? Thank you,    Gabriele

## 2024-07-30 NOTE — PLAN OF CARE
Problem: PHYSICAL THERAPY ADULT  Goal: Performs mobility at highest level of function for planned discharge setting.  See evaluation for individualized goals.  Description: Treatment/Interventions: Functional transfer training, Therapeutic exercise, Elevations, Bed mobility, Gait training, Spoke to nursing, Spoke to case management, OT  Equipment Recommended: Walker       See flowsheet documentation for full assessment, interventions and recommendations.  Outcome: Progressing  Note: Prognosis: Good  Problem List: Decreased endurance, Decreased mobility, Decreased strength, Impaired balance  Assessment: Pt seen for PT treatment session w/ interventions consisting of bed mobility training, t/f training, gait training, + stair training. Pt demonstrated good progress this session w/ increased distance covered during gait training + ability to participate in stair training. Pt c/o lightheadedness w/ transitions - reported slight decrease in lightheadedness throughout session. Primary factor that contributed to rest breaks was SOB. Pt highly anxious w/ PT session and required continuous encouragement + reassurance throughout. Based on pt's performance, recommend HHPT when medically cleared.  Barriers to Discharge: None     Rehab Resource Intensity Level, PT: III (Minimum Resource Intensity)    See flowsheet documentation for full assessment.

## 2024-07-30 NOTE — PROGRESS NOTES
[facility]  Progress Note  Name: Patrica Grimaldo I  MRN: [id number]  Unit/Bed#: [facility] 830-01 I Date of Admission: 7/23/2024   Date of Service: 7/30/2024 I Hospital Day: 7    Assessment & Plan   * Duodenal ulcer  Assessment & Plan  Patient came to the hospital with recurrent abdominal pain pain is worse after eating.  Pain is located in the periumbilical region and also suprapubic region.  Patient reported that she noted some dark stools at home but no rebeca bleeding  Patient was noted to have multiple large melenic bowel movements with acute drop in hemoglobin  EGD with large duodenal ulcer with nonbleeding visible vessel treated with nexpowder  Hemoglobin stable  PPI gtt.  GI following, appreciate recommendations  Monitor bowel movement and transfuse as needed    Fever  Assessment & Plan  Temp 100.7 yesterday evening, resolved today  Complains of some nasal congestion and drainage  No hypoxia or shortness of breath  RSV/COVID/flu negative  CBC ordered without any leukocytosis  Monitor    Anemia  Assessment & Plan  Secondary to GI bleed  Follow-up iron panel  Transfuse for hemoglobin <7  H&H q12hrs    Severe protein-calorie malnutrition (HCC)  Assessment & Plan  Malnutrition Findings:   Adult Malnutrition type: Acute illness  Adult Degree of Malnutrition: Other severe protein calorie malnutrition  Malnutrition Characteristics: Fat loss, Muscle loss, Inadequate energy                  360 Statement: severe malnutrition r/t acute illness as evidenced by PO intake <50% of estimated needs for at least 5 days, moderate-severe muscle loss around clavicles, moderate muscle loss shoulders and temples, mild buccal fat pad loss. Treatment: as medically able, oral diet with oral nutrition supplements    BMI Findings:  Adult BMI Classifications: Underweight < 18.5        Body mass index is 18.13 kg/m².       Mild protein-calorie malnutrition (HCC)  Assessment & Plan  Malnutrition Findings:    Adult Malnutrition type: Acute illness  Patient with BMI of 17.6.  Patient reported that she may have lost some weight.  Encourage p.o. nutrition  Patient reported poor p.o. intake due to abdominal pain    BMI Findings:     Body mass index is 18.13 kg/m².       Subclinical hyperthyroidism  Assessment & Plan  Patient was on methimazole in the past.  Recent thyroid function studies within normal limits and reported that she is currently off of the methimazole    Vitamin D deficiency  Assessment & Plan  Continue with supplementation    Essential hypertension  Assessment & Plan  Patient with known history of hypertension.  Home regimen Toprol- mg at bedtime  /68 this morning  Resume Toprol XL 50 mg at bedtime, uptitrate as tolerated  Monitor blood pressure    Chronic obstructive pulmonary disease (HCC)  Assessment & Plan  With known history of COPD.  No acute exacerbation.  Continue with respiratory protocol    Generalized anxiety disorder  Assessment & Plan  By reviewing the PDMP patient has not filled Xanax recently  Monitor                VTE Pharmacologic Prophylaxis: VTE Score: 3 Moderate Risk (Score 3-4) - Pharmacological DVT Prophylaxis Contraindicated. Sequential Compression Devices Ordered.    Mobility:   Basic Mobility Inpatient Raw Score: 17  JH-HLM Goal: 5: Stand one or more mins  JH-HLM Achieved: 7: Walk 25 feet or more  JH-HLM Goal achieved. Continue to encourage appropriate mobility.    Patient Centered Rounds: I performed bedside rounds with nursing staff today.   Discussions with Specialists or Other Care Team Provider: GI    Education and Discussions with Family / Patient: Updated  (son) via phone.    Total Time Spent on Date of Encounter in care of patient: 45 mins. This time was spent on one or more of the following: performing physical exam; counseling and coordination of care; obtaining or reviewing history; documenting in the medical record; reviewing/ordering tests,  medications or procedures; communicating with other healthcare professionals and discussing with patient's family/caregivers.    Current Length of Stay: 7 day(s)  Current Patient Status: Inpatient   Certification Statement: The patient will continue to require additional inpatient hospital stay due to Monitoring Hg and Bp  Discharge Plan: Anticipate discharge tomorrow to home.    Code Status: Level 1 - Full Code    Subjective:   This is a very pleasant 79 y.o. female who was seen today at bedside. Patient has no new complaints. Patient is not in any acute distress.       Objective:     Vitals:   Temp (24hrs), Av.8 °F (37.1 °C), Min:98.7 °F (37.1 °C), Max:98.9 °F (37.2 °C)    Temp:  [98.7 °F (37.1 °C)-98.9 °F (37.2 °C)] 98.7 °F (37.1 °C)  HR:  [] 148  Resp:  [12-21] 12  BP: ()/(52-83) 115/69  SpO2:  [90 %-95 %] 91 %  Body mass index is 18.13 kg/m².     Input and Output Summary (last 24 hours):     Intake/Output Summary (Last 24 hours) at 2024 1431  Last data filed at 2024 1247  Gross per 24 hour   Intake 1470 ml   Output 600 ml   Net 870 ml       Physical Exam:   Physical Exam  Vitals reviewed.   Constitutional:       General: She is not in acute distress.     Appearance: She is not ill-appearing.   HENT:      Head: Normocephalic.   Eyes:      Conjunctiva/sclera: Conjunctivae normal.   Cardiovascular:      Rate and Rhythm: Regular rhythm. Tachycardia present.   Pulmonary:      Effort: Pulmonary effort is normal.   Abdominal:      General: Abdomen is flat.      Palpations: Abdomen is soft.      Tenderness: There is no abdominal tenderness.   Skin:     General: Skin is warm and dry.   Neurological:      Mental Status: She is alert. Mental status is at baseline.          Additional Data:     Labs:  Results from last 7 days   Lab Units 24  0604 24  2233 24  1302   WBC Thousand/uL  --   --  6.82   HEMOGLOBIN g/dL 9.4*   < > 9.3*   HEMATOCRIT % 29.5*   < > 28.0*   PLATELETS  Thousands/uL  --   --  134*   SEGS PCT %  --   --  73   LYMPHO PCT %  --   --  13*   MONO PCT %  --   --  12   EOS PCT %  --   --  2    < > = values in this interval not displayed.     Results from last 7 days   Lab Units 07/30/24  0604 07/29/24  0415 07/28/24  0633   SODIUM mmol/L 137   < > 137   POTASSIUM mmol/L 4.0   < > 3.7   CHLORIDE mmol/L 100   < > 102   CO2 mmol/L 32   < > 30   BUN mg/dL 11   < > 8   CREATININE mg/dL 0.49*   < > 0.42*   ANION GAP mmol/L 5   < > 5   CALCIUM mg/dL 8.4   < > 7.6*   ALBUMIN g/dL  --   --  2.4*   TOTAL BILIRUBIN mg/dL  --   --  0.42   ALK PHOS U/L  --   --  43   ALT U/L  --   --  15   AST U/L  --   --  13   GLUCOSE RANDOM mg/dL 101   < > 91    < > = values in this interval not displayed.     Results from last 7 days   Lab Units 07/25/24  1158   INR  1.42*     Results from last 7 days   Lab Units 07/29/24  1048 07/27/24  1747 07/27/24  1221 07/27/24  0833 07/27/24  0004 07/26/24  2025 07/26/24  1704 07/26/24  1205 07/26/24  0819 07/26/24  0506 07/26/24  0000 07/25/24 2027   POC GLUCOSE mg/dl 151* 156* 139 95 83 102 91 110 90 95 72 93         Results from last 7 days   Lab Units 07/26/24  0435 07/25/24  1158 07/25/24  0839   LACTIC ACID mmol/L 0.4* 3.8* 8.3*       Lines/Drains:  Invasive Devices       Peripheral Intravenous Line  Duration             Peripheral IV 07/29/24 Right;Ventral (anterior) Forearm 1 day                          Imaging: Reviewed radiology reports from this admission including: abdominal/pelvic CT    Recent Cultures (last 7 days):         Last 24 Hours Medication List:   Current Facility-Administered Medications   Medication Dose Route Frequency Provider Last Rate    acetaminophen  650 mg Oral Q6H PRN Peña Ragab, DO      albuterol  2 puff Inhalation Q4H PRN Peña Ragab, DO      Cholecalciferol  2,000 Units Oral Daily Peñaisaac Crowley, DO      fluticasone-vilanterol  1 puff Inhalation BID Peñaisaac Crowley, DO      metoprolol succinate  50 mg Oral HS  Misty Jaeger, DO      morphine injection  2 mg Intravenous Q4H PRN Peña Ragab, DO      multivitamin-minerals  1 tablet Oral Daily Peña Ragab, DO      nicotine  1 patch Transdermal Daily Peña Ragab, DO      ondansetron  4 mg Intravenous Q6H PRN Peña Ragab, DO      oxyCODONE  5 mg Oral Q4H PRN Peña Ragab, DO      oxyCODONE  2.5 mg Oral Q4H PRN Peña Ragab, DO      pantoprazole  40 mg Oral BID AC Nora Martinez MD      sodium chloride  1 spray Each Nare Q1H PRN Misty Jaeger, DO          Today, Patient Was Seen By: Robby Patricio MD    **Please Note: This note may have been constructed using a voice recognition system.**

## 2024-07-30 NOTE — RESTORATIVE TECHNICIAN NOTE
Restorative Technician Note      Patient Name: Patrica Grimaldo     [facility] Visit Date: 07/30/24  Note Type: Mobility  Patient Position Upon Consult: Bedside chair  Activity Performed: Ambulated  Assistive Device: Standard walker; Other (Comment) (2nd person for chair follow; pt SpO2 destated to 86% during ambulation but improved with seated rest breaks; 90-94%)  Education Provided: Yes  Patient Position at End of Consult: Bedside chair; All needs within reach; Bed/Chair alarm activated    Divine Guerrier  DPT, Restorative Technician

## 2024-07-30 NOTE — PLAN OF CARE
Problem: PAIN - ADULT  Goal: Verbalizes/displays adequate comfort level or baseline comfort level  Description: Interventions:  - Encourage patient to monitor pain and request assistance  - Assess pain using appropriate pain scale  - Administer analgesics based on type and severity of pain and evaluate response  - Implement non-pharmacological measures as appropriate and evaluate response  - Consider cultural and social influences on pain and pain management  - Notify physician/advanced practitioner if interventions unsuccessful or patient reports new pain  7/30/2024 0755 by Elodia Burgos RN  Outcome: Progressing  7/30/2024 0754 by Elodia Burgos RN  Outcome: Progressing     Problem: SAFETY ADULT  Goal: Patient will remain free of falls  Description: INTERVENTIONS:  - Educate patient/family on patient safety including physical limitations  - Instruct patient to call for assistance with activity   - Consult OT/PT to assist with strengthening/mobility   - Keep Call bell within reach  - Keep bed low and locked with side rails adjusted as appropriate  - Keep care items and personal belongings within reach  - Initiate and maintain comfort rounds  - Make Fall Risk Sign visible to staff  - Offer Toileting every 2 Hours, in advance of need  - Initiate/Maintain bed alarm  - Obtain necessary fall risk management equipment: assistive devices  - Apply yellow socks and bracelet for high fall risk patients  - Consider moving patient to room near nurses station  7/30/2024 0755 by Elodia Burgos RN  Outcome: Progressing  7/30/2024 0754 by Elodia Burgos RN  Outcome: Progressing  Goal: Maintain or return to baseline ADL function  Description: INTERVENTIONS:  -  Assess patient's ability to carry out ADLs; assess patient's baseline for ADL function and identify physical deficits which impact ability to perform ADLs (bathing, care of mouth/teeth, toileting, grooming, dressing, etc.)  - Assess/evaluate cause of self-care  deficits   - Assess range of motion  - Assess patient's mobility; develop plan if impaired  - Assess patient's need for assistive devices and provide as appropriate  - Encourage maximum independence but intervene and supervise when necessary  - Involve family in performance of ADLs  - Assess for home care needs following discharge   - Consider OT consult to assist with ADL evaluation and planning for discharge  - Provide patient education as appropriate  7/30/2024 0755 by Elodia Burgos RN  Outcome: Progressing  7/30/2024 0754 by Elodia Burgos RN  Outcome: Progressing  Goal: Maintains/Returns to pre admission functional level  Description: INTERVENTIONS:  - Perform AM-PAC 6 Click Basic Mobility/ Daily Activity assessment daily.  - Set and communicate daily mobility goal to care team and patient/family/caregiver.   - Collaborate with rehabilitation services on mobility goals if consulted  - Perform Range of Motion 6 times a day.  - Reposition patient every 2 hours.  - Dangle patient 2 times a day  - Stand patient 2 times a day  - Ambulate patient 2 times a day  - Out of bed to chair 2 times a day   - Out of bed for meals 2 times a day  - Out of bed for toileting  - Record patient progress and toleration of activity level   7/30/2024 0755 by Elodia Burgos RN  Outcome: Progressing  7/30/2024 0754 by lEodia Burgos RN  Outcome: Progressing     Problem: DISCHARGE PLANNING  Goal: Discharge to home or other facility with appropriate resources  Description: INTERVENTIONS:  - Identify barriers to discharge w/patient and caregiver  - Arrange for needed discharge resources and transportation as appropriate  - Identify discharge learning needs (meds, wound care, etc.)  - Arrange for interpretive services to assist at discharge as needed  - Refer to Case Management Department for coordinating discharge planning if the patient needs post-hospital services based on physician/advanced practitioner order or complex needs  related to functional status, cognitive ability, or social support system  7/30/2024 0755 by Elodia Burgos RN  Outcome: Progressing  7/30/2024 0754 by Elodia Burgos RN  Outcome: Progressing     Problem: Knowledge Deficit  Goal: Patient/family/caregiver demonstrates understanding of disease process, treatment plan, medications, and discharge instructions  Description: Complete learning assessment and assess knowledge base.  Interventions:  - Provide teaching at level of understanding  - Provide teaching via preferred learning methods  7/30/2024 0755 by Elodia Burgos RN  Outcome: Progressing  7/30/2024 0754 by Elodia Burgos RN  Outcome: Progressing     Problem: GASTROINTESTINAL - ADULT  Goal: Minimal or absence of nausea and/or vomiting  Description: INTERVENTIONS:  - Administer IV fluids if ordered to ensure adequate hydration  - Maintain NPO status until nausea and vomiting are resolved  - Nasogastric tube if ordered  - Administer ordered antiemetic medications as needed  - Provide nonpharmacologic comfort measures as appropriate  - Advance diet as tolerated, if ordered  - Consider nutrition services referral to assist patient with adequate nutrition and appropriate food choices  7/30/2024 0755 by Elodia Burgos RN  Outcome: Progressing  7/30/2024 0754 by Elodia Burgos RN  Outcome: Progressing  Goal: Maintains or returns to baseline bowel function  Description: INTERVENTIONS:  - Assess bowel function  - Encourage oral fluids to ensure adequate hydration  - Administer IV fluids if ordered to ensure adequate hydration  - Administer ordered medications as needed  - Encourage mobilization and activity  - Consider nutritional services referral to assist patient with adequate nutrition and appropriate food choices  7/30/2024 0755 by Elodia Burgos RN  Outcome: Progressing  7/30/2024 0754 by Elodia Burgos RN  Outcome: Progressing  Goal: Maintains adequate nutritional intake  Description:  INTERVENTIONS:  - Monitor percentage of each meal consumed  - Identify factors contributing to decreased intake, treat as appropriate  - Assist with meals as needed  - Monitor I&O, weight, and lab values if indicated  - Obtain nutrition services referral as needed  7/30/2024 0755 by Elodia Burgos RN  Outcome: Progressing  7/30/2024 0754 by Elodia Burgos RN  Outcome: Progressing  Goal: Oral mucous membranes remain intact  Description: INTERVENTIONS  - Assess oral mucosa and hygiene practices  - Implement preventative oral hygiene regimen  - Implement oral medicated treatments as ordered  - Initiate Nutrition services referral as needed  7/30/2024 0755 by Elodia Burgos RN  Outcome: Progressing  7/30/2024 0754 by Elodia Burgos RN  Outcome: Progressing     Problem: Nutrition/Hydration-ADULT  Goal: Nutrient/Hydration intake appropriate for improving, restoring or maintaining nutritional needs  Description: Monitor and assess patient's nutrition/hydration status for malnutrition. Collaborate with interdisciplinary team and initiate plan and interventions as ordered.  Monitor patient's weight and dietary intake as ordered or per policy. Utilize nutrition screening tool and intervene as necessary. Determine patient's food preferences and provide high-protein, high-caloric foods as appropriate.     INTERVENTIONS:  - Monitor oral intake, urinary output, labs, and treatment plans  - Assess nutrition and hydration status and recommend course of action  - Evaluate amount of meals eaten  - Assist patient with eating if necessary   - Allow adequate time for meals  - Recommend/ encourage appropriate diets, oral nutritional supplements, and vitamin/mineral supplements  - Order, calculate, and assess calorie counts as needed  - Recommend, monitor, and adjust tube feedings and TPN/PPN based on assessed needs  - Assess need for intravenous fluids  - Provide specific nutrition/hydration education as appropriate  - Include  patient/family/caregiver in decisions related to nutrition  7/30/2024 0755 by Elodia Burgos RN  Outcome: Progressing  7/30/2024 0754 by Elodia Burgos RN  Outcome: Progressing     Problem: Prexisting or High Potential for Compromised Skin Integrity  Goal: Skin integrity is maintained or improved  Description: INTERVENTIONS:  - Identify patients at risk for skin breakdown  - Assess and monitor skin integrity  - Assess and monitor nutrition and hydration status  - Monitor labs   - Assess for incontinence   - Turn and reposition patient  - Assist with mobility/ambulation  - Relieve pressure over bony prominences  - Avoid friction and shearing  - Provide appropriate hygiene as needed including keeping skin clean and dry  - Evaluate need for skin moisturizer/barrier cream  - Collaborate with interdisciplinary team   - Patient/family teaching  - Consider wound care consult   7/30/2024 0755 by Elodia Burgos RN  Outcome: Progressing  7/30/2024 0754 by Elodia Burgos RN  Outcome: Progressing     Problem: RESPIRATORY - ADULT  Goal: Achieves optimal ventilation and oxygenation  Description: INTERVENTIONS:  - Assess for changes in respiratory status  - Assess for changes in mentation and behavior  - Position to facilitate oxygenation and minimize respiratory effort  - Oxygen administered by appropriate delivery if ordered  - Initiate smoking cessation education as indicated  - Encourage broncho-pulmonary hygiene including cough, deep breathe, Incentive Spirometry  - Assess the need for suctioning and aspirate as needed  - Assess and instruct to report SOB or any respiratory difficulty  - Respiratory Therapy support as indicated  7/30/2024 0755 by Elodia Burgos RN  Outcome: Progressing  7/30/2024 0754 by Elodia Burgos RN  Outcome: Progressing     Problem: METABOLIC, FLUID AND ELECTROLYTES - ADULT  Goal: Electrolytes maintained within normal limits  Description: INTERVENTIONS:  - Monitor labs and assess patient  for signs and symptoms of electrolyte imbalances  - Administer electrolyte replacement as ordered  - Monitor response to electrolyte replacements, including repeat lab results as appropriate  - Instruct patient on fluid and nutrition as appropriate  7/30/2024 0755 by Elodia Burgos RN  Outcome: Progressing  7/30/2024 0754 by Elodia Burgos RN  Outcome: Progressing  Goal: Fluid balance maintained  Description: INTERVENTIONS:  - Monitor labs   - Monitor I/O and WT  - Instruct patient on fluid and nutrition as appropriate  - Assess for signs & symptoms of volume excess or deficit  7/30/2024 0755 by Elodia Burgos RN  Outcome: Progressing  7/30/2024 0754 by Elodia Burgos RN  Outcome: Progressing     Problem: HEMATOLOGIC - ADULT  Goal: Maintains hematologic stability  Description: INTERVENTIONS  - Assess for signs and symptoms of bleeding or hemorrhage  - Monitor labs  - Administer supportive blood products/factors as ordered and appropriate  7/30/2024 0755 by Elodia Burgos RN  Outcome: Progressing  7/30/2024 0754 by Elodia Burgos RN  Outcome: Progressing

## 2024-07-30 NOTE — RESTORATIVE TECHNICIAN NOTE
Restorative Technician Note      Patient Name: Patrica Grimaldo     [facility] Tech Visit Date: 07/30/24  Note Type: Mobility  Patient Position Upon Consult: Supine  Activity Performed: Dangled; Stood; Transferred; Other (Comment) (steps to beside chair; pt requested to attempt ambulating after her inhaler at 9; will follow up)  Assistive Device: Standard walker  Education Provided: Yes  Patient Position at End of Consult: Bedside chair; All needs within reach; Bed/Chair alarm activated    Divine Guerrier  DPT, Restorative Technician

## 2024-07-31 VITALS
SYSTOLIC BLOOD PRESSURE: 115 MMHG | BODY MASS INDEX: 17.58 KG/M2 | DIASTOLIC BLOOD PRESSURE: 62 MMHG | WEIGHT: 102.95 LBS | RESPIRATION RATE: 20 BRPM | HEIGHT: 64 IN | TEMPERATURE: 98.9 F | HEART RATE: 99 BPM | OXYGEN SATURATION: 91 %

## 2024-07-31 LAB
ALBUMIN SERPL BCG-MCNC: 2.6 G/DL (ref 3.5–5)
ALP SERPL-CCNC: 47 U/L (ref 34–104)
ALT SERPL W P-5'-P-CCNC: 9 U/L (ref 7–52)
ANION GAP SERPL CALCULATED.3IONS-SCNC: 5 MMOL/L (ref 4–13)
AST SERPL W P-5'-P-CCNC: 12 U/L (ref 13–39)
BASOPHILS # BLD AUTO: 0.03 THOUSANDS/ÂΜL (ref 0–0.1)
BASOPHILS NFR BLD AUTO: 0 % (ref 0–1)
BILIRUB SERPL-MCNC: 0.25 MG/DL (ref 0.2–1)
BUN SERPL-MCNC: 11 MG/DL (ref 5–25)
CALCIUM ALBUM COR SERPL-MCNC: 9.5 MG/DL (ref 8.3–10.1)
CALCIUM SERPL-MCNC: 8.4 MG/DL (ref 8.4–10.2)
CHLORIDE SERPL-SCNC: 99 MMOL/L (ref 96–108)
CO2 SERPL-SCNC: 31 MMOL/L (ref 21–32)
CREAT SERPL-MCNC: 0.44 MG/DL (ref 0.6–1.3)
EOSINOPHIL # BLD AUTO: 0.2 THOUSAND/ÂΜL (ref 0–0.61)
EOSINOPHIL NFR BLD AUTO: 3 % (ref 0–6)
ERYTHROCYTE [DISTWIDTH] IN BLOOD BY AUTOMATED COUNT: 13.9 % (ref 11.6–15.1)
GFR SERPL CREATININE-BSD FRML MDRD: 95 ML/MIN/1.73SQ M
GLUCOSE SERPL-MCNC: 105 MG/DL (ref 65–140)
HCT VFR BLD AUTO: 28.6 % (ref 34.8–46.1)
HCT VFR BLD AUTO: 29.6 % (ref 34.8–46.1)
HGB BLD-MCNC: 9.3 G/DL (ref 11.5–15.4)
HGB BLD-MCNC: 9.4 G/DL (ref 11.5–15.4)
IMM GRANULOCYTES # BLD AUTO: 0.06 THOUSAND/UL (ref 0–0.2)
IMM GRANULOCYTES NFR BLD AUTO: 1 % (ref 0–2)
LYMPHOCYTES # BLD AUTO: 1.28 THOUSANDS/ÂΜL (ref 0.6–4.47)
LYMPHOCYTES NFR BLD AUTO: 16 % (ref 14–44)
MCH RBC QN AUTO: 31.5 PG (ref 26.8–34.3)
MCHC RBC AUTO-ENTMCNC: 32.5 G/DL (ref 31.4–37.4)
MCV RBC AUTO: 97 FL (ref 82–98)
MONOCYTES # BLD AUTO: 1.02 THOUSAND/ÂΜL (ref 0.17–1.22)
MONOCYTES NFR BLD AUTO: 13 % (ref 4–12)
NEUTROPHILS # BLD AUTO: 5.57 THOUSANDS/ÂΜL (ref 1.85–7.62)
NEUTS SEG NFR BLD AUTO: 67 % (ref 43–75)
NRBC BLD AUTO-RTO: 0 /100 WBCS
PLATELET # BLD AUTO: 180 THOUSANDS/UL (ref 149–390)
PMV BLD AUTO: 9.9 FL (ref 8.9–12.7)
POTASSIUM SERPL-SCNC: 4 MMOL/L (ref 3.5–5.3)
PROT SERPL-MCNC: 4.7 G/DL (ref 6.4–8.4)
RBC # BLD AUTO: 2.95 MILLION/UL (ref 3.81–5.12)
SODIUM SERPL-SCNC: 135 MMOL/L (ref 135–147)
WBC # BLD AUTO: 8.16 THOUSAND/UL (ref 4.31–10.16)

## 2024-07-31 PROCEDURE — 85018 HEMOGLOBIN: CPT | Performed by: FAMILY MEDICINE

## 2024-07-31 PROCEDURE — 80053 COMPREHEN METABOLIC PANEL: CPT | Performed by: FAMILY MEDICINE

## 2024-07-31 PROCEDURE — 85025 COMPLETE CBC W/AUTO DIFF WBC: CPT | Performed by: FAMILY MEDICINE

## 2024-07-31 PROCEDURE — 85014 HEMATOCRIT: CPT | Performed by: FAMILY MEDICINE

## 2024-07-31 PROCEDURE — 99239 HOSP IP/OBS DSCHRG MGMT >30: CPT | Performed by: FAMILY MEDICINE

## 2024-07-31 RX ORDER — METOPROLOL SUCCINATE 50 MG/1
50 TABLET, EXTENDED RELEASE ORAL
Qty: 30 TABLET | Refills: 0 | Status: SHIPPED | OUTPATIENT
Start: 2024-07-31

## 2024-07-31 RX ORDER — PANTOPRAZOLE SODIUM 40 MG/1
40 TABLET, DELAYED RELEASE ORAL
Qty: 60 TABLET | Refills: 0 | Status: SHIPPED | OUTPATIENT
Start: 2024-07-31 | End: 2024-08-02

## 2024-07-31 RX ADMIN — Medication 2000 UNITS: at 09:25

## 2024-07-31 RX ADMIN — Medication 1 TABLET: at 09:25

## 2024-07-31 RX ADMIN — FLUTICASONE FUROATE AND VILANTEROL TRIFENATATE 1 PUFF: 200; 25 POWDER RESPIRATORY (INHALATION) at 09:25

## 2024-07-31 RX ADMIN — PANTOPRAZOLE SODIUM 40 MG: 40 TABLET, DELAYED RELEASE ORAL at 06:15

## 2024-07-31 NOTE — CASE MANAGEMENT
Case Management Discharge Planning Note    Patient name Patrica Grimaldo  Location [facility] 830/[facility] 830-01 MRN [id number]  : 1945 Date 2024       Current Admission Date: 2024  Current Admission Diagnosis:Duodenal ulcer   Patient Active Problem List    Diagnosis Date Noted Date Diagnosed    Fever 2024     Anemia 2024     Severe protein-calorie malnutrition (HCC) 2024     Duodenal ulcer 2024     Increased MCV 2022     Encounter for vitamin deficiency screening 2022     Itching 2022     Mild protein-calorie malnutrition (HCC) 2022     Primary osteoarthritis of right knee 2021     Age-related osteoporosis without current pathological fracture 2021     Lightheadedness 06/15/2021     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PASC) 2021     Subclinical hyperthyroidism 2019     Weight loss 2019     Fatigue 2019     Nicotine abuse 2019     Multiple thyroid nodules 2018     Abnormal thyroid function test 2018     Low TSH level 2018     Thyroid nodule 2018     Essential hypertension 2017     Dense breasts 2016     Chronic obstructive pulmonary disease (HCC) 2015     Thyroid disorder 10/11/2012     Allergic rhinitis 2012     Generalized anxiety disorder 2012     Impaired fasting glucose 2012     Vitamin D deficiency 2012     Decreased platelet count (HCC) 2012       LOS (days): 8  Geometric Mean LOS (GMLOS) (days): 4.6  Days to GMLOS:-3.3     OBJECTIVE:  Risk of Unplanned Readmission Score: 11.64         Current admission status: Inpatient   Preferred Pharmacy:   [id number] - [place] - [address] [address]  Phone: [phone] Fax: [phone]    Primary Care Provider: Janine Marcus DO    Primary Insurance: SCOTT ROSENTHAL  Secondary Insurance:     DISCHARGE DETAILS:          Requested Home Health  Care         Is the patient interested in HHC at discharge?: Yes  Home Health Discipline requested:: Occupational Therapy, Physical Therapy  Home Health Agency Name:: [facility]  Home Health Follow-Up Provider:: PCP  Home Health Services Needed:: Strengthening/Theraputic Exercises to Improve Function, Gait/ADL Training, Evaluate Functional Status and Safety  Homebound Criteria Met:: Requires the Assistance of Another Person for Safe Ambulation or to Leave the Home, Uses an Assist Device (i.e. cane, walker, etc)  Supporting Clincal Findings:: Limited Endurance         Pt and son now agreeable to [facility]- Home PT/OT  Referrals [facility] reserved  IMM reviewed with patient and son   Son to provide transport home

## 2024-07-31 NOTE — ASSESSMENT & PLAN NOTE
Patient came to the hospital with recurrent abdominal pain pain is worse after eating.  Pain is located in the periumbilical region and also suprapubic region.  Patient reported that she noted some dark stools at home but no rebeca bleeding  Patient was noted to have multiple large melenic bowel movements with acute drop in hemoglobin  EGD with large duodenal ulcer with nonbleeding visible vessel treated with nexpowder  Hemoglobin stable  PPI BID  GI following, appreciate recommendations  Monitor bowel movement and transfuse as needed

## 2024-07-31 NOTE — PROGRESS NOTES
Patient:    MRN:  [id number]    Jeovany Request ID:  [id number]    Level of care reserved:  Home Health Agency    [phone]    Clinical needs requested:    Geography searched:  [phone]    Start of Service:    Request sent:  12:09pm EDT on 7/31/2024 by Polly Lewis    Partner reserved:  12:16pm EDT on 7/31/2024 by Polly Lewis    Choice list shared:  12:16pm EDT on 7/31/2024 by Polly Lewis

## 2024-07-31 NOTE — ASSESSMENT & PLAN NOTE
Resolved  Complains of some nasal congestion and drainage  No hypoxia or shortness of breath  RSV/COVID/flu negative  CBC ordered without any leukocytosis  Monitor

## 2024-07-31 NOTE — ASSESSMENT & PLAN NOTE
Patient with known history of hypertension.  Home regimen Toprol- mg at bedtime  Resume Toprol XL 50 mg at bedtime, Will D/C on 50 mg as patient has drops in BP when hypovolemic   Monitor blood pressure

## 2024-07-31 NOTE — DISCHARGE SUMMARY
[facility]  Discharge- Patrica Grimaldo 1945, 79 y.o. female MRN: [id number]  [facility] Encounter: [id number]  Primary Care Provider: Janine Marcus DO   Date and time admitted to hospital: 7/23/2024 10:03 AM    * Duodenal ulcer  Assessment & Plan  Patient came to the hospital with recurrent abdominal pain pain is worse after eating.  Pain is located in the periumbilical region and also suprapubic region.  Patient reported that she noted some dark stools at home but no rebeca bleeding  Patient was noted to have multiple large melenic bowel movements with acute drop in hemoglobin  EGD with large duodenal ulcer with nonbleeding visible vessel treated with nexpowder  Hemoglobin stable  PPI BID  GI following, appreciate recommendations  Monitor bowel movement and transfuse as needed    Fever  Assessment & Plan  Resolved  Complains of some nasal congestion and drainage  No hypoxia or shortness of breath  RSV/COVID/flu negative  CBC ordered without any leukocytosis  Monitor    Anemia  Assessment & Plan  Secondary to GI bleed  Follow-up iron panel  Transfuse for hemoglobin <7  Stable    Severe protein-calorie malnutrition (HCC)  Assessment & Plan  Malnutrition Findings:   Adult Malnutrition type: Acute illness  Adult Degree of Malnutrition: Other severe protein calorie malnutrition  Malnutrition Characteristics: Fat loss, Muscle loss, Inadequate energy                  360 Statement: severe malnutrition r/t acute illness as evidenced by PO intake <50% of estimated needs for at least 5 days, moderate-severe muscle loss around clavicles, moderate muscle loss shoulders and temples, mild buccal fat pad loss. Treatment: as medically able, oral diet with oral nutrition supplements    BMI Findings:  Adult BMI Classifications: Underweight < 18.5        Body mass index is 18.13 kg/m².       Mild protein-calorie malnutrition (HCC)  Assessment & Plan  Malnutrition Findings:   Adult  Malnutrition type: Acute illness  Patient with BMI of 17.6.  Patient reported that she may have lost some weight.  Encourage p.o. nutrition  Patient reported poor p.o. intake due to abdominal pain    BMI Findings:     Body mass index is 18.13 kg/m².       Subclinical hyperthyroidism  Assessment & Plan  Patient was on methimazole in the past.  Recent thyroid function studies within normal limits and reported that she is currently off of the methimazole    Vitamin D deficiency  Assessment & Plan  Continue with supplementation    Essential hypertension  Assessment & Plan  Patient with known history of hypertension.  Home regimen Toprol- mg at bedtime  Resume Toprol XL 50 mg at bedtime, Will D/C on 50 mg as patient has drops in BP when hypovolemic   Monitor blood pressure    Chronic obstructive pulmonary disease (HCC)  Assessment & Plan  With known history of COPD.  No acute exacerbation.  Continue with respiratory protocol    Generalized anxiety disorder  Assessment & Plan  By reviewing the PDMP patient has not filled Xanax recently  Monitor         Medical Problems       Resolved Problems  Date Reviewed: 7/31/2024   None       Discharging Physician / Practitioner: Robby Patricio MD  PCP: Janine Marcus DO  Admission Date:   Admission Orders (From admission, onward)       Ordered        07/23/24 1543  INPATIENT ADMISSION  Once                          Discharge Date: 07/31/24    Consultations During Hospital Stay:  GI  General Surgery     Procedures Performed:   EGD    Significant Findings / Test Results:   Large volume blood products throughout the GI tract including mixed density/acute products within the stomach.  No active arterial extravasation identified.  Note of a proximal duodenal diverticulum with similar appearance recent July priors, suggestion of minimal surrounding inflammatory change.  Mild interlobular septal thickening. Small right pleural effusion. Small pericardial effusion.  Findings can be  "seen with mild pulmonary edema/volume overload, slightly increased from exam 2 days prior.    Incidental Findings:   As above   I reviewed the above mentioned incidental findings with the patient and/or family and they expressed understanding.    Test Results Pending at Discharge (will require follow up):   None     Outpatient Tests Requested:  None    Complications:  None    Reason for Admission: Abdominal pain    Hospital Course:   Patrica Grimaldo is a 79 y.o. female patient who originally presented to the hospital on 7/23/2024 due to Abdominal pain.  Patient had CAT scan done which was suggestive of gastroenteritis.  Patient had an EGD done by gastroenterology which found duodenal ulcer.  Duodenal ulcer was treated at that time.  Patient's hemoglobin and vital signs were monitored and remained stable.  Patient was evaluated by physical therapy and Occupational Therapy who determined that patient was not a candidate for inpatient rehabilitation.  Patient will return back home with home health as well as home physical therapy and Occupational Therapy.        Please see above list of diagnoses and related plan for additional information.     Condition at Discharge: stable    Discharge Day Visit / Exam:   Subjective: Is a very pleasant 79-year-old female who was seen and evaluated today at bedside.  Patient has no complaints at this time.  Vitals: Blood Pressure: 115/62 (07/31/24 0741)  Pulse: 99 (07/31/24 0741)  Temperature: 98.9 °F (37.2 °C) (07/31/24 0741)  Temp Source: Oral (07/27/24 2000)  Respirations: 20 (07/31/24 0741)  Height: 5' 4\" (162.6 cm) (07/26/24 1125)  Weight - Scale: 46.7 kg (102 lb 15.3 oz) (07/31/24 0600)  SpO2: 91 % (07/31/24 0741)  Exam:   Physical Exam  Vitals reviewed.   Constitutional:       General: She is not in acute distress.     Appearance: She is not ill-appearing.   HENT:      Head: Normocephalic.   Eyes:      Conjunctiva/sclera: Conjunctivae normal.   Cardiovascular:      Rate and " Rhythm: Regular rhythm. Tachycardia present.   Pulmonary:      Effort: Pulmonary effort is normal.   Abdominal:      General: Abdomen is flat.      Palpations: Abdomen is soft.      Tenderness: There is no abdominal tenderness.   Skin:     General: Skin is warm and dry.   Neurological:      Mental Status: She is alert. Mental status is at baseline.          Discussion with Family: Updated  (son) at bedside.  Had extensive conversation with patient and son.  Both are in agreement with the plan.    Discharge instructions/Information to patient and family:   See after visit summary for information provided to patient and family.      Provisions for Follow-Up Care:  See after visit summary for information related to follow-up care and any pertinent home health orders.      Mobility at time of Discharge:   Basic Mobility Inpatient Raw Score: 18  JH-HLM Goal: 6: Walk 10 steps or more  JH-HLM Achieved: 5: Stand (1 or more minutes)  HLM Goal achieved. Continue to encourage appropriate mobility.     Disposition:   Home with VNA Services (Reminder: Complete face to face encounter)    Planned Readmission: None     Discharge Statement:  I spent 45 minutes discharging the patient. This time was spent on the day of discharge. I had direct contact with the patient on the day of discharge. Greater than 50% of the total time was spent examining patient, answering all patient questions, arranging and discussing plan of care with patient as well as directly providing post-discharge instructions.  Additional time then spent on discharge activities.    Discharge Medications:  See after visit summary for reconciled discharge medications provided to patient and/or family.      **Please Note: This note may have been constructed using a voice recognition system**

## 2024-08-01 ENCOUNTER — TRANSITIONAL CARE MANAGEMENT (OUTPATIENT)
Dept: FAMILY MEDICINE CLINIC | Facility: CLINIC | Age: 79
End: 2024-08-01

## 2024-08-01 LAB — H PYLORI AG STL QL IA: POSITIVE

## 2024-08-01 NOTE — UTILIZATION REVIEW
NOTIFICATION OF ADMISSION DISCHARGE   This is a Notification of Discharge from [facility]. Please be advised that this patient has been discharge from our facility. Below you will find the admission and discharge date and time including the patient’s disposition.   UTILIZATION REVIEW CONTACT:  Maksim Yo  Utilization   Network Utilization Review Department  Phone: [phone] x carefully listen to the prompts. All voicemails are confidential.  Email: [email or web address]     ADMISSION INFORMATION  PRESENTATION DATE: 7/23/2024 10:03 AM  OBERVATION ADMISSION DATE: N/A  INPATIENT ADMISSION DATE: 7/23/24  3:43 PM   DISCHARGE DATE: 7/31/2024  2:04 PM   DISPOSITION:Home with Home Health Care    Network Utilization Review Department  ATTENTION: Please call with any questions or concerns to [phone] and carefully listen to the prompts so that you are directed to the right person. All voicemails are confidential.   For Discharge needs, contact Care Management DC Support Team at [phone] opt. 2  Send all requests for admission clinical reviews, approved or denied determinations and any other requests to dedicated fax number below belonging to the campus where the patient is receiving treatment. List of dedicated fax numbers for the Facilities:  FACILITY NAME UR FAX NUMBER   ADMISSION DENIALS (Administrative/Medical Necessity) [phone]   DISCHARGE SUPPORT TEAM ([facility]) [phone]   PARENT CHILD HEALTH (Maternity/NICU/Pediatrics) [phone]   [facility] [phone]   [facility] [phone]   [facility] [phone]   [facility] [phone]   [facility] [phone]   [facility] [phone]   [facility] [phone]   [facility]  [phone]   [facility] [phone]   On license of [facility] [phone]   [facility] [phone]   [facility] [phone]

## 2024-08-02 DIAGNOSIS — K26.9 ULCER OF THE DUODENUM CAUSED BY BACTERIA (H. PYLORI): Primary | ICD-10-CM

## 2024-08-02 DIAGNOSIS — A04.8 H. PYLORI INFECTION: Primary | ICD-10-CM

## 2024-08-02 DIAGNOSIS — B96.81 ULCER OF THE DUODENUM CAUSED BY BACTERIA (H. PYLORI): Primary | ICD-10-CM

## 2024-08-02 RX ORDER — TETRACYCLINE HYDROCHLORIDE 500 MG/1
500 CAPSULE ORAL 4 TIMES DAILY
Qty: 56 CAPSULE | Refills: 0 | Status: SHIPPED | OUTPATIENT
Start: 2024-08-02 | End: 2024-08-16

## 2024-08-02 RX ORDER — BISMUTH SUBSALICYLATE 262 MG/1
262 TABLET, CHEWABLE ORAL
Qty: 56 TABLET | Refills: 0 | Status: SHIPPED | OUTPATIENT
Start: 2024-08-02 | End: 2024-08-16

## 2024-08-02 RX ORDER — METRONIDAZOLE 250 MG/1
250 TABLET ORAL EVERY 6 HOURS
Qty: 56 TABLET | Refills: 0 | Status: SHIPPED | OUTPATIENT
Start: 2024-08-02 | End: 2024-08-16

## 2024-08-02 RX ORDER — OMEPRAZOLE 40 MG/1
40 CAPSULE, DELAYED RELEASE ORAL 2 TIMES DAILY
Qty: 60 CAPSULE | Refills: 2 | Status: SHIPPED | OUTPATIENT
Start: 2024-08-02

## 2024-08-02 NOTE — TELEPHONE ENCOUNTER
"Patients GI provider:  DO Villafuerte    Number to return call: [phone]    Reason for call: Pt's son calling to f/u on status of hpylori meds that were to be sent to [facility] pharmacy yesterday. Spoke w/Olivia advised she will f/u w/doc to send script over to pharmacy. Relayed message to son, son advised that there are \"too many hands in the pot\". Let son know that unfortunately he would not be able to speak directly w/nursing about this as he is not on pt's med comm consent form. Son not too happy about that but confirmed understanding.    Scheduled procedure/appointment date if applicable: N/A    "

## 2024-08-02 NOTE — RESULT ENCOUNTER NOTE
Good afternoon,    I called patient's son and discussed H. pylori positive stool testing.  Recommended quadruple therapy which I reviewed with him briefly.  I will be sending prescriptions to the pharmacy for him ([facility] in [place]).  Can you please call to review the medications with him again, as I am not sure he completely comprehended and maybe we can also email him written instructions.  Thank you in advance and please let me know if there are any issues.    Gabriele

## 2024-08-02 NOTE — TELEPHONE ENCOUNTER
Pts son is not on communication consent form so could not speak with him. Per PEP he was looking for medication from pharmacy for h pylori. Will have provider send meds, he was contacted yesterday with results.

## 2024-08-04 ENCOUNTER — RA CDI HCC (OUTPATIENT)
Dept: OTHER | Facility: HOSPITAL | Age: 79
End: 2024-08-04

## 2024-08-13 ENCOUNTER — TELEMEDICINE (OUTPATIENT)
Dept: FAMILY MEDICINE CLINIC | Facility: CLINIC | Age: 79
End: 2024-08-13

## 2024-08-13 DIAGNOSIS — I10 ESSENTIAL HYPERTENSION: ICD-10-CM

## 2024-08-13 DIAGNOSIS — D64.9 ANEMIA, UNSPECIFIED TYPE: ICD-10-CM

## 2024-08-13 DIAGNOSIS — K26.9 DUODENAL ULCER: Primary | ICD-10-CM

## 2024-08-13 DIAGNOSIS — K59.00 CONSTIPATION, UNSPECIFIED CONSTIPATION TYPE: ICD-10-CM

## 2024-08-13 PROCEDURE — 99495 TRANSJ CARE MGMT MOD F2F 14D: CPT | Performed by: FAMILY MEDICINE

## 2024-08-13 NOTE — ASSESSMENT & PLAN NOTE
No further signs of bleeding  Reviewed proper dosing of antibiotics  She is going to start taking the metronidazole 250 mg and tetracycline 500 mg 4 times daily as was originally prescribed.  discussed the dosing regimen of taking her medications at 8:30 AM 12:30 PM 4:30 PM and then again at bedtime

## 2024-08-13 NOTE — ASSESSMENT & PLAN NOTE
Had significant anemia secondary to bleeding  Bleeding has improved  Follow-up CBC and iron levels ordered to be done in 1 month  Orders:    CBC; Future    Iron; Future

## 2024-08-13 NOTE — PROGRESS NOTES
Virtual TCM Visit:    Verification of patient location:    Patient is located at Home in the following state in which I hold an active license [place]    Assessment & Plan     Assessment & Plan  Duodenal ulcer  No further signs of bleeding  Reviewed proper dosing of antibiotics  She is going to start taking the metronidazole 250 mg and tetracycline 500 mg 4 times daily as was originally prescribed.  discussed the dosing regimen of taking her medications at 8:30 AM 12:30 PM 4:30 PM and then again at bedtime       Essential hypertension  Stable  Blood pressure is being monitored by visiting nurse  Continue metoprolol 50 mg daily       Constipation, unspecified constipation type           Anemia, unspecified type  Had significant anemia secondary to bleeding  Bleeding has improved  Follow-up CBC and iron levels ordered to be done in 1 month  Orders:  •  CBC; Future  •  Iron; Future     Return for reschedule AWV for October.       Encounter provider Janine Marcus DO     Provider located at [facility]  [address]  [place] [address]    [facility], the patient was identified by name and date of birth. Patrica Grimaldo was informed that this is a telemedicine visit and that the visit is being conducted through Telephone.  My office door was closed. No one else was in the room.  She acknowledged consent and understanding of privacy and security of the video platform. The patient has agreed to participate and understands they can discontinue the visit at any time.    It was my intent to perform this visit via video technology but the patient was not able to do a video connection so the visit was completed via audio telephone only.    Her son was unable to connect to the video appointment     Patient is aware this is a billable service.    History of Present Illness     Transitional Care Management Review:   Patrica Grimaldo is a 79 y.o. female here for TCM  "follow up.    During the TCM phone call patient stated:  TCM Call     Date and time call was made  8/1/2024  7:40 AM    Hospital care reviewed  Records reviewed    Patient was hospitialized at  [facility]    Date of Admission  07/23/24    Date of discharge  07/31/24    Diagnosis  Duodenal ulcer    Disposition  Home    Were the patients medications reviewed and updated  No  Her son manages and he is not there    Current Symptoms  --  Light headed when she first gets out of bed \"due to sinuses\" which has been ongoing. Also, has gas pains but she is moving her bowels.      TCM Call     Post hospital issues  None    Should patient be enrolled in anticoag monitoring?  No    Scheduled for follow up?  Yes    Did you obtain your prescribed medications  Yes    Do you need help managing your prescriptions or medications  No    Is transportation to your appointment needed  Yes    Specify why  She is not driving    I have advised the patient to call PCP with any new or worsening symptoms  Romaine Heath LPN    Living Arrangements  Spouse or Significiant other    Support System  Family    The type of support provided  Physical; Emotional    Do you have social support  Yes, as much as I need    Are you recieving any outpatient services  No    Are you recieving home care services  Yes    Types of home care services  Nurse visit; Home PT    Interperter language line needed  No    Counseling  Patient    Counseling topics  Activities of daily living; Importance of RX compliance; patient and family education; instructions for management; Risk factor reduction; Home health agency benefits    Comments  Patrica states that she is overall doing better. She knows to call if any fevers and to go to the ER if any chest pain, dyspnea, weakness and to stay in touch if any fevers, abdominal pain, not moving her bowels, vomiting, etc Romaine Nath        She had a visiting nurse yesterday.  She has been having constipation.    The visiting " nurse told her she could skip to 2:30 in the morning dose of her antibiotics.  She has only been taking them 3 times a day instead of 4 times a day.    Her biggest concern has been constipation.  She has not had a bowel movement in 3 days.  Before she was hospitalized she was moving her bowels regularly but was also taking MiraLAX daily to control them          Review of Systems  Objective     LMP  (LMP Unknown) Comment: post menapausal    Physical Exam  Medications have been reviewed by provider in current encounter      Janine Marcus, DO      VIRTUAL VISIT DISCLAIMER    Patrica Grimaldo verbally agrees to participate in Virtual Care Services. Pt is aware that Virtual Care Services could be limited without vital signs or the ability to perform a full hands-on physical exam. Patrica Grimaldo understands she or the provider may request at any time to terminate the video visit and request the patient to seek care or treatment in person.

## 2024-08-25 PROCEDURE — 30233N1 TRANSFUSION OF NONAUTOLOGOUS RED BLOOD CELLS INTO PERIPHERAL VEIN, PERCUTANEOUS APPROACH: ICD-10-PCS | Performed by: FAMILY MEDICINE

## 2024-08-28 PROBLEM — R50.9 FEVER: Status: RESOLVED | Noted: 2024-07-29 | Resolved: 2024-08-28

## 2024-08-29 DIAGNOSIS — K26.9 DUODENAL ULCER: ICD-10-CM

## 2024-08-29 RX ORDER — METOPROLOL SUCCINATE 50 MG/1
50 TABLET, EXTENDED RELEASE ORAL
Qty: 30 TABLET | Refills: 0 | Status: SHIPPED | OUTPATIENT
Start: 2024-08-29

## 2024-08-29 NOTE — TELEPHONE ENCOUNTER
Patient needs the as soon as we can.  Son calling stating we got a request days ago which I do not see. Thank you

## 2024-09-09 DIAGNOSIS — J44.9 CHRONIC OBSTRUCTIVE PULMONARY DISEASE, UNSPECIFIED COPD TYPE (HCC): ICD-10-CM

## 2024-09-09 RX ORDER — FLUTICASONE PROPIONATE AND SALMETEROL 250; 50 UG/1; UG/1
POWDER RESPIRATORY (INHALATION)
Qty: 60 BLISTER | Refills: 5 | Status: SHIPPED | OUTPATIENT
Start: 2024-09-09

## 2024-09-28 DIAGNOSIS — K26.9 DUODENAL ULCER: ICD-10-CM

## 2024-09-28 RX ORDER — METOPROLOL SUCCINATE 50 MG/1
50 TABLET, EXTENDED RELEASE ORAL
Qty: 30 TABLET | Refills: 5 | Status: SHIPPED | OUTPATIENT
Start: 2024-09-28

## 2024-09-30 ENCOUNTER — TELEPHONE (OUTPATIENT)
Age: 79
End: 2024-09-30

## 2024-09-30 NOTE — TELEPHONE ENCOUNTER
Call received from visiting nurse with [facility]. Reporting that last night pt bumped her arm and developed a small skin tear to the Right elbow. States that they wound is clean, dry and will be left open to air.

## 2024-10-03 ENCOUNTER — TELEPHONE (OUTPATIENT)
Age: 79
End: 2024-10-03

## 2024-10-03 DIAGNOSIS — R26.89 POOR BALANCE: ICD-10-CM

## 2024-10-03 DIAGNOSIS — J44.9 CHRONIC OBSTRUCTIVE PULMONARY DISEASE, UNSPECIFIED COPD TYPE (HCC): Primary | ICD-10-CM

## 2024-10-03 NOTE — TELEPHONE ENCOUNTER
Alicia called from [facility] requesting a new referral for continued  physical therapy services.

## 2024-11-04 ENCOUNTER — RA CDI HCC (OUTPATIENT)
Dept: OTHER | Facility: HOSPITAL | Age: 79
End: 2024-11-04

## 2024-11-21 ENCOUNTER — TELEPHONE (OUTPATIENT)
Age: 79
End: 2024-11-21

## 2024-11-21 NOTE — TELEPHONE ENCOUNTER
Patient called to cancel her AWV with Dr Marcus next week 11/26 due to she is not strong enough to get out her house. Physical therapy stop coming by due to her insurance would not cover it anymore. However she is doing the exercises they showed every other day. She did not want to reschedule AWV (scheduling out into [place]) she wanted a visit sooner than that to have provider check her strength. She is hoping to be able to get out the house for 12/16 appointment. Please look into this and give her a call back. She is using her son's phone he will be back at her house after 2pm.

## 2024-11-21 NOTE — TELEPHONE ENCOUNTER
12/16 is fine to assess her strength  Appointment can be video if that works better for her and if she has the technology  Thank you,  Janine Marcus, DO

## 2024-11-21 NOTE — TELEPHONE ENCOUNTER
Left message on machine for patient to call us back. Okay to give message.  Belinda Richardson, CMA

## 2024-11-25 NOTE — TELEPHONE ENCOUNTER
Patient's son informed and expressed understanding. He stated he would like to keep the appointment in person at this time. No further action.  Belnida Richardson, CMA

## 2025-01-05 ENCOUNTER — RA CDI HCC (OUTPATIENT)
Dept: OTHER | Facility: HOSPITAL | Age: 80
End: 2025-01-05

## 2025-01-27 DIAGNOSIS — K26.9 ULCER OF THE DUODENUM CAUSED BY BACTERIA (H. PYLORI): ICD-10-CM

## 2025-01-27 DIAGNOSIS — B96.81 ULCER OF THE DUODENUM CAUSED BY BACTERIA (H. PYLORI): ICD-10-CM

## 2025-01-28 RX ORDER — OMEPRAZOLE 40 MG/1
40 CAPSULE, DELAYED RELEASE ORAL 2 TIMES DAILY
Qty: 60 CAPSULE | Refills: 1 | Status: SHIPPED | OUTPATIENT
Start: 2025-01-28

## 2025-02-07 ENCOUNTER — TELEPHONE (OUTPATIENT)
Age: 80
End: 2025-02-07

## 2025-02-07 NOTE — TELEPHONE ENCOUNTER
Patient called wanting to reschedule her appt on Mon 2/10 to the following week. I offered her Mon the 17th at 3:00 but she stated she could not do that. I informed patient I would reach out to the office to see what we could do. Patient only wants to see Dr Marcus.

## 2025-03-07 DIAGNOSIS — J44.9 CHRONIC OBSTRUCTIVE PULMONARY DISEASE, UNSPECIFIED COPD TYPE (HCC): ICD-10-CM

## 2025-03-07 RX ORDER — FLUTICASONE PROPIONATE AND SALMETEROL 250; 50 UG/1; UG/1
POWDER RESPIRATORY (INHALATION)
Qty: 60 BLISTER | Refills: 5 | Status: SHIPPED | OUTPATIENT
Start: 2025-03-07

## 2025-03-17 ENCOUNTER — TELEPHONE (OUTPATIENT)
Age: 80
End: 2025-03-17

## 2025-03-17 DIAGNOSIS — J44.9 CHRONIC OBSTRUCTIVE PULMONARY DISEASE, UNSPECIFIED COPD TYPE (HCC): ICD-10-CM

## 2025-03-17 DIAGNOSIS — E44.1 MILD PROTEIN-CALORIE MALNUTRITION (HCC): Primary | ICD-10-CM

## 2025-03-17 NOTE — TELEPHONE ENCOUNTER
Please call her daughter for more information.  I need a specific site and the exact services she is requesting  Thank you,  Janine Marcus, DO

## 2025-03-17 NOTE — TELEPHONE ENCOUNTER
Have you completed these before on the website, or do you need us to get more information?    Ewelina Fowler LPN

## 2025-03-17 NOTE — TELEPHONE ENCOUNTER
Pt daughter called asking for provider to fill out a form on line on the [employer] website so patient can obtain a pre auth for home health aid. Daughter unsure of website. Notified her PCP might need something more specific to fill this out. Daughter said PCP can contact her if there are any questions.

## 2025-03-18 NOTE — TELEPHONE ENCOUNTER
PT's daughter Eileen called regarding the home health auth paperwork. She is requesting an update regarding this. Please call Eileen at your earliest convienience.    ThankYou

## 2025-03-18 NOTE — TELEPHONE ENCOUNTER
Spoke with Eileen her daughter, who actually has an apt. Here today for herself. She stated the services will be at home. She is looking for aprox. 30-35 hrs a week for someone to come in and help her bathe and maybe help her with one meal and to sit and keep her company and help with daily activities, for a couple hours a day.     When Eileen comes in today she will acquire about this, her apt. Is 12:30

## 2025-03-19 NOTE — TELEPHONE ENCOUNTER
3/19/2025 8:06 AM returned call to Eileen    She needs help with bathing and meal preparation.     Eileen is going to check with her mom's insurance for further details.  I let her know that I needed a specific website.  She totally understood what I was coming from.  Her mom has an appointment next week.  She is going to get me the information and we will go over everything at her visit.  Since I have not seen her since May of last year I will need to see her before I can certify her need for any kind of services    Janine Marcus, DO

## 2025-03-21 ENCOUNTER — RA CDI HCC (OUTPATIENT)
Dept: OTHER | Facility: HOSPITAL | Age: 80
End: 2025-03-21

## 2025-03-25 DIAGNOSIS — K26.9 ULCER OF THE DUODENUM CAUSED BY BACTERIA (H. PYLORI): ICD-10-CM

## 2025-03-25 DIAGNOSIS — K26.9 DUODENAL ULCER: ICD-10-CM

## 2025-03-25 DIAGNOSIS — B96.81 ULCER OF THE DUODENUM CAUSED BY BACTERIA (H. PYLORI): ICD-10-CM

## 2025-03-25 RX ORDER — OMEPRAZOLE 40 MG/1
40 CAPSULE, DELAYED RELEASE ORAL 2 TIMES DAILY
Qty: 180 CAPSULE | Refills: 1 | Status: SHIPPED | OUTPATIENT
Start: 2025-03-25

## 2025-03-26 RX ORDER — METOPROLOL SUCCINATE 50 MG/1
50 TABLET, EXTENDED RELEASE ORAL
Qty: 30 TABLET | Refills: 0 | Status: SHIPPED | OUTPATIENT
Start: 2025-03-26

## 2025-03-27 NOTE — TELEPHONE ENCOUNTER
Procedure:   Scheduled date of procedure (as of today):  Physician performing procedure:  Location of procedure:  Instructions reviewed with patient by:   Clearances:

## 2025-03-27 NOTE — TELEPHONE ENCOUNTER
I called & lvm for the patient in regards to medication renewal request for omeprazole & that we need to see them for an office appt for continual refills.

## 2025-03-28 ENCOUNTER — TELEPHONE (OUTPATIENT)
Age: 80
End: 2025-03-28

## 2025-03-28 ENCOUNTER — APPOINTMENT (EMERGENCY)
Dept: RADIOLOGY | Facility: HOSPITAL | Age: 80
End: 2025-03-28
Payer: COMMERCIAL

## 2025-03-28 ENCOUNTER — HOSPITAL ENCOUNTER (OUTPATIENT)
Facility: HOSPITAL | Age: 80
Setting detail: OBSERVATION
Discharge: HOME | End: 2025-03-29
Attending: EMERGENCY MEDICINE | Admitting: INTERNAL MEDICINE
Payer: COMMERCIAL

## 2025-03-28 DIAGNOSIS — R06.00 DYSPNEA: ICD-10-CM

## 2025-03-28 DIAGNOSIS — J44.1 COPD EXACERBATION (HCC): Primary | ICD-10-CM

## 2025-03-28 DIAGNOSIS — Z13.9 ENCOUNTER FOR SCREENING INVOLVING SOCIAL DETERMINANTS OF HEALTH (SDOH): ICD-10-CM

## 2025-03-28 PROBLEM — I49.3 PVC (PREMATURE VENTRICULAR CONTRACTION): Status: ACTIVE | Noted: 2025-03-28

## 2025-03-28 LAB
2HR DELTA HS TROPONIN: 0 NG/L
ALBUMIN SERPL BCG-MCNC: 4.2 G/DL (ref 3.5–5)
ALP SERPL-CCNC: 91 U/L (ref 34–104)
ALT SERPL W P-5'-P-CCNC: 16 U/L (ref 7–52)
ANION GAP SERPL CALCULATED.3IONS-SCNC: 8 MMOL/L (ref 4–13)
AST SERPL W P-5'-P-CCNC: 22 U/L (ref 13–39)
ATRIAL RATE: 59 BPM
ATRIAL RATE: 85 BPM
BASOPHILS # BLD AUTO: 0.02 THOUSANDS/ÂΜL (ref 0–0.1)
BASOPHILS NFR BLD AUTO: 0 % (ref 0–1)
BILIRUB SERPL-MCNC: 0.48 MG/DL (ref 0.2–1)
BUN SERPL-MCNC: 13 MG/DL (ref 5–25)
CALCIUM SERPL-MCNC: 9.9 MG/DL (ref 8.4–10.2)
CARDIAC TROPONIN I PNL SERPL HS: 8 NG/L (ref ?–50)
CARDIAC TROPONIN I PNL SERPL HS: 8 NG/L (ref ?–50)
CHLORIDE SERPL-SCNC: 101 MMOL/L (ref 96–108)
CO2 SERPL-SCNC: 30 MMOL/L (ref 21–32)
CREAT SERPL-MCNC: 0.59 MG/DL (ref 0.6–1.3)
EOSINOPHIL # BLD AUTO: 0.02 THOUSAND/ÂΜL (ref 0–0.61)
EOSINOPHIL NFR BLD AUTO: 0 % (ref 0–6)
ERYTHROCYTE [DISTWIDTH] IN BLOOD BY AUTOMATED COUNT: 14.7 % (ref 11.6–15.1)
GFR SERPL CREATININE-BSD FRML MDRD: 86 ML/MIN/1.73SQ M
GLUCOSE SERPL-MCNC: 126 MG/DL (ref 65–140)
HCT VFR BLD AUTO: 42.6 % (ref 34.8–46.1)
HGB BLD-MCNC: 13.1 G/DL (ref 11.5–15.4)
IMM GRANULOCYTES # BLD AUTO: 0.02 THOUSAND/UL (ref 0–0.2)
IMM GRANULOCYTES NFR BLD AUTO: 0 % (ref 0–2)
LIPASE SERPL-CCNC: 18 U/L (ref 11–82)
LYMPHOCYTES # BLD AUTO: 0.83 THOUSANDS/ÂΜL (ref 0.6–4.47)
LYMPHOCYTES NFR BLD AUTO: 14 % (ref 14–44)
MAGNESIUM SERPL-MCNC: 1.8 MG/DL (ref 1.9–2.7)
MCH RBC QN AUTO: 28.2 PG (ref 26.8–34.3)
MCHC RBC AUTO-ENTMCNC: 30.8 G/DL (ref 31.4–37.4)
MCV RBC AUTO: 92 FL (ref 82–98)
MONOCYTES # BLD AUTO: 0.48 THOUSAND/ÂΜL (ref 0.17–1.22)
MONOCYTES NFR BLD AUTO: 8 % (ref 4–12)
NEUTROPHILS # BLD AUTO: 4.74 THOUSANDS/ÂΜL (ref 1.85–7.62)
NEUTS SEG NFR BLD AUTO: 78 % (ref 43–75)
NRBC BLD AUTO-RTO: 0 /100 WBCS
P AXIS: 77 DEGREES
P AXIS: 89 DEGREES
PLATELET # BLD AUTO: 145 THOUSANDS/UL (ref 149–390)
PMV BLD AUTO: 10.3 FL (ref 8.9–12.7)
POTASSIUM SERPL-SCNC: 4.6 MMOL/L (ref 3.5–5.3)
PR INTERVAL: 158 MS
PR INTERVAL: 162 MS
PROCALCITONIN SERPL-MCNC: <0.05 NG/ML
PROT SERPL-MCNC: 7 G/DL (ref 6.4–8.4)
QRS AXIS: -33 DEGREES
QRS AXIS: -54 DEGREES
QRSD INTERVAL: 104 MS
QRSD INTERVAL: 110 MS
QT INTERVAL: 376 MS
QT INTERVAL: 386 MS
QTC INTERVAL: 447 MS
QTC INTERVAL: 492 MS
RBC # BLD AUTO: 4.64 MILLION/UL (ref 3.81–5.12)
SODIUM SERPL-SCNC: 139 MMOL/L (ref 135–147)
T WAVE AXIS: 70 DEGREES
T WAVE AXIS: 77 DEGREES
T4 FREE SERPL-MCNC: 1.15 NG/DL (ref 0.61–1.12)
TSH SERPL DL<=0.05 MIU/L-ACNC: 0.26 UIU/ML (ref 0.45–4.5)
VENTRICULAR RATE: 85 BPM
VENTRICULAR RATE: 98 BPM
WBC # BLD AUTO: 6.11 THOUSAND/UL (ref 4.31–10.16)

## 2025-03-28 PROCEDURE — 94640 AIRWAY INHALATION TREATMENT: CPT

## 2025-03-28 PROCEDURE — 99285 EMERGENCY DEPT VISIT HI MDM: CPT | Performed by: EMERGENCY MEDICINE

## 2025-03-28 PROCEDURE — 80053 COMPREHEN METABOLIC PANEL: CPT

## 2025-03-28 PROCEDURE — 93010 ELECTROCARDIOGRAM REPORT: CPT | Performed by: INTERNAL MEDICINE

## 2025-03-28 PROCEDURE — 93005 ELECTROCARDIOGRAM TRACING: CPT

## 2025-03-28 PROCEDURE — 36415 COLL VENOUS BLD VENIPUNCTURE: CPT

## 2025-03-28 PROCEDURE — 85025 COMPLETE CBC W/AUTO DIFF WBC: CPT

## 2025-03-28 PROCEDURE — 84484 ASSAY OF TROPONIN QUANT: CPT

## 2025-03-28 PROCEDURE — 83690 ASSAY OF LIPASE: CPT

## 2025-03-28 PROCEDURE — 83735 ASSAY OF MAGNESIUM: CPT

## 2025-03-28 PROCEDURE — 71046 X-RAY EXAM CHEST 2 VIEWS: CPT

## 2025-03-28 PROCEDURE — 96365 THER/PROPH/DIAG IV INF INIT: CPT

## 2025-03-28 PROCEDURE — 84439 ASSAY OF FREE THYROXINE: CPT

## 2025-03-28 PROCEDURE — 94664 DEMO&/EVAL PT USE INHALER: CPT

## 2025-03-28 PROCEDURE — 99285 EMERGENCY DEPT VISIT HI MDM: CPT

## 2025-03-28 PROCEDURE — 94760 N-INVAS EAR/PLS OXIMETRY 1: CPT

## 2025-03-28 PROCEDURE — 96375 TX/PRO/DX INJ NEW DRUG ADDON: CPT

## 2025-03-28 PROCEDURE — 99222 1ST HOSP IP/OBS MODERATE 55: CPT

## 2025-03-28 PROCEDURE — 84145 PROCALCITONIN (PCT): CPT

## 2025-03-28 PROCEDURE — 84443 ASSAY THYROID STIM HORMONE: CPT

## 2025-03-28 RX ORDER — METHYLPREDNISOLONE SODIUM SUCCINATE 125 MG/2ML
60 INJECTION, POWDER, LYOPHILIZED, FOR SOLUTION INTRAMUSCULAR; INTRAVENOUS ONCE
Status: COMPLETED | OUTPATIENT
Start: 2025-03-28 | End: 2025-03-28

## 2025-03-28 RX ORDER — PREDNISONE 20 MG/1
40 TABLET ORAL DAILY
Status: DISCONTINUED | OUTPATIENT
Start: 2025-03-29 | End: 2025-03-29 | Stop reason: HOSPADM

## 2025-03-28 RX ORDER — IPRATROPIUM BROMIDE AND ALBUTEROL SULFATE 2.5; .5 MG/3ML; MG/3ML
3 SOLUTION RESPIRATORY (INHALATION) ONCE
Status: COMPLETED | OUTPATIENT
Start: 2025-03-28 | End: 2025-03-28

## 2025-03-28 RX ORDER — PREDNISONE 20 MG/1
40 TABLET ORAL DAILY
Qty: 10 TABLET | Refills: 0 | Status: SHIPPED | OUTPATIENT
Start: 2025-03-28 | End: 2025-03-29

## 2025-03-28 RX ORDER — IPRATROPIUM BROMIDE AND ALBUTEROL SULFATE .5; 3 MG/3ML; MG/3ML
1 SOLUTION RESPIRATORY (INHALATION) ONCE
Status: COMPLETED | OUTPATIENT
Start: 2025-03-28 | End: 2025-03-28

## 2025-03-28 RX ORDER — NICOTINE 21 MG/24HR
1 PATCH, TRANSDERMAL 24 HOURS TRANSDERMAL EVERY 24 HOURS
Qty: 28 PATCH | Refills: 0 | Status: SHIPPED | OUTPATIENT
Start: 2025-03-28

## 2025-03-28 RX ORDER — SODIUM CHLORIDE FOR INHALATION 0.9 %
3 VIAL, NEBULIZER (ML) INHALATION
Status: DISCONTINUED | OUTPATIENT
Start: 2025-03-28 | End: 2025-03-28

## 2025-03-28 RX ORDER — DOXYCYCLINE 100 MG/1
100 CAPSULE ORAL 2 TIMES DAILY
Qty: 10 CAPSULE | Refills: 0 | Status: SHIPPED | OUTPATIENT
Start: 2025-03-28 | End: 2025-03-29

## 2025-03-28 RX ORDER — SODIUM CHLORIDE 9 MG/ML
3 INJECTION INTRAVENOUS
Status: DISCONTINUED | OUTPATIENT
Start: 2025-03-28 | End: 2025-03-29 | Stop reason: HOSPADM

## 2025-03-28 RX ORDER — NICOTINE 21 MG/24HR
1 PATCH, TRANSDERMAL 24 HOURS TRANSDERMAL DAILY
Status: DISCONTINUED | OUTPATIENT
Start: 2025-03-29 | End: 2025-03-29 | Stop reason: HOSPADM

## 2025-03-28 RX ORDER — FLUTICASONE FUROATE AND VILANTEROL 200; 25 UG/1; UG/1
1 POWDER RESPIRATORY (INHALATION) DAILY
Status: DISCONTINUED | OUTPATIENT
Start: 2025-03-28 | End: 2025-03-29 | Stop reason: HOSPADM

## 2025-03-28 RX ORDER — ALBUTEROL SULFATE 2.5 MG/3ML
1 SOLUTION RESPIRATORY (INHALATION) ONCE
Status: COMPLETED | OUTPATIENT
Start: 2025-03-28 | End: 2025-03-28

## 2025-03-28 RX ORDER — ALPRAZOLAM 0.25 MG
0.25 TABLET ORAL DAILY PRN
Status: DISCONTINUED | OUTPATIENT
Start: 2025-03-28 | End: 2025-03-29 | Stop reason: HOSPADM

## 2025-03-28 RX ORDER — PANTOPRAZOLE SODIUM 40 MG/1
40 TABLET, DELAYED RELEASE ORAL
Status: DISCONTINUED | OUTPATIENT
Start: 2025-03-28 | End: 2025-03-29 | Stop reason: HOSPADM

## 2025-03-28 RX ORDER — ENOXAPARIN SODIUM 100 MG/ML
40 INJECTION SUBCUTANEOUS DAILY
Status: DISCONTINUED | OUTPATIENT
Start: 2025-03-29 | End: 2025-03-29 | Stop reason: HOSPADM

## 2025-03-28 RX ORDER — LEVALBUTEROL INHALATION SOLUTION 1.25 MG/3ML
1.25 SOLUTION RESPIRATORY (INHALATION)
Status: DISCONTINUED | OUTPATIENT
Start: 2025-03-28 | End: 2025-03-29 | Stop reason: HOSPADM

## 2025-03-28 RX ORDER — METOPROLOL SUCCINATE 50 MG/1
50 TABLET, EXTENDED RELEASE ORAL
Status: DISCONTINUED | OUTPATIENT
Start: 2025-03-28 | End: 2025-03-29 | Stop reason: HOSPADM

## 2025-03-28 RX ORDER — ACETAMINOPHEN 325 MG/1
650 TABLET ORAL EVERY 6 HOURS PRN
Status: DISCONTINUED | OUTPATIENT
Start: 2025-03-28 | End: 2025-03-29 | Stop reason: HOSPADM

## 2025-03-28 RX ORDER — MAGNESIUM SULFATE HEPTAHYDRATE 40 MG/ML
2 INJECTION, SOLUTION INTRAVENOUS ONCE
Status: COMPLETED | OUTPATIENT
Start: 2025-03-28 | End: 2025-03-28

## 2025-03-28 RX ADMIN — SODIUM CHLORIDE 500 ML: 0.9 INJECTION, SOLUTION INTRAVENOUS at 12:15

## 2025-03-28 RX ADMIN — IPRATROPIUM BROMIDE 0.5 MG: 0.5 SOLUTION RESPIRATORY (INHALATION) at 19:17

## 2025-03-28 RX ADMIN — PANTOPRAZOLE SODIUM 40 MG: 40 TABLET, DELAYED RELEASE ORAL at 19:59

## 2025-03-28 RX ADMIN — LEVALBUTEROL HYDROCHLORIDE 1.25 MG: 1.25 SOLUTION RESPIRATORY (INHALATION) at 19:17

## 2025-03-28 RX ADMIN — IPRATROPIUM BROMIDE AND ALBUTEROL SULFATE 3 ML: .5; 3 SOLUTION RESPIRATORY (INHALATION) at 12:14

## 2025-03-28 RX ADMIN — FLUTICASONE FUROATE AND VILANTEROL TRIFENATATE 1 PUFF: 200; 25 POWDER RESPIRATORY (INHALATION) at 21:07

## 2025-03-28 RX ADMIN — METHYLPREDNISOLONE SODIUM SUCCINATE 60 MG: 125 INJECTION, POWDER, FOR SOLUTION INTRAMUSCULAR; INTRAVENOUS at 12:17

## 2025-03-28 RX ADMIN — IPRATROPIUM BROMIDE AND ALBUTEROL SULFATE 3 ML: .5; 3 SOLUTION RESPIRATORY (INHALATION) at 12:36

## 2025-03-28 RX ADMIN — CEFTRIAXONE 1000 MG: 10 INJECTION, POWDER, FOR SOLUTION INTRAVENOUS at 12:19

## 2025-03-28 RX ADMIN — MAGNESIUM SULFATE HEPTAHYDRATE 2 G: 40 INJECTION, SOLUTION INTRAVENOUS at 12:37

## 2025-03-28 RX ADMIN — METOPROLOL SUCCINATE 50 MG: 50 TABLET, EXTENDED RELEASE ORAL at 19:58

## 2025-03-28 NOTE — ED NOTES
Pt resting in bed  Drinking some coffee  Awaiting for 2 hour troponin to deceide disposition       Alem Dee, RN  03/28/25 [id number]

## 2025-03-28 NOTE — ASSESSMENT & PLAN NOTE
Lab Results   Component Value Date    SBP5ZOWSCOVB 0.256 (L) 03/28/2025    REQ3QZDVSLKR 0.731 09/30/2022    FREET4 1.2 03/27/2024    FREET4 1.24 09/30/2022    T3FREE 2.44 06/28/2021    T3FREE 2.87 07/29/2019   Patient denies any heat intolerance  No weight loss  No palpitations  Will check T4 levels

## 2025-03-28 NOTE — ASSESSMENT & PLAN NOTE
Lab Results   Component Value Date    HGB 13.1 03/28/2025    HGB 9.4 (L) 07/31/2024    HGB 9.3 (L) 07/31/2024    HGB 8.7 (L) 07/30/2024    HGB 9.4 (L) 07/30/2024    CONCFE 9 (L) 07/29/2024    IRON 15 (L) 07/29/2024    FERRITIN 101 07/29/2024    TIBC 160 (L) 07/29/2024   Appears to be stable we will continue to monitor

## 2025-03-28 NOTE — ED PROVIDER NOTES
Time reflects when diagnosis was documented in both MDM as applicable and the Disposition within this note       Time User Action Codes Description Comment    3/28/2025  3:27 PM Raffi Cedillo Add [J44.1] COPD exacerbation (HCC)     3/28/2025  3:27 PM Raffi Cedillo Add [R06.00] Dyspnea     3/28/2025  5:30 PM PadminiHaven Add [Z13.9] Encounter for screening involving social determinants of health (SDoH)           ED Disposition       ED Disposition   Admit    Condition   Stable    Date/Time   Fri Mar 28, 2025  3:43 PM    Comment   --             Assessment & Plan       Medical Decision Making  - Given patient's concerns, will do a cardiac workup.   - Will do an EKG for arrythmia, strain; troponin for same as per protocol for evaluation of ACS.   - CBC for anemia; CMP for kidney function and electrolytes; Pro-Robert to risk stratify for pneumonia  - Will check CXR for pneumonia, PTX, fluid overload  - Will treat as COPD exacerbation  HEART score:  History 1=Slightly or non-suspicious  ECG 1=Nonspecific repolarization disturbance  Age 2= > 65 years  Risk Factors 1= 1 or 2 risk factors  Troponin 0= Less than or equal to 12 ng/L  Total 5  -Patient concerned about going home despite symptomatic improvement.  Will admit for cardiac monitoring    Past Medical History:  No date: Elevated blood pressure reading      Amount and/or Complexity of Data Reviewed  Labs: ordered.  Radiology: ordered.    Risk  OTC drugs.  Prescription drug management.  Decision regarding hospitalization.             Medications   sodium chloride (PF) 0.9 % injection 3 mL (has no administration in time range)   ALPRAZolam (XANAX) tablet 0.25 mg (has no administration in time range)   pantoprazole (PROTONIX) EC tablet 40 mg (has no administration in time range)   metoprolol succinate (TOPROL-XL) 24 hr tablet 50 mg (has no administration in time range)   fluticasone-vilanterol 200-25 mcg/actuation 1 puff (has no administration in time range)    acetaminophen (TYLENOL) tablet 650 mg (has no administration in time range)   nicotine (NICODERM CQ) 14 mg/24hr TD 24 hr patch 1 patch (has no administration in time range)   enoxaparin (LOVENOX) subcutaneous injection 40 mg (has no administration in time range)   levalbuterol (XOPENEX) inhalation solution 1.25 mg (has no administration in time range)   ipratropium (ATROVENT) 0.02 % inhalation solution 0.5 mg (has no administration in time range)   predniSONE tablet 40 mg (has no administration in time range)   albuterol (FOR EMS ONLY) (2.5 mg/3 mL) 0.083 % inhalation solution 2.5 mg (0 mg Does not apply Given to EMS 3/28/25 1132)   ipratropium-albuterol (FOR EMS ONLY) (DUO-NEB) 0.5-2.5 mg/3 mL inhalation solution 3 mL (0 mL Does not apply Given to EMS 3/28/25 1132)   ipratropium-albuterol (DUO-NEB) 0.5-2.5 mg/3 mL inhalation solution 3 mL (3 mL Nebulization Given 3/28/25 1214)   methylPREDNISolone sodium succinate (Solu-MEDROL) injection 60 mg (60 mg Intravenous Given 3/28/25 1217)   sodium chloride 0.9 % bolus 500 mL (0 mL Intravenous Stopped 3/28/25 1331)   ceftriaxone (ROCEPHIN) 1 g/50 mL in dextrose IVPB (0 mg Intravenous Stopped 3/28/25 1233)   ipratropium-albuterol (DUO-NEB) 0.5-2.5 mg/3 mL inhalation solution 3 mL (3 mL Nebulization Given 3/28/25 1236)   magnesium sulfate 2 g/50 mL IVPB (premix) 2 g (0 g Intravenous Stopped 3/28/25 1331)       ED Risk Strat Scores                            SBIRT 22yo+      Flowsheet Row Most Recent Value   Initial Alcohol Screen: US AUDIT-C     1. How often do you have a drink containing alcohol? 0 Filed at: 03/28/2025 1149   2. How many drinks containing alcohol do you have on a typical day you are drinking?  0 Filed at: 03/28/2025 1146   3a. Male UNDER 65: How often do you have five or more drinks on one occasion? 0 Filed at: 03/28/2025 1144   3b. FEMALE Any Age, or MALE 65+: How often do you have 4 or more drinks on one occassion? 0 Filed at: 03/28/2025 1142   Audit-C  Score 0 Filed at: 03/28/2025 1144   [place]: How many times in the past year have you...    Used an illegal drug or used a prescription medication for non-medical reasons? Never Filed at: 03/28/2025 1144                            History of Present Illness       Chief Complaint   Patient presents with    Shortness of Breath     Pt has been short of breath after having smoking. Pt also having dark stool;s. Pt also has a moist cough       Past Medical History:   Diagnosis Date    Elevated blood pressure reading       Past Surgical History:   Procedure Laterality Date    APPENDECTOMY      TONSILLECTOMY        Family History   Problem Relation Age of Onset    Lung cancer Mother     Heart disease Maternal Aunt     Heart disease Maternal Uncle     Heart disease Maternal Grandmother       Social History     Tobacco Use    Smoking status: Every Day     Current packs/day: 1.50     Types: Cigarettes    Smokeless tobacco: Never   Vaping Use    Vaping status: Never Used   Substance Use Topics    Alcohol use: Never    Drug use: Never      E-Cigarette/Vaping    E-Cigarette Use Never User       E-Cigarette/Vaping Substances    Nicotine No     THC No     CBD No     Flavoring No     Other No     Unknown No       I have reviewed and agree with the history as documented.     Patient is an 80F pmh COPD, duodenal ulcer, p/w SOB, has been worsening over the last week, acutely worsened earlier today after smoking a cigarette.  Patient additionally endorses cough, with production of sputum. Describes chest pressure and melanotic stools on review of systems, additionally cough.  Denies any fevers, falls, abdominal pain, or other complaints on review of systems        Review of Systems   All other systems reviewed and are negative.          Objective       ED Triage Vitals [03/28/25 1142]   Temperature Pulse Blood Pressure Respirations SpO2 Patient Position - Orthostatic VS   98 °F (36.7 °C) 88 157/96 20 97 % Lying      Temp Source Heart Rate  "Source BP Location FiO2 (%) Pain Score    Oral -- Left arm -- No Pain      Vitals      Date and Time Temp Pulse SpO2 Resp BP Pain Score FACES Pain Rating User   03/28/25 1708 98.3 °F (36.8 °C) 96 97 % -- 128/85 -- -- DII   03/28/25 1600 -- 100 95 % -- 132/71 -- -- HK   03/28/25 1430 -- 102 92 % -- 158/65 No Pain -- BG   03/28/25 1230 -- 102 99 % -- 145/72 -- -- BG   03/28/25 1142 98 °F (36.7 °C) 88 97 % 20 157/96 No Pain -- BG            Physical Exam  Vitals and nursing note reviewed.   Constitutional:       General: She is not in acute distress.  HENT:      Head: Normocephalic and atraumatic.   Eyes:      Conjunctiva/sclera: Conjunctivae normal.   Cardiovascular:      Rate and Rhythm: Normal rate and regular rhythm.      Heart sounds: No murmur heard.  Pulmonary:      Effort: Pulmonary effort is normal. No respiratory distress.      Breath sounds: Wheezing present.   Abdominal:      Palpations: Abdomen is soft.      Tenderness: There is no abdominal tenderness.   Musculoskeletal:         General: No swelling.      Cervical back: Neck supple.   Skin:     General: Skin is warm and dry.      Capillary Refill: Capillary refill takes less than 2 seconds.   Neurological:      Mental Status: She is alert.   Psychiatric:         Mood and Affect: Mood normal.         Results Reviewed       Procedure Component Value Units Date/Time    T4, free [[id number]]  (Abnormal) Collected: 03/28/25 1157    Lab Status: Final result Specimen: Blood from Arm, Left Updated: 03/28/25 1647     Free T4 1.15 ng/dL     Narrative:        \"Therapeutic range for patients medicated with thyroid disorders: 0.61-1.24 ng/dL.\"    HS Troponin I 2hr [[id number]]  (Normal) Collected: 03/28/25 1404    Lab Status: Final result Specimen: Blood from Arm, Right Updated: 03/28/25 1526     hs TnI 2hr 8 ng/L      Delta 2hr hsTnI 0 ng/L     TSH, 3rd generation [[id number]]  (Abnormal) Collected: 03/28/25 1157    Lab Status: Final result Specimen: Blood from Arm, " Left Updated: 03/28/25 1247     TSH 3RD GENERATON 0.256 uIU/mL     Procalcitonin [[id number]]  (Normal) Collected: 03/28/25 1157    Lab Status: Final result Specimen: Blood from Arm, Left Updated: 03/28/25 1240     Procalcitonin <0.05 ng/ml     HS Troponin I 4hr [[id number]]     Lab Status: No result Specimen: Blood     HS Troponin 0hr (reflex protocol) [[id number]]  (Normal) Collected: 03/28/25 1157    Lab Status: Final result Specimen: Blood from Arm, Left Updated: 03/28/25 1228     hs TnI 0hr 8 ng/L     Comprehensive metabolic panel [[id number]]  (Abnormal) Collected: 03/28/25 1157    Lab Status: Final result Specimen: Blood from Arm, Left Updated: 03/28/25 1227     Sodium 139 mmol/L      Potassium 4.6 mmol/L      Chloride 101 mmol/L      CO2 30 mmol/L      ANION GAP 8 mmol/L      BUN 13 mg/dL      Creatinine 0.59 mg/dL      Glucose 126 mg/dL      Calcium 9.9 mg/dL      AST 22 U/L      ALT 16 U/L      Alkaline Phosphatase 91 U/L      Total Protein 7.0 g/dL      Albumin 4.2 g/dL      Total Bilirubin 0.48 mg/dL      eGFR 86 ml/min/1.73sq m     Narrative:      [employer] Kid[employer] guidelines for Chronic Kidney Disease (CKD):     Stage 1 with normal or high GFR (GFR > 90 mL/min/1.73 square meters)    Stage 2 Mild CKD (GFR = 60-89 mL/min/1.73 square meters)    Stage 3A Moderate CKD (GFR = 45-59 mL/min/1.73 square meters)    Stage 3B Moderate CKD (GFR = 30-44 mL/min/1.73 square meters)    Stage 4 Severe CKD (GFR = 15-29 mL/min/1.73 square meters)    Stage 5 End Stage CKD (GFR <15 mL/min/1.73 square meters)  Note: GFR calculation is accurate only with a steady state creatinine    Lipase [[id number]]  (Normal) Collected: 03/28/25 1157    Lab Status: Final result Specimen: Blood from Arm, Left Updated: 03/28/25 1227     Lipase 18 u/L     Magnesium [[id number]]  (Abnormal) Collected: 03/28/25 1157    Lab Status: Final result Specimen: Blood from Arm, Left Updated: 03/28/25 1227     Magnesium 1.8 mg/dL     CBC and  differential [[id number]]  (Abnormal) Collected: 03/28/25 1157    Lab Status: Final result Specimen: Blood from Arm, Left Updated: 03/28/25 1214     WBC 6.11 Thousand/uL      RBC 4.64 Million/uL      Hemoglobin 13.1 g/dL      Hematocrit 42.6 %      MCV 92 fL      MCH 28.2 pg      MCHC 30.8 g/dL      RDW 14.7 %      MPV 10.3 fL      Platelets 145 Thousands/uL      nRBC 0 /100 WBCs      Segmented % 78 %      Immature Grans % 0 %      Lymphocytes % 14 %      Monocytes % 8 %      Eosinophils Relative 0 %      Basophils Relative 0 %      Absolute Neutrophils 4.74 Thousands/µL      Absolute Immature Grans 0.02 Thousand/uL      Absolute Lymphocytes 0.83 Thousands/µL      Absolute Monocytes 0.48 Thousand/µL      Eosinophils Absolute 0.02 Thousand/µL      Basophils Absolute 0.02 Thousands/µL             XR chest pa and lateral   Final Interpretation by Yaron Jones MD (03/28 4585)      No acute cardiopulmonary disease.            Workstation performed: [id number]             Procedures    ED Medication and Procedure Management   Prior to Admission Medications   Prescriptions Last Dose Informant Patient Reported? Taking?   ALPRAZolam (XANAX) 0.25 mg tablet   No No   Sig: Take 1 tablet (0.25 mg total) by mouth daily as needed for anxiety   Cholecalciferol (VITAMIN D3) 2000 units TABS   Yes No   Sig: Take 2,000 Units by mouth daily    Fluticasone-Salmeterol (Advair) 250-50 mcg/dose inhaler   No No   Sig: INHALE 1 PUFF BY MOUTH TWO TIMES A DAY . RINSE MOUTH AFTER USE   Multiple Vitamins-Minerals (MULTIVITAMIN ADULT PO)   Yes No   Sig: Take 1 tablet by mouth daily   alendronate (Fosamax) 70 mg tablet   No No   Sig: Take 1 tablet (70 mg total) by mouth every 7 days   Patient not taking: Reported on 7/26/2024   metoprolol succinate (TOPROL-XL) 50 mg 24 hr tablet   No No   Sig: TAKE ONE TABLET BY MOUTH EVERY DAY AT BEDTIME   omeprazole (PriLOSEC) 40 MG capsule   No No   Sig: TAKE ONE CAPSULE BY MOUTH TWICE A DAY       Facility-Administered Medications: None     Current Discharge Medication List        START taking these medications    Details   doxycycline hyclate (VIBRAMYCIN) 100 mg capsule Take 1 capsule (100 mg total) by mouth 2 (two) times a day for 5 days  Qty: 10 capsule, Refills: 0    Associated Diagnoses: COPD exacerbation (HCC)      nicotine (NICODERM CQ) 14 mg/24hr TD 24 hr patch Place 1 patch on the skin over 24 hours every 24 hours  Qty: 28 patch, Refills: 0    Associated Diagnoses: COPD exacerbation (HCC); Dyspnea      predniSONE 20 mg tablet Take 2 tablets (40 mg total) by mouth daily for 5 days  Qty: 10 tablet, Refills: 0    Associated Diagnoses: COPD exacerbation (HCC)           CONTINUE these medications which have NOT CHANGED    Details   alendronate (Fosamax) 70 mg tablet Take 1 tablet (70 mg total) by mouth every 7 days  Qty: 12 tablet, Refills: 3    Associated Diagnoses: Age-related osteoporosis without current pathological fracture      ALPRAZolam (XANAX) 0.25 mg tablet Take 1 tablet (0.25 mg total) by mouth daily as needed for anxiety  Qty: 30 tablet, Refills: 3    Associated Diagnoses: Generalized anxiety disorder      Cholecalciferol (VITAMIN D3) 2000 units TABS Take 2,000 Units by mouth daily       Fluticasone-Salmeterol (Advair) 250-50 mcg/dose inhaler INHALE 1 PUFF BY MOUTH TWO TIMES A DAY . RINSE MOUTH AFTER USE  Qty: 60 blister, Refills: 5    Comments: Substitution to a formulary equivalent within the same pharmaceutical class is authorized.  Associated Diagnoses: Chronic obstructive pulmonary disease, unspecified COPD type (HCC)      metoprolol succinate (TOPROL-XL) 50 mg 24 hr tablet TAKE ONE TABLET BY MOUTH EVERY DAY AT BEDTIME  Qty: 30 tablet, Refills: 0    Associated Diagnoses: Duodenal ulcer      Multiple Vitamins-Minerals (MULTIVITAMIN ADULT PO) Take 1 tablet by mouth daily      omeprazole (PriLOSEC) 40 MG capsule TAKE ONE CAPSULE BY MOUTH TWICE A DAY  Qty: 180 capsule, Refills: 1     Associated Diagnoses: Ulcer of the duodenum caused by bacteria (H. pylori)           No discharge procedures on file.  ED SEPSIS DOCUMENTATION   Time reflects when diagnosis was documented in both MDM as applicable and the Disposition within this note       Time User Action Codes Description Comment    3/28/2025  3:27 PM Raffi Cedillo Add [J44.1] COPD exacerbation (HCC)     3/28/2025  3:27 PM Raffi Cedillo Add [R06.00] Dyspnea     3/28/2025  5:30 PM Haven Washington Add [Z13.9] Encounter for screening involving social determinants of health (SDoH)                  Raffi Cedillo MD  03/28/25 3896

## 2025-03-28 NOTE — RESPIRATORY THERAPY NOTE
RT Protocol Note  Patrica Grimaldo 80 y.o. female MRN: [id number]  [facility] Encounter: [id number]    Assessment    Principal Problem:    Chronic obstructive pulmonary disease with acute exacerbation (HCC)  Active Problems:    Allergic rhinitis    Subclinical hyperthyroidism    Anemia    PVC (premature ventricular contraction)      Home Pulmonary Medications:  N/a       Past Medical History:   Diagnosis Date    Elevated blood pressure reading      Social History     Socioeconomic History    Marital status: /Civil Union     Spouse name: Not on file    Number of children: Not on file    Years of education: Not on file    Highest education level: Not on file   Occupational History    Not on file   Tobacco Use    Smoking status: Every Day     Current packs/day: 1.50     Types: Cigarettes    Smokeless tobacco: Never   Vaping Use    Vaping status: Never Used   Substance and Sexual Activity    Alcohol use: Never    Drug use: Never    Sexual activity: Not on file   Other Topics Concern    Not on file   Social History Narrative    Not on file     Social Drivers of Health     Financial Resource Strain: Medium Risk (8/29/2022)    Overall Financial Resource Strain (CARDIA)     Difficulty of Paying Living Expenses: Somewhat hard   Food Insecurity: No Food Insecurity (3/28/2025)    Nursing - Inadequate Food Risk Classification     Worried About Running Out of Food in the Last Year: Never true     Ran Out of Food in the Last Year: Never true     Ran Out of Food in the Last Year: Never true   Transportation Needs: Unmet Transportation Needs (3/28/2025)    Nursing - Transportation Risk Classification     Lack of Transportation: Not on file     Lack of Transportation: Yes   Physical Activity: Not on file   Stress: Not on file   Social Connections: Not on file   Intimate Partner Violence: Unknown (3/28/2025)    Nursing IPS     Feels Physically and Emotionally Safe: Not on file     Physically Hurt by Someone: Not on  "file     Humiliated or Emotionally Abused by Someone: Not on file     Physically Hurt by Someone: No     Hurt or Threatened by Someone: No   Housing Stability: Unknown (3/28/2025)    Nursing: Inadequate Housing Risk Classification     Has Housing: Not on file     Worried About Losing Housing: Not on file     Unable to Get Utilities: Not on file     Unable to Pay for Housing in the Last Year: No     Has Housin       Subjective         Objective    Physical Exam:   Assessment Type: Assess only  General Appearance: Alert, Awake  Respiratory Pattern: Normal  Chest Assessment: Chest expansion symmetrical  Bilateral Breath Sounds: Diminished    Vitals:  Blood pressure 128/85, pulse 96, temperature 98.3 °F (36.8 °C), resp. rate 20, height 5' 4\" (1.626 m), weight 46.3 kg (102 lb), SpO2 97%.          Imaging and other studies: Results Review Statement: I reviewed radiology reports from this admission including: chest xray.          Plan    Respiratory Plan: No distress/Pulmonary history        Resp Comments: Pt assessed per respiratory protocol. Pt has hx Copd. Pt takes no respiratory medications at home. Pt admitted for observation for exac of Copd. Pt in NAD at this time. Pt currently on room air. X-ray shows no active cardiopulmonary disease. Pt is a current smoker. Treatments ordered by physcian Tid with 1.25mg Xopenex and 0.5mg atrovent. Will add 2.5mg Albuterol Q4prn for SOB/Wheezing.   "

## 2025-03-28 NOTE — ASSESSMENT & PLAN NOTE
Patient experienced some chest tightness likely in the setting of exacerbation  Troponins were negative  Continue telemetry  Possible discharge tomorrow  No need for PT OT

## 2025-03-28 NOTE — DISCHARGE INSTRUCTIONS
You were evaluated in the emergency department for shortness of breath after smoking a cigarette.  We recommend that she stop smoking immediately given your COPD.  We have prescribed nicotine patches to aid you with this.  Please follow-up with your primary care provider for further help in quitting smoking.    Additionally, given your COPD exacerbation, we have prescribed an antibiotic and a steroid for you to take for the next 5 days.  Please take them as prescribed and follow-up with your primary care provider.  Return to the emergency department if your symptoms get severely worse or you develop new, concerning symptoms

## 2025-03-28 NOTE — TELEPHONE ENCOUNTER
Patrica has an apt today with Dr. Marcus and is calling stating she this she is too weak to walk down the stares.  I spoke with nurse Claudia who stated she should be seen.  I'm concerned and asked if she could do virtual?  Or should patient go to the ER and call EMT to help with stairs.      Please call patient as they wanted to check with Dr. Marcus first since she has not been seen in some time.  Please call Raffi back.  Thank you

## 2025-03-28 NOTE — ED ATTENDING ATTESTATION
3/28/2025  I, Blayne Solis DO, saw and evaluated the patient. I have discussed the patient with the resident/non-physician practitioner and agree with the resident's/non-physician practitioner's findings, Plan of Care, and MDM as documented in the resident's/non-physician practitioner's note, except where noted. All available labs and Radiology studies were reviewed.  I was present for key portions of any procedure(s) performed by the resident/non-physician practitioner and I was immediately available to provide assistance.       At this point I agree with the current assessment done in the Emergency Department.  I have conducted an independent evaluation of this patient a history and physical is as follows:    Patient is an 80-year-old female history of COPD, protein calorie malnutrition, anemia, duodenal ulcer, brought in by EMS.  Patient says she always has a mild amount of shortness of breath and slight cough due to her COPD, this morning she was smoking a cigarette, when her coughing got worse and she felt more short of breath.  She has had about a week of this mild chest heaviness and pressure which is relatively constant, not pleuritic in nature, not knifelike, ripping, or tearing.  She said her cough got worse today with some slight change in her sputum production.  She has had some mild dark stools over the last several months when she wipes, she has not seen any blood or maroon or purple-colored stool.  No lightheadedness, no abdominal pain, no fever or chills.  She has not been hospitalized in the last year, says she has not been on antibiotics recently.  EMS indicates patient remained stable during transport    General:  Patient is well-appearing  Head:  Atraumatic  Eyes:  Conjunctiva pink  ENT:  Mucous membranes are moist  Neck:  Supple  Cardiac:  S1-S2, without murmurs  Lungs: Decreased air movement, mild expiratory wheezing, no clinical respiratory failure  Abdomen:  Soft, nontender, normal bowel  sounds, no CVA tenderness, no tympany, no rigidity, no guarding, rectal exam chaperoned by female nurse Pushpa shows several nonthrombosed external hemorrhoids, no visible blood, digital rectal examination is nontender, there is some slight brown-colored stool on my finger but no visible blood or melena  Extremities:  Normal range of motion, no pedal edema or calf asymmetry, radial pulses are equal and symmetric bilaterally  Neurologic:  Awake, fluent speech, normal comprehension, AAOx3  Skin:  Pink warm and dry  Psychiatric:  Alert, pleasant, cooperative            ED Course  ED Course as of 03/28/25 1551   Fri Mar 28, 2025   1146 ECG performed 1136 hrs., interpreted by me, sinus rhythm rate of 81, occasional PVC, incomplete right bundle branch block, other than the rate, no acute change from July 28, 2024   1407 Repeat ECG interpreted by me, sinus rhythm, rate of 98, occasional PVCs, occasional bigeminy, incomplete right bundle branch block, no significant acute change from earlier ECG today     XR chest pa and lateral   Final Result      No acute cardiopulmonary disease.            Workstation performed: [id number]           Labs Reviewed   CBC AND DIFFERENTIAL - Abnormal       Result Value Ref Range Status    WBC 6.11  4.31 - 10.16 Thousand/uL Final    RBC 4.64  3.81 - 5.12 Million/uL Final    Hemoglobin 13.1  11.5 - 15.4 g/dL Final    Hematocrit 42.6  34.8 - 46.1 % Final    MCV 92  82 - 98 fL Final    MCH 28.2  26.8 - 34.3 pg Final    MCHC 30.8 (*) 31.4 - 37.4 g/dL Final    RDW 14.7  11.6 - 15.1 % Final    MPV 10.3  8.9 - 12.7 fL Final    Platelets 145 (*) 149 - 390 Thousands/uL Final    nRBC 0  /100 WBCs Final    Segmented % 78 (*) 43 - 75 % Final    Immature Grans % 0  0 - 2 % Final    Lymphocytes % 14  14 - 44 % Final    Monocytes % 8  4 - 12 % Final    Eosinophils Relative 0  0 - 6 % Final    Basophils Relative 0  0 - 1 % Final    Absolute Neutrophils 4.74  1.85 - 7.62 Thousands/µL Final    Absolute Immature  Grans 0.02  0.00 - 0.20 Thousand/uL Final    Absolute Lymphocytes 0.83  0.60 - 4.47 Thousands/µL Final    Absolute Monocytes 0.48  0.17 - 1.22 Thousand/µL Final    Eosinophils Absolute 0.02  0.00 - 0.61 Thousand/µL Final    Basophils Absolute 0.02  0.00 - 0.10 Thousands/µL Final   COMPREHENSIVE METABOLIC PANEL - Abnormal    Sodium 139  135 - 147 mmol/L Final    Potassium 4.6  3.5 - 5.3 mmol/L Final    Chloride 101  96 - 108 mmol/L Final    CO2 30  21 - 32 mmol/L Final    ANION GAP 8  4 - 13 mmol/L Final    BUN 13  5 - 25 mg/dL Final    Creatinine 0.59 (*) 0.60 - 1.30 mg/dL Final    Comment: Standardized to IDMS reference method    Glucose 126  65 - 140 mg/dL Final    Comment: If the patient is fasting, the ADA then defines impaired fasting glucose as > 100 mg/dL and diabetes as > or equal to 123 mg/dL.    Calcium 9.9  8.4 - 10.2 mg/dL Final    AST 22  13 - 39 U/L Final    ALT 16  7 - 52 U/L Final    Comment: Specimen collection should occur prior to Sulfasalazine administration due to the potential for falsely depressed results.     Alkaline Phosphatase 91  34 - 104 U/L Final    Total Protein 7.0  6.4 - 8.4 g/dL Final    Albumin 4.2  3.5 - 5.0 g/dL Final    Total Bilirubin 0.48  0.20 - 1.00 mg/dL Final    Comment: Use of this assay is not recommended for patients undergoing treatment with eltrombopag due to the potential for falsely elevated results.  N-acetyl-p-benzoquinone imine (metabolite of Acetaminophen) will generate erroneously low results in samples for patients that have taken an overdose of Acetaminophen.    eGFR 86  ml/min/1.73sq m Final    Narrative:     National Kid[employer] Foundation guidelines for Chronic Kidney Disease (CKD):     Stage 1 with normal or high GFR (GFR > 90 mL/min/1.73 square meters)    Stage 2 Mild CKD (GFR = 60-89 mL/min/1.73 square meters)    Stage 3A Moderate CKD (GFR = 45-59 mL/min/1.73 square meters)    Stage 3B Moderate CKD (GFR = 30-44 mL/min/1.73 square meters)    Stage 4  "Severe CKD (GFR = 15-29 mL/min/1.73 square meters)    Stage 5 End Stage CKD (GFR <15 mL/min/1.73 square meters)  Note: GFR calculation is accurate only with a steady state creatinine   MAGNESIUM - Abnormal    Magnesium 1.8 (*) 1.9 - 2.7 mg/dL Final   TSH, 3RD GENERATION - Abnormal    TSH 3RD GENERATON 0.256 (*) 0.450 - 4.500 uIU/mL Final    Comment: The recommended reference ranges for TSH during pregnancy are as follows:   First trimester 0.100 to 2.500 uIU/mL   Second trimester  0.200 to 3.000 uIU/mL   Third trimester 0.300 to 3.000 uIU/m    Note: Normal ranges may not apply to patients who are transgender, non-binary, or whose legal sex, sex at birth, and gender identity differ.  Adult TSH (3rd generation) reference range follows the recommended guidelines of the [employer], January, 2020.   HS TROPONIN I 0HR - Normal    hs TnI 0hr 8  \"Refer to ACS Flowchart\"- see link ng/L Final    Comment:                                              Initial (time 0) result  If >=50 ng/L, Myocardial injury suggested ;  Type of myocardial injury and treatment strategy  to be determined.  If 5-49 ng/L, a delta result at 2 hours and or 4 hours will be needed to further evaluate.  If <4 ng/L, and chest pain has been >3 hours since onset, patient may qualify for discharge based on the HEART score in the ED.  If <5 ng/L and <3hours since onset of chest pain, a delta result at 2 hours will be needed to further evaluate.    HS Troponin 99th Percentile URL of a Health Population=12 ng/L with a 95% Confidence Interval of 8-18 ng/L.    Second Troponin (time 2 hours)  If calculated delta >= 20 ng/L,  Myocardial injury suggested ; Type of myocardial injury and treatment strategy to be determined.  If 5-49 ng/L and the calculated delta is 5-19 ng/L, consult medical service for evaluation.  Continue evaluation for ischemia on ecg and other possible etiology and repeat hs troponin at 4 hours.  If delta is <5 ng/L at 2 hours, " consider discharge based on risk stratification via the HEART score (if in ED), or EMMANUELLE risk score in IP/Observation.    HS Troponin 99th Percentile URL of a Health Population=12 ng/L with a 95% Confidence Interval of 8-18 ng/L.   LIPASE - Normal    Lipase 18  11 - 82 u/L Final   PROCALCITONIN TEST - Normal    Procalcitonin <0.05  <=0.25 ng/ml Final    Comment: Suspected Lower Respiratory Tract Infection (LRTI):  - LESS than or EQUAL to 0.25 ng/mL:   low likelihood for bacterial LRTI; antibiotics DISCOURAGED.  - GREATER than 0.25 ng/mL:   increased likelihood for bacterial LRTI; antibiotics ENCOURAGED.    Suspected Sepsis:  - Strongly consider initiating antibiotics in ALL UNSTABLE patients.  - LESS than or EQUAL to 0.5 ng/mL:   low likelihood for bacterial sepsis; antibiotics DISCOURAGED.  - GREATER than 0.5 ng/mL:   increased likelihood for bacterial sepsis; antibiotics ENCOURAGED.  - GREATER than 2 ng/mL:   high risk for severe sepsis / septic shock; antibiotics strongly ENCOURAGED.    Decisions on antibiotic use should not be based solely on Procalcitonin (PCT) levels. If PCT is low but uncertainty exists with stopping antibiotics, repeat PCT in 6-24 hours to confirm the low level. If antibiotics are administered (regardless if initial PCT was high or low), repeat PCT every 1-2 days to consider early antibiotic cessation (when GREATER than 80% decrease from the peak OR when PCT drops below designated cutoffs, whichever comes first), so long as the infection is NOT one that typically requires prolonged treatment durations (e.g., bone/joint infections, endocarditis, Staph. aureus bacteremia).    Situations of FALSE-POSITIVE Procalcitonin values:  1) Newborns < 72 hours old  2) Massive stress from severe trauma / burns, major surgery, acute pancreatitis, cardiogenic / hemorrhagic shock, sickle cell crisis, or other organ perfusion abnormalities  3) Malaria and some Candidal infections  4) Treatment with agents that  "stimulate cytokines (e.g., OKT3, anti-lymphocyte globulins, alemtuzumab, IL-2, granulocyte transfusion [NOT GCSFs])  5) Chronic renal disease causes elevated baseline levels (consider GREATER than 0.75 ng/mL as an abnormal cut-off); initiating HD/CRRT may cause transient decreases  6) Paraneoplastic syndromes from medullary thyroid or SCLC, some forms of vasculitis, and acute fzbrk-sv-yewy disease    Situations of FALSE-NEGATIVE Procalcitonin values:  1) Too early in clinical course for PCT to have reached its peak (may repeat in 6-24 hours to confirm low level)  2) Localized infection WITHOUT systemic (SIRS / sepsis) response (e.g., an abscess, osteomyelitis, cystitis)  3) Mycobacteria (e.g., Tuberculosis, MAC)  4) Cystic fibrosis exacerbations     HS TROPONIN I 2HR - Normal    hs TnI 2hr 8  \"Refer to ACS Flowchart\"- see link ng/L Final    Comment:                                              Initial (time 0) result  If >=50 ng/L, Myocardial injury suggested ;  Type of myocardial injury and treatment strategy  to be determined.  If 5-49 ng/L, a delta result at 2 hours and or 4 hours will be needed to further evaluate.  If <4 ng/L, and chest pain has been >3 hours since onset, patient may qualify for discharge based on the HEART score in the ED.  If <5 ng/L and <3hours since onset of chest pain, a delta result at 2 hours will be needed to further evaluate.    HS Troponin 99th Percentile URL of a Health Population=12 ng/L with a 95% Confidence Interval of 8-18 ng/L.    Second Troponin (time 2 hours)  If calculated delta >= 20 ng/L,  Myocardial injury suggested ; Type of myocardial injury and treatment strategy to be determined.  If 5-49 ng/L and the calculated delta is 5-19 ng/L, consult medical service for evaluation.  Continue evaluation for ischemia on ecg and other possible etiology and repeat hs troponin at 4 hours.  If delta is <5 ng/L at 2 hours, consider discharge based on risk stratification via the HEART " score (if in ED), or EMMANUELLE risk score in IP/Observation.    HS Troponin 99th Percentile URL of a Health Population=12 ng/L with a 95% Confidence Interval of 8-18 ng/L.    Delta 2hr hsTnI 0  <20 ng/L Final   HS TROPONIN I 4HR     Patient is examined history is suggestive of COPD exacerbation.  Chest x-ray shows no evidence of pneumonia.  ACS considered unlikely given cardiac biomarkers,I do not believe this patient's complaints are from pulmonary embolism and I believe they would most likely be harmed through false positive test results and other complications of testing by further pursuing the diagnosis of pulmonary embolism.  No evidence of life-threatening anemia.  Plan is for admission      The patient was evaluated for sepsis in the emergency department. After that assessment, at the time of admission, there was no evidence of severe sepsis or septic shock or indication that the patient should be included in the SEP-1 CMS quality measure.    DIAGNOSIS:  Acute COPD exacerbation, chronic tobacco use    MEDICAL DECISION MAKING CODING    COLLECTION AND INTERPRETATION OF DATA  I reviewed prior external notes, including July 2024 hospital discharge summary    I ordered each unique test  Tests reviewed personally by me:  ECG: See my ED course  Labs: See above  Imaging: I independently interpreted the x-ray as noted above.          Critical Care Time  Procedures

## 2025-03-28 NOTE — TELEPHONE ENCOUNTER
3/28/2025 9:42 AM returned call to Patrica     She is having a hard time getting down the front steps of her apartment.     I spoke with her and her son. He thinks she needs her heart and lungs checked.  He is worried about her breathing.     I advised her son to bring her to the emergency room.  He is going to call 911 since he is concerned about her breathing.    Appointment this morning canceled  Janine Marcus,

## 2025-03-28 NOTE — H&P
H&P - Hospitalist   Name: Patrica Grimaldo 80 y.o. female I MRN: [id number]  Unit/Bed#: ED 28 I Date of Admission: 3/28/2025   Date of Service: 3/28/2025 I Hospital Day: 0     Assessment & Plan  Chronic obstructive pulmonary disease with acute exacerbation (HCC)  Patient has history of COPD.  She notably gets exacerbation when she smokes a cigarette.  Today she was smoking and had shortness of breath.  Reached out to PCP who advised her to go to the emergency department.  Coming in for shortness of breath  Patient received DuoNebs and ceftriaxone in the emergency department as well as methylprednisolone 60 mg IV.  Imaging reviewed which was unremarkable  No signs and symptoms of infection  Plan:  Prednisone for 5 days  Continue inhaler at night at  Possible discharge tomorrow  Advised to quit smoking.  No need for PT OT  Xopenex levalbuterol TID  Inhaler at 9pm (patient very specific with timing of meds)    PVC (premature ventricular contraction)  Patient experienced some chest tightness likely in the setting of exacerbation  Troponins were negative  Continue telemetry  Possible discharge tomorrow  No need for PT OT      Subclinical hyperthyroidism  Lab Results   Component Value Date    XQZ8FRBIXUZP 0.256 (L) 03/28/2025    AWI7SGBZWGDS 0.731 09/30/2022    FREET4 1.2 03/27/2024    FREET4 1.24 09/30/2022    T3FREE 2.44 06/28/2021    T3FREE 2.87 07/29/2019   Patient denies any heat intolerance  No weight loss  No palpitations  Will check T4 levels    Anemia  Lab Results   Component Value Date    HGB 13.1 03/28/2025    HGB 9.4 (L) 07/31/2024    HGB 9.3 (L) 07/31/2024    HGB 8.7 (L) 07/30/2024    HGB 9.4 (L) 07/30/2024    CONCFE 9 (L) 07/29/2024    IRON 15 (L) 07/29/2024    FERRITIN 101 07/29/2024    TIBC 160 (L) 07/29/2024   Appears to be stable we will continue to monitor          VTE Pharmacologic Prophylaxis:   Moderate Risk (Score 3-4) - Pharmacological DVT Prophylaxis Ordered: enoxaparin (Lovenox).  Code Status:  Level 1  Discussion with family: Updated  (son) at bedside.    Anticipated Length of Stay: Patient will be admitted on an observation basis with an anticipated length of stay of less than 2 midnights secondary to COPD acute.    History of Present Illness   Chief Complaint: copd ACUTE    Patrica Grimaldo is a 80 y.o. female with a PMH of acute COPD exacerbation.  Patient was smoking today.  And had chest tightness and shortness of breath called PCP who advised patient to go to the emergency department.    Review of Systems   Constitutional:  Negative for chills and fever.   HENT:  Negative for ear pain and sore throat.    Eyes:  Negative for pain and visual disturbance.   Respiratory:  Negative for cough and shortness of breath.    Cardiovascular:  Negative for chest pain and palpitations.   Gastrointestinal:  Negative for abdominal pain and vomiting.   Genitourinary:  Negative for dysuria and hematuria.   Musculoskeletal:  Negative for arthralgias and back pain.   Skin:  Negative for color change and rash.   Neurological:  Negative for seizures and syncope.   All other systems reviewed and are negative.      Historical Information   Past Medical History:   Diagnosis Date    Elevated blood pressure reading      Past Surgical History:   Procedure Laterality Date    APPENDECTOMY      TONSILLECTOMY       Social History     Tobacco Use    Smoking status: Every Day     Current packs/day: 1.50     Types: Cigarettes    Smokeless tobacco: Never   Vaping Use    Vaping status: Never Used   Substance and Sexual Activity    Alcohol use: Never    Drug use: Never    Sexual activity: Not on file     E-Cigarette/Vaping    E-Cigarette Use Never User      E-Cigarette/Vaping Substances    Nicotine No     THC No     CBD No     Flavoring No     Other No     Unknown No      Family history non-contributory  Social History:  Marital Status: /Civil Union   Occupation: nONE  Patient Pre-hospital Living Situation:  Home  Patient Pre-hospital Level of Mobility: walks  Patient Pre-hospital Diet Restrictions: nONE    Meds/Allergies   I have reviewed home medications with patient personally.  Prior to Admission medications    Medication Sig Start Date End Date Taking? Authorizing Provider   doxycycline hyclate (VIBRAMYCIN) 100 mg capsule Take 1 capsule (100 mg total) by mouth 2 (two) times a day for 5 days 3/28/25 4/2/25 Yes Raffi Cedillo MD   nicotine (NICODERM CQ) 14 mg/24hr TD 24 hr patch Place 1 patch on the skin over 24 hours every 24 hours 3/28/25  Yes Raffi Cedillo MD   predniSONE 20 mg tablet Take 2 tablets (40 mg total) by mouth daily for 5 days 3/28/25 4/2/25 Yes Raffi Cedillo MD   alendronate (Fosamax) 70 mg tablet Take 1 tablet (70 mg total) by mouth every 7 days  Patient not taking: Reported on 7/26/2024 11/22/21   Janine Marcus DO   ALPRAZolam (XANAX) 0.25 mg tablet Take 1 tablet (0.25 mg total) by mouth daily as needed for anxiety 11/17/22   Janine Marcus DO   Cholecalciferol (VITAMIN D3) 2000 units TABS Take 2,000 Units by mouth daily     Historical Provider, MD   Fluticasone-Salmeterol (Advair) 250-50 mcg/dose inhaler INHALE 1 PUFF BY MOUTH TWO TIMES A DAY . RINSE MOUTH AFTER USE 3/7/25   Janine Marcus DO   metoprolol succinate (TOPROL-XL) 50 mg 24 hr tablet TAKE ONE TABLET BY MOUTH EVERY DAY AT BEDTIME 3/26/25   Janine Marcus DO   Multiple Vitamins-Minerals (MULTIVITAMIN ADULT PO) Take 1 tablet by mouth daily    Historical Provider, MD   omeprazole (PriLOSEC) 40 MG capsule TAKE ONE CAPSULE BY MOUTH TWICE A DAY 3/25/25   Wendy Louie PA-C     Allergies   Allergen Reactions    Erythromycin GI Intolerance     Reaction Date: 26Sep2005;     Lexapro [Escitalopram] Other (See Comments)     hyponatremia       Objective :  Temp:  [98 °F (36.7 °C)] 98 °F (36.7 °C)  HR:  [] 100  BP: (132-158)/(65-96) 132/71  Resp:  [20] 20  SpO2:  [92 %-99 %] 95 %  O2 Device: None (Room air)    Physical Exam  Vitals and nursing note  reviewed.   Constitutional:       General: She is not in acute distress.     Appearance: She is well-developed.   HENT:      Head: Normocephalic and atraumatic.   Eyes:      Conjunctiva/sclera: Conjunctivae normal.   Cardiovascular:      Rate and Rhythm: Normal rate and regular rhythm.      Heart sounds: No murmur heard.  Pulmonary:      Effort: Pulmonary effort is normal. No respiratory distress.      Breath sounds: Normal breath sounds.   Abdominal:      Palpations: Abdomen is soft.      Tenderness: There is no abdominal tenderness.   Musculoskeletal:         General: No swelling.      Cervical back: Neck supple.   Skin:     General: Skin is warm and dry.      Capillary Refill: Capillary refill takes less than 2 seconds.   Neurological:      Mental Status: She is alert.   Psychiatric:         Mood and Affect: Mood normal.          Lines/Drains:            Lab Results: I have reviewed the following results:  Results from last 7 days   Lab Units 03/28/25  1157   WBC Thousand/uL 6.11   HEMOGLOBIN g/dL 13.1   HEMATOCRIT % 42.6   PLATELETS Thousands/uL 145*   SEGS PCT % 78*   LYMPHO PCT % 14   MONO PCT % 8   EOS PCT % 0     Results from last 7 days   Lab Units 03/28/25  1157   SODIUM mmol/L 139   POTASSIUM mmol/L 4.6   CHLORIDE mmol/L 101   CO2 mmol/L 30   BUN mg/dL 13   CREATININE mg/dL 0.59*   ANION GAP mmol/L 8   CALCIUM mg/dL 9.9   ALBUMIN g/dL 4.2   TOTAL BILIRUBIN mg/dL 0.48   ALK PHOS U/L 91   ALT U/L 16   AST U/L 22   GLUCOSE RANDOM mg/dL 126             Lab Results   Component Value Date    HGBA1C 5.3 02/28/2022    HGBA1C 5.4 01/21/2020    HGBA1C 5.3 12/14/2016     Results from last 7 days   Lab Units 03/28/25  1157   PROCALCITONIN ng/ml <0.05       Imaging Results Review: I personally reviewed the following image studies/reports in PACS and discussed pertinent findings with Radiology: chest xray. My interpretation of the radiology images/reports is: Unremarkable.  Other Study Results Review: EKG was reviewed.      Administrative Statements   I have spent a total time of 60 minutes in caring for this patient on the day of the visit/encounter including Diagnostic results, Prognosis, Risks and benefits of tx options, Instructions for management, Patient and family education, Importance of tx compliance, Risk factor reductions, Impressions, Counseling / Coordination of care, Documenting in the medical record, Reviewing/placing orders in the medical record (including tests, medications, and/or procedures), Obtaining or reviewing history  , and Communicating with other healthcare professionals .    ** Please Note: This note has been constructed using a voice recognition system. **

## 2025-03-28 NOTE — PLAN OF CARE
Problem: PAIN - ADULT  Goal: Verbalizes/displays adequate comfort level or baseline comfort level  Description: Interventions:- Encourage patient to monitor pain and request assistance- Assess pain using appropriate pain scale- Administer analgesics based on type and severity of pain and evaluate response- Implement non-pharmacological measures as appropriate and evaluate response- Consider cultural and social influences on pain and pain management- Notify physician/advanced practitioner if interventions unsuccessful or patient reports new pain  Outcome: Progressing     Problem: INFECTION - ADULT  Goal: Absence or prevention of progression during hospitalization  Description: INTERVENTIONS:- Assess and monitor for signs and symptoms of infection- Monitor lab/diagnostic results- Monitor all insertion sites, i.e. indwelling lines, tubes, and drains- Monitor endotracheal if appropriate and nasal secretions for changes in amount and color- Juliaetta appropriate cooling/warming therapies per order- Administer medications as ordered- Instruct and encourage patient and family to use good hand hygiene technique- Identify and instruct in appropriate isolation precautions for identified infection/condition  Outcome: Progressing  Goal: Absence of fever/infection during neutropenic period  Description: INTERVENTIONS:- Monitor WBC  Outcome: Progressing     Problem: SAFETY ADULT  Goal: Patient will remain free of falls  Description: INTERVENTIONS:- Educate patient/family on patient safety including physical limitations- Instruct patient to call for assistance with activity - Consult OT/PT to assist with strengthening/mobility - Keep Call bell within reach- Keep bed low and locked with side rails adjusted as appropriate- Keep care items and personal belongings within reach- Initiate and maintain comfort rounds- Make Fall Risk Sign visible to staff-  Apply yellow socks and bracelet for high fall risk patients- Consider moving  patient to room near nurses station  Outcome: Progressing  Goal: Maintain or return to baseline ADL function  Description: INTERVENTIONS:-  Assess patient's ability to carry out ADLs; assess patient's baseline for ADL function and identify physical deficits which impact ability to perform ADLs (bathing, care of mouth/teeth, toileting, grooming, dressing, etc.)- Assess/evaluate cause of self-care deficits - Assess range of motion- Assess patient's mobility; develop plan if impaired- Assess patient's need for assistive devices and provide as appropriate- Encourage maximum independence but intervene and supervise when necessary- Involve family in performance of ADLs- Assess for home care needs following discharge - Consider OT consult to assist with ADL evaluation and planning for discharge- Provide patient education as appropriate  Outcome: Progressing  Goal: Maintains/Returns to pre admission functional level  Description: INTERVENTIONS:- Perform AM-PAC 6 Click Basic Mobility/ Daily Activity assessment daily.- Set and communicate daily mobility goal to care team and patient/family/caregiver. - Collaborate with rehabilitation services on mobility goals if consulted- Perform Range of Motion Outcome: Progressing

## 2025-03-29 VITALS
BODY MASS INDEX: 17.42 KG/M2 | HEIGHT: 64 IN | RESPIRATION RATE: 14 BRPM | WEIGHT: 102 LBS | DIASTOLIC BLOOD PRESSURE: 69 MMHG | OXYGEN SATURATION: 97 % | SYSTOLIC BLOOD PRESSURE: 146 MMHG | TEMPERATURE: 97.3 F | HEART RATE: 72 BPM

## 2025-03-29 PROCEDURE — 94640 AIRWAY INHALATION TREATMENT: CPT

## 2025-03-29 PROCEDURE — 94664 DEMO&/EVAL PT USE INHALER: CPT

## 2025-03-29 PROCEDURE — 99239 HOSP IP/OBS DSCHRG MGMT >30: CPT | Performed by: INTERNAL MEDICINE

## 2025-03-29 PROCEDURE — 94760 N-INVAS EAR/PLS OXIMETRY 1: CPT

## 2025-03-29 RX ORDER — PREDNISONE 20 MG/1
40 TABLET ORAL DAILY
Qty: 8 TABLET | Refills: 0 | Status: SHIPPED | OUTPATIENT
Start: 2025-03-29 | End: 2025-04-02

## 2025-03-29 RX ORDER — PREDNISONE 20 MG/1
40 TABLET ORAL DAILY
Qty: 8 TABLET | Refills: 0 | OUTPATIENT
Start: 2025-03-29 | End: 2025-04-02

## 2025-03-29 RX ADMIN — PANTOPRAZOLE SODIUM 40 MG: 40 TABLET, DELAYED RELEASE ORAL at 08:24

## 2025-03-29 RX ADMIN — IPRATROPIUM BROMIDE 0.5 MG: 0.5 SOLUTION RESPIRATORY (INHALATION) at 07:57

## 2025-03-29 RX ADMIN — LEVALBUTEROL HYDROCHLORIDE 1.25 MG: 1.25 SOLUTION RESPIRATORY (INHALATION) at 07:57

## 2025-03-29 RX ADMIN — NICOTINE 1 PATCH: 14 PATCH, EXTENDED RELEASE TRANSDERMAL at 08:24

## 2025-03-29 RX ADMIN — FLUTICASONE FUROATE AND VILANTEROL TRIFENATATE 1 PUFF: 200; 25 POWDER RESPIRATORY (INHALATION) at 09:06

## 2025-03-29 RX ADMIN — PREDNISONE 40 MG: 20 TABLET ORAL at 08:24

## 2025-03-29 NOTE — ASSESSMENT & PLAN NOTE
Patient experienced some chest tightness likely in the setting of exacerbation  Troponins were negative  Resolved

## 2025-03-29 NOTE — ASSESSMENT & PLAN NOTE
Patient has history of COPD.  She notably gets exacerbation when she smokes a cigarette.   she was smoking and had shortness of breath.  Reached out to PCP who advised her to go to the emergency department.  Coming in for shortness of breath  Patient received DuoNebs and ceftriaxone in the emergency department as well as methylprednisolone 60 mg IV.  I chest x-ray without acute abnormality  No signs and symptoms of infection   Patient was admitted treated with bronchodilator and oral prednisone  Currently she is in stable condition  Wants to go home  Clear lungs on physical exam  Patient will be discharged home today for 4 more days of oral prednisone to follow-up with her PCP as an outpatient  Outpatient pulmonology and pulmonology rehab

## 2025-03-29 NOTE — ASSESSMENT & PLAN NOTE
Lab Results   Component Value Date    HGB 13.1 03/28/2025    HGB 9.4 (L) 07/31/2024    HGB 9.3 (L) 07/31/2024    HGB 8.7 (L) 07/30/2024    HGB 9.4 (L) 07/30/2024    CONCFE 9 (L) 07/29/2024    IRON 15 (L) 07/29/2024    FERRITIN 101 07/29/2024    TIBC 160 (L) 07/29/2024   Appears to be stable

## 2025-03-29 NOTE — ASSESSMENT & PLAN NOTE
Lab Results   Component Value Date    NFK7PTCZHNNV 0.256 (L) 03/28/2025    SSP7MJRFRWLX 0.731 09/30/2022    FREET4 1.15 (H) 03/28/2025    FREET4 1.2 03/27/2024    T3FREE 2.44 06/28/2021    T3FREE 2.87 07/29/2019   Patient denies any heat intolerance  No weight loss  No palpitations  TSH 0.25  Free T41.15  Repeat outpatient thyroid study

## 2025-03-29 NOTE — PLAN OF CARE

## 2025-03-29 NOTE — DISCHARGE SUMMARY
Discharge Summary - Hospitalist   Name: Patrica Grimaldo 80 y.o. female I MRN: [id number]  [facility] Date of Admission: 3/28/2025   Date of Service: 3/29/2025 I Hospital Day: 0     Assessment & Plan  Chronic obstructive pulmonary disease with acute exacerbation (HCC)  Patient has history of COPD.  She notably gets exacerbation when she smokes a cigarette.   she was smoking and had shortness of breath.  Reached out to PCP who advised her to go to the emergency department.  Coming in for shortness of breath  Patient received DuoNebs and ceftriaxone in the emergency department as well as methylprednisolone 60 mg IV.  I chest x-ray without acute abnormality  No signs and symptoms of infection   Patient was admitted treated with bronchodilator and oral prednisone  Currently she is in stable condition  Wants to go home  Clear lungs on physical exam  Patient will be discharged home today for 4 more days of oral prednisone to follow-up with her PCP as an outpatient  Outpatient pulmonology and pulmonology rehab  PVC (premature ventricular contraction)  Patient experienced some chest tightness likely in the setting of exacerbation  Troponins were negative  Resolved      Subclinical hyperthyroidism  Lab Results   Component Value Date    HJO2QYUGGULA 0.256 (L) 03/28/2025    CVV5LRACISQL 0.731 09/30/2022    FREET4 1.15 (H) 03/28/2025    FREET4 1.2 03/27/2024    T3FREE 2.44 06/28/2021    T3FREE 2.87 07/29/2019   Patient denies any heat intolerance  No weight loss  No palpitations  TSH 0.25  Free T41.15  Repeat outpatient thyroid study    Anemia  Lab Results   Component Value Date    HGB 13.1 03/28/2025    HGB 9.4 (L) 07/31/2024    HGB 9.3 (L) 07/31/2024    HGB 8.7 (L) 07/30/2024    HGB 9.4 (L) 07/30/2024    CONCFE 9 (L) 07/29/2024    IRON 15 (L) 07/29/2024    FERRITIN 101 07/29/2024    TIBC 160 (L) 07/29/2024   Appears to be stable          Medical Problems       Resolved Problems  Date Reviewed: 3/29/2025   None    "    Discharging Physician / Practitioner: Romie Peralta DO  PCP: Janine Marcus DO  Admission Date:   Admission Orders (From admission, onward)       Ordered        03/28/25 1554  Place in Observation  Once                          Discharge Date: 03/29/25    Consultations During Hospital Stay:  none    Procedures Performed:     Normal chest x-ray  Significant Findings / Test Results:   As above    Incidental Findings:   none    Test Results Pending at Discharge (will require follow up):   none     Outpatient Tests Requested:  none    Complications:  none    Reason for Admission:   COPD exacerbation    Hospital Course:   Patrica Grimaldo is a 80 y.o. female patient who originally presented to the hospital on 3/28/2025 due to chest tightness and shortness of breath  Underlying history of COPD and active tobacco smoking  Patient developed above symptoms while smoking, she was sent to ED by PCP  Found to have COPD exacerbation    Patient was admitted and treated with steroids and bronchodilator with improvement in her symptoms  Currently she is in stable condition, she will be discharged home to follow-up with her PCP      Please see above list of diagnoses and related plan for additional information.     Condition at Discharge: stable    Discharge Day Visit / Exam:   Subjective:    Patient seen and examined   Comfortable in bed  Asymptomatic  Wants to go home    Vitals: Blood Pressure: 146/69 (03/29/25 0818)  Pulse: 72 (03/29/25 0818)  Temperature: (!) 97.3 °F (36.3 °C) (03/29/25 0818)  Temp Source: Oral (03/28/25 1923)  Respirations: 14 (03/29/25 0818)  Height: 5' 4\" (162.6 cm) (03/28/25 1923)  Weight - Scale: 46.3 kg (102 lb) (03/28/25 1923)  SpO2: 97 % (03/29/25 0818)  Physical Exam   Patient is awake alert oriented in no acute distress  Comfortable in bed   Lungs clear to auscultation bilateral  Heart positive S1-S2 no murmur  Abdomen soft nontender  Lower extremities no edema    Discussion with Family: Updated "  (son) at bedside.    Discharge instructions/Information to patient and family:   See after visit summary for information provided to patient and family.      Provisions for Follow-Up Care:  See after visit summary for information related to follow-up care and any pertinent home health orders.      Mobility at time of Discharge:   Basic Mobility Inpatient Raw Score: 12  JH-HLM Goal: 4: Move to chair/commode  JH-HLM Achieved: 6: Walk 10 steps or more  HLM Goal achieved. Continue to encourage appropriate mobility.     Disposition:   Home    Planned Readmission: no    Discharge Medications:  See after visit summary for reconciled discharge medications provided to patient and/or family.      Administrative Statements   Discharge Statement:  I have spent a total time of 36 minutes in caring for this patient on the day of the visit/encounter. >30 minutes of time was spent on: Counseling / Coordination of care, Documenting in the medical record, Reviewing / ordering tests, medicine, procedures  , and Communicating with other healthcare professionals .    **Please Note: This note may have been constructed using a voice recognition system**

## 2025-03-31 ENCOUNTER — TELEPHONE (OUTPATIENT)
Age: 80
End: 2025-03-31

## 2025-04-27 ENCOUNTER — APPOINTMENT (EMERGENCY)
Dept: RADIOLOGY | Facility: HOSPITAL | Age: 80
DRG: 189 | End: 2025-04-27
Payer: COMMERCIAL

## 2025-04-27 ENCOUNTER — HOSPITAL ENCOUNTER (INPATIENT)
Facility: HOSPITAL | Age: 80
LOS: 4 days | Discharge: HOME HEALTH | DRG: 189 | End: 2025-05-01
Attending: EMERGENCY MEDICINE | Admitting: INTERNAL MEDICINE
Payer: COMMERCIAL

## 2025-04-27 DIAGNOSIS — K26.9 DUODENAL ULCER: ICD-10-CM

## 2025-04-27 DIAGNOSIS — I82.509 CHRONIC DEEP VEIN THROMBOSIS (DVT) (HCC): ICD-10-CM

## 2025-04-27 DIAGNOSIS — Z72.0 TOBACCO ABUSE: ICD-10-CM

## 2025-04-27 DIAGNOSIS — R06.00 DYSPNEA: ICD-10-CM

## 2025-04-27 DIAGNOSIS — R93.89 ABNORMAL CT OF THE CHEST: ICD-10-CM

## 2025-04-27 DIAGNOSIS — J44.1 COPD EXACERBATION (HCC): Primary | ICD-10-CM

## 2025-04-27 PROBLEM — J96.22 ACUTE ON CHRONIC RESPIRATORY FAILURE WITH HYPERCAPNIA (HCC): Status: ACTIVE | Noted: 2025-04-27

## 2025-04-27 LAB
2HR DELTA HS TROPONIN: 0 NG/L
ALBUMIN SERPL BCG-MCNC: 3.2 G/DL (ref 3.5–5)
ALP SERPL-CCNC: 79 U/L (ref 34–104)
ALT SERPL W P-5'-P-CCNC: 15 U/L (ref 7–52)
ANION GAP SERPL CALCULATED.3IONS-SCNC: 5 MMOL/L (ref 4–13)
AST SERPL W P-5'-P-CCNC: 18 U/L (ref 13–39)
BASE EX.OXY STD BLDV CALC-SCNC: 25.1 % (ref 60–80)
BASE EXCESS BLDV CALC-SCNC: 5 MMOL/L
BASOPHILS # BLD AUTO: 0.05 THOUSANDS/ÂΜL (ref 0–0.1)
BASOPHILS NFR BLD AUTO: 1 % (ref 0–1)
BILIRUB SERPL-MCNC: 0.26 MG/DL (ref 0.2–1)
BNP SERPL-MCNC: 40 PG/ML (ref 0–100)
BUN SERPL-MCNC: 15 MG/DL (ref 5–25)
CALCIUM ALBUM COR SERPL-MCNC: 8.8 MG/DL (ref 8.3–10.1)
CALCIUM SERPL-MCNC: 8.2 MG/DL (ref 8.4–10.2)
CARDIAC TROPONIN I PNL SERPL HS: 4 NG/L (ref ?–50)
CARDIAC TROPONIN I PNL SERPL HS: 4 NG/L (ref ?–50)
CHLORIDE SERPL-SCNC: 107 MMOL/L (ref 96–108)
CO2 SERPL-SCNC: 28 MMOL/L (ref 21–32)
CREAT SERPL-MCNC: 0.42 MG/DL (ref 0.6–1.3)
EOSINOPHIL # BLD AUTO: 0.08 THOUSAND/ÂΜL (ref 0–0.61)
EOSINOPHIL NFR BLD AUTO: 1 % (ref 0–6)
ERYTHROCYTE [DISTWIDTH] IN BLOOD BY AUTOMATED COUNT: 14.1 % (ref 11.6–15.1)
GFR SERPL CREATININE-BSD FRML MDRD: 96 ML/MIN/1.73SQ M
GLUCOSE SERPL-MCNC: 95 MG/DL (ref 65–140)
HCO3 BLDV-SCNC: 32.3 MMOL/L (ref 24–30)
HCT VFR BLD AUTO: 39.5 % (ref 34.8–46.1)
HGB BLD-MCNC: 12.2 G/DL (ref 11.5–15.4)
IMM GRANULOCYTES # BLD AUTO: 0.03 THOUSAND/UL (ref 0–0.2)
IMM GRANULOCYTES NFR BLD AUTO: 0 % (ref 0–2)
LYMPHOCYTES # BLD AUTO: 1.06 THOUSANDS/ÂΜL (ref 0.6–4.47)
LYMPHOCYTES NFR BLD AUTO: 14 % (ref 14–44)
MCH RBC QN AUTO: 28.8 PG (ref 26.8–34.3)
MCHC RBC AUTO-ENTMCNC: 30.9 G/DL (ref 31.4–37.4)
MCV RBC AUTO: 93 FL (ref 82–98)
MONOCYTES # BLD AUTO: 0.69 THOUSAND/ÂΜL (ref 0.17–1.22)
MONOCYTES NFR BLD AUTO: 9 % (ref 4–12)
NEUTROPHILS # BLD AUTO: 5.63 THOUSANDS/ÂΜL (ref 1.85–7.62)
NEUTS SEG NFR BLD AUTO: 75 % (ref 43–75)
NRBC BLD AUTO-RTO: 0 /100 WBCS
O2 CT BLDV-SCNC: 4.7 ML/DL
PCO2 BLDV: 60.1 MM HG (ref 42–50)
PH BLDV: 7.35 [PH] (ref 7.3–7.4)
PLATELET # BLD AUTO: 236 THOUSANDS/UL (ref 149–390)
PMV BLD AUTO: 10 FL (ref 8.9–12.7)
PO2 BLDV: 19.1 MM HG (ref 35–45)
POTASSIUM SERPL-SCNC: 3.9 MMOL/L (ref 3.5–5.3)
PROCALCITONIN SERPL-MCNC: <0.05 NG/ML
PROT SERPL-MCNC: 5.7 G/DL (ref 6.4–8.4)
RBC # BLD AUTO: 4.24 MILLION/UL (ref 3.81–5.12)
SODIUM SERPL-SCNC: 140 MMOL/L (ref 135–147)
T4 FREE SERPL-MCNC: 1.32 NG/DL (ref 0.61–1.12)
TSH SERPL DL<=0.05 MIU/L-ACNC: 0.08 UIU/ML (ref 0.45–4.5)
WBC # BLD AUTO: 7.54 THOUSAND/UL (ref 4.31–10.16)

## 2025-04-27 PROCEDURE — 94640 AIRWAY INHALATION TREATMENT: CPT

## 2025-04-27 PROCEDURE — 94664 DEMO&/EVAL PT USE INHALER: CPT

## 2025-04-27 PROCEDURE — 71275 CT ANGIOGRAPHY CHEST: CPT

## 2025-04-27 PROCEDURE — 83880 ASSAY OF NATRIURETIC PEPTIDE: CPT

## 2025-04-27 PROCEDURE — 99223 1ST HOSP IP/OBS HIGH 75: CPT | Performed by: INTERNAL MEDICINE

## 2025-04-27 PROCEDURE — 85025 COMPLETE CBC W/AUTO DIFF WBC: CPT

## 2025-04-27 PROCEDURE — 93005 ELECTROCARDIOGRAM TRACING: CPT

## 2025-04-27 PROCEDURE — 96375 TX/PRO/DX INJ NEW DRUG ADDON: CPT

## 2025-04-27 PROCEDURE — 84443 ASSAY THYROID STIM HORMONE: CPT

## 2025-04-27 PROCEDURE — 36415 COLL VENOUS BLD VENIPUNCTURE: CPT

## 2025-04-27 PROCEDURE — 0241U HB NFCT DS VIR RESP RNA 4 TRGT: CPT | Performed by: INTERNAL MEDICINE

## 2025-04-27 PROCEDURE — 99285 EMERGENCY DEPT VISIT HI MDM: CPT

## 2025-04-27 PROCEDURE — 84481 FREE ASSAY (FT-3): CPT | Performed by: INTERNAL MEDICINE

## 2025-04-27 PROCEDURE — 80053 COMPREHEN METABOLIC PANEL: CPT

## 2025-04-27 PROCEDURE — 84145 PROCALCITONIN (PCT): CPT

## 2025-04-27 PROCEDURE — 96366 THER/PROPH/DIAG IV INF ADDON: CPT

## 2025-04-27 PROCEDURE — 99285 EMERGENCY DEPT VISIT HI MDM: CPT | Performed by: EMERGENCY MEDICINE

## 2025-04-27 PROCEDURE — 96365 THER/PROPH/DIAG IV INF INIT: CPT

## 2025-04-27 PROCEDURE — 84439 ASSAY OF FREE THYROXINE: CPT

## 2025-04-27 PROCEDURE — 84484 ASSAY OF TROPONIN QUANT: CPT

## 2025-04-27 PROCEDURE — 82805 BLOOD GASES W/O2 SATURATION: CPT

## 2025-04-27 RX ORDER — GUAIFENESIN 600 MG/1
600 TABLET, EXTENDED RELEASE ORAL 2 TIMES DAILY
Status: DISCONTINUED | OUTPATIENT
Start: 2025-04-28 | End: 2025-05-01 | Stop reason: HOSPADM

## 2025-04-27 RX ORDER — PANTOPRAZOLE SODIUM 40 MG/1
40 TABLET, DELAYED RELEASE ORAL
Status: DISCONTINUED | OUTPATIENT
Start: 2025-04-28 | End: 2025-05-01 | Stop reason: HOSPADM

## 2025-04-27 RX ORDER — SODIUM CHLORIDE FOR INHALATION 0.9 %
3 VIAL, NEBULIZER (ML) INHALATION EVERY 6 HOURS PRN
Status: DISCONTINUED | OUTPATIENT
Start: 2025-04-27 | End: 2025-04-28

## 2025-04-27 RX ORDER — ALBUTEROL SULFATE 2.5 MG/3ML
1 SOLUTION RESPIRATORY (INHALATION) ONCE
Status: COMPLETED | OUTPATIENT
Start: 2025-04-27 | End: 2025-04-27

## 2025-04-27 RX ORDER — ENOXAPARIN SODIUM 100 MG/ML
40 INJECTION SUBCUTANEOUS DAILY
Status: DISCONTINUED | OUTPATIENT
Start: 2025-04-28 | End: 2025-04-30

## 2025-04-27 RX ORDER — SODIUM CHLORIDE FOR INHALATION 0.9 %
3 VIAL, NEBULIZER (ML) INHALATION
Status: DISCONTINUED | OUTPATIENT
Start: 2025-04-28 | End: 2025-04-28

## 2025-04-27 RX ORDER — FLUTICASONE FUROATE AND VILANTEROL 100; 25 UG/1; UG/1
1 POWDER RESPIRATORY (INHALATION)
Status: DISCONTINUED | OUTPATIENT
Start: 2025-04-28 | End: 2025-05-01 | Stop reason: HOSPADM

## 2025-04-27 RX ORDER — ALBUTEROL SULFATE 0.83 MG/ML
5 SOLUTION RESPIRATORY (INHALATION) ONCE
Status: COMPLETED | OUTPATIENT
Start: 2025-04-27 | End: 2025-04-27

## 2025-04-27 RX ORDER — ALPRAZOLAM 0.25 MG
0.25 TABLET ORAL DAILY PRN
Status: DISCONTINUED | OUTPATIENT
Start: 2025-04-27 | End: 2025-05-01 | Stop reason: HOSPADM

## 2025-04-27 RX ORDER — LEVALBUTEROL INHALATION SOLUTION 1.25 MG/3ML
1.25 SOLUTION RESPIRATORY (INHALATION) EVERY 8 HOURS
Status: DISCONTINUED | OUTPATIENT
Start: 2025-04-27 | End: 2025-04-28

## 2025-04-27 RX ORDER — MAGNESIUM SULFATE HEPTAHYDRATE 40 MG/ML
2 INJECTION, SOLUTION INTRAVENOUS ONCE
Status: COMPLETED | OUTPATIENT
Start: 2025-04-27 | End: 2025-04-27

## 2025-04-27 RX ORDER — ACETAMINOPHEN 325 MG/1
650 TABLET ORAL EVERY 6 HOURS PRN
Status: DISCONTINUED | OUTPATIENT
Start: 2025-04-27 | End: 2025-05-01 | Stop reason: HOSPADM

## 2025-04-27 RX ORDER — METHYLPREDNISOLONE SODIUM SUCCINATE 40 MG/ML
40 INJECTION, POWDER, LYOPHILIZED, FOR SOLUTION INTRAMUSCULAR; INTRAVENOUS EVERY 8 HOURS
Status: DISCONTINUED | OUTPATIENT
Start: 2025-04-27 | End: 2025-04-28

## 2025-04-27 RX ORDER — METOPROLOL SUCCINATE 50 MG/1
50 TABLET, EXTENDED RELEASE ORAL
Status: DISCONTINUED | OUTPATIENT
Start: 2025-04-27 | End: 2025-05-01 | Stop reason: HOSPADM

## 2025-04-27 RX ORDER — ECHINACEA PURPUREA EXTRACT 125 MG
1 TABLET ORAL
Status: DISCONTINUED | OUTPATIENT
Start: 2025-04-27 | End: 2025-05-01 | Stop reason: HOSPADM

## 2025-04-27 RX ORDER — METHYLPREDNISOLONE SODIUM SUCCINATE 125 MG/2ML
125 INJECTION, POWDER, LYOPHILIZED, FOR SOLUTION INTRAMUSCULAR; INTRAVENOUS ONCE
Status: COMPLETED | OUTPATIENT
Start: 2025-04-27 | End: 2025-04-27

## 2025-04-27 RX ORDER — ALBUTEROL SULFATE 2.5 MG/3ML
1 SOLUTION RESPIRATORY (INHALATION) ONCE
Status: DISCONTINUED | OUTPATIENT
Start: 2025-04-27 | End: 2025-04-27

## 2025-04-27 RX ORDER — LEVALBUTEROL INHALATION SOLUTION 1.25 MG/3ML
1.25 SOLUTION RESPIRATORY (INHALATION) EVERY 6 HOURS PRN
Status: DISCONTINUED | OUTPATIENT
Start: 2025-04-27 | End: 2025-04-28

## 2025-04-27 RX ADMIN — METHYLPREDNISOLONE SODIUM SUCCINATE 40 MG: 40 INJECTION, POWDER, FOR SOLUTION INTRAMUSCULAR; INTRAVENOUS at 23:51

## 2025-04-27 RX ADMIN — METOPROLOL SUCCINATE 50 MG: 50 TABLET, EXTENDED RELEASE ORAL at 23:51

## 2025-04-27 RX ADMIN — METHYLPREDNISOLONE SODIUM SUCCINATE 125 MG: 125 INJECTION, POWDER, FOR SOLUTION INTRAMUSCULAR; INTRAVENOUS at 18:43

## 2025-04-27 RX ADMIN — IOHEXOL 80 ML: 350 INJECTION, SOLUTION INTRAVENOUS at 19:58

## 2025-04-27 RX ADMIN — ALBUTEROL SULFATE 5 MG: 2.5 SOLUTION RESPIRATORY (INHALATION) at 18:49

## 2025-04-27 RX ADMIN — MAGNESIUM SULFATE HEPTAHYDRATE 2 G: 40 INJECTION, SOLUTION INTRAVENOUS at 18:45

## 2025-04-27 RX ADMIN — IPRATROPIUM BROMIDE 0.5 MG: 0.5 SOLUTION RESPIRATORY (INHALATION) at 18:49

## 2025-04-27 NOTE — ED PROVIDER NOTES
Time reflects when diagnosis was documented in both MDM as applicable and the Disposition within this note       Time User Action Codes Description Comment    4/27/2025 10:08 PM Pranav Guo Add [J44.1] COPD exacerbation (HCC)           ED Disposition       ED Disposition   Admit    Condition   Stable    Date/Time   Sun Apr 27, 2025 10:08 PM    Comment   Case was discussed with slim             Assessment & Plan       Medical Decision Making  Amount and/or Complexity of Data Reviewed  Labs: ordered.  Radiology: ordered.    Risk  Prescription drug management.  Decision regarding hospitalization.    80-year-old woman with a history of COPD who has poor insight of how bad her symptoms are. The patient continues to smoke. Today she had worsening shortness of breath was working to breathe with respiratory rate around 30 on room air. Patient normally does not use oxygen. Patient put on 5 L nasal cannula which improved her work of breathing. She has diffuse wheezing. Patient is cachectic looking. She is also very tachycardic with a heart rate of 130 consistently.    Impression: COPD exacerbation, possible PE  Plan CBC CMP troponin Pro-Robert VBG CT PE study   the PE study showed no acute pulmonary embolism but possible chronic pulmonary embolism. Patient EKG shows bigeminy. Patient admitted at this time.   ED Course as of 04/29/25 2030   Sun Apr 27, 2025 2128 No acute pulmonary embolus.     Possible webs in right pulmonary arterial branches, suggesting sequela of chronic pulmonary embolus.         Medications   albuterol (FOR EMS ONLY) (2.5 mg/3 mL) 0.083 % inhalation solution 2.5 mg (0 mg Does not apply Given to EMS 4/27/25 1848)   methylPREDNISolone sodium succinate (Solu-MEDROL) injection 125 mg (125 mg Intravenous Given 4/27/25 1843)   albuterol inhalation solution 5 mg (5 mg Nebulization Given 4/27/25 1849)   ipratropium (ATROVENT) 0.02 % inhalation solution 0.5 mg (0.5 mg Nebulization Given 4/27/25 1849)   magnesium  sulfate 2 g/50 mL IVPB (premix) 2 g (0 g Intravenous Stopped 4/27/25 2025)   iohexol (OMNIPAQUE) 350 MG/ML injection (MULTI-DOSE) 80 mL (80 mL Intravenous Given 4/27/25 1958)       ED Risk Strat Scores                    No data recorded                            History of Present Illness       Chief Complaint   Patient presents with    Shortness of Breath     Per EMS they were called to pts home as she became short of breath after smoking a cigarette. Pt has hx of COPD.        Past Medical History:   Diagnosis Date    Elevated blood pressure reading       Past Surgical History:   Procedure Laterality Date    APPENDECTOMY      TONSILLECTOMY        Family History   Problem Relation Age of Onset    Lung cancer Mother     Heart disease Maternal Aunt     Heart disease Maternal Uncle     Heart disease Maternal Grandmother       Social History     Tobacco Use    Smoking status: Every Day     Current packs/day: 1.50     Types: Cigarettes    Smokeless tobacco: Never   Vaping Use    Vaping status: Never Used   Substance Use Topics    Alcohol use: Never    Drug use: Never      E-Cigarette/Vaping    E-Cigarette Use Never User       E-Cigarette/Vaping Substances    Nicotine No     THC No     CBD No     Flavoring No     Other No     Unknown No       I have reviewed and agree with the history as documented.     80-year-old female comes in with COPD exacerbation worse today than she states she has never had.  The patient has increased work of breathing and tachypnea with tachycardia.  Blood pressure stable        Review of Systems   Constitutional:  Negative for chills and fever.   HENT:  Negative for ear pain and sore throat.    Eyes:  Negative for pain and visual disturbance.   Respiratory:  Positive for cough, chest tightness, shortness of breath and wheezing.    Cardiovascular:  Negative for chest pain and palpitations.   Gastrointestinal:  Negative for abdominal pain and vomiting.   Genitourinary:  Negative for dysuria and  hematuria.   Musculoskeletal:  Negative for arthralgias and back pain.   Skin:  Negative for color change and rash.   Neurological:  Negative for seizures and syncope.   All other systems reviewed and are negative.          Objective       ED Triage Vitals   Temperature Pulse Blood Pressure Respirations SpO2 Patient Position - Orthostatic VS   04/27/25 1754 04/27/25 1756 04/27/25 1756 04/27/25 1756 04/27/25 1756 04/27/25 1756   99 °F (37.2 °C) (!) 126 164/73 (!) 24 99 % Lying      Temp Source Heart Rate Source BP Location FiO2 (%) Pain Score    04/27/25 1756 04/27/25 1756 04/27/25 1756 -- 04/27/25 1756    Oral Monitor Left arm  No Pain      Vitals      Date and Time Temp Pulse SpO2 Resp BP Pain Score FACES Pain Rating User   04/29/25 1940 -- -- -- -- -- No Pain -- AB   04/29/25 1940 98.2 °F (36.8 °C) 100 92 % -- 125/86 -- -- DII   04/29/25 1544 98.2 °F (36.8 °C) 85 93 % 13 130/63 -- -- DII   04/29/25 1200 -- 63 -- -- 110/78 -- -- IT   04/29/25 1059 97.7 °F (36.5 °C) 63 93 % 14 110/78 -- -- DII   04/29/25 1043 -- -- -- -- -- No Pain -- NM   04/29/25 1042 -- -- -- -- -- No Pain -- AE   04/29/25 0754 98.5 °F (36.9 °C) 83 92 % 13 130/89 -- -- DII   04/29/25 0736 -- -- -- -- -- No Pain -- MB   04/28/25 2210 -- -- -- -- -- No Pain -- AB   04/28/25 2210 98.6 °F (37 °C) 92 94 % -- 93/72 -- -- DII   04/28/25 2025 98.6 °F (37 °C) 118 94 % -- 126/90 -- -- DII   04/28/25 1959 -- -- 96 % -- -- -- -- PS   04/28/25 1605 97.5 °F (36.4 °C) 105 97 % 13 133/69 -- -- DII   04/28/25 1300 -- -- 94 % -- -- -- -- RF   04/28/25 1147 -- 106 95 % 13 106/73 -- -- DII   04/28/25 0900 -- -- -- -- -- No Pain -- BS   04/28/25 0735 98.3 °F (36.8 °C) 96 94 % 17 109/65 -- -- DII   04/28/25 0708 -- -- 95 % -- -- -- -- RF   04/28/25 0222 -- -- -- -- -- No Pain -- RK   04/28/25 0222 97.9 °F (36.6 °C) 102 93 % 17 111/63 -- -- DII   04/28/25 0050 -- -- 98 % -- -- -- -- RK   04/28/25 0043 -- -- 96 % -- -- -- -- PS   04/27/25 2350 -- -- -- 20 -- -- -- RK    04/27/25 2350 -- 117 97 % -- 130/76 -- -- DII   04/27/25 2320 -- -- -- -- -- No Pain -- RK   04/27/25 2303 -- -- -- 20 -- No Pain -- RK   04/27/25 2303 97.7 °F (36.5 °C) 117 95 % -- 105/76 -- -- DII   04/27/25 2015 -- 127 98 % 22 -- -- -- CM   04/27/25 2000 -- 125 96 % 22 125/84 -- -- CM   04/27/25 1756 98 °F (36.7 °C) 126 99 % 24 164/73 No Pain -- CR   04/27/25 1754 99 °F (37.2 °C) -- -- -- -- -- -- CR            Physical Exam  Vitals and nursing note reviewed.   Constitutional:       Appearance: She is well-developed. She is ill-appearing.      Interventions: She is not intubated.  HENT:      Head: Normocephalic and atraumatic.      Mouth/Throat:      Pharynx: No pharyngeal swelling or oropharyngeal exudate.   Eyes:      Conjunctiva/sclera: Conjunctivae normal.   Neck:      Vascular: No JVD.   Cardiovascular:      Rate and Rhythm: Tachycardia present. Rhythm irregular.      Heart sounds: No murmur heard.  Pulmonary:      Effort: Tachypnea and accessory muscle usage present. She is not intubated.      Breath sounds: No stridor. Wheezing present.   Abdominal:      Palpations: Abdomen is soft.      Tenderness: There is no abdominal tenderness.   Musculoskeletal:         General: No swelling.      Cervical back: Neck supple.      Comments: Cachectic   Skin:     General: Skin is warm and dry.      Capillary Refill: Capillary refill takes less than 2 seconds.   Neurological:      Mental Status: She is alert and oriented to person, place, and time.      Cranial Nerves: No cranial nerve deficit.      Motor: No weakness.   Psychiatric:         Mood and Affect: Mood normal. Mood is not anxious.         Behavior: Behavior is not agitated.         Results Reviewed       Procedure Component Value Units Date/Time    T3, free [[id number]]  (Normal) Collected: 04/27/25 2013    Lab Status: Final result Specimen: Blood from Arm, Left Updated: 04/29/25 1853     T3, Free 3.33 pg/mL     HS Troponin I 2hr [[id number]]  (Normal)  "Collected: 04/27/25 2013    Lab Status: Final result Specimen: Blood from Arm, Left Updated: 04/27/25 2040     hs TnI 2hr 4 ng/L      Delta 2hr hsTnI 0 ng/L     T4, free [[id number]]  (Abnormal) Collected: 04/27/25 1819    Lab Status: Final result Specimen: Blood from Arm, Left Updated: 04/27/25 1938     Free T4 1.32 ng/dL     Narrative:        \"Therapeutic range for patients medicated with thyroid disorders: 0.61-1.24 ng/dL.\"    Blood gas, venous [[id number]]  (Abnormal) Collected: 04/27/25 1901    Lab Status: Final result Specimen: Blood from Arm, Left Updated: 04/27/25 1920     pH, Loc 7.348     pCO2, Loc 60.1 mm Hg      pO2, Loc 19.1 mm Hg      HCO3, Loc 32.3 mmol/L      Base Excess, Loc 5.0 mmol/L      O2 Content, Loc 4.7 ml/dL      O2 HGB, VENOUS 25.1 %     TSH, 3rd generation with Free T4 reflex [[id number]]  (Abnormal) Collected: 04/27/25 1819    Lab Status: Final result Specimen: Blood from Arm, Left Updated: 04/27/25 1903     TSH 3RD GENERATON 0.076 uIU/mL     Procalcitonin [[id number]]  (Normal) Collected: 04/27/25 1819    Lab Status: Final result Specimen: Blood from Arm, Left Updated: 04/27/25 1858     Procalcitonin <0.05 ng/ml     B-Type Natriuretic Peptide(BNP) [[id number]]  (Normal) Collected: 04/27/25 1819    Lab Status: Final result Specimen: Blood from Arm, Left Updated: 04/27/25 1853     BNP 40 pg/mL     Comprehensive metabolic panel [[id number]]  (Abnormal) Collected: 04/27/25 1819    Lab Status: Final result Specimen: Blood from Arm, Left Updated: 04/27/25 1849     Sodium 140 mmol/L      Potassium 3.9 mmol/L      Chloride 107 mmol/L      CO2 28 mmol/L      ANION GAP 5 mmol/L      BUN 15 mg/dL      Creatinine 0.42 mg/dL      Glucose 95 mg/dL      Calcium 8.2 mg/dL      Corrected Calcium 8.8 mg/dL      AST 18 U/L      ALT 15 U/L      Alkaline Phosphatase 79 U/L      Total Protein 5.7 g/dL      Albumin 3.2 g/dL      Total Bilirubin 0.26 mg/dL      eGFR 96 ml/min/1.73sq m     Narrative:      National " Kidney Disease Foundation guidelines for Chronic Kidney Disease (CKD):     Stage 1 with normal or high GFR (GFR > 90 mL/min/1.73 square meters)    Stage 2 Mild CKD (GFR = 60-89 mL/min/1.73 square meters)    Stage 3A Moderate CKD (GFR = 45-59 mL/min/1.73 square meters)    Stage 3B Moderate CKD (GFR = 30-44 mL/min/1.73 square meters)    Stage 4 Severe CKD (GFR = 15-29 mL/min/1.73 square meters)    Stage 5 End Stage CKD (GFR <15 mL/min/1.73 square meters)  Note: GFR calculation is accurate only with a steady state creatinine    HS Troponin 0hr (reflex protocol) [[id number]]  (Normal) Collected: 04/27/25 1819    Lab Status: Final result Specimen: Blood from Arm, Left Updated: 04/27/25 1849     hs TnI 0hr 4 ng/L     CBC and differential [[id number]]  (Abnormal) Collected: 04/27/25 1819    Lab Status: Final result Specimen: Blood from Arm, Left Updated: 04/27/25 1828     WBC 7.54 Thousand/uL      RBC 4.24 Million/uL      Hemoglobin 12.2 g/dL      Hematocrit 39.5 %      MCV 93 fL      MCH 28.8 pg      MCHC 30.9 g/dL      RDW 14.1 %      MPV 10.0 fL      Platelets 236 Thousands/uL      nRBC 0 /100 WBCs      Segmented % 75 %      Immature Grans % 0 %      Lymphocytes % 14 %      Monocytes % 9 %      Eosinophils Relative 1 %      Basophils Relative 1 %      Absolute Neutrophils 5.63 Thousands/µL      Absolute Immature Grans 0.03 Thousand/uL      Absolute Lymphocytes 1.06 Thousands/µL      Absolute Monocytes 0.69 Thousand/µL      Eosinophils Absolute 0.08 Thousand/µL      Basophils Absolute 0.05 Thousands/µL              VAS VENOUS DUPLEX - LOWER LIMB BILATERAL   Final Interpretation by Demond Copeland MD (04/29 1156)      CTA chest pe study   Final Interpretation by Moose Joseph DO (04/27 2125)      No acute pulmonary embolus.      Possible webs in right pulmonary arterial branches, suggesting sequela of chronic pulmonary embolus.      Severe emphysema.                     Workstation performed: [id number]              Procedures    ED Medication and Procedure Management   Prior to Admission Medications   Prescriptions Last Dose Informant Patient Reported? Taking?   ALPRAZolam (XANAX) 0.25 mg tablet Past Month  No Yes   Sig: Take 1 tablet (0.25 mg total) by mouth daily as needed for anxiety   Cholecalciferol (VITAMIN D3) 2000 units TABS 4/27/2025  Yes Yes   Sig: Take 2,000 Units by mouth daily    Fluticasone-Salmeterol (Advair) 250-50 mcg/dose inhaler 4/26/2025  No Yes   Sig: INHALE 1 PUFF BY MOUTH TWO TIMES A DAY . RINSE MOUTH AFTER USE   Multiple Vitamins-Minerals (MULTIVITAMIN ADULT PO) 4/27/2025  Yes Yes   Sig: Take 1 tablet by mouth daily   alendronate (Fosamax) 70 mg tablet Not Taking  No No   Sig: Take 1 tablet (70 mg total) by mouth every 7 days   Patient not taking: Reported on 7/26/2024   metoprolol succinate (TOPROL-XL) 50 mg 24 hr tablet   No No   Sig: TAKE ONE TABLET BY MOUTH EVERY DAY AT BEDTIME   nicotine (NICODERM CQ) 14 mg/24hr TD 24 hr patch 4/27/2025  No Yes   Sig: Place 1 patch on the skin over 24 hours every 24 hours   omeprazole (PriLOSEC) 40 MG capsule 4/27/2025  No Yes   Sig: TAKE ONE CAPSULE BY MOUTH TWICE A DAY      Facility-Administered Medications: None     Current Discharge Medication List        CONTINUE these medications which have CHANGED    Details   metoprolol succinate (TOPROL-XL) 50 mg 24 hr tablet TAKE ONE TABLET BY MOUTH EVERY DAY AT BEDTIME  Qty: 30 tablet, Refills: 0    Associated Diagnoses: Duodenal ulcer           CONTINUE these medications which have NOT CHANGED    Details   ALPRAZolam (XANAX) 0.25 mg tablet Take 1 tablet (0.25 mg total) by mouth daily as needed for anxiety  Qty: 30 tablet, Refills: 3    Associated Diagnoses: Generalized anxiety disorder      Cholecalciferol (VITAMIN D3) 2000 units TABS Take 2,000 Units by mouth daily       Fluticasone-Salmeterol (Advair) 250-50 mcg/dose inhaler INHALE 1 PUFF BY MOUTH TWO TIMES A DAY . RINSE MOUTH AFTER USE  Qty: 60 blister,  Refills: 5    Comments: Substitution to a formulary equivalent within the same pharmaceutical class is authorized.  Associated Diagnoses: Chronic obstructive pulmonary disease, unspecified COPD type (HCC)      Multiple Vitamins-Minerals (MULTIVITAMIN ADULT PO) Take 1 tablet by mouth daily      nicotine (NICODERM CQ) 14 mg/24hr TD 24 hr patch Place 1 patch on the skin over 24 hours every 24 hours  Qty: 28 patch, Refills: 0    Associated Diagnoses: COPD exacerbation (HCC); Dyspnea      omeprazole (PriLOSEC) 40 MG capsule TAKE ONE CAPSULE BY MOUTH TWICE A DAY  Qty: 180 capsule, Refills: 1    Associated Diagnoses: Ulcer of the duodenum caused by bacteria (H. pylori)      alendronate (Fosamax) 70 mg tablet Take 1 tablet (70 mg total) by mouth every 7 days  Qty: 12 tablet, Refills: 3    Associated Diagnoses: Age-related osteoporosis without current pathological fracture           No discharge procedures on file.  ED SEPSIS DOCUMENTATION   Time reflects when diagnosis was documented in both MDM as applicable and the Disposition within this note       Time User Action Codes Description Comment    4/27/2025 10:08 PM Pranav Guo Add [J44.1] COPD exacerbation (HCC)                  Pranav Guo MD  04/29/25 2030

## 2025-04-28 ENCOUNTER — APPOINTMENT (INPATIENT)
Dept: NON INVASIVE DIAGNOSTICS | Facility: HOSPITAL | Age: 80
DRG: 189 | End: 2025-04-28
Payer: COMMERCIAL

## 2025-04-28 LAB
ARTERIAL PATENCY WRIST A: NO
ATRIAL RATE: 106 BPM
ATRIAL RATE: 126 BPM
BASE EX.OXY STD BLDV CALC-SCNC: 79.8 % (ref 60–80)
BASE EXCESS BLDV CALC-SCNC: 4.1 MMOL/L
BASOPHILS # BLD MANUAL: 0 THOUSAND/UL (ref 0–0.1)
BASOPHILS NFR MAR MANUAL: 0 % (ref 0–1)
EOSINOPHIL # BLD MANUAL: 0 THOUSAND/UL (ref 0–0.4)
EOSINOPHIL NFR BLD MANUAL: 0 % (ref 0–6)
ERYTHROCYTE [DISTWIDTH] IN BLOOD BY AUTOMATED COUNT: 14.3 % (ref 11.6–15.1)
FLUAV RNA RESP QL NAA+PROBE: NEGATIVE
FLUBV RNA RESP QL NAA+PROBE: NEGATIVE
HCO3 BLDV-SCNC: 29.5 MMOL/L (ref 24–30)
HCT VFR BLD AUTO: 37.9 % (ref 34.8–46.1)
HGB BLD-MCNC: 11.6 G/DL (ref 11.5–15.4)
LYMPHOCYTES # BLD AUTO: 0.14 THOUSAND/UL (ref 0.6–4.47)
LYMPHOCYTES # BLD AUTO: 2 % (ref 14–44)
MCH RBC QN AUTO: 28.6 PG (ref 26.8–34.3)
MCHC RBC AUTO-ENTMCNC: 30.6 G/DL (ref 31.4–37.4)
MCV RBC AUTO: 94 FL (ref 82–98)
MONOCYTES # BLD AUTO: 0.04 THOUSAND/UL (ref 0–1.22)
MONOCYTES NFR BLD: 1 % (ref 4–12)
NEUTROPHILS # BLD MANUAL: 3.4 THOUSAND/UL (ref 1.85–7.62)
NEUTS SEG NFR BLD AUTO: 95 % (ref 43–75)
O2 CT BLDV-SCNC: 13.9 ML/DL
P AXIS: 82 DEGREES
P AXIS: 83 DEGREES
PCO2 BLDV: 47.6 MM HG (ref 42–50)
PH BLDV: 7.41 [PH] (ref 7.3–7.4)
PLATELET # BLD AUTO: 219 THOUSANDS/UL (ref 149–390)
PLATELET BLD QL SMEAR: ADEQUATE
PMV BLD AUTO: 10.3 FL (ref 8.9–12.7)
PO2 BLDV: 46.4 MM HG (ref 35–45)
PR INTERVAL: 158 MS
PR INTERVAL: 164 MS
QRS AXIS: -59 DEGREES
QRS AXIS: -75 DEGREES
QRSD INTERVAL: 108 MS
QRSD INTERVAL: 110 MS
QT INTERVAL: 354 MS
QT INTERVAL: 368 MS
QTC INTERVAL: 470 MS
QTC INTERVAL: 532 MS
RBC # BLD AUTO: 4.05 MILLION/UL (ref 3.81–5.12)
RBC MORPH BLD: NORMAL
RSV RNA RESP QL NAA+PROBE: NEGATIVE
SARS-COV-2 RNA RESP QL NAA+PROBE: NEGATIVE
T WAVE AXIS: 75 DEGREES
T WAVE AXIS: 80 DEGREES
VARIANT LYMPHS # BLD AUTO: 2 %
VENTRICULAR RATE: 106 BPM
VENTRICULAR RATE: 126 BPM
WBC # BLD AUTO: 3.58 THOUSAND/UL (ref 4.31–10.16)

## 2025-04-28 PROCEDURE — 87070 CULTURE OTHR SPECIMN AEROBIC: CPT | Performed by: INTERNAL MEDICINE

## 2025-04-28 PROCEDURE — 94760 N-INVAS EAR/PLS OXIMETRY 1: CPT

## 2025-04-28 PROCEDURE — 85027 COMPLETE CBC AUTOMATED: CPT | Performed by: INTERNAL MEDICINE

## 2025-04-28 PROCEDURE — 87205 SMEAR GRAM STAIN: CPT | Performed by: INTERNAL MEDICINE

## 2025-04-28 PROCEDURE — 82805 BLOOD GASES W/O2 SATURATION: CPT | Performed by: INTERNAL MEDICINE

## 2025-04-28 PROCEDURE — 94640 AIRWAY INHALATION TREATMENT: CPT

## 2025-04-28 PROCEDURE — 99232 SBSQ HOSP IP/OBS MODERATE 35: CPT | Performed by: PHYSICIAN ASSISTANT

## 2025-04-28 PROCEDURE — 93010 ELECTROCARDIOGRAM REPORT: CPT | Performed by: INTERNAL MEDICINE

## 2025-04-28 PROCEDURE — 85007 BL SMEAR W/DIFF WBC COUNT: CPT | Performed by: INTERNAL MEDICINE

## 2025-04-28 PROCEDURE — 99223 1ST HOSP IP/OBS HIGH 75: CPT | Performed by: INTERNAL MEDICINE

## 2025-04-28 PROCEDURE — 94664 DEMO&/EVAL PT USE INHALER: CPT

## 2025-04-28 RX ORDER — LEVALBUTEROL INHALATION SOLUTION 1.25 MG/3ML
1.25 SOLUTION RESPIRATORY (INHALATION)
Status: DISCONTINUED | OUTPATIENT
Start: 2025-04-28 | End: 2025-04-28

## 2025-04-28 RX ORDER — ALBUTEROL SULFATE 0.83 MG/ML
SOLUTION RESPIRATORY (INHALATION)
Status: COMPLETED
Start: 2025-04-28 | End: 2025-04-28

## 2025-04-28 RX ORDER — ALBUTEROL SULFATE 0.83 MG/ML
2.5 SOLUTION RESPIRATORY (INHALATION) EVERY 6 HOURS PRN
Status: DISCONTINUED | OUTPATIENT
Start: 2025-04-28 | End: 2025-04-28

## 2025-04-28 RX ORDER — LEVALBUTEROL INHALATION SOLUTION 1.25 MG/3ML
1.25 SOLUTION RESPIRATORY (INHALATION) EVERY 4 HOURS PRN
Status: DISCONTINUED | OUTPATIENT
Start: 2025-04-28 | End: 2025-04-28

## 2025-04-28 RX ORDER — LEVALBUTEROL INHALATION SOLUTION 1.25 MG/3ML
1.25 SOLUTION RESPIRATORY (INHALATION) EVERY 6 HOURS PRN
Status: DISCONTINUED | OUTPATIENT
Start: 2025-04-28 | End: 2025-05-01 | Stop reason: HOSPADM

## 2025-04-28 RX ORDER — METHYLPREDNISOLONE SODIUM SUCCINATE 40 MG/ML
40 INJECTION, POWDER, LYOPHILIZED, FOR SOLUTION INTRAMUSCULAR; INTRAVENOUS 2 TIMES DAILY
Status: DISCONTINUED | OUTPATIENT
Start: 2025-04-28 | End: 2025-04-29

## 2025-04-28 RX ADMIN — LEVALBUTEROL HYDROCHLORIDE 1.25 MG: 1.25 SOLUTION RESPIRATORY (INHALATION) at 13:11

## 2025-04-28 RX ADMIN — FLUTICASONE FUROATE AND VILANTEROL TRIFENATATE 1 PUFF: 100; 25 POWDER RESPIRATORY (INHALATION) at 07:47

## 2025-04-28 RX ADMIN — PANTOPRAZOLE SODIUM 40 MG: 40 TABLET, DELAYED RELEASE ORAL at 04:57

## 2025-04-28 RX ADMIN — LEVALBUTEROL HYDROCHLORIDE 1.25 MG: 1.25 SOLUTION RESPIRATORY (INHALATION) at 07:06

## 2025-04-28 RX ADMIN — GUAIFENESIN 600 MG: 600 TABLET, EXTENDED RELEASE ORAL at 16:30

## 2025-04-28 RX ADMIN — IPRATROPIUM BROMIDE 0.5 MG: 0.5 SOLUTION RESPIRATORY (INHALATION) at 07:06

## 2025-04-28 RX ADMIN — Medication 2000 UNITS: at 09:38

## 2025-04-28 RX ADMIN — ENOXAPARIN SODIUM 40 MG: 40 INJECTION SUBCUTANEOUS at 09:37

## 2025-04-28 RX ADMIN — NICOTINE 1 PATCH: 7 PATCH, EXTENDED RELEASE TRANSDERMAL at 09:36

## 2025-04-28 RX ADMIN — METHYLPREDNISOLONE SODIUM SUCCINATE 40 MG: 40 INJECTION, POWDER, FOR SOLUTION INTRAMUSCULAR; INTRAVENOUS at 07:47

## 2025-04-28 RX ADMIN — METOPROLOL SUCCINATE 50 MG: 50 TABLET, EXTENDED RELEASE ORAL at 20:26

## 2025-04-28 RX ADMIN — METHYLPREDNISOLONE SODIUM SUCCINATE 40 MG: 40 INJECTION, POWDER, FOR SOLUTION INTRAMUSCULAR; INTRAVENOUS at 20:26

## 2025-04-28 RX ADMIN — ALBUTEROL SULFATE 2.5 MG: 2.5 SOLUTION RESPIRATORY (INHALATION) at 00:41

## 2025-04-28 RX ADMIN — IPRATROPIUM BROMIDE 0.5 MG: 0.5 SOLUTION RESPIRATORY (INHALATION) at 13:11

## 2025-04-28 RX ADMIN — GUAIFENESIN 600 MG: 600 TABLET, EXTENDED RELEASE ORAL at 09:37

## 2025-04-28 RX ADMIN — B-COMPLEX W/ C & FOLIC ACID TAB 1 TABLET: TAB at 09:37

## 2025-04-28 RX ADMIN — PANTOPRAZOLE SODIUM 40 MG: 40 TABLET, DELAYED RELEASE ORAL at 16:30

## 2025-04-28 NOTE — PROGRESS NOTES
"Progress Note - Hospitalist   Name: Patrica Grimaldo 80 y.o. female I MRN: [id number]  [facility] Date of Admission: 4/27/2025   Date of Service: 4/28/2025 I Hospital Day: 1    Assessment & Plan  Acute on chronic respiratory failure with hypercapnia (HCC)  Patient presented with chest pressure, shortness of breath, wheezing.  pCO2 elevated at 60 from baseline in the 50s  Was placed on supplemental oxygen to keep O2 sat between 88 to 92%  Pulm consult pending as below   COPD with acute exacerbation (HCC)  Pulm consult pending  Was on IV Solumedrol Q8hr, wean to Q12hr today  Continue nebs and inhalers  Allergic rhinitis  Continue nasal saline spray  Generalized anxiety disorder  Continue with Xanax as needed  Essential hypertension  Continue metoprolol nightly  Nicotine abuse  Nicotine patch  Duodenal ulcer  Continue with PPI twice daily  PVC (premature ventricular contraction)  Continue metoprolol   Abnormal CT of the chest  CTA revealed, \"No acute pulmonary embolus. Possible webs in right pulmonary arterial branches, suggesting sequela of chronic pulmonary embolus. Severe emphysema.\"  Pulm consult pending   Abnormal thyroid function test  TSH 0.076 and free T4 1.32  T3 pending      VTE Pharmacologic Prophylaxis:    lovenox    Mobility:   Basic Mobility Inpatient Raw Score: 22  JH-HLM Goal: 7: Walk 25 feet or more  JH-HLM Achieved: 7: Walk 25 feet or more  JH-HLM Goal achieved. Continue to encourage appropriate mobility.    Patient Centered Rounds: I performed bedside rounds with nursing staff today.   Discussions with Specialists or Other Care Team Provider:     Education and Discussions with Family / Patient: Patient declined call to .     Current Length of Stay: 1 day(s)  Current Patient Status: Inpatient   Certification Statement: The patient will continue to require additional inpatient hospital stay due to Pulm consult, IV steroids, PT/OT  Discharge Plan: Anticipate " discharge in 24-48 hrs to discharge location to be determined pending rehab evaluations.    Code Status: Level 1 - Full Code    Subjective   Ms. Grimaldo reports feeling better but not yet quite back to baseline    Objective :  Temp:  [97.7 °F (36.5 °C)-99 °F (37.2 °C)] 98.3 °F (36.8 °C)  HR:  [] 106  BP: (105-164)/(63-84) 106/73  Resp:  [13-24] 13  SpO2:  [93 %-99 %] 95 %  O2 Device: None (Room air)  Nasal Cannula O2 Flow Rate (L/min):  [2 L/min] 2 L/min    Body mass index is 16.73 kg/m².     Input and Output Summary (last 24 hours):     Intake/Output Summary (Last 24 hours) at 4/28/2025 1239  Last data filed at 4/28/2025 0600  Gross per 24 hour   Intake 480 ml   Output --   Net 480 ml       Physical Exam  Vitals reviewed.   Constitutional:       Comments: Patient seen sitting in bed comfortably resting watching TV, NAD   Cardiovascular:      Rate and Rhythm: Normal rate and regular rhythm.   Pulmonary:      Effort: Pulmonary effort is normal.      Comments: No resp distress  On room air  No wheezes noted  +poor air movement  Abdominal:      General: Bowel sounds are normal.      Palpations: Abdomen is soft.      Tenderness: There is no abdominal tenderness.   Musculoskeletal:      Right lower leg: No edema.      Left lower leg: No edema.   Skin:     General: Skin is warm.   Neurological:      Mental Status: She is alert and oriented to person, place, and time.   Psychiatric:         Mood and Affect: Mood normal.           Lines/Drains:        Telemetry:  Telemetry Orders (From admission, onward)               24 Hour Telemetry Monitoring  Continuous x 24 Hours (Telem)        Expiring   Question:  Reason for 24 Hour Telemetry  Answer:  Acute respiratory failure on BiPAP                     Telemetry Reviewed: Normal Sinus Rhythm, Sinus Tachycardia, and PVCs  Indication for Continued Telemetry Use: Arrthymias requiring medical therapy               Lab Results: I have reviewed the following results:   Results  from last 7 days   Lab Units 04/28/25  0453 04/27/25  1819   WBC Thousand/uL 3.58* 7.54   HEMOGLOBIN g/dL 11.6 12.2   HEMATOCRIT % 37.9 39.5   PLATELETS Thousands/uL 219 236   SEGS PCT %  --  75   LYMPHO PCT % 2* 14   MONO PCT % 1* 9   EOS PCT % 0 1     Results from last 7 days   Lab Units 04/27/25  1819   SODIUM mmol/L 140   POTASSIUM mmol/L 3.9   CHLORIDE mmol/L 107   CO2 mmol/L 28   BUN mg/dL 15   CREATININE mg/dL 0.42*   ANION GAP mmol/L 5   CALCIUM mg/dL 8.2*   ALBUMIN g/dL 3.2*   TOTAL BILIRUBIN mg/dL 0.26   ALK PHOS U/L 79   ALT U/L 15   AST U/L 18   GLUCOSE RANDOM mg/dL 95                 Results from last 7 days   Lab Units 04/27/25  1819   PROCALCITONIN ng/ml <0.05       Recent Cultures (last 7 days):         Imaging Results Review: I reviewed radiology reports from this admission including: CT chest.      Last 24 Hours Medication List:     Current Facility-Administered Medications:     acetaminophen (TYLENOL) tablet 650 mg, Q6H PRN    albuterol inhalation solution 2.5 mg, Q6H PRN    ALPRAZolam (XANAX) tablet 0.25 mg, Daily PRN    Cholecalciferol (VITAMIN D3) tablet 2,000 Units, Daily    enoxaparin (LOVENOX) subcutaneous injection 40 mg, Daily    Fluticasone Furoate-Vilanterol 100-25 mcg/actuation 1 puff, Daily    guaiFENesin (MUCINEX) 12 hr tablet 600 mg, BID    ipratropium (ATROVENT) 0.02 % inhalation solution 0.5 mg, TID    levalbuterol (XOPENEX) inhalation solution 1.25 mg, TID    methylPREDNISolone sodium succinate (Solu-MEDROL) injection 40 mg, BID    metoprolol succinate (TOPROL-XL) 24 hr tablet 50 mg, HS    multivitamin stress formula tablet 1 tablet, Daily    nicotine (NICODERM CQ) 7 mg/24hr TD 24 hr patch 1 patch, Daily    pantoprazole (PROTONIX) EC tablet 40 mg, BID AC    sodium chloride (OCEAN) 0.65 % nasal spray 1 spray, Q1H PRN    Administrative Statements   Today, Patient Was Seen By: Chapin Argueta PA-C      **Please Note: This note may have been constructed using a voice recognition  system.**

## 2025-04-28 NOTE — MALNUTRITION/BMI
This medical record reflects one or more clinical indicators suggestive of malnutrition and/or morbid obesity.    Malnutrition Findings:   Adult Malnutrition type: Chronic illness  Adult Degree of Malnutrition: Other severe protein calorie malnutrition  Malnutrition Characteristics: Fat loss, Muscle loss, Inadequate energy                360 Statement: Chronic severe pro, leyla malnutrition d/t COPD, decreased apeptite as evidence by <75% energy intake > 1 month, severe signs of muscle and fat loss at clavicles, chest, triceps, orbitals, temples, interosseous of hands, BMI 16.7, treated with regular diet, discussed tips for increasing pro, leyla intake at home, offered oral nutrition supplements and snacks in between meals to maximize pro, leyla intake, PT ok with recieving Ensure Plus BID.    BMI Findings:  Adult BMI Classifications: Underweight < 18.5        Body mass index is 16.73 kg/m².     See Nutrition note dated 4/28/25 for additional details.  Completed nutrition assessment is viewable in the nutrition documentation.

## 2025-04-28 NOTE — PLAN OF CARE
Problem: PAIN - ADULT  Goal: Verbalizes/displays adequate comfort level or baseline comfort level  Description: Interventions:- Encourage patient to monitor pain and request assistance- Assess pain using appropriate pain scale- Administer analgesics based on type and severity of pain and evaluate response- Implement non-pharmacological measures as appropriate and evaluate response- Consider cultural and social influences on pain and pain management- Notify physician/advanced practitioner if interventions unsuccessful or patient reports new pain  Outcome: Progressing     Problem: INFECTION - ADULT  Goal: Absence or prevention of progression during hospitalization  Description: INTERVENTIONS:- Assess and monitor for signs and symptoms of infection- Monitor lab/diagnostic results- Monitor all insertion sites, i.e. indwelling lines, tubes, and drains- Monitor endotracheal if appropriate and nasal secretions for changes in amount and color- [place] appropriate cooling/warming therapies per order- Administer medications as ordered- Instruct and encourage patient and family to use good hand hygiene technique- Identify and instruct in appropriate isolation precautions for identified infection/condition  Outcome: Progressing  Goal: Absence of fever/infection during neutropenic period  Description: INTERVENTIONS:- Monitor WBC  Outcome: Progressing

## 2025-04-28 NOTE — ASSESSMENT & PLAN NOTE
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

## 2025-04-28 NOTE — CONSULTS
Consultation - Pulmonology   Name: Patrica Grimaldo 80 y.o. female I MRN: [id number]  Unit/Bed#: [facility] 905-01 I Date of Admission: 4/27/2025   Date of Service: 4/28/2025 I Hospital Day: 1   Inpatient consult to Pulmonology  Consult performed by: Long Rao MD  Consult ordered by: Shanel Carlisle MD        Physician Requesting Evaluation: Miguel Mauricio MD   Reason for Evaluation / Principal Problem: Evaluation of COPD Exacerbation    Assessment & Plan  Acute on chronic hypoxemic respiratory failure with (HCC)  At this time attribute her acute hypoxemic respiratory failure in the setting of COPD exacerbation given symptoms of dyspnea with productive cough, correlated with CT lung findings showing severe emphysema as well as recent mission for presumed COPD exacerbation    Does not currently follow with pulmonology outpatient, though at home takes Advair once daily  At this time following IV Solu-Medrol, patient reports symptomatic improvement and would likely benefit from de-escalation of active steroid therapy    Patient this time would also benefit from triple therapy with ICS plus LABA plus LAMA given she currently meets group E category for 2 prior exacerbations within the past month and eosinophil count above 300 on most recent differential within the past year    Plan  Reduce Solu-Medrol to 40 mg twice daily, plan to reduce to p.o. 40 mg once daily tomorrow if oxygenation status continues to be stable and taper from there  Continue Xopenex and Atrovent nebulization 3 times daily  Patient will benefit from addition of Trelegy Ellipta on outpatient regimen for triple therapy given recurrent COPD exacerbation  Recommend evaluation with desaturation screen to assess if patient has ambulatory oxygenation requirements  Recommend further evaluation with TTE given CT findings of chronic pulmonary embolism to assess for possible cardiomyopathy versus pulmonary hypertension  Can also consider lower extremity  "Doppler to evaluate for possible VTE  Tobacco cessation encouraged with patient who is agreeable to nicotine replacement therapy outpatient with nicotine patches, and will discuss with PCP for addition of Chantix  Allergic rhinitis  Continue fluticasone furoate-vilanterol inhaler  Generalized anxiety disorder    COPD with acute exacerbation (HCC)  See plan for hypoxemic respiratory failure  Essential hypertension    Abnormal thyroid function test  Per primary team  Nicotine abuse  Smoking cessation encouraged the patient  Duodenal ulcer    PVC (premature ventricular contraction)  Per primary team  Abnormal CT of the chest  Recommend further evaluation with TTE to assess for cardiomyopathy versus pulmonary hypertension given 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 female with PMHx of COPD on Advair, Hyperthyroidism who presents with 1 day history of shortness of breath.  Patient reports he was lying down in her bed at home when suddenly she felt acute onset of fatigue, \"feeling I was about to die.\"  She reports that following the onset she had acute onset of shortness of breath prompting her to notify EMS for transport to the hospital.  She reports being compliant with all her medications at home, including her Advair inhaler.  She reports 1 week prior to this symptom onset, she had had ongoing productive cough with green sputum that is unchanged and reports chronic history since her last admission for COPD exacerbation last month of ongoing coughing spells with random occurrence and intermittent frequency.  I do she reports previous sick contact at home with her son she reports had onset of sinus symptoms and was seeing antibiotics for possible sinus infection.  At this time evaluation, she denies any shortness of breath, chest pain, lightheadedness/dizziness, fatigue, or lethargy, abdominal pain, nausea/vomiting, bowel movement changes " or urinary changes.  She does report ongoing dry cough that is unchanged from prior to admission, though no longer productive of green sputum.    Review of Systems   Constitutional:  Negative for chills and fever.   HENT:  Negative for ear pain and sore throat.    Eyes:  Negative for pain and visual disturbance.   Respiratory:  Negative for cough and shortness of breath.    Cardiovascular:  Negative for chest pain and palpitations.   Gastrointestinal:  Negative for abdominal pain and vomiting.   Genitourinary:  Negative for dysuria and hematuria.   Musculoskeletal:  Negative for arthralgias and back pain.   Skin:  Negative for color change and rash.   Neurological:  Negative for seizures and syncope.   All other systems reviewed and are negative.      Historical Information   Medical History Review: I have reviewed the patient's PMH, PSH, Social History, Family History, Meds, and Allergies   Historical Information   Past Medical History:   Diagnosis Date    Elevated blood pressure reading      Past Surgical History:   Procedure Laterality Date    APPENDECTOMY      TONSILLECTOMY       Social History     Tobacco Use    Smoking status: Every Day     Current packs/day: 1.50     Types: Cigarettes    Smokeless tobacco: Never   Vaping Use    Vaping status: Never Used   Substance and Sexual Activity    Alcohol use: Never    Drug use: Never    Sexual activity: Not on file     E-Cigarette/Vaping    E-Cigarette Use Never User      E-Cigarette/Vaping Substances    Nicotine No     THC No     CBD No     Flavoring No     Other No     Unknown No        Social History     Tobacco Use    Smoking status: Every Day     Current packs/day: 1.50     Types: Cigarettes    Smokeless tobacco: Never   Vaping Use    Vaping status: Never Used   Substance and Sexual Activity    Alcohol use: Never    Drug use: Never    Sexual activity: Not on file       Current Facility-Administered Medications:     acetaminophen (TYLENOL) tablet 650 mg, Q6H PRN     albuterol inhalation solution 2.5 mg, Q6H PRN    ALPRAZolam (XANAX) tablet 0.25 mg, Daily PRN    Cholecalciferol (VITAMIN D3) tablet 2,000 Units, Daily    enoxaparin (LOVENOX) subcutaneous injection 40 mg, Daily    Fluticasone Furoate-Vilanterol 100-25 mcg/actuation 1 puff, Daily    guaiFENesin (MUCINEX) 12 hr tablet 600 mg, BID    ipratropium (ATROVENT) 0.02 % inhalation solution 0.5 mg, TID    levalbuterol (XOPENEX) inhalation solution 1.25 mg, TID    methylPREDNISolone sodium succinate (Solu-MEDROL) injection 40 mg, BID    metoprolol succinate (TOPROL-XL) 24 hr tablet 50 mg, HS    multivitamin stress formula tablet 1 tablet, Daily    nicotine (NICODERM CQ) 7 mg/24hr TD 24 hr patch 1 patch, Daily    pantoprazole (PROTONIX) EC tablet 40 mg, BID AC    sodium chloride (OCEAN) 0.65 % nasal spray 1 spray, Q1H PRN  Prior to Admission Medications   Prescriptions Last Dose Informant Patient Reported? Taking?   ALPRAZolam (XANAX) 0.25 mg tablet Past Month  No Yes   Sig: Take 1 tablet (0.25 mg total) by mouth daily as needed for anxiety   Cholecalciferol (VITAMIN D3) 2000 units TABS 4/27/2025  Yes Yes   Sig: Take 2,000 Units by mouth daily    Fluticasone-Salmeterol (Advair) 250-50 mcg/dose inhaler 4/26/2025  No Yes   Sig: INHALE 1 PUFF BY MOUTH TWO TIMES A DAY . RINSE MOUTH AFTER USE   Multiple Vitamins-Minerals (MULTIVITAMIN ADULT PO) 4/27/2025  Yes Yes   Sig: Take 1 tablet by mouth daily   alendronate (Fosamax) 70 mg tablet Not Taking  No No   Sig: Take 1 tablet (70 mg total) by mouth every 7 days   Patient not taking: Reported on 7/26/2024   metoprolol succinate (TOPROL-XL) 50 mg 24 hr tablet 4/27/2025  No Yes   Sig: TAKE ONE TABLET BY MOUTH EVERY DAY AT BEDTIME   nicotine (NICODERM CQ) 14 mg/24hr TD 24 hr patch 4/27/2025  No Yes   Sig: Place 1 patch on the skin over 24 hours every 24 hours   omeprazole (PriLOSEC) 40 MG capsule 4/27/2025  No Yes   Sig: TAKE ONE CAPSULE BY MOUTH TWICE A DAY      Facility-Administered  Medications: None     Erythromycin and Lexapro [escitalopram]  Tobacco History: Smoking 3 to 4 cigarettes a day, previous 1 pack/day since age 18 for approximate 60-year pack history  Occupational History: Retired  Family History:non-contributory    Objective :  Temp:  [97.7 °F (36.5 °C)-99 °F (37.2 °C)] 98.3 °F (36.8 °C)  HR:  [] 96  BP: (105-164)/(63-84) 109/65  Resp:  [17-24] 17  SpO2:  [93 %-99 %] 94 %  O2 Device: None (Room air)  Nasal Cannula O2 Flow Rate (L/min):  [2 L/min] 2 L/min    Physical Exam  Vitals and nursing note reviewed.   Constitutional:       General: She is not in acute distress.     Appearance: She is well-developed.   HENT:      Head: Normocephalic and atraumatic.   Eyes:      Conjunctiva/sclera: Conjunctivae normal.   Cardiovascular:      Rate and Rhythm: Normal rate and regular rhythm.      Heart sounds: No murmur heard.  Pulmonary:      Effort: Pulmonary effort is normal. No respiratory distress.      Breath sounds: Wheezing present.      Comments: Mild expiratory wheeze  Abdominal:      Palpations: Abdomen is soft.      Tenderness: There is no abdominal tenderness.   Musculoskeletal:         General: No swelling.      Cervical back: Neck supple.      Right lower leg: No edema.      Left lower leg: No edema.   Skin:     General: Skin is warm and dry.      Capillary Refill: Capillary refill takes less than 2 seconds.   Neurological:      Mental Status: She is alert.   Psychiatric:         Mood and Affect: Mood normal.           Lab Results: I have reviewed the following results:  .     04/27/25  1819 04/27/25 2013 04/28/25  0453   WBC 7.54  --  3.58*   HGB 12.2  --  11.6   HCT 39.5  --  37.9     --  219   SODIUM 140  --   --    K 3.9  --   --      --   --    CO2 28  --   --    BUN 15  --   --    CREATININE 0.42*  --   --    GLUC 95  --   --    AST 18  --   --    ALT 15  --   --    ALB 3.2*  --   --    TBILI 0.26  --   --    ALKPHOS 79  --   --    HSTNI0 4  --   --     HSTNI2  --  4  --    BNP 40  --   --      ABG: No new results in last 24 hours.    Imaging Results Review: I reviewed radiology reports from this admission including: CT chest.  Other Study Results Review: EKG was reviewed.   PFT Results Reviewed: No prior PFT data    VTE Prophylaxis: VTE covered by:  enoxaparin, Subcutaneous, 40 mg at 04/28/25 0937

## 2025-04-28 NOTE — UTILIZATION REVIEW
Initial Clinical Review    Admission: Date/Time/Statement:   Admission Orders (From admission, onward)       Ordered        04/27/25 2208  INPATIENT ADMISSION  Once                          Orders Placed This Encounter   Procedures    INPATIENT ADMISSION     Standing Status:   Standing     Number of Occurrences:   1     Level of Care:   Med Surg [16]     Estimated length of stay:   More than 2 Midnights     Certification:   I certify that inpatient services are medically necessary for this patient for a duration of greater than two midnights. See H&P and MD Progress Notes for additional information about the patient's course of treatment.     ED Arrival Information       Expected   -    Arrival   4/27/2025 17:47    Acuity   Urgent              Means of arrival   Ambulance    Escorted by   [facility]    Service   Hospitalist    Admission type   Emergency              Arrival complaint   sob             Chief Complaint   Patient presents with    Shortness of Breath     Per EMS they were called to pts home as she became short of breath after smoking a cigarette. Pt has hx of COPD.        Initial Presentation: 80 y.o. female with Pmhx of COPD, HTN, tobacco dependence, PVCs, GI bleed d/t duodenal ulcer, abnormal TSH, protein calorie malnutrition to ED with c/o chest pressure, sob, wheezing.    On presentation T 99, tachycardic, tachypneic, O2 sat 99%. Decreased breath sounds, frequently pursed lip breathing. VBG shows pH 7.348, PCO2 60.1, PO2 19.1, bicarb 32.3. BUN 15, Cr 0.42, calcium 8.2, troponin 4 > 4. BNP 40, procalcitonin < 0.05, WBC 7.54 H/H 12.2/39.5, platelet 236. TSH 0.076, free T41.32. CTA chest shows no acute PE, possible webs in right pulmonary arterial branches, suggesting sequela of chronic pulmonary embolus. Severe emphysema. Pt received IV Solu-Medrol, mag, Atrovent, albuterol inhalation en route to ED. Pt also placed on O2 d/t increased wob with improvement of respiratory rate.   Admitted  Inpatient to MS unit with Acute on chronic respiratory failure with Hypercapnia -- continue O2, currently on 2L nc, wean as able. BiPAP if no significant improvement. Repeat VBG in AM.  Continue with IV Solu-Medrol 40 mg Q8h. Levalbuterol and Atrovent Q8h. Guaifenesin BID. Check RSV/flu/COVID swab. Sputum cx. Consult pulmonology. Start nasal spray. Continue PTA po meds. Nicotine patch.     Date: 4/28   Day 2: pt reports feeling less sob today but not at baseline. No wheezes noted. + poor air movement. Remains on RA. Telemetry - .   Pulmonology consult -- COPD with acute exacerbation, Acute hypoxic respiratory failure-improved, Tobacco abuse   Plan: continue Solu-Medrol to 40 IV Q12H for today. Recommend switching to prednisone 40 mg po daily starting tomorrow. Continue Xopenex and Atrovent nebs TID. Continue Breo 100 daily. Recommend adding Incruse daily. D/C'd maría ipratropium. Changed levalbuterol to prn. Tobacco cessation.    Date: 4/29  Day 3: Has surpassed a 2nd midnight with active treatments and services for acute respir failure.  Pt reports less SOB today. BS clear. Wean IV steroids to po prednisone starting today. Continue nebs and inhalers per pulmonology recs. Continue reg diet, IS, OOB, SCDs,          ED Treatment-Medication Administration from 04/27/2025 1747 to 04/27/2025 2503         Date/Time Order Dose Route Action     04/27/2025 1843 methylPREDNISolone sodium succinate (Solu-MEDROL) injection 125 mg 125 mg Intravenous Given     04/27/2025 1849 albuterol inhalation solution 5 mg 5 mg Nebulization Given     04/27/2025 1849 ipratropium (ATROVENT) 0.02 % inhalation solution 0.5 mg 0.5 mg Nebulization Given     04/27/2025 1845 magnesium sulfate 2 g/50 mL IVPB (premix) 2 g 2 g Intravenous New Bag     Scheduled Medications:  Medications 04/28 04/29   albuterol (2.5 mg/3 mL) 0.083 % inhalation solution **ADS Override Pull**  Start: 04/28/25 0040 End: 04/28/25 0041   Admin Instructions:       0041          Cholecalciferol (VITAMIN D3) tablet 2,000 Units  Dose: 2,000 Units  Freq: Daily Route: PO  Start: 04/28/25 0900   Admin Instructions:       0938      0824        enoxaparin (LOVENOX) subcutaneous injection 40 mg  Dose: 40 mg  Freq: Daily Route: SC  Start: 04/28/25 0900   Admin Instructions:       0937      0824        Fluticasone Furoate-Vilanterol 100-25 mcg/actuation 1 puff  Dose: 1 puff  Freq: Daily (RESP) Route: IN  Start: 04/28/25 0800   Admin Instructions:       0747      0833        guaiFENesin (MUCINEX) 12 hr tablet 600 mg  Dose: 600 mg  Freq: 2 times daily Route: PO  Start: 04/28/25 0900   Admin Instructions:       0937     1630           0824     1800        ipratropium (ATROVENT) 0.02 % inhalation solution 0.5 mg  Dose: 0.5 mg  Freq: 3 times daily (RESP) Route: NEBULIZATION  Start: 04/28/25 0800 End: 04/28/25 1425    0706     1311     1425-D/C'd       ipratropium (ATROVENT) 0.02 % inhalation solution 0.5 mg  Dose: 0.5 mg  Freq: Every 8 hours Route: NEBULIZATION  Start: 04/27/25 2345 End: 04/28/25 0109         0109-D/C'd       levalbuterol (XOPENEX) inhalation solution 1.25 mg  Dose: 1.25 mg  Freq: 3 times daily (RESP) Route: NEBULIZATION  Start: 04/28/25 0800 End: 04/28/25 1425    0706     1311     1425-D/C'd        levalbuterol (XOPENEX) inhalation solution 1.25 mg  Dose: 1.25 mg  Freq: Every 8 hours Route: NEBULIZATION  Start: 04/27/25 2345 End: 04/28/25 0109         0109-D/C'd       And   sodium chloride 0.9 % inhalation solution 3 mL  Dose: 3 mL  Freq: 3 times daily (RESP) Route: NEBULIZATION  Start: 04/28/25 0800 End: 04/28/25 0109    0109-D/C'd       methylPREDNISolone sodium succinate (Solu-MEDROL) injection 40 mg  Dose: 40 mg  Freq: 2 times daily Route: IV  Start: 04/28/25 2100 End: 04/29/25 0928    2026      0824     0928-D/C'd      methylPREDNISolone sodium succinate (Solu-MEDROL) injection 40 mg  Dose: 40 mg  Freq: Every 8 hours Route: IV  Start: 04/27/25 2345 End: 04/28/25 1029    0747      1029-D/C'd       metoprolol succinate (TOPROL-XL) 24 hr tablet 50 mg  Dose: 50 mg  Freq: Daily at bedtime Route: PO  Start: 04/27/25 2345   Admin Instructions:      Order specific questions:       2026 2200        multivitamin stress formula tablet 1 tablet  Dose: 1 tablet  Freq: Daily Route: PO  Start: 04/28/25 0900   Admin Instructions:       0937      0824        nicotine (NICODERM CQ) 7 mg/24hr TD 24 hr patch 1 patch  Dose: 1 patch  Freq: Daily Route: TD  Start: 04/28/25 0900   Admin Instructions:       0936      0824     0832        pantoprazole (PROTONIX) EC tablet 40 mg  Dose: 40 mg  Freq: 2 times daily before meals Route: PO  Start: 04/28/25 0700   Admin Instructions:       0457 [C]     1543      0893     1600        predniSONE tablet 40 mg  Dose: 40 mg  Freq: Daily Route: PO  Start: 04/30/25 0900   Admin Instructions:           umeclidinium 62.5 mcg/actuation inhaler AEPB 1 puff  Dose: 1 puff  Freq: Daily Route: IN  Start: 04/29/25 0900     0833          PRN Meds:  acetaminophen, 650 mg, Oral, Q6H PRN  ALPRAZolam, 0.25 mg, Oral, Daily PRN  levalbuterol, 1.25 mg, Nebulization, Q6H PRN  sodium chloride, 1 spray, Each Nare, Q1H PRN      ED Triage Vitals   Temperature Pulse Respirations Blood Pressure SpO2 Pain Score   04/27/25 1754 04/27/25 1756 04/27/25 1756 04/27/25 1756 04/27/25 1756 04/27/25 1756   99 °F (37.2 °C) (!) 126 (!) 24 164/73 99 % No Pain     Weight (last 2 days)       Date/Time Weight    04/27/25 23:03:58 44.2 (97.44)            Vital Signs (last 3 days)       Date/Time Temp Pulse Resp BP MAP (mmHg) SpO2 Calculated FIO2 (%) - Nasal Cannula Nasal Cannula O2 Flow Rate (L/min) O2 Device Patient Position - Orthostatic VS Pain    04/29/25 10:59:46 97.7 °F (36.5 °C) 63 14 110/78 89 93 % -- -- -- -- --    04/29/25 07:54:26 98.5 °F (36.9 °C) 83 13 130/89 103 92 % -- -- -- -- --    04/29/25 0736 -- -- -- -- -- -- -- -- None (Room air) -- No Pain    04/28/25 22:10:07 98.6 °F (37 °C) 92 -- 93/72 79  94 % -- -- -- -- No Pain    04/28/25 20:25:48 98.6 °F (37 °C) 118 -- 126/90 102 94 % -- -- -- -- --    04/28/25 1959 -- -- -- -- -- 96 % -- -- None (Room air) -- --    04/28/25 16:05:14 97.5 °F (36.4 °C) 105 13 133/69 90 97 % -- -- -- -- --    04/28/25 1300 -- -- -- -- -- 94 % -- -- -- -- --    04/28/25 11:47:22 -- 106 13 106/73 84 95 % -- -- -- -- --    04/28/25 0900 -- -- -- -- -- -- -- -- -- -- No Pain    04/28/25 07:35:42 98.3 °F (36.8 °C) 96 17 109/65 80 94 % -- -- -- -- --    04/28/25 0708 -- -- -- -- -- 95 % -- -- -- -- --    04/28/25 02:22:46 97.9 °F (36.6 °C) 102 17 111/63 79 93 % -- -- None (Room air) Lying No Pain    04/28/25 0050 -- -- -- -- -- 98 % -- -- None (Room air) -- --    04/28/25 0043 -- -- -- -- -- 96 % -- -- -- -- --    04/27/25 23:50:24 -- 117 20 130/76 94 97 % 28 2 L/min Nasal cannula Lying --    04/27/25 2320 -- -- -- -- -- -- -- -- -- -- No Pain    04/27/25 23:03:58 97.7 °F (36.5 °C) 117 20 105/76 86 95 % 28 2 L/min Nasal cannula Lying No Pain    04/27/25 2015 -- 127 22 -- -- 98 % 28 2 L/min Nasal cannula -- --    04/27/25 2000 -- 125 22 125/84 95 96 % -- -- None (Room air) -- --    04/27/25 1802 -- -- -- -- -- -- -- -- Nasal cannula -- --    04/27/25 1756 98 °F (36.7 °C) 126 24 164/73 -- 99 % -- -- None (Room air) Lying No Pain    04/27/25 1754 99 °F (37.2 °C) -- -- -- -- -- -- -- -- -- --              Pertinent Labs/Diagnostic Test Results:   Radiology:  CTA chest pe study   Final Interpretation by Moose Joseph DO (04/27 2125)      No acute pulmonary embolus.      Possible webs in right pulmonary arterial branches, suggesting sequela of chronic pulmonary embolus.      Severe emphysema.          VAS VENOUS DUPLEX - LOWER LIMB BILATERAL    (Results Pending)     Cardiology:  ECG 12 lead   Final Result by Jarred Parry MD (04/28 0018)   Sinus tachycardia with frequent Premature ventricular complexes in a    pattern of bigeminy   Right bundle branch block   Left anterior fascicular block    Bifascicular block    T wave abnormality, consider lateral ischemia   Abnormal ECG   When compared with ECG of 27-Apr-2025 18:17, (unconfirmed)   Premature ventricular complexes are now Present   Confirmed by Jarred Parry ([id number]) on 4/28/2025 12:18:48 AM      ECG 12 lead   Final Result by Jarred Parry MD (04/28 0018)   Sinus tachycardia   Left axis deviation   Right bundle branch block   Abnormal ECG   When compared with ECG of 28-Mar-2025 14:00,   Premature ventricular complexes are no longer Present   Criteria for Septal infarct are no longer Present   Confirmed by Jarred Parry [id number]) on 4/28/2025 12:18:42 AM        Results from last 7 days   Lab Units 04/27/25  2354   SARS-COV-2  Negative     Results from last 7 days   Lab Units 04/29/25  0622 04/28/25  0453 04/27/25  1819   WBC Thousand/uL 6.24 3.58* 7.54   HEMOGLOBIN g/dL 10.9* 11.6 12.2   HEMATOCRIT % 36.7 37.9 39.5   PLATELETS Thousands/uL 208 219 236   TOTAL NEUT ABS Thousands/µL  --   --  5.63     Results from last 7 days   Lab Units 04/29/25  0954 04/29/25  0517 04/27/25  1819   SODIUM mmol/L  --  136 140   POTASSIUM mmol/L 4.4 6.0* 3.9   CHLORIDE mmol/L  --  104 107   CO2 mmol/L  --  27 28   ANION GAP mmol/L  --  5 5   BUN mg/dL  --  16 15   CREATININE mg/dL  --  0.49* 0.42*   EGFR ml/min/1.73sq m  --  91 96   CALCIUM mg/dL  --  9.1 8.2*   MAGNESIUM mg/dL  --  2.3  --      Results from last 7 days   Lab Units 04/27/25  1819   AST U/L 18   ALT U/L 15   ALK PHOS U/L 79   TOTAL PROTEIN g/dL 5.7*   ALBUMIN g/dL 3.2*   TOTAL BILIRUBIN mg/dL 0.26     Results from last 7 days   Lab Units 04/29/25  0517 04/27/25  1819   GLUCOSE RANDOM mg/dL 121 95     Results from last 7 days   Lab Units 04/28/25  0634 04/27/25  1901   PH CASEY  7.410* 7.348   PCO2 CASEY mm Hg 47.6 60.1*   PO2 CASEY mm Hg 46.4* 19.1*   HCO3 CASEY mmol/L 29.5 32.3*   BASE EXC CASEY mmol/L 4.1 5.0   O2 CONTENT CASEY ml/dL 13.9 4.7   O2 HGB, VENOUS % 79.8 25.1*     Results from last 7 days   Lab  Units 04/27/25 2013 04/27/25  1819   HS TNI 0HR ng/L  --  4   HS TNI 2HR ng/L 4  --    HSTNI D2 ng/L 0  --        Results from last 7 days   Lab Units 04/27/25  1819   TSH 3RD GENERATON uIU/mL 0.076*     Results from last 7 days   Lab Units 04/27/25  1819   PROCALCITONIN ng/ml <0.05     Results from last 7 days   Lab Units 04/27/25  1819   BNP pg/mL 40     Results from last 7 days   Lab Units 04/27/25  2354   INFLUENZA A PCR  Negative   INFLUENZA B PCR  Negative   RSV PCR  Negative       Results from last 7 days   Lab Units 04/28/25  0749   SPUTUM CULTURE  4+ Growth of   GRAM STAIN RESULT  1+ Epithelial Cells*  1+ Gram positive cocci in pairs*  1+ Gram negative rods*  No polys seen*       Past Medical History:   Diagnosis Date    Elevated blood pressure reading      Present on Admission:   COPD with acute exacerbation (HCC)   PVC (premature ventricular contraction)   Nicotine abuse   Generalized anxiety disorder   Essential hypertension   Duodenal ulcer   Allergic rhinitis   Abnormal thyroid function test      Admitting Diagnosis: SOB (shortness of breath) [R06.02]  COPD exacerbation (HCC) [J44.1]  Age/Sex: 80 y.o. female    Network Utilization Review Department  ATTENTION: Please call with any questions or concerns to [phone] and carefully listen to the prompts so that you are directed to the right person. All voicemails are confidential.   For Discharge needs, contact Care Management DC Support Team at [phone] opt. [phone]  Send all requests for admission clinical reviews, approved or denied determinations and any other requests to dedicated fax number below belonging to the campus where the patient is receiving treatment. List of dedicated fax numbers for the Facilities:  FACILITY NAME UR FAX NUMBER   ADMISSION DENIALS (Administrative/Medical Necessity) [phone]   DISCHARGE SUPPORT TEAM (NETWORK) [phone]   PARENT CHILD HEALTH (Maternity/NICU/Pediatrics) [phone]   [facility]  [facility] [phone]   [facility] [phone]   [facility] [phone]   [facility] [phone]   [facility] [phone]   [facility] [phone]   General acute hospital [phone]   [facility] [phone]   [facility] [phone]   [facility] [phone]   [facility] [phone]   [facility] [phone]

## 2025-04-28 NOTE — H&P
H&P - Hospitalist   Name: Patrica Grimaldo 80 y.o. female I MRN: [id number]  Unit/Bed#: [facility] 905-01 I Date of Admission: 4/27/2025   Date of Service: 4/27/2025 I Hospital Day: 0     Assessment & Plan  Acute on chronic respiratory failure with hypercapnia (HCC)  Patient presents with chest pressure, shortness of breath, wheezing.  pCO2 elevated at 60 from baseline in the 50s  Placed on supplemental oxygen to keep O2 sat between 88 to 92%  Repeat VBG in AM  BiPAP if no significant improvement  COPD with acute exacerbation (HCC)  Patient with shortness of breath, chest pressure. Troponins negative.  EKG shows bigeminy.  CTA chest negative for acute pathology, shows severe emphysema, possible chronic pulmonary embolus  Received Solu-Medrol, albuterol, Atrovent in the emergency room  Continue with IV Solu-Medrol 40 mg every 8  Continue with levalbuterol and Atrovent every 8 hours  Guaifenesin twice daily  Check RSV/flu/COVID swab  Sputum culture if possible  Pulmonology consult  Allergic rhinitis  Start nasal saline spray  Generalized anxiety disorder  Continue with Xanax as needed  Essential hypertension  Continue metoprolol nightly  Nicotine abuse  Nicotine patch  Duodenal ulcer  Continue with PPI twice daily  PVC (premature ventricular contraction)  Chronic. Telemetry monitoring  Continue with metoprolol  Abnormal CT of the chest  CTA notable for possible chronic pulmonary embolism  Will hold off on starting full dose anticoagulation in the setting of history of GI bleed  Pulmonology consult  Abnormal thyroid function test  TSH 0.076 and free T4 1.32  Check T3 level        VTE Pharmacologic Prophylaxis:   Moderate Risk (Score 3-4) - Pharmacological DVT Prophylaxis Ordered: enoxaparin (Lovenox).  Code Status: Level 1 - Full Code   Discussion with family: Patient declined call to .     Anticipated Length of Stay: Patient will be admitted on an inpatient basis with an anticipated length of stay of greater  than 2 midnights secondary to above.    History of Present Illness   Chief Complaint: Chest pressure, shortness of breath    Patrica Grimaldo is a 80 y.o. female with a PMH of COPD, hypertension, tobacco dependence, PVCs, GI bleed due to duodenal ulcer, abnormal TSH, protein calorie malnutrition presenting with complaints of chest pressure, shortness of breath.  Patient is generally a poor historian, states symptoms began a long time ago but worsened in the last 5 days.  She has been having nasal congestion which leads to increased cough and therefore increased chest pressure.  Chest pressure is located in the left chest with no radiation, intermittent with coughing spells.  Also feels short of breath.  During this time she has also felt very weak.  Today she felt like she was going to ' die'. Due to her symptoms and was asked to be brought to the emergency room.  No significant increase in sputum production or wheezing however.  Patient uses her inhaler Advair twice daily as prescribed.  Denies any sick contacts, recent fevers, chills, nausea, vomiting, lightheadedness/dizziness, weakness, appetite changes, sore throat but admits to increased postnasal drip.    In ER, heart rate 126, temperature 99F, respiratory rate 24, blood pressure 164/73, O2 sat 99%  VBG shows pH 7.348, PCO2 60.1, PO2 19.1, bicarb 32.3  Labs show BUN 15, creatinine 0.42, calcium 8.2, troponin 4 > 4  BNP 40, procalcitonin less than 0.05, WBC 7.54 H/H 12.2/39.5, platelet 236  TSH 0.076, free T41.32    CTA chest shows no acute PE, possible webs in right pulmonary arterial branches, suggesting sequela of chronic pulmonary embolus. Severe emphysema.    Patient received Solu-Medrol, magnesium, Atrovent, albuterol inhalation en route and in ER.   Patient also placed on oxygen therapy due to increased work of breathing with improvement of respiratory rate  Patient will be admitted for further management    Review of Systems  As per HPI  Historical  Information   Past Medical History:   Diagnosis Date    Elevated blood pressure reading      Past Surgical History:   Procedure Laterality Date    APPENDECTOMY      TONSILLECTOMY       Social History     Tobacco Use    Smoking status: Every Day     Current packs/day: 1.50     Types: Cigarettes    Smokeless tobacco: Never   Vaping Use    Vaping status: Never Used   Substance and Sexual Activity    Alcohol use: Never    Drug use: Never    Sexual activity: Not on file     E-Cigarette/Vaping    E-Cigarette Use Never User      E-Cigarette/Vaping Substances    Nicotine No     THC No     CBD No     Flavoring No     Other No     Unknown No      Family History   Problem Relation Age of Onset    Lung cancer Mother     Heart disease Maternal Aunt     Heart disease Maternal Uncle     Heart disease Maternal Grandmother      Social History:  Marital Status: /Civil Union       Meds/Allergies   I have reviewed home medications with patient personally.  Prior to Admission medications    Medication Sig Start Date End Date Taking? Authorizing Provider   ALPRAZolam (XANAX) 0.25 mg tablet Take 1 tablet (0.25 mg total) by mouth daily as needed for anxiety 11/17/22  Yes Janine Marcus, DO   Cholecalciferol (VITAMIN D3) 2000 units TABS Take 2,000 Units by mouth daily    Yes Historical Provider, MD   Fluticasone-Salmeterol (Advair) 250-50 mcg/dose inhaler INHALE 1 PUFF BY MOUTH TWO TIMES A DAY . RINSE MOUTH AFTER USE 3/7/25  Yes Janine Marcus,    metoprolol succinate (TOPROL-XL) 50 mg 24 hr tablet TAKE ONE TABLET BY MOUTH EVERY DAY AT BEDTIME 3/26/25  Yes Janine Marcus, DO   Multiple Vitamins-Minerals (MULTIVITAMIN ADULT PO) Take 1 tablet by mouth daily   Yes Historical Provider, MD   nicotine (NICODERM CQ) 14 mg/24hr TD 24 hr patch Place 1 patch on the skin over 24 hours every 24 hours 3/28/25  Yes Raffi Cedillo MD   omeprazole (PriLOSEC) 40 MG capsule TAKE ONE CAPSULE BY MOUTH TWICE A DAY 3/25/25  Yes Wendy Louie PA-C    alendronate (Fosamax) 70 mg tablet Take 1 tablet (70 mg total) by mouth every 7 days  Patient not taking: Reported on 7/26/2024 11/22/21   Janine Marcus DO     Allergies   Allergen Reactions    Erythromycin GI Intolerance     Reaction Date: 26Sep2005;     Lexapro [Escitalopram] Other (See Comments)     hyponatremia       Objective :  Temp:  [97.7 °F (36.5 °C)-99 °F (37.2 °C)] 97.7 °F (36.5 °C)  HR:  [117-127] 117  BP: (105-164)/(73-84) 105/76  Resp:  [20-24] 20  SpO2:  [95 %-99 %] 95 %  O2 Device: Nasal cannula  Nasal Cannula O2 Flow Rate (L/min):  [2 L/min] 2 L/min    Physical Exam  Vitals reviewed.   Constitutional:       Appearance: She is underweight. She is ill-appearing. She is not toxic-appearing.      Interventions: Nasal cannula in place.   HENT:      Nose: No rhinorrhea.      Mouth/Throat:      Mouth: Mucous membranes are moist.   Eyes:      General: No scleral icterus.  Cardiovascular:      Rate and Rhythm: Regular rhythm. Tachycardia present.      Heart sounds: No murmur heard.  Pulmonary:      Effort: Tachypnea present. No accessory muscle usage or respiratory distress.      Breath sounds: No transmitted upper airway sounds. Decreased breath sounds present. No wheezing, rhonchi or rales.      Comments: Frequently purses lips  Musculoskeletal:      Cervical back: Neck supple.      Right lower leg: No edema.      Left lower leg: No edema.   Skin:     General: Skin is warm and dry.      Findings: No rash.   Neurological:      General: No focal deficit present.      Mental Status: Mental status is at baseline.   Psychiatric:         Mood and Affect: Mood normal.       Lines/Drains:            Lab Results: I have reviewed the following results:  Results from last 7 days   Lab Units 04/27/25  1819   WBC Thousand/uL 7.54   HEMOGLOBIN g/dL 12.2   HEMATOCRIT % 39.5   PLATELETS Thousands/uL 236   SEGS PCT % 75   LYMPHO PCT % 14   MONO PCT % 9   EOS PCT % 1     Results from last 7 days   Lab Units 04/27/25  7131    SODIUM mmol/L 140   POTASSIUM mmol/L 3.9   CHLORIDE mmol/L 107   CO2 mmol/L 28   BUN mg/dL 15   CREATININE mg/dL 0.42*   ANION GAP mmol/L 5   CALCIUM mg/dL 8.2*   ALBUMIN g/dL 3.2*   TOTAL BILIRUBIN mg/dL 0.26   ALK PHOS U/L 79   ALT U/L 15   AST U/L 18   GLUCOSE RANDOM mg/dL 95             Lab Results   Component Value Date    HGBA1C 5.3 02/28/2022    HGBA1C 5.4 01/21/2020    HGBA1C 5.3 12/14/2016     Results from last 7 days   Lab Units 04/27/25  1819   PROCALCITONIN ng/ml <0.05       Imaging Results Review: I reviewed radiology reports from this admission including: CT chest.  Other Study Results Review: EKG was reviewed.     Administrative Statements   I have spent a total time of 70 minutes in caring for this patient on the day of the visit/encounter including Risks and benefits of tx options.    ** Please Note: This note has been constructed using a voice recognition system. **

## 2025-04-28 NOTE — PLAN OF CARE
Problem: PAIN - ADULT  Goal: Verbalizes/displays adequate comfort level or baseline comfort level  Description: Interventions:- Encourage patient to monitor pain and request assistance- Assess pain using appropriate pain scale- Administer analgesics based on type and severity of pain and evaluate response- Implement non-pharmacological measures as appropriate and evaluate response- Consider cultural and social influences on pain and pain management- Notify physician/advanced practitioner if interventions unsuccessful or patient reports new pain  Outcome: Progressing     Problem: INFECTION - ADULT  Goal: Absence or prevention of progression during hospitalization  Description: INTERVENTIONS:- Assess and monitor for signs and symptoms of infection- Monitor lab/diagnostic results- Monitor all insertion sites, i.e. indwelling lines, tubes, and drains- Monitor endotracheal if appropriate and nasal secretions for changes in amount and color- [place] appropriate cooling/warming therapies per order- Administer medications as ordered- Instruct and encourage patient and family to use good hand hygiene technique- Identify and instruct in appropriate isolation precautions for identified infection/condition  Outcome: Progressing  Goal: Absence of fever/infection during neutropenic period  Description: INTERVENTIONS:- Monitor WBC  Outcome: Progressing     Problem: SAFETY ADULT  Goal: Patient will remain free of falls  Description: INTERVENTIONS:- Educate patient/family on patient safety including physical limitations- Instruct patient to call for assistance with activity - Consult OT/PT to assist with strengthening/mobility - Keep Call bell within reach- Keep bed low and locked with side rails adjusted as appropriate- Keep care items and personal belongings within reach- Initiate and maintain comfort rounds- Make Fall Risk Sign visible to staff- Offer Toileting every  Hours, in advance of need- Initiate/Maintain alarm- Obtain  necessary fall risk management equipment: - Apply yellow socks and bracelet for high fall risk patients- Consider moving patient to room near nurses station  Outcome: Progressing  Goal: Maintain or return to baseline ADL function  Description: INTERVENTIONS:-  Assess patient's ability to carry out ADLs; assess patient's baseline for ADL function and identify physical deficits which impact ability to perform ADLs (bathing, care of mouth/teeth, toileting, grooming, dressing, etc.)- Assess/evaluate cause of self-care deficits - Assess range of motion- Assess patient's mobility; develop plan if impaired- Assess patient's need for assistive devices and provide as appropriate- Encourage maximum independence but intervene and supervise when necessary- Involve family in performance of ADLs- Assess for home care needs following discharge - Consider OT consult to assist with ADL evaluation and planning for discharge- Provide patient education as appropriate  Outcome: Progressing  Goal: Maintains/Returns to pre admission functional level  Description: INTERVENTIONS:- Perform AM-PAC 6 Click Basic Mobility/ Daily Activity assessment daily.- Set and communicate daily mobility goal to care team and patient/family/caregiver. - Collaborate with rehabilitation services on mobility goals if consulted- Perform Range of Motion  times a day.- Reposition patient every  hours.- Dangle patient  times a day- Stand patient  times a day- Ambulate patient  times a day- Out of bed to chair  times a day - Out of bed for meals  times a day- Out of bed for toileting- Record patient progress and toleration of activity level   Outcome: Progressing     Problem: DISCHARGE PLANNING  Goal: Discharge to home or other facility with appropriate resources  Description: INTERVENTIONS:- Identify barriers to discharge w/patient and caregiver- Arrange for needed discharge resources and transportation as appropriate- Identify discharge learning needs (meds, wound  care, etc.)- Arrange for interpretive services to assist at discharge as needed- Refer to Case Management Department for coordinating discharge planning if the patient needs post-hospital services based on physician/advanced practitioner order or complex needs related to functional status, cognitive ability, or social support system  Outcome: Progressing     Problem: Knowledge Deficit  Goal: Patient/family/caregiver demonstrates understanding of disease process, treatment plan, medications, and discharge instructions  Description: Complete learning assessment and assess knowledge base.Interventions:- Provide teaching at level of understanding- Provide teaching via preferred learning methods  Outcome: Progressing      Price (Use Numbers Only, No Special Characters Or $): 100.00 Consent: The patient's consent was obtained including but not limited to risks of crusting, scabbing, blistering, scarring, darker or lighter pigmentary change, recurrence, incomplete removal and infection. Detail Level: Detailed Post-Care Instructions: I reviewed with the patient in detail post-care instructions. Patient is to wear sunprotection, and avoid picking at any of the treated lesions. Pt may apply Vaseline to crusted or scabbing areas.

## 2025-04-28 NOTE — ASSESSMENT & PLAN NOTE
Patient presented with chest pressure, shortness of breath, wheezing.  pCO2 elevated at 60 from baseline in the 50s  Was placed on supplemental oxygen to keep O2 sat between 88 to 92%  Pulm consult pending as below

## 2025-04-28 NOTE — ASSESSMENT & PLAN NOTE
CTA notable for possible chronic pulmonary embolism  Will hold off on starting full dose anticoagulation in the setting of history of GI bleed  Pulmonology consult

## 2025-04-28 NOTE — ASSESSMENT & PLAN NOTE
Patient presents with chest pressure, shortness of breath, wheezing.  pCO2 elevated at 60 from baseline in the 50s  Placed on supplemental oxygen to keep O2 sat between 88 to 92%  Repeat VBG in AM  BiPAP if no significant improvement

## 2025-04-28 NOTE — RESPIRATORY THERAPY NOTE
RT Protocol Note  Patrica Grimaldo 80 y.o. female MRN: [id number]  [facility] Encounter: [id number]    Assessment    Principal Problem:    Acute on chronic respiratory failure with hypercapnia (HCC)  Active Problems:    Allergic rhinitis    Generalized anxiety disorder    COPD with acute exacerbation (HCC)    Essential hypertension    Abnormal thyroid function test    Nicotine abuse    Duodenal ulcer    Severe protein-calorie malnutrition (HCC)    PVC (premature ventricular contraction)    Abnormal CT of the chest      Home Pulmonary Medications:  Advair       Past Medical History:   Diagnosis Date    Elevated blood pressure reading      Social History     Socioeconomic History    Marital status: /Civil Union     Spouse name: None    Number of children: None    Years of education: None    Highest education level: None   Occupational History    None   Tobacco Use    Smoking status: Every Day     Current packs/day: 1.50     Types: Cigarettes    Smokeless tobacco: Never   Vaping Use    Vaping status: Never Used   Substance and Sexual Activity    Alcohol use: Never    Drug use: Never    Sexual activity: None   Other Topics Concern    None   Social History Narrative    None     Social Drivers of Health     Financial Resource Strain: Medium Risk (8/29/2022)    Overall Financial Resource Strain (CARDIA)     Difficulty of Paying Living Expenses: Somewhat hard   Food Insecurity: No Food Insecurity (4/27/2025)    Nursing - Inadequate Food Risk Classification     Worried About Running Out of Food in the Last Year: Never true     Ran Out of Food in the Last Year: Never true     Ran Out of Food in the Last Year: Never true   Transportation Needs: No Transportation Needs (4/27/2025)    Nursing - Transportation Risk Classification     Lack of Transportation: Not on file     Lack of Transportation: No   Recent Concern: Transportation Needs - Unmet Transportation Needs (3/28/2025)    Nursing - Transportation Risk  "Classification     Lack of Transportation: Not on file     Lack of Transportation: Yes   Physical Activity: Not on file   Stress: Not on file   Social Connections: Not on file   Intimate Partner Violence: Unknown (2025)    Nursing IPS     Feels Physically and Emotionally Safe: Not on file     Physically Hurt by Someone: Not on file     Humiliated or Emotionally Abused by Someone: Not on file     Physically Hurt by Someone: No     Hurt or Threatened by Someone: No   Housing Stability: Unknown (2025)    Nursing: Inadequate Housing Risk Classification     Has Housing: Not on file     Worried About Losing Housing: Not on file     Unable to Get Utilities: Not on file     Unable to Pay for Housing in the Last Year: No     Has Housin       Subjective         Objective    Physical Exam:   Assessment Type: During-treatment  General Appearance: Awake, Alert  Respiratory Pattern: Spontaneous, Dyspnea with exertion  Chest Assessment: Chest expansion symmetrical  Bilateral Breath Sounds: Diminished  Cough: Strong, Productive  O2 Device: (P) RA    Vitals:  Blood pressure 130/76, pulse (!) 117, temperature 97.7 °F (36.5 °C), temperature source Oral, resp. rate 20, height 5' 4\" (1.626 m), weight 44.2 kg (97 lb 7.1 oz), SpO2 98%.          Imaging and other studies: Results Review Statement: I reviewed radiology reports from this admission including: chest xray.    O2 Device: (P) RA     Plan    Respiratory Plan: Mild Distress pathway        Resp Comments: (P) Pt admitted for acute on chronic resp failure with hypercapnia, pt has a pulm hx of COPD and is a current smoker (3-4 cigs per day). Pt takes Advair BID at home. Pt assessed at this time per RCP, pt in with slight SOB, BBS diminished, SpO2 96% on RA, pt with strong productive cough. Cxr showed clear lungs. PRN Albuterol given. Pt ordered on scheduled tx's X/A. Will change to TID from Q8 and order PRN Albuterol. Will cont to monitor pt per RCP for mild distress " pathway.

## 2025-04-28 NOTE — ASSESSMENT & PLAN NOTE
Patient with shortness of breath, chest pressure. Troponins negative.  EKG shows bigeminy.  CTA chest negative for acute pathology, shows severe emphysema, possible chronic pulmonary embolus  Received Solu-Medrol, albuterol, Atrovent in the emergency room  Continue with IV Solu-Medrol 40 mg every 8  Continue with levalbuterol and Atrovent every 8 hours  Guaifenesin twice daily  Check RSV/flu/COVID swab  Sputum culture if possible  Pulmonology consult

## 2025-04-28 NOTE — CASE MANAGEMENT
Case Management Assessment & Discharge Planning Note    Patient name Patrica Grimaldo  Location [facility]01 MRN [id number]  : 1945 Date 2025       Current Admission Date: 2025  Current Admission Diagnosis:Acute on chronic respiratory failure with hypercapnia (HCC)   Patient Active Problem List    Diagnosis Date Noted Date Diagnosed    Acute on chronic respiratory failure with hypercapnia (HCC) 2025     Abnormal CT of the chest 2025     PVC (premature ventricular contraction) 2025     Anemia 2024     Severe protein-calorie malnutrition (HCC) 2024     Duodenal ulcer 2024     Increased MCV 2022     Encounter for vitamin deficiency screening 2022     Itching 2022     Mild protein-calorie malnutrition (HCC) 2022     Primary osteoarthritis of right knee 2021     Age-related osteoporosis without current pathological fracture 2021     Lightheadedness 06/15/2021     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PASC) 2021     Subclinical hyperthyroidism 2019     Weight loss 2019     Fatigue 2019     Nicotine abuse 2019     Multiple thyroid nodules 2018     Abnormal thyroid function test 2018     Low TSH level 2018     Thyroid nodule 2018     Essential hypertension 2017     Dense breasts 2016     COPD with acute exacerbation (HCC) 2015     Thyroid disorder 10/11/2012     Allergic rhinitis 2012     Generalized anxiety disorder 2012     Impaired fasting glucose 2012     Vitamin D deficiency 2012     Decreased platelet count (HCC) 2012       LOS (days): 1  Geometric Mean LOS (GMLOS) (days):   Days to GMLOS:     OBJECTIVE:    Risk of Unplanned Readmission Score: 15.27         Current admission status: Inpatient       Preferred Pharmacy:   [id number] - [place] - [address] [place] [address]  Sami CROWE  [id number]  Phone: [phone] Fax: [phone]    Primary Care Provider: Janine Marcus DO    Primary Insurance: [employer] [facility]  Secondary Insurance: BLUE CROSS    ASSESSMENT:  Active Health Care Proxies    There are no active Health Care Proxies on file.                 Readmission Root Cause  30 Day Readmission: No    Patient Information  Admitted from:: Home  Mental Status: Alert  During Assessment patient was accompanied by: Not accompanied during assessment  Assessment information provided by:: Patient  Primary Caregiver: Self  Support Systems: Self, Son  County of Residence: [place]  What city do you live in?: [place]  Home entry access options. Select all that apply.: Stairs  Number of steps to enter home.: 4  Type of Current Residence: Apartment  Floor Level: 1  Upon entering residence, is there a bedroom on the main floor (no further steps)?: Yes  Upon entering residence, is there a bathroom on the main floor (no further steps)?: Yes  Living Arrangements: Lives w/ Son  Is patient a ?: No    Activities of Daily Living Prior to Admission  Functional Status: Independent  Completes ADLs independently?: Yes  Ambulates independently?: Yes  Does patient use assisted devices?: Yes  Assisted Devices (DME) used: Walker  Does patient currently own DME?: Yes  What DME does the patient currently own?: Walker  Does patient have a history of Outpatient Therapy (PT/OT)?: No  Does the patient have a history of Short-Term Rehab?: No  Does patient have a history of HHC?: Yes ([facility])  Does patient currently have HHC?: No         Patient Information Continued  Income Source: Pension/FPC  Does patient have prescription coverage?: Yes  Can the patient afford their medications and any related supplies (such as glucometers or test strips)?: N/A  Does patient receive dialysis treatments?: No  Does patient have a history of substance abuse?: No  Does patient have a history of Mental Health Diagnosis?:  No         Means of Transportation  Means of Transport to Appts:: Family transport          DISCHARGE DETAILS:    Discharge planning discussed with:: Pt  Freedom of Choice: Yes  Comments - Freedom of Choice: Discussed FOC  CM contacted family/caregiver?: No- see comments (Pt alert and oriented)  Were Treatment Team discharge recommendations reviewed with patient/caregiver?: Yes  Did patient/caregiver verbalize understanding of patient care needs?: Yes  Were patient/caregiver advised of the risks associated with not following Treatment Team discharge recommendations?: Yes                 CM met wit pt to complete assessment and explain CM role.  Pt resides with her son in an apartment, has 4 [address].  Pt's apartment is on 1st floor.  Pt reports being independent with most ADL's, but does require assistance at times.  Pt has a walker for DME.  Pt reports previous HHC through [facility], denies outpatient PT or STR.  Pt denies any mental health or substance use hx.  Pt is retired and family assists pt to all appointments.  CM will continue to follow for discharge planning needs.

## 2025-04-28 NOTE — ASSESSMENT & PLAN NOTE
"CTA revealed, \"No acute pulmonary embolus. Possible webs in right pulmonary arterial branches, suggesting sequela of chronic pulmonary embolus. Severe emphysema.\"  Pulm consult pending   "

## 2025-04-28 NOTE — ASSESSMENT & PLAN NOTE
Malnutrition Findings:                                 BMI Findings:           Body mass index is 16.73 kg/m².

## 2025-04-28 NOTE — ASSESSMENT & PLAN NOTE
At this time attribute her acute hypoxemic respiratory failure in the setting of COPD exacerbation given symptoms of dyspnea with productive cough, correlated with CT lung findings showing severe emphysema as well as recent mission for presumed COPD exacerbation    Does not currently follow with pulmonology outpatient, though at home takes Advair once daily  At this time following IV Solu-Medrol, patient reports symptomatic improvement and would likely benefit from de-escalation of active steroid therapy    Patient this time would also benefit from triple therapy with ICS plus LABA plus LAMA given she currently meets group E category for 2 prior exacerbations within the past month and eosinophil count above 300 on most recent differential within the past year    Plan  Reduce Solu-Medrol to 40 mg twice daily, plan to reduce to p.o. 40 mg once daily tomorrow if oxygenation status continues to be stable and taper from there  Continue Xopenex and Atrovent nebulization 3 times daily  Patient will benefit from addition of Trelegy Ellipta on outpatient regimen for triple therapy given recurrent COPD exacerbation  Recommend evaluation with desaturation screen to assess if patient has ambulatory oxygenation requirements  Recommend further evaluation with TTE given CT findings of chronic pulmonary embolism to assess for possible cardiomyopathy versus pulmonary hypertension  Can also consider lower extremity Doppler to evaluate for possible VTE  Tobacco cessation encouraged with patient who is agreeable to nicotine replacement therapy outpatient with nicotine patches, and will discuss with PCP for addition of Chantix

## 2025-04-28 NOTE — RESTORATIVE TECHNICIAN NOTE
Restorative Technician Note      Patient Name: Patrica Grimaldo     [facility] Tech Visit Date: 04/28/25  Note Type: Mobility  Patient Position Upon Consult: Supine  Activity Performed: Ambulated (To and from BR)  Assistive Device: Roller walker  Patient Position at End of Consult: Bedside chair; All needs within reach; Bed/Chair alarm activated  Nurse Communication: Nurse aware of consult, application of brace    Chapin Evans, Restorative Technician

## 2025-04-29 ENCOUNTER — APPOINTMENT (INPATIENT)
Dept: NON INVASIVE DIAGNOSTICS | Facility: HOSPITAL | Age: 80
DRG: 189 | End: 2025-04-29
Payer: COMMERCIAL

## 2025-04-29 PROBLEM — E87.5 HYPERKALEMIA: Status: ACTIVE | Noted: 2025-04-29

## 2025-04-29 LAB
ANION GAP SERPL CALCULATED.3IONS-SCNC: 5 MMOL/L (ref 4–13)
AORTIC ROOT: 3.3 CM
ASCENDING AORTA: 3.3 CM
BSA FOR ECHO PROCEDURE: 1.44 M2
BUN SERPL-MCNC: 16 MG/DL (ref 5–25)
CALCIUM SERPL-MCNC: 9.1 MG/DL (ref 8.4–10.2)
CHLORIDE SERPL-SCNC: 104 MMOL/L (ref 96–108)
CO2 SERPL-SCNC: 27 MMOL/L (ref 21–32)
CREAT SERPL-MCNC: 0.49 MG/DL (ref 0.6–1.3)
E WAVE DECELERATION TIME: 205 MS
E/A RATIO: 1.22
ERYTHROCYTE [DISTWIDTH] IN BLOOD BY AUTOMATED COUNT: 14.3 % (ref 11.6–15.1)
FRACTIONAL SHORTENING: 28 (ref 28–44)
GFR SERPL CREATININE-BSD FRML MDRD: 91 ML/MIN/1.73SQ M
GLUCOSE SERPL-MCNC: 121 MG/DL (ref 65–140)
HCT VFR BLD AUTO: 36.7 % (ref 34.8–46.1)
HGB BLD-MCNC: 10.9 G/DL (ref 11.5–15.4)
INTERVENTRICULAR SEPTUM IN DIASTOLE (PARASTERNAL SHORT AXIS VIEW): 0.8 CM
INTERVENTRICULAR SEPTUM: 0.8 CM (ref 0.6–1.1)
LAAS-AP2: 13.9 CM2
LAAS-AP4: 11 CM2
LEFT ATRIUM SIZE: 2.5 CM
LEFT ATRIUM VOLUME (MOD BIPLANE): 27 ML
LEFT ATRIUM VOLUME INDEX (MOD BIPLANE): 18.8 ML/M2
LEFT INTERNAL DIMENSION IN SYSTOLE: 2.6 CM (ref 2.1–4)
LEFT VENTRICULAR INTERNAL DIMENSION IN DIASTOLE: 3.6 CM (ref 3.5–6)
LEFT VENTRICULAR POSTERIOR WALL IN END DIASTOLE: 0.8 CM
LEFT VENTRICULAR STROKE VOLUME: 31 ML
LVSV (TEICH): 31 ML
MAGNESIUM SERPL-MCNC: 2.3 MG/DL (ref 1.9–2.7)
MCH RBC QN AUTO: 27.7 PG (ref 26.8–34.3)
MCHC RBC AUTO-ENTMCNC: 29.7 G/DL (ref 31.4–37.4)
MCV RBC AUTO: 93 FL (ref 82–98)
MV E'TISSUE VEL-LAT: 10 CM/S
MV E'TISSUE VEL-SEP: 9 CM/S
MV PEAK A VEL: 0.51 M/S
MV PEAK E VEL: 62 CM/S
MV STENOSIS PRESSURE HALF TIME: 60 MS
MV VALVE AREA P 1/2 METHOD: 3.67
PLATELET # BLD AUTO: 208 THOUSANDS/UL (ref 149–390)
PMV BLD AUTO: 10.2 FL (ref 8.9–12.7)
POTASSIUM SERPL-SCNC: 4.4 MMOL/L (ref 3.5–5.3)
POTASSIUM SERPL-SCNC: 6 MMOL/L (ref 3.5–5.3)
RA PRESSURE ESTIMATED: 3 MMHG
RBC # BLD AUTO: 3.93 MILLION/UL (ref 3.81–5.12)
RIGHT ATRIUM AREA SYSTOLE A4C: 15.1 CM2
RIGHT VENTRICLE ID DIMENSION: 3.5 CM
RV PSP: 37 MMHG
SL CV LEFT ATRIUM LENGTH A2C: 4.3 CM
SL CV LV EF: 55
SL CV PED ECHO LEFT VENTRICLE DIASTOLIC VOLUME (MOD BIPLANE) 2D: 56 ML
SL CV PED ECHO LEFT VENTRICLE SYSTOLIC VOLUME (MOD BIPLANE) 2D: 25 ML
SODIUM SERPL-SCNC: 136 MMOL/L (ref 135–147)
T3FREE SERPL-MCNC: 3.33 PG/ML (ref 2.5–3.9)
TR MAX PG: 34 MMHG
TR PEAK VELOCITY: 2.9 M/S
TRICUSPID ANNULAR PLANE SYSTOLIC EXCURSION: 1.9 CM
TRICUSPID VALVE PEAK REGURGITATION VELOCITY: 2.89 M/S
WBC # BLD AUTO: 6.24 THOUSAND/UL (ref 4.31–10.16)

## 2025-04-29 PROCEDURE — 93970 EXTREMITY STUDY: CPT | Performed by: SURGERY

## 2025-04-29 PROCEDURE — 80048 BASIC METABOLIC PNL TOTAL CA: CPT | Performed by: PHYSICIAN ASSISTANT

## 2025-04-29 PROCEDURE — 99232 SBSQ HOSP IP/OBS MODERATE 35: CPT | Performed by: PHYSICIAN ASSISTANT

## 2025-04-29 PROCEDURE — 93306 TTE W/DOPPLER COMPLETE: CPT | Performed by: INTERNAL MEDICINE

## 2025-04-29 PROCEDURE — 93970 EXTREMITY STUDY: CPT

## 2025-04-29 PROCEDURE — 83735 ASSAY OF MAGNESIUM: CPT | Performed by: PHYSICIAN ASSISTANT

## 2025-04-29 PROCEDURE — 84132 ASSAY OF SERUM POTASSIUM: CPT | Performed by: PHYSICIAN ASSISTANT

## 2025-04-29 PROCEDURE — 99232 SBSQ HOSP IP/OBS MODERATE 35: CPT | Performed by: INTERNAL MEDICINE

## 2025-04-29 PROCEDURE — 85027 COMPLETE CBC AUTOMATED: CPT | Performed by: PHYSICIAN ASSISTANT

## 2025-04-29 PROCEDURE — 97163 PT EVAL HIGH COMPLEX 45 MIN: CPT

## 2025-04-29 PROCEDURE — 97167 OT EVAL HIGH COMPLEX 60 MIN: CPT

## 2025-04-29 PROCEDURE — 93306 TTE W/DOPPLER COMPLETE: CPT

## 2025-04-29 RX ORDER — METOPROLOL SUCCINATE 50 MG/1
50 TABLET, EXTENDED RELEASE ORAL
Qty: 30 TABLET | Refills: 0 | Status: SHIPPED | OUTPATIENT
Start: 2025-04-29

## 2025-04-29 RX ORDER — PREDNISONE 20 MG/1
40 TABLET ORAL DAILY
Status: DISCONTINUED | OUTPATIENT
Start: 2025-04-30 | End: 2025-04-30

## 2025-04-29 RX ADMIN — NICOTINE 1 PATCH: 7 PATCH, EXTENDED RELEASE TRANSDERMAL at 08:32

## 2025-04-29 RX ADMIN — UMECLIDINIUM 1 PUFF: 62.5 AEROSOL, POWDER ORAL at 08:33

## 2025-04-29 RX ADMIN — GUAIFENESIN 600 MG: 600 TABLET, EXTENDED RELEASE ORAL at 17:17

## 2025-04-29 RX ADMIN — Medication 2000 UNITS: at 08:24

## 2025-04-29 RX ADMIN — METOPROLOL SUCCINATE 50 MG: 50 TABLET, EXTENDED RELEASE ORAL at 20:01

## 2025-04-29 RX ADMIN — B-COMPLEX W/ C & FOLIC ACID TAB 1 TABLET: TAB at 08:24

## 2025-04-29 RX ADMIN — NICOTINE 1 PATCH: 7 PATCH, EXTENDED RELEASE TRANSDERMAL at 19:54

## 2025-04-29 RX ADMIN — PANTOPRAZOLE SODIUM 40 MG: 40 TABLET, DELAYED RELEASE ORAL at 08:27

## 2025-04-29 RX ADMIN — FLUTICASONE FUROATE AND VILANTEROL TRIFENATATE 1 PUFF: 100; 25 POWDER RESPIRATORY (INHALATION) at 08:33

## 2025-04-29 RX ADMIN — METHYLPREDNISOLONE SODIUM SUCCINATE 40 MG: 40 INJECTION, POWDER, FOR SOLUTION INTRAMUSCULAR; INTRAVENOUS at 08:24

## 2025-04-29 RX ADMIN — PANTOPRAZOLE SODIUM 40 MG: 40 TABLET, DELAYED RELEASE ORAL at 17:17

## 2025-04-29 RX ADMIN — GUAIFENESIN 600 MG: 600 TABLET, EXTENDED RELEASE ORAL at 08:24

## 2025-04-29 RX ADMIN — ENOXAPARIN SODIUM 40 MG: 40 INJECTION SUBCUTANEOUS at 08:24

## 2025-04-29 NOTE — OCCUPATIONAL THERAPY NOTE
"  Occupational Therapy Progress Note     Patient Name: Patrica Grimaldo  Today's Date: 4/29/2025  Problem List  Principal Problem:    Acute on chronic hypoxemic respiratory failure with (HCC)  Active Problems:    Allergic rhinitis    Generalized anxiety disorder    COPD with acute exacerbation (HCC)    Essential hypertension    Abnormal thyroid function test    Nicotine abuse    Duodenal ulcer    PVC (premature ventricular contraction)    Abnormal CT of the chest    Hyperkalemia          04/29/25 1042   OT Last Visit   OT Visit Date 04/29/25   Note Type   Note type Evaluation   Pain Assessment   Pain Assessment Tool 0-10   Pain Score No Pain   Restrictions/Precautions   Weight Bearing Precautions Per Order No   Other Precautions Cognitive;Bed Alarm;Chair Alarm;Hard of hearing   Home Living   Type of Home Apartment   Home Layout One level;Performs ADLs on one level;Stairs to enter with rails  (1 vs 4 ISRRAEL)   Bathroom Shower/Tub Tub/shower unit  (although normally sponge bathes at sink, sits in shower chair)   Bathroom Toilet Standard   Bathroom Equipment Shower chair   Home Equipment Walker   Prior Function   Level of [place] Independent with ADLs;Independent with functional mobility;Needs assistance with IADLS   Lives With Son  (retired and able to assist as needed)   Receives Help From Family   IADLs Family/Friend/Other provides transportation;Family/Friend/Other provides medication management;Family/Friend/Other provides meals   Falls in the last 6 months 0   Vocational Retired   Lifestyle   Autonomy Pta pt reports being I with most ADL and functional mobility using a RW, has assist for IADL, (-) .   Reciprocal Relationships supportive son   Service to Others retired   Subjective   Subjective \"I need my walker\"   ADL   Where Assessed Edge of bed   Eating Assistance 6  Modified independent   Grooming Assistance 5  Supervision/Setup   UB Bathing Assistance 5  Supervision/Setup   LB Bathing Assistance 4  " Minimal Assistance   UB Dressing Assistance 5  Supervision/Setup   LB Dressing Assistance 4  Minimal Assistance   Toileting Assistance  5  Supervision/Setup   Functional Assistance 5  Supervision/Setup   Bed Mobility   Sit to Supine 5  Supervision   Additional items Increased time required;Verbal cues   Additional Comments Pt greeted in bathroom, left in bed with all needs within reach.   Transfers   Sit to Stand 5  Supervision   Additional items Verbal cues   Stand to Sit 5  Supervision   Additional items Verbal cues   Toilet transfer 4  Minimal assistance   Additional items Assist x 1;Increased time required;Verbal cues;Standard toilet   Additional Comments with RW   Functional Mobility   Functional Mobility 4  Minimal assistance  (CGA)   Additional Comments Pt performs short household distance mobility with CGA with RW.   Additional items Rolling walker   Balance   Static Sitting Fair +   Dynamic Sitting Fair   Static Standing Fair -   Dynamic Standing Fair -   Ambulatory Poor +   Activity Tolerance   Activity Tolerance Patient limited by fatigue   Medical Staff Made Aware Co-eval with DPT due to medical complexity.   Nurse Made Aware RN cleared for therapy, handoff to ultrasound tech.   RUE Assessment   RUE Assessment WFL   LUE Assessment   LUE Assessment WFL   Hand Function   Gross Motor Coordination Functional   Fine Motor Coordination Functional   Sensation   Light Touch No apparent deficits   Cognition   Overall Cognitive Status WFL  (with some higher level deficits detected)   Arousal/Participation Cooperative;Alert   Attention Attends with cues to redirect   Orientation Level Oriented to person;Oriented to place;Oriented to situation   Memory Decreased recall of precautions;Decreased recall of recent events   Following Commands Follows one step commands with increased time or repetition   Comments Pt cooperative to therapy, with some decreased insight to deficits and requiring vc for safety.   Assessment    Limitation Decreased ADL status;Decreased Safe judgement during ADL;Decreased endurance;Decreased self-care trans;Decreased high-level ADLs   Prognosis Good   Assessment Pt is a 80 y.o. female who was admitted to [facility] on 4/27/2025 with Acute on chronic respiratory failure with hypercapnia (HCC). Pt seen for an OT evaluation per active OT orders.  Pt  has a past medical history of Elevated blood pressure reading. Pt lives in a 1 level apt with 1 vs 4 ISR[place]EL, uses a standard toilet although normally sponge bathes using a shower chair. Pta, pt was independent w/ ADL and functional mobility using RW, has assist for most IADL, was (-) driving. Currently, pt is Supervision for UB ADL, Min Ax1 for LB ADL, and completed transfers/FM w Supervision-CGA with RW. Pt currently presents with impairments in the following categories -steps to enter environment, difficulty performing ADLS, and limited insight into deficits activity tolerance, endurance, standing balance/tolerance, insight, safety , and judgement . These impairments, as well as pt's fatigue and risk for falls  limit pt's ability to safely engage in all baseline areas of occupation, includinggrooming, bathing, dressing, toileting, and functional mobility/transfers.  The patient's raw score on the AM-PAC Daily Activity Inpatient Short Form is 19. A raw score of greater than or equal to 19 suggests the patient may benefit from discharge to home. Please refer to the recommendation of the Occupational Therapist for safe discharge planning. Pt would benefit from continued acute OT services throughout hospital course and following D/C. Plan for OT interventions 2-3x per week. From OT standpoint, recommend home with increased social support, level III services upon D/C. Pt was left supine in bed with ultrasound tech present and all needs within reach.   Goals   Patient Goals to go home   Plan   Treatment Interventions ADL retraining;Functional transfer  training;Endurance training;Continued evaluation;Energy conservation   Goal Expiration Date 05/13/25   OT Frequency 2-3x/wk   Discharge Recommendation   Rehab Resource Intensity Level, OT III (Minimum Resource Intensity)   AM-PAC Daily Activity Inpatient   Lower Body Dressing 3   Bathing 3   Toileting 3   Upper Body Dressing 3   Grooming 3   Eating 4   Daily Activity Raw Score 19   Daily Activity Standardized Score (Calc for Raw Score >=11) 40.22   AM-PAC Applied Cognition Inpatient   Following a Speech/Presentation 3   Understanding Ordinary Conversation 4   Taking Medications 3   Remembering Where Things Are Placed or Put Away 3   Remembering List of 4-5 Errands 3   Taking Care of Complicated Tasks 3   Applied Cognition Raw Score 19   Applied Cognition Standardized Score 39.77   Modified Jack Scale   Modified [place] Scale 4   End of Consult   Education Provided Yes   Patient Position at End of Consult Supine;Bed/Chair alarm activated;All needs within reach   Nurse Communication Nurse aware of consult       OT Goals    - Pt will be Mod I with LB ADL by end of hospital course.    - Pt will be Mod I with UB ADL by end of hospital course.    - Pt will be Mod I with all functional transfers required for patient safety by end of hospital course.    - Pt will be Mod I with functional mobility to/from bathroom for increased independence with toileting tasks.    - Pt activity tolerance will increase to 30 minutes in order to safely engage in ADL and transfers.     - Pt standing tolerance will increase to 5 minutes  in order to promote sink side ADLs and IADL activities.    - Pt will consistently follow one-step directions with min to no vc or prompting.     - Pt will attend to functional tasks for 10 minutes with min to no vc for attention/redirection.    - Pt will attend to and complete ongoing cognitive assessment in order to inform safe discharge planning.          BE [place], OTR/L

## 2025-04-29 NOTE — PLAN OF CARE
Problem: PAIN - ADULT  Goal: Verbalizes/displays adequate comfort level or baseline comfort level  Description: Interventions:- Encourage patient to monitor pain and request assistance- Assess pain using appropriate pain scale- Administer analgesics based on type and severity of pain and evaluate response- Implement non-pharmacological measures as appropriate and evaluate response- Consider cultural and social influences on pain and pain management- Notify physician/advanced practitioner if interventions unsuccessful or patient reports new pain  Outcome: Progressing     Problem: SAFETY ADULT  Goal: Patient will remain free of falls  Description: INTERVENTIONS:- Educate patient/family on patient safety including physical limitations- Instruct patient to call for assistance with activity - Consult OT/PT to assist with strengthening/mobility - Keep Call bell within reach- Keep bed low and locked with side rails adjusted as appropriate- Keep care items and personal belongings within reach- Initiate and maintain comfort rounds- Make Fall Risk Sign visible to staff- Offer Toileting every  Hours, in advance of need- Initiate/Maintain alarm- Obtain necessary fall risk management equipment: - Apply yellow socks and bracelet for high fall risk patients- Consider moving patient to room near nurses station  Outcome: Progressing  Goal: Maintain or return to baseline ADL function  Description: INTERVENTIONS:-  Assess patient's ability to carry out ADLs; assess patient's baseline for ADL function and identify physical deficits which impact ability to perform ADLs (bathing, care of mouth/teeth, toileting, grooming, dressing, etc.)- Assess/evaluate cause of self-care deficits - Assess range of motion- Assess patient's mobility; develop plan if impaired- Assess patient's need for assistive devices and provide as appropriate- Encourage maximum independence but intervene and supervise when necessary- Involve family in performance of  ADLs- Assess for home care needs following discharge - Consider OT consult to assist with ADL evaluation and planning for discharge- Provide patient education as appropriate  Outcome: Progressing  Goal: Maintains/Returns to pre admission functional level  Description: INTERVENTIONS:- Perform AM-PAC 6 Click Basic Mobility/ Daily Activity assessment daily.- Set and communicate daily mobility goal to care team and patient/family/caregiver. - Collaborate with rehabilitation services on mobility goals if consulted- Perform Range of Motion  times a day.- Reposition patient every  hours.- Dangle patient  times a day- Stand patient  times a day- Ambulate patient  times a day- Out of bed to chair  times a day - Out of bed for meals  times a day- Out of bed for toileting- Record patient progress and toleration of activity level   Outcome: Progressing

## 2025-04-29 NOTE — PROGRESS NOTES
Patient:    MRN:  [id number]    Jeovany Request ID:  [id number]    Level of care reserved:  Home Health Agency    [facility] [phone]    Clinical needs requested:    Geography searched:  [phone]    Start of Service:    Request sent:  2:50pm EDT on 4/29/2025 by Janine Renae    Partner reserved:  3:21pm EDT on 4/29/2025 by Janine Renae    Choice list shared:  3:18pm EDT on 4/29/2025 by Janine Renae

## 2025-04-29 NOTE — ASSESSMENT & PLAN NOTE
"CTA revealed, \"No acute pulmonary embolus. Possible webs in right pulmonary arterial branches, suggesting sequela of chronic pulmonary embolus. Severe emphysema.\"  Pulm consult appreciated  Echo pending  LE venous duplex pending   "

## 2025-04-29 NOTE — PHYSICAL THERAPY NOTE
Physical Therapy Evaluation     Patient's Name: Patrica Grimaldo    Admitting Diagnosis  SOB (shortness of breath) [R06.02]  COPD exacerbation (HCC) [J44.1]    Problem List  Patient Active Problem List   Diagnosis    Allergic rhinitis    Generalized anxiety disorder    COPD with acute exacerbation (HCC)    Dense breasts    Essential hypertension    Impaired fasting glucose    Thyroid disorder    Vitamin D deficiency    Decreased platelet count (HCC)    Low TSH level    Thyroid nodule    Multiple thyroid nodules    Abnormal thyroid function test    Nicotine abuse    Subclinical hyperthyroidism    Weight loss    Fatigue    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PASC)    Lightheadedness    Age-related osteoporosis without current pathological fracture    Primary osteoarthritis of right knee    Mild protein-calorie malnutrition (HCC)    Itching    Increased MCV    Encounter for vitamin deficiency screening    Duodenal ulcer    Severe protein-calorie malnutrition (HCC)    Anemia    PVC (premature ventricular contraction)    Acute on chronic hypoxemic respiratory failure with (HCC)    Abnormal CT of the chest    Hyperkalemia       Past Medical History  Past Medical History:   Diagnosis Date    Elevated blood pressure reading        Past Surgical History  Past Surgical History:   Procedure Laterality Date    APPENDECTOMY      TONSILLECTOMY          04/29/25 1043   PT Last Visit   PT Visit Date 04/29/25   Note Type   Note type Evaluation   Pain Assessment   Pain Assessment Tool 0-10   Pain Score No Pain   Restrictions/Precautions   Weight Bearing Precautions Per Order No   Other Precautions Cognitive;Chair Alarm;Bed Alarm;Multiple lines;Fall Risk;Hard of hearing   Home Living   Type of Home Apartment   Home Layout One level;Performs ADLs on one level;Able to live on main level with bedroom/bathroom  (1 vs 4 ISRRAEL)   Bathroom Shower/Tub Tub/shower unit  (usually spongebathes at sink using SC)   Bathroom Toilet Standard   Bathroom  Equipment Shower chair   Home Equipment Walker  (uses @ baseline)   Prior Function   Level of [place] Independent with ADLs;Independent with functional mobility;Needs assistance with IADLS   Lives With Son  (per pt report son is retired and able to assist as needed)   Receives Help From Family   IADLs Family/Friend/Other provides transportation;Family/Friend/Other provides medication management;Family/Friend/Other provides meals   Falls in the last 6 months 0   Vocational Retired   General   Family/Caregiver Present No   Cognition   Overall Cognitive Status WFL   Arousal/Participation Cooperative   Attention Attends with cues to redirect   Orientation Level Oriented to person;Oriented to place;Oriented to situation   Memory Decreased recall of precautions;Decreased recall of recent events   Following Commands Follows one step commands with increased time or repetition   Comments pleasant and cooperative, decreased insight into current defits. Occasional VC for safety and sequencing   RLE Assessment   RLE Assessment   (functionally 3+/5)   LLE Assessment   LLE Assessment   (functionally 3+/5)   Bed Mobility   Supine to Sit Unable to assess   Sit to Supine 5  Supervision   Additional items HOB elevated;Bedrails;Increased time required   Additional Comments pt sitting in bathroom upon arrival. pt left supine in bed with all needs within reach - alarm on post session   Transfers   Sit to Stand 5  Supervision   Additional items Armrests;Increased time required;Verbal cues   Stand to Sit 5  Supervision   Additional items Armrests;Increased time required;Verbal cues   Toilet transfer 4  Minimal assistance   Additional items Assist x 1;Increased time required;Verbal cues;Standard toilet   Additional Comments RW; completes 2x STS t/o session   Ambulation/Elevation   Gait pattern Excessively slow;Short stride;Foward flexed;Inconsistent letitia;Decreased foot clearance   Gait Assistance 4  Minimal assist  (CGA)    Additional items Assist x 1;Verbal cues   Assistive Device Rolling walker   Distance 10', 2 x 10'   Stair Management Assistance Not tested   Balance   Static Sitting Fair +   Dynamic Sitting Fair   Static Standing Fair -   Dynamic Standing Fair -   Ambulatory Poor +   Endurance Deficit   Endurance Deficit Yes   Activity Tolerance   Activity Tolerance Patient limited by fatigue   Medical Staff Made Aware OT; co-session completed this date 2* increased medical complexity and multiple co-morbidities   Nurse Made Aware RN cleared   Assessment   Prognosis Good   Problem List Decreased strength;Decreased endurance;Impaired balance;Decreased mobility;Impaired judgement;Decreased safety awareness   Assessment Pt is a 80 y.o. female seen for PT evaluation s/p admit to [facility] on 4/27/2025. Pt was admitted with a primary dx of: acute on chronic hypoxemic respiratory failure.  PT now consulted for assessment of mobility and d/c needs. Pt with Up and OOB as tolerated  orders.  Pts current comorbidities effecting treatment include: hyperkalemia, generalized anxiety disorder, HTN. Pt  has a past medical history of Elevated blood pressure reading. Pts current clinical presentation is Unstable/ Unpredictable (high complexity) due to Ongoing medical management for primary dx, Decreased activity tolerance compared to baseline, Fall risk, Increased assistance needed from caregiver at current time, Cog status, Trending lab values, Continuous pulse oximetry monitoring . Prior to admission, pt was residing with son in 1 level apartment with 1 vs 4 [place] and was mod I using RW. Upon evaluation, pt currently is requiring S level for bed mobility; S level for transfers; min A for ambulation w/ RW. Pt presents at PT eval functioning below baseline and currently w/ overall mobility deficits 2* to: BLE weakness, impaired balance, decreased endurance, impaired coordination, gait deviations, decreased activity tolerance compared to  baseline, decreased functional mobility tolerance compared to baseline, decreased safety awareness, impaired judgement, fall risk. Pt currently at a fall risk 2* to impairments listed above.  Pt will continue to benefit from skilled acute PT interventions to address stated impairments; to maximize functional mobility; for ongoing pt/ family training; and DME needs. At conclusion of PT session pt returned BTB and bed alarm engaged with phone and call bell within reach. Pt denies any further questions at this time. The patient's AM-PAC Basic Mobility Inpatient Short Form Raw Score is 17. A Raw score of greater than 16 suggests the patient may benefit from discharge to home. Please also refer to the recommendation of the Physical Therapist for safe discharge planning. PT to continue to follow pt t/o hospital stay, recommend level 3 resource intensity upon hospital D/C.   Goals   Patient Goals to go home   STG Expiration Date 05/13/25   Short Term Goal #1 STG 1. Pt will be able to perform bed mobility tasks with mod I level in order to improve overall functional mobility and assist in safe d/c. STG 2. Pt with sit EOB for at least 25 minutes at mod I level in order to strengthen abdominal musculature and assist in future transfers/ ambulation. STG 3. Pt will be able to perform functional transfer with mod I level in order to improve overall functional mobility and assist in safe d/c. STG 4. Pt will be able to ambulate at least 250 feet with least restrictive device with mod I level in order to improve overall functional mobility and assist in safe d/c. STG 5. Pt will improve sitting/standing static/dynamic balance 1/2 grade in order to improve functional mobility and assist in safe d/c. STG 6. Pt will improve LE strength by 1/2 grade in order to improve functional mobility and assist in safe d/c. STG 7. Pt will be able to negotiate at least 2 stairs with least restrictive device with S level in order to improve overall  functional mobility and assist in safe d/c.   PT Treatment Day 0   Plan   Treatment/Interventions ADL retraining;Functional transfer training;LE strengthening/ROM;Elevations;Therapeutic exercise;Endurance training;Patient/family training;Equipment eval/education;Spoke to nursing;Spoke to case management;OT;Bed mobility;Gait training   PT Frequency 3-5x/wk   Discharge Recommendation   Rehab Resource Intensity Level, PT III (Minimum Resource Intensity)   Equipment Recommended Walker  (pt reports owning)   AM-PAC Basic Mobility Inpatient   Turning in Flat Bed Without Bedrails 3   Lying on Back to Sitting on Edge of Flat Bed Without Bedrails 3   Moving Bed to Chair 3   Standing Up From Chair Using Arms 3   Walk in Room 3   Climb 3-5 Stairs With Railing 2   Basic Mobility Inpatient Raw Score 17   Basic Mobility Standardized Score 39.67   [facility] Highest Level Of Mobility   -HLM Goal 5: Stand one or more mins   JH-HLM Achieved 7: Walk 25 feet or more   Modified Jack Scale   Modified Jack Scale 4           Ewelina Bailey, PT DPT

## 2025-04-29 NOTE — PROGRESS NOTES
"Progress Note - Hospitalist   Name: Patrica Grimaldo 80 y.o. female I MRN: [id number]  [facility] I Date of Admission: 4/27/2025   Date of Service: 4/29/2025 I Hospital Day: 2    Assessment & Plan  Acute on chronic hypoxemic respiratory failure with (HCC)  Patient presented with chest pressure, shortness of breath, wheezing.  pCO2 elevated at 60 from baseline in the 50s  Was placed on supplemental oxygen to keep O2 sat between 88 to 92%  Pulm consult appreciated as below   Now on room air  COPD with acute exacerbation (HCC)  Pulm consult appreciated  Was on IV Solumedrol Q8hr, weaned to PO prednisone beginning today, 4/29/25  Continue nebs and inhalers per Pulm recs  Allergic rhinitis  Continue nasal saline spray  Generalized anxiety disorder  Continue with Xanax as needed  Essential hypertension  Continue metoprolol nightly  Nicotine abuse  Nicotine patch  Duodenal ulcer  Continue with PPI twice daily  PVC (premature ventricular contraction)  Continue metoprolol   Abnormal CT of the chest  CTA revealed, \"No acute pulmonary embolus. Possible webs in right pulmonary arterial branches, suggesting sequela of chronic pulmonary embolus. Severe emphysema.\"  Pulm consult appreciated  Echo pending  LE venous duplex pending   Abnormal thyroid function test  TSH 0.076 and free T4 1.32  T3 pending    Hyperkalemia  Potassium 6.0 on a.m. labs, however sample noted to be moderately hemolyzed  Stat potassium level ordered -discussed with nurse    VTE Pharmacologic Prophylaxis:    lovenox    Mobility:   Basic Mobility Inpatient Raw Score: 22  JH-HLM Goal: 7: Walk 25 feet or more  JH-HLM Achieved: 7: Walk 25 feet or more  JH-HLM Goal achieved. Continue to encourage appropriate mobility.    Patient Centered Rounds: I performed bedside rounds with nursing staff today.   Discussions with Specialists or Other Care Team Provider:     Education and Discussions with Family / Patient: Patient declined call to . "     Current Length of Stay: 2 day(s)  Current Patient Status: Inpatient   Certification Statement: The patient will continue to require additional inpatient hospital stay due to echo pending, LE venous duplex pending, pending Pulm clearance  Discharge Plan: Anticipate discharge tomorrow to discharge location to be determined pending rehab evaluations.    Code Status: Level 1 - Full Code    Subjective   Ms. Grimaldo reports feeling better today, less SOB    Objective :  Temp:  [97.5 °F (36.4 °C)-98.6 °F (37 °C)] 98.5 °F (36.9 °C)  HR:  [] 83  BP: ()/(69-90) 130/89  Resp:  [13] 13  SpO2:  [92 %-97 %] 92 %  O2 Device: None (Room air)    Body mass index is 16.73 kg/m².     Input and Output Summary (last 24 hours):     Intake/Output Summary (Last 24 hours) at 4/29/2025 1015  Last data filed at 4/29/2025 0754  Gross per 24 hour   Intake 770 ml   Output --   Net 770 ml       Physical Exam  Vitals reviewed.   Constitutional:       Comments: Patient seen sitting in bed, NAD   Cardiovascular:      Rate and Rhythm: Normal rate and regular rhythm.   Pulmonary:      Effort: Pulmonary effort is normal. No respiratory distress.      Breath sounds: Normal breath sounds.   Abdominal:      General: Bowel sounds are normal.      Palpations: Abdomen is soft.      Tenderness: There is no abdominal tenderness.   Musculoskeletal:      Right lower leg: No edema.      Left lower leg: No edema.   Skin:     General: Skin is warm.   Neurological:      Mental Status: She is alert and oriented to person, place, and time.   Psychiatric:         Mood and Affect: Mood normal.           Lines/Drains:        Telemetry:  Telemetry Orders (From admission, onward)               24 Hour Telemetry Monitoring  Continuous x 24 Hours (Telem)        Expiring   Question:  Reason for 24 Hour Telemetry  Answer:  Acute respiratory failure on BiPAP                       Indication for Continued Telemetry Use: PE               Lab Results: I have  reviewed the following results:   Results from last 7 days   Lab Units 04/29/25  0622 04/28/25  0453 04/27/25  1819   WBC Thousand/uL 6.24 3.58* 7.54   HEMOGLOBIN g/dL 10.9* 11.6 12.2   HEMATOCRIT % 36.7 37.9 39.5   PLATELETS Thousands/uL 208 219 236   SEGS PCT %  --   --  75   LYMPHO PCT %  --  2* 14   MONO PCT %  --  1* 9   EOS PCT %  --  0 1     Results from last 7 days   Lab Units 04/29/25  0517 04/27/25  1819   SODIUM mmol/L 136 140   POTASSIUM mmol/L 6.0* 3.9   CHLORIDE mmol/L 104 107   CO2 mmol/L 27 28   BUN mg/dL 16 15   CREATININE mg/dL 0.49* 0.42*   ANION GAP mmol/L 5 5   CALCIUM mg/dL 9.1 8.2*   ALBUMIN g/dL  --  3.2*   TOTAL BILIRUBIN mg/dL  --  0.26   ALK PHOS U/L  --  79   ALT U/L  --  15   AST U/L  --  18   GLUCOSE RANDOM mg/dL 121 95                 Results from last 7 days   Lab Units 04/27/25  1819   PROCALCITONIN ng/ml <0.05       Recent Cultures (last 7 days):   Results from last 7 days   Lab Units 04/28/25  0749   GRAM STAIN RESULT  1+ Epithelial Cells*  1+ Gram positive cocci in pairs*  1+ Gram negative rods*  No polys seen*       Imaging Results Review: No pertinent imaging studies reviewed.  Other Study Results Review: No additional pertinent studies reviewed.    Last 24 Hours Medication List:     Current Facility-Administered Medications:     acetaminophen (TYLENOL) tablet 650 mg, Q6H PRN    ALPRAZolam (XANAX) tablet 0.25 mg, Daily PRN    Cholecalciferol (VITAMIN D3) tablet 2,000 Units, Daily    enoxaparin (LOVENOX) subcutaneous injection 40 mg, Daily    Fluticasone Furoate-Vilanterol 100-25 mcg/actuation 1 puff, Daily    guaiFENesin (MUCINEX) 12 hr tablet 600 mg, BID    levalbuterol (XOPENEX) inhalation solution 1.25 mg, Q6H PRN    metoprolol succinate (TOPROL-XL) 24 hr tablet 50 mg, HS    multivitamin stress formula tablet 1 tablet, Daily    nicotine (NICODERM CQ) 7 mg/24hr TD 24 hr patch 1 patch, Daily    pantoprazole (PROTONIX) EC tablet 40 mg, BID AC    [START ON 4/30/2025]  predniSONE tablet 40 mg, Daily    sodium chloride (OCEAN) 0.65 % nasal spray 1 spray, Q1H PRN    umeclidinium 62.5 mcg/actuation inhaler AEPB 1 puff, Daily    Administrative Statements   Today, Patient Was Seen By: Chapin Argueta PA-C    **Please Note: This note may have been constructed using a voice recognition system.**

## 2025-04-29 NOTE — ASSESSMENT & PLAN NOTE
Pulm consult appreciated  Was on IV Solumedrol Q8hr, weaned to PO prednisone beginning today, 4/29/25  Continue nebs and inhalers per Pulm recs

## 2025-04-29 NOTE — PLAN OF CARE
Problem: PAIN - ADULT  Goal: Verbalizes/displays adequate comfort level or baseline comfort level  Description: Interventions:- Encourage patient to monitor pain and request assistance- Assess pain using appropriate pain scale- Administer analgesics based on type and severity of pain and evaluate response- Implement non-pharmacological measures as appropriate and evaluate response- Consider cultural and social influences on pain and pain management- Notify physician/advanced practitioner if interventions unsuccessful or patient reports new pain  Outcome: Progressing     Problem: INFECTION - ADULT  Goal: Absence or prevention of progression during hospitalization  Description: INTERVENTIONS:- Assess and monitor for signs and symptoms of infection- Monitor lab/diagnostic results- Monitor all insertion sites, i.e. indwelling lines, tubes, and drains- Monitor endotracheal if appropriate and nasal secretions for changes in amount and color- Clark appropriate cooling/warming therapies per order- Administer medications as ordered- Instruct and encourage patient and family to use good hand hygiene technique- Identify and instruct in appropriate isolation precautions for identified infection/condition  Outcome: Progressing     Problem: DISCHARGE PLANNING  Goal: Discharge to home or other facility with appropriate resources  Description: INTERVENTIONS:- Identify barriers to discharge w/patient and caregiver- Arrange for needed discharge resources and transportation as appropriate- Identify discharge learning needs (meds, wound care, etc.)- Arrange for interpretive services to assist at discharge as needed- Refer to Case Management Department for coordinating discharge planning if the patient needs post-hospital services based on physician/advanced practitioner order or complex needs related to functional status, cognitive ability, or social support system  Outcome: Progressing

## 2025-04-29 NOTE — ASSESSMENT & PLAN NOTE
Potassium 6.0 on a.m. labs, however sample noted to be moderately hemolyzed  Stat potassium level ordered -discussed with nurse

## 2025-04-29 NOTE — PROGRESS NOTES
Progress Note - Pulmonology   Name: Patrica Grimaldo 80 y.o. female I MRN: [id number]  Unit/Bed#: [facility] 905-01 I Date of Admission: 4/27/2025   Date of Service: 4/29/2025 I Hospital Day: 2    Assessment & Plan  Acute on chronic hypoxemic respiratory failure with (HCC)  At this time attribute her acute hypoxemic respiratory failure in the setting of COPD exacerbation given symptoms of dyspnea with productive cough, correlated with CT lung findings showing severe emphysema as well as recent mission for presumed COPD exacerbation    Does not currently follow with pulmonology outpatient, though at home takes Advair once daily  At this time following IV Solu-Medrol, patient reports symptomatic improvement and would likely benefit from de-escalation of active steroid therapy    Patient this time would also benefit from triple therapy with ICS plus LABA plus LAMA given she currently meets group E category for 2 prior exacerbations within the past month and eosinophil count above 300 on most recent differential within the past year    Plan  Reduce Solu-Medrol to 40 mg once daily, plan to reduce to p.o. 40 mg once daily tomorrow  Given recurrent exacerbation within past month, will plan for prednisone taper with 10 mg reduction every 3 days following initiation of oral prednisone  Continue Xopenex nebulization 3 times daily PRN  Continue Breo-Ellipa Incruse daily  Patient will benefit from addition of Trelegy Ellipta on outpatient regimen for triple therapy given recurrent COPD exacerbation  Recommend evaluation with desaturation screen to assess if patient has ambulatory oxygenation requirements  F/U TTE, LE Doppler  Tobacco cessation encouraged with patient who is agreeable to nicotine replacement therapy outpatient with nicotine patches and Chantix on DC  Allergic rhinitis  Continue fluticasone furoate-vilanterol inhaler  Generalized anxiety disorder    COPD with acute exacerbation (HCC)  See plan for hypoxemic respiratory  failure  Essential hypertension    Abnormal thyroid function test  Per primary team  Nicotine abuse  Smoking cessation encouraged the patient  Duodenal ulcer    PVC (premature ventricular contraction)  Per primary team  Abnormal CT of the chest  Recommend further evaluation with TTE to assess for cardiomyopathy versus pulmonary hypertension given 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 Denies any active shortness of breath, chest pain, cough, abdominal pain, nausea/vomiting or fevers/chills.    Objective :  Temp:  [97.5 °F (36.4 °C)-98.6 °F (37 °C)] 98.5 °F (36.9 °C)  HR:  [] 83  BP: ()/(69-90) 130/89  Resp:  [13] 13  SpO2:  [92 %-97 %] 92 %  O2 Device: None (Room air)    Physical Exam  Vitals and nursing note reviewed.   Constitutional:       General: She is not in acute distress.     Appearance: She is well-developed.   HENT:      Head: Normocephalic and atraumatic.   Eyes:      Conjunctiva/sclera: Conjunctivae normal.   Cardiovascular:      Rate and Rhythm: Normal rate and regular rhythm.      Heart sounds: No murmur heard.  Pulmonary:      Effort: Pulmonary effort is normal. No respiratory distress.      Breath sounds: Wheezing present.      Comments: Mild expiratory wheezing in lower lung fields, improved from yesterday  Abdominal:      Palpations: Abdomen is soft.      Tenderness: There is no abdominal tenderness.   Musculoskeletal:         General: No swelling.      Cervical back: Neck supple.   Skin:     General: Skin is warm and dry.      Capillary Refill: Capillary refill takes less than 2 seconds.   Neurological:      Mental Status: She is alert.   Psychiatric:         Mood and Affect: Mood normal.           Lab Results: I have reviewed the following results:   .     04/29/25  0517 04/29/25  0622   WBC  --  6.24   HGB  --  10.9*   HCT  --  36.7   PLT  --  208   SODIUM 136  --     K 6.0*  --      --    CO2 27  --    BUN 16  --    CREATININE 0.49*  --    GLUC 121  --    MG 2.3  --      ABG: No new results in last 24 hours.        PFT Results Reviewed: No formal PFTs conducted

## 2025-04-29 NOTE — PLAN OF CARE
Problem: PHYSICAL THERAPY ADULT  Goal: Performs mobility at highest level of function for planned discharge setting.  See evaluation for individualized goals.  Description: Treatment/Interventions: ADL retraining, Functional transfer training, LE strengthening/ROM, Elevations, Therapeutic exercise, Endurance training, Patient/family training, Equipment eval/education, Spoke to nursing, Spoke to case management, OT, Bed mobility, Gait training  Equipment Recommended: Walker (pt reports owning)       See flowsheet documentation for full assessment, interventions and recommendations.  4/29/2025 1439 by Ewelina Bailey PT  Note: Prognosis: Good  Problem List: Decreased strength, Decreased endurance, Impaired balance, Decreased mobility, Impaired judgement, Decreased safety awareness  Assessment: Pt is a 80 y.o. female seen for PT evaluation s/p admit to [facility] on 4/27/2025. Pt was admitted with a primary dx of: acute on chronic hypoxemic respiratory failure.  PT now consulted for assessment of mobility and d/c needs. Pt with Up and OOB as tolerated  orders.  Pts current comorbidities effecting treatment include: hyperkalemia, generalized anxiety disorder, HTN. Pt  has a past medical history of Elevated blood pressure reading. Pts current clinical presentation is Unstable/ Unpredictable (high complexity) due to Ongoing medical management for primary dx, Decreased activity tolerance compared to baseline, Fall risk, Increased assistance needed from caregiver at current time, Cog status, Trending lab values, Continuous pulse oximetry monitoring . Prior to admission, pt was residing with son in 1 level apartment with 1 vs 4 [place] and was mod I using RW. Upon evaluation, pt currently is requiring S level for bed mobility; S level for transfers; min A for ambulation w/ RW. Pt presents at PT eval functioning below baseline and currently w/ overall mobility deficits 2* to: BLE weakness, impaired balance, decreased  endurance, impaired coordination, gait deviations, decreased activity tolerance compared to baseline, decreased functional mobility tolerance compared to baseline, decreased safety awareness, impaired judgement, fall risk. Pt currently at a fall risk 2* to impairments listed above.  Pt will continue to benefit from skilled acute PT interventions to address stated impairments; to maximize functional mobility; for ongoing pt/ family training; and DME needs. At conclusion of PT session pt returned BTB and bed alarm engaged with phone and call bell within reach. Pt denies any further questions at this time. The patient's AM-PAC Basic Mobility Inpatient Short Form Raw Score is 17. A Raw score of greater than 16 suggests the patient may benefit from discharge to home. Please also refer to the recommendation of the Physical Therapist for safe discharge planning. PT to continue to follow pt t/o hospital stay, recommend level 3 resource intensity upon hospital D/C.        Rehab Resource Intensity Level, PT: III (Minimum Resource Intensity)    See flowsheet documentation for full assessment.

## 2025-04-29 NOTE — ASSESSMENT & PLAN NOTE
Patient presented with chest pressure, shortness of breath, wheezing.  pCO2 elevated at 60 from baseline in the 50s  Was placed on supplemental oxygen to keep O2 sat between 88 to 92%  Pulm consult appreciated as below   Now on room air

## 2025-04-29 NOTE — PLAN OF CARE
Problem: OCCUPATIONAL THERAPY ADULT  Goal: Performs self-care activities at highest level of function for planned discharge setting.  See evaluation for individualized goals.  Description: Treatment Interventions: ADL retraining, Functional transfer training, Endurance training, Continued evaluation, Energy conservation          See flowsheet documentation for full assessment, interventions and recommendations.   Outcome: Progressing  Note: Limitation: Decreased ADL status, Decreased Safe judgement during ADL, Decreased endurance, Decreased self-care trans, Decreased high-level ADLs  Prognosis: Good  Assessment: Pt is a 80 y.o. female who was [facility] to [facility] on 4/27/2025 with Acute on chronic respiratory failure with hypercapnia (HCC). Pt seen for an OT evaluation per active OT orders.  Pt  has a past medical history of Elevated blood pressure reading. Pt lives in a 1 level apt with 1 vs 4 [place], uses a standard toilet although normally sponge bathes using a shower chair. Pta, pt was independent w/ ADL and functional mobility using RW, has assist for most IADL, was (-) driving. Currently, pt is Supervision for UB ADL, Min Ax1 for LB ADL, and completed transfers/FM w Supervision-CGA with RW. Pt currently presents with impairments in the following categories -steps to enter environment, difficulty performing ADLS, and limited insight into deficits activity tolerance, endurance, standing balance/tolerance, insight, safety , and judgement . These impairments, as well as pt's fatigue and risk for falls  limit pt's ability to safely engage in all baseline areas of occupation, includinggrooming, bathing, dressing, toileting, and functional mobility/transfers.  The patient's raw score on the -PAC Daily Activity Inpatient Short Form is 19. A raw score of greater than or equal to 19 suggests the patient may benefit from discharge to home. Please refer to the recommendation of the Occupational Therapist for  safe discharge planning. Pt would benefit from continued acute OT services throughout hospital course and following D/C. Plan for OT interventions 2-3x per week. From OT standpoint, recommend home with increased social support, level III services upon D/C. Pt was left supine in bed with ultrasound tech present and all needs within reach.     Rehab Resource Intensity Level, OT: III (Minimum Resource Intensity)

## 2025-04-29 NOTE — CASE MANAGEMENT
Case Management Discharge Planning Note    Patient name Patrica Grimaldo  Location [facility] 905/[facility] 905-01 MRN [id number]  : 1945 Date 2025       Current Admission Date: 2025  Current Admission Diagnosis:Acute on chronic hypoxemic respiratory failure with (HCC)   Patient Active Problem List    Diagnosis Date Noted Date Diagnosed    Hyperkalemia 2025     Acute on chronic hypoxemic respiratory failure with (HCC) 2025     Abnormal CT of the chest 2025     PVC (premature ventricular contraction) 2025     Anemia 2024     Severe protein-calorie malnutrition (HCC) 2024     Duodenal ulcer 2024     Increased MCV 2022     Encounter for vitamin deficiency screening 2022     Itching 2022     Mild protein-calorie malnutrition (HCC) 2022     Primary osteoarthritis of right knee 2021     Age-related osteoporosis without current pathological fracture 2021     Lightheadedness 06/15/2021     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PASC) 2021     Subclinical hyperthyroidism 2019     Weight loss 2019     Fatigue 2019     Nicotine abuse 2019     Multiple thyroid nodules 2018     Abnormal thyroid function test 2018     Low TSH level 2018     Thyroid nodule 2018     Essential hypertension 2017     Dense breasts 2016     COPD with acute exacerbation (HCC) 2015     Thyroid disorder 10/11/2012     Allergic rhinitis 2012     Generalized anxiety disorder 2012     Impaired fasting glucose 2012     Vitamin D deficiency 2012     Decreased platelet count (HCC) 2012       LOS (days): 2  Geometric Mean LOS (GMLOS) (days):   Days to GMLOS:     OBJECTIVE:  Risk of Unplanned Readmission Score: 15.72         Current admission status: Inpatient   Preferred Pharmacy:   [id number] - [place] - [address]  [place]  [address]  Phone: [phone] Fax: [phone]    Primary Care Provider: Janine Marcus DO    Primary Insurance: [facility] [employer] [facility]  Secondary Insurance: BLUE CROSS    DISCHARGE DETAILS:    Discharge planning discussed with:: Pt  Freedom of Choice: Yes  Comments - Freedom of Choice: Pt agreeable to HHC referrals, 1st choice is [facility]  CM contacted family/caregiver?: No- see comments (Pt alert and oriented)  Were Treatment Team discharge recommendations reviewed with patient/caregiver?: Yes  Did patient/caregiver verbalize understanding of patient care needs?: Yes  Were patient/caregiver advised of the risks associated with not following Treatment Team discharge recommendations?: Yes         Requested Home Health Care         Is the patient interested in HHC at discharge?: Yes  Home Health Discipline requested:: Occupational Therapy, Physical Therapy  Home Health Follow-Up Provider:: PCP  Home Health Services Needed:: Strengthening/Theraputic Exercises to Improve Function, Gait/ADL Training, Evaluate Functional Status and Safety  Homebound Criteria Met:: Uses an Assist Device (i.e. cane, walker, etc)  Supporting Clincal Findings:: Limited Endurance         Other Referral/Resources/Interventions Provided:  Interventions: HHC  Referral Comments: CM spoke with pt regarding rec for HHC, pt in agreement with referrals         Treatment Team Recommendation: Home with Home Health Care  Discharge Destination Plan:: Home with Home Health Care

## 2025-04-29 NOTE — ASSESSMENT & PLAN NOTE
At this time attribute her acute hypoxemic respiratory failure in the setting of COPD exacerbation given symptoms of dyspnea with productive cough, correlated with CT lung findings showing severe emphysema as well as recent mission for presumed COPD exacerbation    Does not currently follow with pulmonology outpatient, though at home takes Advair once daily  At this time following IV Solu-Medrol, patient reports symptomatic improvement and would likely benefit from de-escalation of active steroid therapy    Patient this time would also benefit from triple therapy with ICS plus LABA plus LAMA given she currently meets group E category for 2 prior exacerbations within the past month and eosinophil count above 300 on most recent differential within the past year    Plan  Reduce Solu-Medrol to 40 mg once daily, plan to reduce to p.o. 40 mg once daily tomorrow  Given recurrent exacerbation within past month, will plan for prednisone taper with 10 mg reduction every 3 days following initiation of oral prednisone  Continue Xopenex nebulization 3 times daily PRN  Continue Breo-Ellipa Incruse daily  Patient will benefit from addition of Trelegy Ellipta on outpatient regimen for triple therapy given recurrent COPD exacerbation  Recommend evaluation with desaturation screen to assess if patient has ambulatory oxygenation requirements  F/U TTE, LE Doppler  Tobacco cessation encouraged with patient who is agreeable to nicotine replacement therapy outpatient with nicotine patches and Chantix on DC

## 2025-04-30 PROBLEM — I48.20 HYPERCOAGULABLE STATE DUE TO CHRONIC ATRIAL FIBRILLATION (HCC): Status: ACTIVE | Noted: 2025-04-30

## 2025-04-30 PROBLEM — D68.69 HYPERCOAGULABLE STATE DUE TO CHRONIC ATRIAL FIBRILLATION (HCC): Status: ACTIVE | Noted: 2025-04-30

## 2025-04-30 LAB
ANION GAP SERPL CALCULATED.3IONS-SCNC: 5 MMOL/L (ref 4–13)
BACTERIA SPT RESP CULT: ABNORMAL
BUN SERPL-MCNC: 20 MG/DL (ref 5–25)
CALCIUM SERPL-MCNC: 9 MG/DL (ref 8.4–10.2)
CHLORIDE SERPL-SCNC: 102 MMOL/L (ref 96–108)
CO2 SERPL-SCNC: 31 MMOL/L (ref 21–32)
CREAT SERPL-MCNC: 0.5 MG/DL (ref 0.6–1.3)
ERYTHROCYTE [DISTWIDTH] IN BLOOD BY AUTOMATED COUNT: 14.5 % (ref 11.6–15.1)
GFR SERPL CREATININE-BSD FRML MDRD: 91 ML/MIN/1.73SQ M
GLUCOSE SERPL-MCNC: 87 MG/DL (ref 65–140)
GRAM STN SPEC: ABNORMAL
HCT VFR BLD AUTO: 35.3 % (ref 34.8–46.1)
HGB BLD-MCNC: 10.9 G/DL (ref 11.5–15.4)
MCH RBC QN AUTO: 28.3 PG (ref 26.8–34.3)
MCHC RBC AUTO-ENTMCNC: 30.9 G/DL (ref 31.4–37.4)
MCV RBC AUTO: 92 FL (ref 82–98)
PLATELET # BLD AUTO: 187 THOUSANDS/UL (ref 149–390)
PMV BLD AUTO: 10.4 FL (ref 8.9–12.7)
POTASSIUM SERPL-SCNC: 3.9 MMOL/L (ref 3.5–5.3)
RBC # BLD AUTO: 3.85 MILLION/UL (ref 3.81–5.12)
SODIUM SERPL-SCNC: 138 MMOL/L (ref 135–147)
WBC # BLD AUTO: 5.07 THOUSAND/UL (ref 4.31–10.16)

## 2025-04-30 PROCEDURE — 99223 1ST HOSP IP/OBS HIGH 75: CPT | Performed by: INTERNAL MEDICINE

## 2025-04-30 PROCEDURE — 80048 BASIC METABOLIC PNL TOTAL CA: CPT | Performed by: PHYSICIAN ASSISTANT

## 2025-04-30 PROCEDURE — 99232 SBSQ HOSP IP/OBS MODERATE 35: CPT | Performed by: PHYSICIAN ASSISTANT

## 2025-04-30 PROCEDURE — 85027 COMPLETE CBC AUTOMATED: CPT | Performed by: PHYSICIAN ASSISTANT

## 2025-04-30 PROCEDURE — 99232 SBSQ HOSP IP/OBS MODERATE 35: CPT | Performed by: INTERNAL MEDICINE

## 2025-04-30 RX ORDER — PREDNISONE 10 MG/1
10 TABLET ORAL DAILY
Status: DISCONTINUED | OUTPATIENT
Start: 2025-05-09 | End: 2025-05-01 | Stop reason: HOSPADM

## 2025-04-30 RX ORDER — PREDNISONE 20 MG/1
40 TABLET ORAL DAILY
Status: DISCONTINUED | OUTPATIENT
Start: 2025-05-01 | End: 2025-05-01 | Stop reason: HOSPADM

## 2025-04-30 RX ORDER — PREDNISONE 20 MG/1
20 TABLET ORAL DAILY
Status: DISCONTINUED | OUTPATIENT
Start: 2025-05-06 | End: 2025-05-01 | Stop reason: HOSPADM

## 2025-04-30 RX ADMIN — FLUTICASONE FUROATE AND VILANTEROL TRIFENATATE 1 PUFF: 100; 25 POWDER RESPIRATORY (INHALATION) at 08:25

## 2025-04-30 RX ADMIN — PANTOPRAZOLE SODIUM 40 MG: 40 TABLET, DELAYED RELEASE ORAL at 17:08

## 2025-04-30 RX ADMIN — PREDNISONE 40 MG: 20 TABLET ORAL at 08:18

## 2025-04-30 RX ADMIN — METOPROLOL SUCCINATE 50 MG: 50 TABLET, EXTENDED RELEASE ORAL at 21:08

## 2025-04-30 RX ADMIN — Medication 2000 UNITS: at 08:18

## 2025-04-30 RX ADMIN — ENOXAPARIN SODIUM 40 MG: 40 INJECTION SUBCUTANEOUS at 08:18

## 2025-04-30 RX ADMIN — APIXABAN 2.5 MG: 2.5 TABLET, FILM COATED ORAL at 17:08

## 2025-04-30 RX ADMIN — PANTOPRAZOLE SODIUM 40 MG: 40 TABLET, DELAYED RELEASE ORAL at 08:18

## 2025-04-30 RX ADMIN — GUAIFENESIN 600 MG: 600 TABLET, EXTENDED RELEASE ORAL at 08:18

## 2025-04-30 RX ADMIN — B-COMPLEX W/ C & FOLIC ACID TAB 1 TABLET: TAB at 08:19

## 2025-04-30 RX ADMIN — GUAIFENESIN 600 MG: 600 TABLET, EXTENDED RELEASE ORAL at 17:08

## 2025-04-30 NOTE — ASSESSMENT & PLAN NOTE
"CTA revealed, \"No acute pulmonary embolus. Possible webs in right pulmonary arterial branches, suggesting sequela of chronic pulmonary embolus. Severe emphysema.\"  Pulm consult appreciated  Lower extremity venous duplex revealed chronic nonocclusive DVT in the proximal to distal femoral vein and in the proximal popliteal vein.  Hematology/oncology consulted  "

## 2025-04-30 NOTE — ASSESSMENT & PLAN NOTE
Pulm consult appreciated  Was on IV Solumedrol Q8hr, weaned to 40 mg PO prednisone beginning 4/30/25, wean by 10 mg every 3 days  Continue nebs and inhalers per Pulm recs  Pulmonology recommending addition of Trelejulieta Ellipta on outpatient regimen  Ambulatory pulse ox ordered and pending

## 2025-04-30 NOTE — PROGRESS NOTES
Progress Note - Pulmonology   Name: Patrica Grimaldo 80 y.o. female I MRN: [id number]  [facility] I Date of Admission: 4/27/2025   Date of Service: 4/30/2025 I Hospital Day: 3    Assessment & Plan  Acute on chronic hypoxemic respiratory failure with (HCC)  At this time attribute her acute hypoxemic respiratory failure in the setting of COPD exacerbation given symptoms of dyspnea with productive cough, correlated with CT lung findings showing severe emphysema as well as recent mission for presumed COPD exacerbation    Does not currently follow with pulmonology outpatient, though at home takes Advair once daily  At this time following IV Solu-Medrol, patient reports symptomatic improvement and would likely benefit from de-escalation of active steroid therapy    Patient this time would also benefit from triple therapy with ICS plus LABA plus LAMA given she currently meets group E category for 2 prior exacerbations within the past month and eosinophil count above 300 on most recent differential within the past year    Plan  Continue Prednisone 40 mg once daily today for 3-day course  Given recurrent exacerbation within past month, will plan for prednisone taper with 10 mg reduction every 3 days following initiation of oral prednisone  Continue Xopenex nebulization 3 times daily PRN  Continue Breo-Ellipa Incruse daily  Patient will benefit from addition of Trelegy Ellipta on outpatient regimen for triple therapy given recurrent COPD exacerbation  Recommend evaluation with desaturation screen to assess if patient has ambulatory oxygenation requirements  Tobacco cessation encouraged with patient who is agreeable to nicotine replacement therapy outpatient with nicotine patches and Chantix on DC  Allergic rhinitis  Continue fluticasone furoate-vilanterol inhaler  Generalized anxiety disorder    COPD with acute exacerbation (HCC)  See plan for hypoxemic respiratory failure  Essential hypertension    Abnormal thyroid  function test  Per primary team  Nicotine abuse  Smoking cessation encouraged the patient  Duodenal ulcer    PVC (premature ventricular contraction)  Per primary team  Abnormal CT of the chest  Recommend further evaluation with TTE to assess for cardiomyopathy versus pulmonary hypertension given 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 Reports no acute dyspnea or shortness of breath at this time. Denies new symptoms at this time.    Objective :  Temp:  [97.6 °F (36.4 °C)-98.6 °F (37 °C)] 98.4 °F (36.9 °C)  HR:  [] 74  BP: (110-141)/(34-86) 112/34  Resp:  [13-16] 14  SpO2:  [92 %-95 %] 93 %    Physical Exam  Vitals and nursing note reviewed.   Constitutional:       General: She is not in acute distress.     Appearance: She is well-developed.   HENT:      Head: Normocephalic and atraumatic.   Eyes:      Conjunctiva/sclera: Conjunctivae normal.   Cardiovascular:      Rate and Rhythm: Normal rate and regular rhythm.      Heart sounds: No murmur heard.  Pulmonary:      Effort: Pulmonary effort is normal. No respiratory distress.      Breath sounds: Normal breath sounds.   Abdominal:      Palpations: Abdomen is soft.      Tenderness: There is no abdominal tenderness.   Musculoskeletal:         General: No swelling.      Cervical back: Neck supple.   Skin:     General: Skin is warm and dry.      Capillary Refill: Capillary refill takes less than 2 seconds.   Neurological:      Mental Status: She is alert.   Psychiatric:         Mood and Affect: Mood normal.           Lab Results: I have reviewed the following results:   .     04/30/25  0455   WBC 5.07   HGB 10.9*   HCT 35.3      SODIUM 138   K 3.9      CO2 31   BUN 20   CREATININE 0.50*   GLUC 87     ABG: No new results in last 24 hours.        PFT Results Reviewed: No prior PFTs to evaluate

## 2025-04-30 NOTE — ASSESSMENT & PLAN NOTE
At this time attribute her acute hypoxemic respiratory failure in the setting of COPD exacerbation given symptoms of dyspnea with productive cough, correlated with CT lung findings showing severe emphysema as well as recent mission for presumed COPD exacerbation    Does not currently follow with pulmonology outpatient, though at home takes Advair once daily  At this time following IV Solu-Medrol, patient reports symptomatic improvement and would likely benefit from de-escalation of active steroid therapy    Patient this time would also benefit from triple therapy with ICS plus LABA plus LAMA given she currently meets group E category for 2 prior exacerbations within the past month and eosinophil count above 300 on most recent differential within the past year    Plan  Continue Prednisone 40 mg once daily today for 3-day course  Given recurrent exacerbation within past month, will plan for prednisone taper with 10 mg reduction every 3 days following initiation of oral prednisone  Continue Xopenex nebulization 3 times daily PRN  Continue Breo-Ellipa Incruse daily  Patient will benefit from addition of Trelegy Ellipta on outpatient regimen for triple therapy given recurrent COPD exacerbation  Recommend evaluation with desaturation screen to assess if patient has ambulatory oxygenation requirements  Tobacco cessation encouraged with patient who is agreeable to nicotine replacement therapy outpatient with nicotine patches and Chantix on DC

## 2025-04-30 NOTE — CONSULTS
Consultation - Oncology-Medical   Name: Patrica Grimaldo 80 y.o. female I MRN: [id number]  [facility] Date of Admission: 4/27/2025   Date of Service: 4/30/2025 I Hospital Day: 3   Inpatient consult to Hematology  Consult performed by: Emile Stuart MD  Consult ordered by: Chapin Argueta PA-C        Physician Requesting Evaluation: Khalida Rader MD   Reason for Evaluation / Principal Problem: Hypercoagulable state.    Assessment & Plan  Acute on chronic hypoxemic respiratory failure with (HCC)  The patient has well-known long history of tobacco use.  PVC (premature ventricular contraction)    Abnormal CT of the chest  As below.  Hypercoagulable state due to chronic atrial fibrillation (HCC)  The patient seems to have chronic nonocclusive deep vein thrombosis affecting the right lower extremity according to the recent Doppler study during this hospitalization.  CTA of the chest also is suggesting some changes compatible with prior pulmonary embolism.  I did discuss with the patient the benefit of being on anticoagulation to avoid another clotting event which could be fatal.  The patient seems to be very concerned about the financial aspect of oral anticoagulation.  I would definitely recommend indefinite anticoagulation unless there is absolute contraindication like active bleeding.  Eliquis 5 mg twice a day would be an option.  The dose can be also decreased to 2.5 mg twice a day with any hint of easy bruising or rebeca bleeding.        History of Present Illness   Patrica Grimaldo is a 80 y.o. female who presents with Chest pressure and shortness of breath  She was evaluated with a CTA of the chest on 4/27/2025 which showed no acute pulmonary embolism.  She does seem to have 1 changes in the right pulmonary artery branches suggestive of sequelae of chronic pulmonary embolus.  Severe emphysema was noted.  Doppler ultrasound of the lower extremities showed chronic and nonocclusive deep vein thrombosis in  the proximal to distal right femoral vein and in the proximal popliteal vein on the right side.  She is currently on prophylactic dose of Lovenox.  Blood work from 4/30/2025 was reviewed.  Her hemoglobin level is 10.9 with normal white cells and platelets.  There is no obvious bleeding from any sites.  Creatinine 0.5 with normal calcium.    Review of Systems   Constitutional:  Positive for activity change and fatigue. Negative for chills and fever.   HENT:  Negative for ear pain and sore throat.    Eyes:  Negative for pain and visual disturbance.   Respiratory:  Positive for shortness of breath.    Cardiovascular:  Negative for chest pain and palpitations.   Gastrointestinal:  Negative for abdominal pain and vomiting.   Genitourinary:  Negative for dysuria and hematuria.   Musculoskeletal:  Negative for arthralgias and back pain.   Skin:  Negative for color change and rash.   Neurological:  Negative for seizures and syncope.   All other systems reviewed and are negative.    Historical Information   Medical History Review: I have reviewed the patient's PMH, PSH, Social History, Family History, Meds, and Allergies   Historical Information   Past Medical History:   Diagnosis Date    Elevated blood pressure reading      Past Surgical History:   Procedure Laterality Date    APPENDECTOMY      TONSILLECTOMY       Social History     Tobacco Use    Smoking status: Every Day     Current packs/day: 1.50     Types: Cigarettes    Smokeless tobacco: Never   Vaping Use    Vaping status: Never Used   Substance and Sexual Activity    Alcohol use: Never    Drug use: Never    Sexual activity: Not on file     E-Cigarette/Vaping    E-Cigarette Use Never User      E-Cigarette/Vaping Substances    Nicotine No     THC No     CBD No     Flavoring No     Other No     Unknown No        Social History     Tobacco Use    Smoking status: Every Day     Current packs/day: 1.50     Types: Cigarettes    Smokeless tobacco: Never   Vaping Use     Vaping status: Never Used   Substance and Sexual Activity    Alcohol use: Never    Drug use: Never    Sexual activity: Not on file       Current Facility-Administered Medications:     acetaminophen (TYLENOL) tablet 650 mg, Q6H PRN    ALPRAZolam (XANAX) tablet 0.25 mg, Daily PRN    Cholecalciferol (VITAMIN D3) tablet 2,000 Units, Daily    enoxaparin (LOVENOX) subcutaneous injection 40 mg, Daily    Fluticasone Furoate-Vilanterol 100-25 mcg/actuation 1 puff, Daily    guaiFENesin (MUCINEX) 12 hr tablet 600 mg, BID    levalbuterol (XOPENEX) inhalation solution 1.25 mg, Q6H PRN    metoprolol succinate (TOPROL-XL) 24 hr tablet 50 mg, HS    multivitamin stress formula tablet 1 tablet, Daily    nicotine (NICODERM CQ) 7 mg/24hr TD 24 hr patch 1 patch, Daily    pantoprazole (PROTONIX) EC tablet 40 mg, BID AC    [START ON 5/1/2025] predniSONE tablet 40 mg, Daily **FOLLOWED BY** [START ON 5/3/2025] predniSONE tablet 30 mg, Daily **FOLLOWED BY** [START ON 5/6/2025] predniSONE tablet 20 mg, Daily **FOLLOWED BY** [START ON 5/9/2025] predniSONE tablet 10 mg, Daily    sodium chloride (OCEAN) 0.65 % nasal spray 1 spray, Q1H PRN    umeclidinium 62.5 mcg/actuation inhaler AEPB 1 puff, Daily  Prior to Admission Medications   Prescriptions Last Dose Informant Patient Reported? Taking?   ALPRAZolam (XANAX) 0.25 mg tablet Past Month  No Yes   Sig: Take 1 tablet (0.25 mg total) by mouth daily as needed for anxiety   Cholecalciferol (VITAMIN D3) 2000 units TABS 4/27/2025  Yes Yes   Sig: Take 2,000 Units by mouth daily    Fluticasone-Salmeterol (Advair) 250-50 mcg/dose inhaler 4/26/2025  No Yes   Sig: INHALE 1 PUFF BY MOUTH TWO TIMES A DAY . RINSE MOUTH AFTER USE   Multiple Vitamins-Minerals (MULTIVITAMIN ADULT PO) 4/27/2025  Yes Yes   Sig: Take 1 tablet by mouth daily   alendronate (Fosamax) 70 mg tablet Not Taking  No No   Sig: Take 1 tablet (70 mg total) by mouth every 7 days   Patient not taking: Reported on 7/26/2024   metoprolol  succinate (TOPROL-XL) 50 mg 24 hr tablet   No No   Sig: TAKE ONE TABLET BY MOUTH EVERY DAY AT BEDTIME   nicotine (NICODERM CQ) 14 mg/24hr TD 24 hr patch 4/27/2025  No Yes   Sig: Place 1 patch on the skin over 24 hours every 24 hours   omeprazole (PriLOSEC) 40 MG capsule 4/27/2025  No Yes   Sig: TAKE ONE CAPSULE BY MOUTH TWICE A DAY      Facility-Administered Medications: None     Erythromycin and Lexapro [escitalopram]    Oncology History:   Cancer Staging   No matching staging information was found for the patient.    Oncology History    No history exists.     Current Facility-Administered Medications   Medication Dose Route Frequency Provider Last Rate Last Admin    acetaminophen (TYLENOL) tablet 650 mg  650 mg Oral Q6H PRN Shanel Carlisle MD        ALPRAZolam (XANAX) tablet 0.25 mg  0.25 mg Oral Daily PRN Shanel Carlisle MD        Cholecalciferol (VITAMIN D3) tablet 2,000 Units  2,000 Units Oral Daily Shanel Carlisle MD   2,000 Units at 04/30/25 0818    enoxaparin (LOVENOX) subcutaneous injection 40 mg  40 mg Subcutaneous Daily Shanel Carlisle MD   40 mg at 04/30/25 0818    Fluticasone Furoate-Vilanterol 100-25 mcg/actuation 1 puff  1 puff Inhalation Daily Shanel Carlisle MD   1 puff at 04/30/25 0825    guaiFENesin (MUCINEX) 12 hr tablet 600 mg  600 mg Oral BID Shanel Carlisle MD   600 mg at 04/30/25 0818    levalbuterol (XOPENEX) inhalation solution 1.25 mg  1.25 mg Nebulization Q6H PRN Miguel Mauricio MD        metoprolol succinate (TOPROL-XL) 24 hr tablet 50 mg  50 mg Oral HS Shanel Carlisle MD   50 mg at 04/29/25 2001    multivitamin stress formula tablet 1 tablet  1 tablet Oral Daily Shanel Carlisle MD   1 tablet at 04/30/25 0819    nicotine (NICODERM CQ) 7 mg/24hr TD 24 hr patch 1 patch  1 patch Transdermal Daily Shanel Carlisle MD   1 patch at 04/29/25 1954    pantoprazole (PROTONIX) EC tablet 40 mg  40 mg Oral BID GODFREY Carlisle MD   40 mg at 04/30/25  0818    [START ON 5/1/2025] predniSONE tablet 40 mg  40 mg Oral Daily Long Rao MD        Followed by    [START ON 5/3/2025] predniSONE tablet 30 mg  30 mg Oral Daily Long Rao MD        Followed by    [START ON 5/6/2025] predniSONE tablet 20 mg  20 mg Oral Daily Long Rao MD        Followed by    [START ON 5/9/2025] predniSONE tablet 10 mg  10 mg Oral Daily Long Rao MD        sodium chloride (OCEAN) 0.65 % nasal spray 1 spray  1 spray Each Nare Q1H PRN Shanel Carlisle MD        umeclidinium 62.5 mcg/actuation inhaler AEPB 1 puff  1 puff Inhalation Daily Luis Vázquez MD   1 puff at 04/29/25 0833       Objective :  Temp:  [97.6 °F (36.4 °C)-98.6 °F (37 °C)] 98.5 °F (36.9 °C)  HR:  [] 87  BP: (112-155)/(34-90) 155/90  Resp:  [13-16] 13  SpO2:  [92 %-95 %] 93 %  O2 Device: None (Room air)    Physical Exam  Vitals and nursing note reviewed.   Constitutional:       General: She is not in acute distress.     Appearance: She is well-developed. She is ill-appearing.      Comments: Patient looks cachectic.   HENT:      Head: Normocephalic and atraumatic.   Eyes:      Conjunctiva/sclera: Conjunctivae normal.   Cardiovascular:      Rate and Rhythm: Normal rate and regular rhythm.      Heart sounds: No murmur heard.  Pulmonary:      Effort: Pulmonary effort is normal. No respiratory distress.      Breath sounds: Normal breath sounds.   Abdominal:      Palpations: Abdomen is soft.      Tenderness: There is no abdominal tenderness.   Musculoskeletal:         General: No swelling.      Cervical back: Neck supple.   Skin:     General: Skin is warm and dry.      Capillary Refill: Capillary refill takes less than 2 seconds.   Neurological:      Mental Status: She is alert.   Psychiatric:         Mood and Affect: Mood normal.           Lab Results: I have reviewed the following results:CBC/BMP:   .     04/30/25  0455   WBC 5.07   HGB 10.9*   HCT 35.3      SODIUM 138   K 3.9      CO2 31    BUN 20   CREATININE 0.50*   GLUC 87    , Creatinine Clearance: Estimated Creatinine Clearance: 61.6 mL/min (A) (by C-G formula based on SCr of 0.5 mg/dL (L))., LFTs: No new results in last 24 hours.   Lab Results   Component Value Date     12/14/2015    K 3.9 04/30/2025     04/30/2025    CO2 31 04/30/2025    ANIONGAP 9.7 (L) 12/14/2015    BUN 20 04/30/2025    CREATININE 0.50 (L) 04/30/2025    GLUCOSE 101 12/14/2015    GLUF 94 08/18/2022    CALCIUM 9.0 04/30/2025    CORRECTEDCA 8.8 04/27/2025    AST 18 04/27/2025    ALT 15 04/27/2025    ALKPHOS 79 04/27/2025    PROT 7.0 12/14/2015    BILITOT 0.5 12/14/2015    EGFR 91 04/30/2025     Lab Results   Component Value Date    WBC 5.07 04/30/2025    HGB 10.9 (L) 04/30/2025    HCT 35.3 04/30/2025    MCV 92 04/30/2025     04/30/2025     Lab Results   Component Value Date    NEUTROABS 5.63 04/27/2025

## 2025-04-30 NOTE — ASSESSMENT & PLAN NOTE
Patient presented with chest pressure, shortness of breath, wheezing.  pCO2 elevated at 60 from baseline in the 50s  Was placed on supplemental oxygen to keep O2 sat between 88 to 92%  Pulm consult appreciated as below   Now on room air  Ambulatory pulse ox ordered and pending

## 2025-04-30 NOTE — QUICK NOTE
Discussed with Heme-Onc, Dr. Stuart. Consult noted hypercoagulable state due to chronic atrial fibrillation, however patient does not have a known history of A. Fib from what I can see on record review. Heme-onc is recommending anticoagulation given CTA and LE venous duplex findings. Discussed with Dr. Stuart via [email or web address] secure chat - he recommended lower dose Eliquis, 2.5mg.  Patient does have history of GIB.     Chapin Argueta PA-C

## 2025-04-30 NOTE — ED ATTENDING ATTESTATION
4/27/2025  I, Franco Traylor MD, saw and evaluated the patient. I have discussed the patient with the resident/non-physician practitioner and agree with the resident's/non-physician practitioner's findings, Plan of Care, and MDM as documented in the resident's/non-physician practitioner's note, except where noted. All available labs and Radiology studies were reviewed.  I was present for key portions of any procedure(s) performed by the resident/non-physician practitioner and I was immediately available to provide assistance.       At this point I agree with the current assessment done in the Emergency Department.  I have conducted an independent evaluation of this patient a history and physical is as follows:    ED Course     Impression: Acute dyspnea, history of tobacco abuse, history of COPD, acute hypoxic respiratory failure  Differential diagnosis: ACS MI arrhythmia, high risk for pulmonary embolism, COPD exacerbation, at risk for pneumonia    Plan to give trial of bronchodilators and steroids will check CT PE study ECG cardiac enzymes screening labs.  Titrate oxygen to maintain O2 sat greater 92% anticipate admission      Critical Care Time  Procedures

## 2025-04-30 NOTE — PROGRESS NOTES
"Progress Note - Hospitalist   Name: Patrica Grimaldo 80 y.o. female I MRN: [id number]  [facility] I Date of Admission: 4/27/2025   Date of Service: 4/30/2025 I Hospital Day: 3    Assessment & Plan  Acute on chronic hypoxemic respiratory failure with (HCC)  Patient presented with chest pressure, shortness of breath, wheezing.  pCO2 elevated at 60 from baseline in the 50s  Was placed on supplemental oxygen to keep O2 sat between 88 to 92%  Pulm consult appreciated as below   Now on room air  Ambulatory pulse ox ordered and pending  COPD with acute exacerbation (HCC)  Pulm consult appreciated  Was on IV Solumedrol Q8hr, weaned to 40 mg PO prednisone beginning 4/30/25, wean by 10 mg every 3 days  Continue nebs and inhalers per Pulm recs  Pulmonology recommending addition of Trelegy Ellipta on outpatient regimen  Ambulatory pulse ox ordered and pending  Allergic rhinitis  Continue nasal saline spray  Generalized anxiety disorder  Continue with Xanax as needed  Essential hypertension  Continue metoprolol nightly  Nicotine abuse  Nicotine patch  Pulmonology recommending Chantix on discharge  Encourage cessation  Duodenal ulcer  Continue with PPI twice daily  PVC (premature ventricular contraction)  Continue metoprolol   Abnormal CT of the chest  CTA revealed, \"No acute pulmonary embolus. Possible webs in right pulmonary arterial branches, suggesting sequela of chronic pulmonary embolus. Severe emphysema.\"  Pulm consult appreciated  Lower extremity venous duplex revealed chronic nonocclusive DVT in the proximal to distal femoral vein and in the proximal popliteal vein.  Hematology/oncology consulted  Abnormal thyroid function test  TSH 0.076 and free T4 1.32  T3 WNL at 3.33    Hyperkalemia  Potassium 6.0 on a.m. labs, however sample noted to be moderately hemolyzed  Stat potassium level ordered -discussed with nurse    VTE Pharmacologic Prophylaxis:    Lovenox    Mobility:   Basic Mobility Inpatient Raw Score: " 17  JH-HLM Goal: 5: Stand one or more mins  JH-HLM Achieved: 3: Sit at edge of bed  JH-HLM Goal NOT achieved. Continue with multidisciplinary rounding and encourage appropriate mobility to improve upon JH-HLM goals.    Patient Centered Rounds: I performed bedside rounds with nursing staff today.   Discussions with Specialists or Other Care Team Provider: . Messaged heme/onc fellow    Education and Discussions with Family / Patient: Updated  (son) at bedside. Raheem    Current Length of Stay: 3 day(s)  Current Patient Status: Inpatient   Certification Statement: The patient will continue to require additional inpatient hospital stay due to heme/onc eval, ambulatory pulse ox, safe d/c planning  Discharge Plan: Anticipate discharge tomorrow to home with home services.    Code Status: Level 1 - Full Code    Subjective   Ms. Grimaldo reports feeling better today. Wants to stay another day to monitor her symptoms with transition to PO prednisone and for heme/onc recs. Son at bedside in agreement.    Objective :  Temp:  [97.6 °F (36.4 °C)-98.6 °F (37 °C)] 98.5 °F (36.9 °C)  HR:  [] 87  BP: (110-155)/(34-90) 155/90  Resp:  [13-16] 13  SpO2:  [92 %-95 %] 93 %  O2 Device: None (Room air)    Body mass index is 16.73 kg/m².     Input and Output Summary (last 24 hours):     Intake/Output Summary (Last 24 hours) at 4/30/2025 1157  Last data filed at 4/30/2025 0900  Gross per 24 hour   Intake 460 ml   Output 800 ml   Net -340 ml       Physical Exam  Vitals reviewed.   Constitutional:       Comments: Patient seen sitting in bedside chair, son at bedside, NAD   Cardiovascular:      Rate and Rhythm: Normal rate and regular rhythm.   Pulmonary:      Effort: Pulmonary effort is normal.      Breath sounds: No wheezing.      Comments: Decreased air movement  On room air  Abdominal:      General: Bowel sounds are normal.      Palpations: Abdomen is soft.      Tenderness: There is no abdominal tenderness.    Musculoskeletal:      Right lower leg: No edema.      Left lower leg: No edema.   Skin:     General: Skin is warm.   Neurological:      Mental Status: She is alert and oriented to person, place, and time.   Psychiatric:         Mood and Affect: Mood normal.           Lines/Drains:        Telemetry:  Telemetry Orders (From admission, onward)               24 Hour Telemetry Monitoring  Continuous x 24 Hours (Telem)        Expiring   Question:  Reason for 24 Hour Telemetry  Answer:  Acute respiratory failure on BiPAP                     Telemetry Reviewed: Normal Sinus Rhythm and NSVT  Indication for Continued Telemetry Use: PE               Lab Results: I have reviewed the following results:   Results from last 7 days   Lab Units 04/30/25  0455 04/29/25  0622 04/28/25  0453 04/27/25  1819   WBC Thousand/uL 5.07   < > 3.58* 7.54   HEMOGLOBIN g/dL 10.9*   < > 11.6 12.2   HEMATOCRIT % 35.3   < > 37.9 39.5   PLATELETS Thousands/uL 187   < > 219 236   SEGS PCT %  --   --   --  75   LYMPHO PCT %  --   --  2* 14   MONO PCT %  --   --  1* 9   EOS PCT %  --   --  0 1    < > = values in this interval not displayed.     Results from last 7 days   Lab Units 04/30/25 0455 04/29/25  0517 04/27/25  1819   SODIUM mmol/L 138   < > 140   POTASSIUM mmol/L 3.9   < > 3.9   CHLORIDE mmol/L 102   < > 107   CO2 mmol/L 31   < > 28   BUN mg/dL 20   < > 15   CREATININE mg/dL 0.50*   < > 0.42*   ANION GAP mmol/L 5   < > 5   CALCIUM mg/dL 9.0   < > 8.2*   ALBUMIN g/dL  --   --  3.2*   TOTAL BILIRUBIN mg/dL  --   --  0.26   ALK PHOS U/L  --   --  79   ALT U/L  --   --  15   AST U/L  --   --  18   GLUCOSE RANDOM mg/dL 87   < > 95    < > = values in this interval not displayed.                 Results from last 7 days   Lab Units 04/27/25  1819   PROCALCITONIN ng/ml <0.05       Recent Cultures (last 7 days):   Results from last 7 days   Lab Units 04/28/25  0749   SPUTUM CULTURE  4+ Growth of   GRAM STAIN RESULT  1+ Epithelial Cells*  1+ Gram  positive cocci in pairs*  1+ Gram negative rods*  No polys seen*       Imaging Results Review: I reviewed radiology reports from this admission including: LE venous duplex, echo.      Last 24 Hours Medication List:     Current Facility-Administered Medications:     acetaminophen (TYLENOL) tablet 650 mg, Q6H PRN    ALPRAZolam (XANAX) tablet 0.25 mg, Daily PRN    Cholecalciferol (VITAMIN D3) tablet 2,000 Units, Daily    enoxaparin (LOVENOX) subcutaneous injection 40 mg, Daily    Fluticasone Furoate-Vilanterol 100-25 mcg/actuation 1 puff, Daily    guaiFENesin (MUCINEX) 12 hr tablet 600 mg, BID    levalbuterol (XOPENEX) inhalation solution 1.25 mg, Q6H PRN    metoprolol succinate (TOPROL-XL) 24 hr tablet 50 mg, HS    multivitamin stress formula tablet 1 tablet, Daily    nicotine (NICODERM CQ) 7 mg/24hr TD 24 hr patch 1 patch, Daily    pantoprazole (PROTONIX) EC tablet 40 mg, BID AC    [START ON 5/1/2025] predniSONE tablet 40 mg, Daily **FOLLOWED BY** [START ON 5/3/2025] predniSONE tablet 30 mg, Daily **FOLLOWED BY** [START ON 5/6/2025] predniSONE tablet 20 mg, Daily **FOLLOWED BY** [START ON 5/9/2025] predniSONE tablet 10 mg, Daily    sodium chloride (OCEAN) 0.65 % nasal spray 1 spray, Q1H PRN    umeclidinium 62.5 mcg/actuation inhaler AEPB 1 puff, Daily    Administrative Statements   Today, Patient Was Seen By: Chapin Argueta PA-C      **Please Note: This note may have been constructed using a voice recognition system.**

## 2025-04-30 NOTE — ASSESSMENT & PLAN NOTE
The patient seems to have chronic nonocclusive deep vein thrombosis affecting the right lower extremity according to the recent Doppler study during this hospitalization.  CTA of the chest also is suggesting some changes compatible with prior pulmonary embolism.  I did discuss with the patient the benefit of being on anticoagulation to avoid another clotting event which could be fatal.  The patient seems to be very concerned about the financial aspect of oral anticoagulation.  I would definitely recommend indefinite anticoagulation unless there is absolute contraindication like active bleeding.  Eliquis 5 mg twice a day would be an option.  The dose can be also decreased to 2.5 mg twice a day with any hint of easy bruising or rebeca bleeding.

## 2025-04-30 NOTE — PLAN OF CARE
Problem: PAIN - ADULT  Goal: Verbalizes/displays adequate comfort level or baseline comfort level  Description: Interventions:- Encourage patient to monitor pain and request assistance- Assess pain using appropriate pain scale- Administer analgesics based on type and severity of pain and evaluate response- Implement non-pharmacological measures as appropriate and evaluate response- Consider cultural and social influences on pain and pain management- Notify physician/advanced practitioner if interventions unsuccessful or patient reports new pain  Outcome: Progressing     Problem: INFECTION - ADULT  Goal: Absence or prevention of progression during hospitalization  Description: INTERVENTIONS:- Assess and monitor for signs and symptoms of infection- Monitor lab/diagnostic results- Monitor all insertion sites, i.e. indwelling lines, tubes, and drains- Monitor endotracheal if appropriate and nasal secretions for changes in amount and color- [place] appropriate cooling/warming therapies per order- Administer medications as ordered- Instruct and encourage patient and family to use good hand hygiene technique- Identify and instruct in appropriate isolation precautions for identified infection/condition  Outcome: Progressing     Problem: DISCHARGE PLANNING  Goal: Discharge to home or other facility with appropriate resources  Description: INTERVENTIONS:- Identify barriers to discharge w/patient and caregiver- Arrange for needed discharge resources and transportation as appropriate- Identify discharge learning needs (meds, wound care, etc.)- Arrange for interpretive services to assist at discharge as needed- Refer to Case Management Department for coordinating discharge planning if the patient needs post-hospital services based on physician/advanced practitioner order or complex needs related to functional status, cognitive ability, or social support system  Outcome: Progressing

## 2025-04-30 NOTE — PHYSICAL THERAPY NOTE
Physical Therapy Cancellation Note    The patient was attempted on four separate occasions, but she continued to decline any ambulation with therapy as she expected that she would be returning home today. Will continue to follow.    Jasper Welsh, PTA

## 2025-05-01 ENCOUNTER — TRANSITIONAL CARE MANAGEMENT (OUTPATIENT)
Dept: FAMILY MEDICINE CLINIC | Facility: CLINIC | Age: 80
End: 2025-05-01

## 2025-05-01 VITALS
RESPIRATION RATE: 17 BRPM | SYSTOLIC BLOOD PRESSURE: 130 MMHG | HEIGHT: 64 IN | OXYGEN SATURATION: 95 % | TEMPERATURE: 98.2 F | BODY MASS INDEX: 16.64 KG/M2 | HEART RATE: 82 BPM | WEIGHT: 97.44 LBS | DIASTOLIC BLOOD PRESSURE: 79 MMHG

## 2025-05-01 LAB
ANION GAP SERPL CALCULATED.3IONS-SCNC: 4 MMOL/L (ref 4–13)
BUN SERPL-MCNC: 18 MG/DL (ref 5–25)
CALCIUM SERPL-MCNC: 9.2 MG/DL (ref 8.4–10.2)
CHLORIDE SERPL-SCNC: 101 MMOL/L (ref 96–108)
CO2 SERPL-SCNC: 33 MMOL/L (ref 21–32)
CREAT SERPL-MCNC: 0.53 MG/DL (ref 0.6–1.3)
ERYTHROCYTE [DISTWIDTH] IN BLOOD BY AUTOMATED COUNT: 14.2 % (ref 11.6–15.1)
GFR SERPL CREATININE-BSD FRML MDRD: 89 ML/MIN/1.73SQ M
GLUCOSE SERPL-MCNC: 82 MG/DL (ref 65–140)
HCT VFR BLD AUTO: 37.8 % (ref 34.8–46.1)
HGB BLD-MCNC: 11.5 G/DL (ref 11.5–15.4)
MCH RBC QN AUTO: 28.7 PG (ref 26.8–34.3)
MCHC RBC AUTO-ENTMCNC: 30.4 G/DL (ref 31.4–37.4)
MCV RBC AUTO: 94 FL (ref 82–98)
PLATELET # BLD AUTO: 196 THOUSANDS/UL (ref 149–390)
PMV BLD AUTO: 10.4 FL (ref 8.9–12.7)
POTASSIUM SERPL-SCNC: 3.6 MMOL/L (ref 3.5–5.3)
RBC # BLD AUTO: 4.01 MILLION/UL (ref 3.81–5.12)
SODIUM SERPL-SCNC: 138 MMOL/L (ref 135–147)
WBC # BLD AUTO: 5.94 THOUSAND/UL (ref 4.31–10.16)

## 2025-05-01 PROCEDURE — 80048 BASIC METABOLIC PNL TOTAL CA: CPT | Performed by: PHYSICIAN ASSISTANT

## 2025-05-01 PROCEDURE — 85027 COMPLETE CBC AUTOMATED: CPT | Performed by: PHYSICIAN ASSISTANT

## 2025-05-01 PROCEDURE — 99239 HOSP IP/OBS DSCHRG MGMT >30: CPT | Performed by: STUDENT IN AN ORGANIZED HEALTH CARE EDUCATION/TRAINING PROGRAM

## 2025-05-01 RX ORDER — VARENICLINE TARTRATE 0.5 (11)-1
KIT ORAL
Qty: 53 EACH | Refills: 0 | Status: SHIPPED | OUTPATIENT
Start: 2025-05-01

## 2025-05-01 RX ORDER — PREDNISONE 10 MG/1
TABLET ORAL
Qty: 22 TABLET | Refills: 0 | Status: SHIPPED | OUTPATIENT
Start: 2025-05-02 | End: 2025-05-11

## 2025-05-01 RX ORDER — NICOTINE 21 MG/24HR
1 PATCH, TRANSDERMAL 24 HOURS TRANSDERMAL EVERY 24 HOURS
Qty: 28 PATCH | Refills: 0 | Status: SHIPPED | OUTPATIENT
Start: 2025-05-01

## 2025-05-01 RX ADMIN — NICOTINE 1 PATCH: 7 PATCH, EXTENDED RELEASE TRANSDERMAL at 08:44

## 2025-05-01 RX ADMIN — FLUTICASONE FUROATE AND VILANTEROL TRIFENATATE 1 PUFF: 100; 25 POWDER RESPIRATORY (INHALATION) at 08:42

## 2025-05-01 RX ADMIN — Medication 2000 UNITS: at 08:42

## 2025-05-01 RX ADMIN — APIXABAN 2.5 MG: 2.5 TABLET, FILM COATED ORAL at 08:42

## 2025-05-01 RX ADMIN — PANTOPRAZOLE SODIUM 40 MG: 40 TABLET, DELAYED RELEASE ORAL at 05:37

## 2025-05-01 RX ADMIN — GUAIFENESIN 600 MG: 600 TABLET, EXTENDED RELEASE ORAL at 08:42

## 2025-05-01 RX ADMIN — PREDNISONE 40 MG: 20 TABLET ORAL at 08:42

## 2025-05-01 RX ADMIN — B-COMPLEX W/ C & FOLIC ACID TAB 1 TABLET: TAB at 08:41

## 2025-05-01 NOTE — ASSESSMENT & PLAN NOTE
Patient evaluated by hematology during this hospital stay who recommended anticoagulation given her chronic DVT  Started on Eliquis 2.5 mg twice daily on 4/30.  Price check $15 per month, patient and son report able to afford.  Outpatient hematology follow-up

## 2025-05-01 NOTE — ASSESSMENT & PLAN NOTE
"CTA revealed, \"No acute pulmonary embolus. Possible webs in right pulmonary arterial branches, suggesting sequela of chronic pulmonary embolus. Severe emphysema.\"  Lower extremity venous duplex revealed chronic nonocclusive DVT in the proximal to distal femoral vein and in the proximal popliteal vein.    Discharged on Eliquis 2.5 mg twice daily per hematology  "

## 2025-05-01 NOTE — PLAN OF CARE
Problem: PAIN - ADULT  Goal: Verbalizes/displays adequate comfort level or baseline comfort level  Description: Interventions:- Encourage patient to monitor pain and request assistance- Assess pain using appropriate pain scale- Administer analgesics based on type and severity of pain and evaluate response- Implement non-pharmacological measures as appropriate and evaluate response- Consider cultural and social influences on pain and pain management- Notify physician/advanced practitioner if interventions unsuccessful or patient reports new pain  Outcome: Progressing     Problem: INFECTION - ADULT  Goal: Absence or prevention of progression during hospitalization  Description: INTERVENTIONS:- Assess and monitor for signs and symptoms of infection- Monitor lab/diagnostic results- Monitor all insertion sites, i.e. indwelling lines, tubes, and drains- Monitor endotracheal if appropriate and nasal secretions for changes in amount and color- [place] appropriate cooling/warming therapies per order- Administer medications as ordered- Instruct and encourage patient and family to use good hand hygiene technique- Identify and instruct in appropriate isolation precautions for identified infection/condition  Outcome: Progressing  Goal: Absence of fever/infection during neutropenic period  Description: INTERVENTIONS:- Monitor WBC  Outcome: Progressing     Problem: SAFETY ADULT  Goal: Patient will remain free of falls  Description: INTERVENTIONS:- Educate patient/family on patient safety including physical limitations- Instruct patient to call for assistance with activity - Consult OT/PT to assist with strengthening/mobility - Keep Call bell within reach- Keep bed low and locked with side rails adjusted as appropriate- Keep care items and personal belongings within reach- Initiate and maintain comfort rounds- Make Fall Risk Sign visible to staff- Offer Toileting every 2 Hours, in advance of need- Initiate/Maintain alarm-  Obtain necessary fall risk management equipment: - Apply yellow socks and bracelet for high fall risk patients- Consider moving patient to room near nurses station  Outcome: Progressing  Goal: Maintain or return to baseline ADL function  Description: INTERVENTIONS:-  Assess patient's ability to carry out ADLs; assess patient's baseline for ADL function and identify physical deficits which impact ability to perform ADLs (bathing, care of mouth/teeth, toileting, grooming, dressing, etc.)- Assess/evaluate cause of self-care deficits - Assess range of motion- Assess patient's mobility; develop plan if impaired- Assess patient's need for assistive devices and provide as appropriate- Encourage maximum independence but intervene and supervise when necessary- Involve family in performance of ADLs- Assess for home care needs following discharge - Consider OT consult to assist with ADL evaluation and planning for discharge- Provide patient education as appropriate  Outcome: Progressing  Goal: Maintains/Returns to pre admission functional level  Description: INTERVENTIONS:- Perform AM-PAC 6 Click Basic Mobility/ Daily Activity assessment daily.- Set and communicate daily mobility goal to care team and patient/family/caregiver. - Collaborate with rehabilitation services on mobility goals if consulted- Perform Range of Motion 3 times a day.- Reposition patient every 2 hours.- Dangle patient 3 times a day- Stand patient 3 times a day- Ambulate patient 3 times a day- Out of bed to chair 3 times a day - Out of bed for meals 3 times a day- Out of bed for toileting- Record patient progress and toleration of activity level   Outcome: Progressing     Problem: DISCHARGE PLANNING  Goal: Discharge to home or other facility with appropriate resources  Description: INTERVENTIONS:- Identify barriers to discharge w/patient and caregiver- Arrange for needed discharge resources and transportation as appropriate- Identify discharge learning  needs (meds, wound care, etc.)- Arrange for interpretive services to assist at discharge as needed- Refer to Case Management Department for coordinating discharge planning if the patient needs post-hospital services based on physician/advanced practitioner order or complex needs related to functional status, cognitive ability, or social support system  Outcome: Progressing     Problem: Knowledge Deficit  Goal: Patient/family/caregiver demonstrates understanding of disease process, treatment plan, medications, and discharge instructions  Description: Complete learning assessment and assess knowledge base.Interventions:- Provide teaching at level of understanding- Provide teaching via preferred learning methods  Outcome: Progressing     Problem: Nutrition/Hydration-ADULT  Goal: Nutrient/Hydration intake appropriate for improving, restoring or maintaining nutritional needs  Description: Monitor and assess patient's nutrition/hydration status for malnutrition. Collaborate with interdisciplinary team and initiate plan and interventions as ordered.  Monitor patient's weight and dietary intake as ordered or per policy. Utilize nutrition screening tool and intervene as necessary. Determine patient's food preferences and provide high-protein, high-caloric foods as appropriate. INTERVENTIONS:- Monitor oral intake, urinary output, labs, and treatment plans- Assess nutrition and hydration status and recommend course of action- Evaluate amount of meals eaten- Assist patient with eating if necessary - Allow adequate time for meals- Recommend/ encourage appropriate diets, oral nutritional supplements, and vitamin/mineral supplements- Order, calculate, and assess calorie counts as needed- Recommend, monitor, and adjust tube feedings and TPN/PPN based on assessed needs- Assess need for intravenous fluids- Provide specific nutrition/hydration education as appropriate- Include patient/family/caregiver in decisions related to  nutrition  Outcome: Progressing

## 2025-05-01 NOTE — ASSESSMENT & PLAN NOTE
S/p IV Solu-Medrol while inpatient.  Discharged with prednisone taper  Discharged with breathing treatments.  Trelegy Ellipta will need prior Auth, discussed with pulmonology who will follow-up outpatient  Encouraged tobacco cessation  Pulmonary rehab ordered

## 2025-05-01 NOTE — ASSESSMENT & PLAN NOTE
Patient presented with chest pressure, shortness of breath, wheezing.  pCO2 elevated at 60 from baseline in the 50s  Was placed on supplemental oxygen to keep O2 sat between 88 to 92%  Secondary to COPD has remained show  Evaluated by pulmonology during hospital stay with plan for outpatient follow-up   Patient remained stable on room air with sats above 94%

## 2025-05-01 NOTE — CASE MANAGEMENT
Case Management Discharge Planning Note    Patient name Patrica Grimaldo  Location [facility] MRN [id number]  : 1945 Date 2025       Current Admission Date: 2025  Current Admission Diagnosis:Acute on chronic hypoxemic respiratory failure with (HCC)   Patient Active Problem List    Diagnosis Date Noted Date Diagnosed    Hypercoagulable state due to chronic atrial fibrillation (HCC) 2025     Hyperkalemia 2025     Acute on chronic hypoxemic respiratory failure with (HCC) 2025     Abnormal CT of the chest 2025     PVC (premature ventricular contraction) 2025     Anemia 2024     Severe protein-calorie malnutrition (HCC) 2024     Duodenal ulcer 2024     Increased MCV 2022     Encounter for vitamin deficiency screening 2022     Itching 2022     Mild protein-calorie malnutrition (HCC) 2022     Primary osteoarthritis of right knee 2021     Age-related osteoporosis without current pathological fracture 2021     Lightheadedness 06/15/2021     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PASC) 2021     Subclinical hyperthyroidism 2019     Weight loss 2019     Fatigue 2019     Nicotine abuse 2019     Multiple thyroid nodules 2018     Abnormal thyroid function test 2018     Low TSH level 2018     Thyroid nodule 2018     Essential hypertension 2017     Dense breasts 2016     COPD with acute exacerbation (HCC) 2015     Thyroid disorder 10/11/2012     Allergic rhinitis 2012     Generalized anxiety disorder 2012     Impaired fasting glucose 2012     Vitamin D deficiency 2012     Decreased platelet count (HCC) 2012       LOS (days): 4  Geometric Mean LOS (GMLOS) (days): 3.5  Days to GMLOS:0     OBJECTIVE:  Risk of Unplanned Readmission Score: 14.77         Current admission status: Inpatient   [facility]   [id number] [place] - [address] [address]  Phone: [phone] Fax: [phone]    Primary Care Provider: Janine Marcus DO    Primary Insurance: [employer] [facility]  Secondary Insurance: BLUE CROSS    DISCHARGE DETAILS:    Discharge planning discussed with:: Pt  Freedom of Choice: Yes  Comments - Freedom of Choice: [facility]  CM contacted family/caregiver?: No- see comments (alert and oriented)  Were Treatment Team discharge recommendations reviewed with patient/caregiver?: Yes  Did patient/caregiver verbalize understanding of patient care needs?: Yes  Were patient/caregiver advised of the risks associated with not following Treatment Team discharge recommendations?: Yes                   Other Referral/Resources/Interventions Provided:  Interventions: [facility]  Referral Comments: Pt to return home and follow-up with [facility]         Treatment Team Recommendation: Home with Home Health Care  Discharge Destination Plan:: Home with Home Health Care  Transport at Discharge : Family                             IMM Given (Date):: 05/01/25  IMM Given to:: Patient                        CM completed price check for Eliquis, cost is $15.00.      IMM reviewed with patient, patient agrees with discharge determination.

## 2025-05-01 NOTE — DISCHARGE SUMMARY
"Discharge Summary - Hospitalist   Name: Patrica Grimaldo 80 y.o. female I MRN: [id number]  Unit/Bed#: [facility] 905-01 I Date of Admission: 4/27/2025   Date of Service: 5/1/2025 I Hospital Day: 4     Assessment & Plan  Acute on chronic hypoxemic respiratory failure with (HCC)  Patient presented with chest pressure, shortness of breath, wheezing.  pCO2 elevated at 60 from baseline in the 50s  Was placed on supplemental oxygen to keep O2 sat between 88 to 92%  Secondary to COPD has remained show  Evaluated by pulmonology during hospital stay with plan for outpatient follow-up   Patient remained stable on room air with sats above 94%  COPD with acute exacerbation (HCC)  S/p IV Solu-Medrol while inpatient.  Discharged with prednisone taper  Discharged with breathing treatments.  Trelegy Ellipta will need prior Auth, discussed with pulmonology who will follow-up outpatient  Encouraged tobacco cessation  Pulmonary rehab ordered  Nicotine abuse  Discharged with nicotine patch and Chantix  Encouraged cessation  Allergic rhinitis  Continue nasal saline spray  Abnormal CT of the chest  CTA revealed, \"No acute pulmonary embolus. Possible webs in right pulmonary arterial branches, suggesting sequela of chronic pulmonary embolus. Severe emphysema.\"  Lower extremity venous duplex revealed chronic nonocclusive DVT in the proximal to distal femoral vein and in the proximal popliteal vein.    Discharged on Eliquis 2.5 mg twice daily per hematology  Hypercoagulable state due to chronic atrial fibrillation (HCC)  Patient evaluated by hematology during this hospital stay who recommended anticoagulation given her chronic DVT  Started on Eliquis 2.5 mg twice daily on 4/30.  Price check $15 per month, patient and son report able to afford.  Outpatient hematology follow-up  Generalized anxiety disorder  Continue with Xanax as needed  PCP follow-up  Essential hypertension  Continue home metoprolol nightly  BP reviewed  Duodenal ulcer  Continue " with PPI twice daily  PVC (premature ventricular contraction)  Continue metoprolol   Abnormal thyroid function test  TSH 0.076 and free T4 1.32  T3 WNL at 3.33  Outpatient PCP follow-up    Hyperkalemia  Potassium 6.0 on 4/30, hemolyzed sample  Potassium stable     Medical Problems       Resolved Problems  Date Reviewed: 4/30/2025   None       Discharging Physician / Practitioner: Khalida Rader MD  PCP: Janine Marcus,   Admission Date:   Admission Orders (From admission, onward)       Ordered        04/27/25 2208  INPATIENT ADMISSION  Once                          Discharge Date: 05/01/25    Consultations During Hospital Stay:  Pulmonology  Hematology    Procedures Performed:   None    Significant Findings / Test Results:   Acute on chronic hypoxic respiratory failure  COPD exacerbation    Incidental Findings:   Right lower extremity chronic nonocclusive DVT  Previous provider reviewed the above mentioned incidental findings with the patient and/or family and they expressed understanding.    Test Results Pending at Discharge (will require follow up):   None     Outpatient Tests Requested:  None    Complications:  None    Reason for Admission: Shortness of breath    Hospital Course:   Patrica Grimaldo is a 80 y.o. female patient who originally presented to the hospital on 4/27/2025 due to shortness of breath and increased cough.  On admission was found to be hypoxic secondary to COPD exacerbation.  Received IV steroids and breathing treatments with pulmonology and was later transition to oral steroids to continue at discharge.  Her oxygen was weaned and patient remained stable on room air at the time of discharge.  During this hospital stay she was also evaluated by hematology given CT chest findings of chronic pulmonary embolism and right lower extremity chronic nonocclusive DVT.  She was recommended to be on low-dose Eliquis given her hypercoagulable state.    She was also encouraged to stop smoking and  "nicotine patches and Chantix were sent to her pharmacy.    Please see above list of diagnoses and related plan for additional information.     Condition at Discharge: stable    Discharge Day Visit / Exam:   Subjective: No events overnight.  Patient reports feeling well this morning.  Has no chest pain or shortness of breath.  Has been tolerating oral intake with no nausea or vomiting.  Vitals: Blood Pressure: 130/79 (05/01/25 0705)  Pulse: 82 (05/01/25 0705)  Temperature: 98.2 °F (36.8 °C) (05/01/25 0705)  Temp Source: Oral (04/28/25 0222)  Respirations: 17 (05/01/25 0200)  Height: 5' 4\" (162.6 cm) (04/29/25 1200)  Weight - Scale: 44.2 kg (97 lb 7.1 oz) (04/29/25 1200)  SpO2: 95 % (05/01/25 0705)  Physical Exam  Vitals and nursing note reviewed.   Constitutional:       General: She is not in acute distress.     Appearance: She is well-developed.   HENT:      Head: Normocephalic and atraumatic.   Eyes:      Conjunctiva/sclera: Conjunctivae normal.   Cardiovascular:      Rate and Rhythm: Normal rate and regular rhythm.   Pulmonary:      Effort: No respiratory distress.      Breath sounds: No wheezing.      Comments: Decreased breath sounds bilaterally  Poor effort  Abdominal:      Palpations: Abdomen is soft.      Tenderness: There is no abdominal tenderness.   Musculoskeletal:         General: No swelling.      Cervical back: Neck supple.   Skin:     General: Skin is warm and dry.      Capillary Refill: Capillary refill takes less than 2 seconds.   Neurological:      Mental Status: She is alert.   Psychiatric:         Mood and Affect: Mood normal.         Behavior: Behavior normal.          Discussion with Family: Updated  (son) at bedside.    Discharge instructions/Information to patient and family:   See after visit summary for information provided to patient and family.      Provisions for Follow-Up Care:  See after visit summary for information related to follow-up care and any pertinent home health " orders.      Mobility at time of Discharge:   Basic Mobility Inpatient Raw Score: 17  JH-HLM Goal: 5: Stand one or more mins  JH-HLM Achieved: 5: Stand (1 or more minutes)  HLM Goal achieved. Continue to encourage appropriate mobility.     Disposition:   Home    Planned Readmission: no    Discharge Medications:  See after visit summary for reconciled discharge medications provided to patient and/or family.      Administrative Statements   Discharge Statement:  I have spent a total time of 40 minutes in caring for this patient on the day of the visit/encounter. .    **Please Note: This note may have been constructed using a voice recognition system**

## 2025-05-02 NOTE — UTILIZATION REVIEW
NOTIFICATION OF ADMISSION DISCHARGE   This is a Notification of Discharge from [facility]. Please be advised that this patient has been discharge from our facility. Below you will find the admission and discharge date and time including the patient’s disposition.   UTILIZATION REVIEW CONTACT:  Utilization Review Assistants  Network Utilization Review Department  Phone: [phone] x carefully listen to the prompts. All voicemails are confidential.  Email: [email or web address]     ADMISSION INFORMATION  PRESENTATION DATE: 4/27/2025  5:47 PM  OBERVATION ADMISSION DATE: N/A  INPATIENT ADMISSION DATE: 4/27/25 10:08 PM   DISCHARGE DATE: 5/1/2025  1:20 PM   DISPOSITION:Home/Self Care    Network Utilization Review Department  ATTENTION: Please call with any questions or concerns to [phone] and carefully listen to the prompts so that you are directed to the right person. All voicemails are confidential.   For Discharge needs, contact Care Management DC Support Team at [phone] opt. 2  Send all requests for admission clinical reviews, approved or denied determinations and any other requests to dedicated fax number below belonging to the campus where the patient is receiving treatment. List of dedicated fax numbers for the Facilities:  FACILITY NAME UR FAX NUMBER   ADMISSION DENIALS (Administrative/Medical Necessity) [phone]   DISCHARGE SUPPORT TEAM ([facility]) [phone]   PARENT CHILD HEALTH (Maternity/NICU/Pediatrics) [phone]   [facility] [phone]   [facility] [phone]   [facility] [phone]   [facility] [phone]   [facility] [phone]   General acute hospital [phone]   [facility] [phone]   [facility] [phone]   [facility]  [facility] [phone]   [facility] [phone]   [facility] [phone]   [facility] [phone]

## 2025-05-31 DIAGNOSIS — K26.9 DUODENAL ULCER: ICD-10-CM

## 2025-06-01 RX ORDER — METOPROLOL SUCCINATE 50 MG/1
50 TABLET, EXTENDED RELEASE ORAL
Qty: 30 TABLET | Refills: 5 | Status: SHIPPED | OUTPATIENT
Start: 2025-06-01

## 2025-07-01 ENCOUNTER — TELEPHONE (OUTPATIENT)
Dept: FAMILY MEDICINE CLINIC | Facility: CLINIC | Age: 80
End: 2025-07-01

## 2025-07-01 DIAGNOSIS — J43.8 OTHER EMPHYSEMA (HCC): Primary | ICD-10-CM

## 2025-07-01 NOTE — TELEPHONE ENCOUNTER
Raffi patient's son called requesting umeclidinium 62.5 mcg/actuation AEPB inhaler it was prescribe by another provider at the hospital please advise   Tod SALAMANCAil

## 2025-07-02 DIAGNOSIS — J44.1 COPD EXACERBATION (HCC): ICD-10-CM

## 2025-07-02 DIAGNOSIS — J44.9 CHRONIC OBSTRUCTIVE PULMONARY DISEASE, UNSPECIFIED COPD TYPE (HCC): ICD-10-CM

## 2025-07-02 RX ORDER — FLUTICASONE PROPIONATE AND SALMETEROL 250; 50 UG/1; UG/1
1 POWDER RESPIRATORY (INHALATION) 2 TIMES DAILY
Qty: 60 BLISTER | Refills: 5 | Status: SHIPPED | OUTPATIENT
Start: 2025-07-02 | End: 2025-07-02

## 2025-07-02 NOTE — TELEPHONE ENCOUNTER
Please call Raffi  Is she still taking Budeson-Glycopyrrol-Formoterol 160-9-4.8 MCG/ACT AERO ? They have similar ingredients. She should only be on one inhaler like that.  If she is not taking it then I will call in the new medication  Thank you,  Janine Marcus, DO

## 2025-07-02 NOTE — TELEPHONE ENCOUNTER
The patient's son called in regard the inhaler.   We ask Dr. Marcus question about Budeson-Glycopyrrol-Formoterol 160-9-4.8 MCG/ACT AERO  he report her mother is not taking that one only   Fluticasone-Salmeterol (Advair) 250-50 mcg/dose inhaler  He would like a order for this inhaler.

## 2025-07-02 NOTE — TELEPHONE ENCOUNTER
7/2/2025 4:22 PM returned call to Patrica Burciaga and I discussed her inhalers along with her son Raheem.  We discussed what she has been taking and will simplify her regimen to just the Breztri twice daily and discontinue the Advair    Janine Marcus, DO

## 2025-08-01 DIAGNOSIS — I82.509 CHRONIC DEEP VEIN THROMBOSIS (DVT) (HCC): ICD-10-CM
